# Patient Record
Sex: FEMALE | Race: WHITE | NOT HISPANIC OR LATINO
[De-identification: names, ages, dates, MRNs, and addresses within clinical notes are randomized per-mention and may not be internally consistent; named-entity substitution may affect disease eponyms.]

---

## 2017-02-08 ENCOUNTER — MEDICATION RENEWAL (OUTPATIENT)
Age: 82
End: 2017-02-08

## 2017-03-06 ENCOUNTER — RX RENEWAL (OUTPATIENT)
Age: 82
End: 2017-03-06

## 2017-06-07 ENCOUNTER — RX RENEWAL (OUTPATIENT)
Age: 82
End: 2017-06-07

## 2017-06-12 ENCOUNTER — RX RENEWAL (OUTPATIENT)
Age: 82
End: 2017-06-12

## 2017-06-19 ENCOUNTER — MEDICATION RENEWAL (OUTPATIENT)
Age: 82
End: 2017-06-19

## 2017-06-22 ENCOUNTER — MEDICATION RENEWAL (OUTPATIENT)
Age: 82
End: 2017-06-22

## 2017-07-06 ENCOUNTER — APPOINTMENT (OUTPATIENT)
Dept: CARDIOLOGY | Facility: CLINIC | Age: 82
End: 2017-07-06

## 2017-07-06 ENCOUNTER — APPOINTMENT (OUTPATIENT)
Dept: PULMONOLOGY | Facility: CLINIC | Age: 82
End: 2017-07-06

## 2017-07-06 ENCOUNTER — NON-APPOINTMENT (OUTPATIENT)
Age: 82
End: 2017-07-06

## 2017-07-06 VITALS
BODY MASS INDEX: 25.49 KG/M2 | HEART RATE: 82 BPM | DIASTOLIC BLOOD PRESSURE: 91 MMHG | RESPIRATION RATE: 12 BRPM | OXYGEN SATURATION: 99 % | SYSTOLIC BLOOD PRESSURE: 168 MMHG | HEIGHT: 65 IN | WEIGHT: 153 LBS | TEMPERATURE: 98.6 F

## 2017-07-06 VITALS
OXYGEN SATURATION: 99 % | DIASTOLIC BLOOD PRESSURE: 70 MMHG | SYSTOLIC BLOOD PRESSURE: 130 MMHG | RESPIRATION RATE: 12 BRPM | HEART RATE: 66 BPM

## 2017-07-06 VITALS — DIASTOLIC BLOOD PRESSURE: 78 MMHG | HEART RATE: 66 BPM | SYSTOLIC BLOOD PRESSURE: 132 MMHG

## 2017-07-06 DIAGNOSIS — I34.0 NONRHEUMATIC MITRAL (VALVE) INSUFFICIENCY: ICD-10-CM

## 2017-07-06 DIAGNOSIS — Z82.3 FAMILY HISTORY OF STROKE: ICD-10-CM

## 2017-07-06 DIAGNOSIS — Z87.891 PERSONAL HISTORY OF NICOTINE DEPENDENCE: ICD-10-CM

## 2017-07-07 LAB
ALBUMIN SERPL ELPH-MCNC: 4.2 G/DL
ALP BLD-CCNC: 48 U/L
ALT SERPL-CCNC: 7 U/L
ANION GAP SERPL CALC-SCNC: 16 MMOL/L
AST SERPL-CCNC: 17 U/L
BASOPHILS # BLD AUTO: 0.08 K/UL
BASOPHILS NFR BLD AUTO: 1 %
BILIRUB SERPL-MCNC: 0.4 MG/DL
BUN SERPL-MCNC: 18 MG/DL
CALCIUM SERPL-MCNC: 9.2 MG/DL
CHLORIDE SERPL-SCNC: 108 MMOL/L
CHOLEST SERPL-MCNC: 224 MG/DL
CHOLEST/HDLC SERPL: 4.6 RATIO
CO2 SERPL-SCNC: 23 MMOL/L
CREAT SERPL-MCNC: 0.88 MG/DL
EOSINOPHIL # BLD AUTO: 0.4 K/UL
EOSINOPHIL NFR BLD AUTO: 5 %
GLUCOSE SERPL-MCNC: 80 MG/DL
HBA1C MFR BLD HPLC: 5.4 %
HCT VFR BLD CALC: 38.2 %
HDLC SERPL-MCNC: 49 MG/DL
HGB BLD-MCNC: 12.2 G/DL
IMM GRANULOCYTES NFR BLD AUTO: 0.1 %
LDLC SERPL CALC-MCNC: 152 MG/DL
LYMPHOCYTES # BLD AUTO: 3.37 K/UL
LYMPHOCYTES NFR BLD AUTO: 41.9 %
MAN DIFF?: NORMAL
MCHC RBC-ENTMCNC: 29.2 PG
MCHC RBC-ENTMCNC: 31.9 GM/DL
MCV RBC AUTO: 91.4 FL
MONOCYTES # BLD AUTO: 0.56 K/UL
MONOCYTES NFR BLD AUTO: 7 %
NEUTROPHILS # BLD AUTO: 3.62 K/UL
NEUTROPHILS NFR BLD AUTO: 45 %
PLATELET # BLD AUTO: 291 K/UL
POTASSIUM SERPL-SCNC: 4.4 MMOL/L
PROT SERPL-MCNC: 7.3 G/DL
RBC # BLD: 4.18 M/UL
RBC # FLD: 15.9 %
SODIUM SERPL-SCNC: 147 MMOL/L
TRIGL SERPL-MCNC: 116 MG/DL
TSH SERPL-ACNC: 1.36 UIU/ML
WBC # FLD AUTO: 8.04 K/UL

## 2017-10-23 ENCOUNTER — APPOINTMENT (OUTPATIENT)
Dept: INTERNAL MEDICINE | Facility: CLINIC | Age: 82
End: 2017-10-23
Payer: MEDICARE

## 2017-10-23 VITALS
OXYGEN SATURATION: 98 % | DIASTOLIC BLOOD PRESSURE: 78 MMHG | WEIGHT: 145 LBS | RESPIRATION RATE: 12 BRPM | BODY MASS INDEX: 24.16 KG/M2 | TEMPERATURE: 98.2 F | HEIGHT: 65 IN | HEART RATE: 68 BPM | SYSTOLIC BLOOD PRESSURE: 150 MMHG

## 2017-10-23 PROCEDURE — 99214 OFFICE O/P EST MOD 30 MIN: CPT

## 2018-01-10 ENCOUNTER — APPOINTMENT (OUTPATIENT)
Dept: INTERNAL MEDICINE | Facility: CLINIC | Age: 83
End: 2018-01-10
Payer: MEDICARE

## 2018-01-10 VITALS
DIASTOLIC BLOOD PRESSURE: 81 MMHG | BODY MASS INDEX: 23.82 KG/M2 | RESPIRATION RATE: 12 BRPM | HEART RATE: 74 BPM | TEMPERATURE: 98.1 F | HEIGHT: 65 IN | OXYGEN SATURATION: 98 % | WEIGHT: 143 LBS | SYSTOLIC BLOOD PRESSURE: 137 MMHG

## 2018-01-10 DIAGNOSIS — K59.00 CONSTIPATION, UNSPECIFIED: ICD-10-CM

## 2018-01-10 PROCEDURE — 99214 OFFICE O/P EST MOD 30 MIN: CPT

## 2018-01-11 ENCOUNTER — RX RENEWAL (OUTPATIENT)
Age: 83
End: 2018-01-11

## 2018-01-18 ENCOUNTER — APPOINTMENT (OUTPATIENT)
Dept: CARDIOLOGY | Facility: CLINIC | Age: 83
End: 2018-01-18
Payer: MEDICARE

## 2018-01-18 ENCOUNTER — NON-APPOINTMENT (OUTPATIENT)
Age: 83
End: 2018-01-18

## 2018-01-18 VITALS
SYSTOLIC BLOOD PRESSURE: 143 MMHG | RESPIRATION RATE: 12 BRPM | TEMPERATURE: 97.9 F | HEART RATE: 64 BPM | DIASTOLIC BLOOD PRESSURE: 80 MMHG | OXYGEN SATURATION: 97 % | HEIGHT: 65 IN | WEIGHT: 145 LBS | BODY MASS INDEX: 24.16 KG/M2

## 2018-01-18 VITALS — DIASTOLIC BLOOD PRESSURE: 78 MMHG | SYSTOLIC BLOOD PRESSURE: 150 MMHG

## 2018-01-18 VITALS — SYSTOLIC BLOOD PRESSURE: 148 MMHG | DIASTOLIC BLOOD PRESSURE: 78 MMHG | HEART RATE: 56 BPM

## 2018-01-18 PROCEDURE — 93000 ELECTROCARDIOGRAM COMPLETE: CPT

## 2018-01-18 PROCEDURE — 99214 OFFICE O/P EST MOD 30 MIN: CPT | Mod: 25

## 2018-01-19 LAB
ALBUMIN SERPL ELPH-MCNC: 4 G/DL
ALP BLD-CCNC: 49 U/L
ALT SERPL-CCNC: 5 U/L
ANION GAP SERPL CALC-SCNC: 14 MMOL/L
AST SERPL-CCNC: 20 U/L
BASOPHILS # BLD AUTO: 0.06 K/UL
BASOPHILS NFR BLD AUTO: 0.7 %
BILIRUB SERPL-MCNC: 0.2 MG/DL
BUN SERPL-MCNC: 16 MG/DL
CALCIUM SERPL-MCNC: 9.8 MG/DL
CHLORIDE SERPL-SCNC: 106 MMOL/L
CHOLEST SERPL-MCNC: 185 MG/DL
CHOLEST/HDLC SERPL: 4 RATIO
CO2 SERPL-SCNC: 24 MMOL/L
CREAT SERPL-MCNC: 0.86 MG/DL
EOSINOPHIL # BLD AUTO: 0.53 K/UL
EOSINOPHIL NFR BLD AUTO: 5.9 %
GLUCOSE SERPL-MCNC: 67 MG/DL
HCT VFR BLD CALC: 38.9 %
HDLC SERPL-MCNC: 46 MG/DL
HGB BLD-MCNC: 11.7 G/DL
IMM GRANULOCYTES NFR BLD AUTO: 0.1 %
LDLC SERPL CALC-MCNC: 104 MG/DL
LYMPHOCYTES # BLD AUTO: 3.96 K/UL
LYMPHOCYTES NFR BLD AUTO: 44.4 %
MAN DIFF?: NORMAL
MCHC RBC-ENTMCNC: 28 PG
MCHC RBC-ENTMCNC: 30.1 GM/DL
MCV RBC AUTO: 93.1 FL
MONOCYTES # BLD AUTO: 0.8 K/UL
MONOCYTES NFR BLD AUTO: 9 %
NEUTROPHILS # BLD AUTO: 3.56 K/UL
NEUTROPHILS NFR BLD AUTO: 39.9 %
PLATELET # BLD AUTO: 281 K/UL
POTASSIUM SERPL-SCNC: 4.2 MMOL/L
PROT SERPL-MCNC: 7 G/DL
RBC # BLD: 4.18 M/UL
RBC # FLD: 16.5 %
SODIUM SERPL-SCNC: 144 MMOL/L
TRIGL SERPL-MCNC: 173 MG/DL
TSH SERPL-ACNC: 1.88 UIU/ML
WBC # FLD AUTO: 8.92 K/UL

## 2018-02-05 ENCOUNTER — NON-APPOINTMENT (OUTPATIENT)
Age: 83
End: 2018-02-05

## 2018-02-05 ENCOUNTER — APPOINTMENT (OUTPATIENT)
Dept: CARDIOLOGY | Facility: CLINIC | Age: 83
End: 2018-02-05
Payer: MEDICARE

## 2018-02-05 VITALS
BODY MASS INDEX: 23.99 KG/M2 | HEART RATE: 69 BPM | OXYGEN SATURATION: 100 % | DIASTOLIC BLOOD PRESSURE: 62 MMHG | SYSTOLIC BLOOD PRESSURE: 148 MMHG | HEIGHT: 65 IN | RESPIRATION RATE: 12 BRPM | WEIGHT: 144 LBS

## 2018-02-05 VITALS — HEART RATE: 66 BPM | SYSTOLIC BLOOD PRESSURE: 130 MMHG | DIASTOLIC BLOOD PRESSURE: 68 MMHG

## 2018-02-05 DIAGNOSIS — I10 ESSENTIAL (PRIMARY) HYPERTENSION: ICD-10-CM

## 2018-02-05 LAB
ANION GAP SERPL CALC-SCNC: 17 MMOL/L
BASOPHILS # BLD AUTO: 0.09 K/UL
BASOPHILS NFR BLD AUTO: 1.1 %
BUN SERPL-MCNC: 16 MG/DL
CALCIUM SERPL-MCNC: 9.7 MG/DL
CHLORIDE SERPL-SCNC: 105 MMOL/L
CO2 SERPL-SCNC: 23 MMOL/L
CREAT SERPL-MCNC: 0.83 MG/DL
EOSINOPHIL # BLD AUTO: 0.55 K/UL
EOSINOPHIL NFR BLD AUTO: 6.9 %
GLUCOSE SERPL-MCNC: 70 MG/DL
HCT VFR BLD CALC: 38.6 %
HGB BLD-MCNC: 11.8 G/DL
IMM GRANULOCYTES NFR BLD AUTO: 0.1 %
LYMPHOCYTES # BLD AUTO: 3.05 K/UL
LYMPHOCYTES NFR BLD AUTO: 38.1 %
MAN DIFF?: NORMAL
MCHC RBC-ENTMCNC: 28.6 PG
MCHC RBC-ENTMCNC: 30.6 GM/DL
MCV RBC AUTO: 93.7 FL
MONOCYTES # BLD AUTO: 0.64 K/UL
MONOCYTES NFR BLD AUTO: 8 %
NEUTROPHILS # BLD AUTO: 3.66 K/UL
NEUTROPHILS NFR BLD AUTO: 45.8 %
PLATELET # BLD AUTO: 312 K/UL
POTASSIUM SERPL-SCNC: 4.7 MMOL/L
RBC # BLD: 4.12 M/UL
RBC # FLD: 16.1 %
SODIUM SERPL-SCNC: 145 MMOL/L
WBC # FLD AUTO: 8 K/UL

## 2018-02-05 PROCEDURE — 93306 TTE W/DOPPLER COMPLETE: CPT

## 2018-02-05 PROCEDURE — 93000 ELECTROCARDIOGRAM COMPLETE: CPT

## 2018-02-05 PROCEDURE — 99214 OFFICE O/P EST MOD 30 MIN: CPT | Mod: 25

## 2018-03-02 ENCOUNTER — APPOINTMENT (OUTPATIENT)
Dept: GASTROENTEROLOGY | Facility: CLINIC | Age: 83
End: 2018-03-02

## 2018-03-10 ENCOUNTER — RX RENEWAL (OUTPATIENT)
Age: 83
End: 2018-03-10

## 2018-05-15 ENCOUNTER — APPOINTMENT (OUTPATIENT)
Dept: CARDIOLOGY | Facility: CLINIC | Age: 83
End: 2018-05-15
Payer: MEDICARE

## 2018-05-15 ENCOUNTER — NON-APPOINTMENT (OUTPATIENT)
Age: 83
End: 2018-05-15

## 2018-05-15 VITALS
HEIGHT: 65 IN | HEART RATE: 62 BPM | BODY MASS INDEX: 24.16 KG/M2 | SYSTOLIC BLOOD PRESSURE: 130 MMHG | RESPIRATION RATE: 12 BRPM | OXYGEN SATURATION: 99 % | DIASTOLIC BLOOD PRESSURE: 74 MMHG | WEIGHT: 145 LBS

## 2018-05-15 PROCEDURE — 99214 OFFICE O/P EST MOD 30 MIN: CPT | Mod: 25

## 2018-05-15 PROCEDURE — 93000 ELECTROCARDIOGRAM COMPLETE: CPT

## 2018-05-20 ENCOUNTER — NON-APPOINTMENT (OUTPATIENT)
Age: 83
End: 2018-05-20

## 2018-05-31 ENCOUNTER — APPOINTMENT (OUTPATIENT)
Dept: INTERNAL MEDICINE | Facility: CLINIC | Age: 83
End: 2018-05-31
Payer: MEDICARE

## 2018-05-31 VITALS
WEIGHT: 141 LBS | HEIGHT: 65 IN | SYSTOLIC BLOOD PRESSURE: 116 MMHG | HEART RATE: 69 BPM | TEMPERATURE: 98.3 F | OXYGEN SATURATION: 96 % | BODY MASS INDEX: 23.49 KG/M2 | DIASTOLIC BLOOD PRESSURE: 71 MMHG | RESPIRATION RATE: 12 BRPM

## 2018-05-31 DIAGNOSIS — M25.511 PAIN IN RIGHT SHOULDER: ICD-10-CM

## 2018-05-31 PROCEDURE — 99214 OFFICE O/P EST MOD 30 MIN: CPT

## 2018-06-03 NOTE — HISTORY OF PRESENT ILLNESS
[de-identified] : 88 year old female with h/o Hypertension/ Hyperlipidemia/ / CAD/ Osteoporosis presents today c/o worsening of chronic RT shoulder pain. Pt reports RT shoulder fracture many years ago. Pain moderate , worse with arm lifting, overhead activities. She takes Tylenol prn without much improvement. She is otherwise denies CP/SOB, dizziness, numbness in b/l extremities

## 2018-06-03 NOTE — PHYSICAL EXAM
[No Acute Distress] : no acute distress [Well Nourished] : well nourished [Well Developed] : well developed [Well-Appearing] : well-appearing [Normal Sclera/Conjunctiva] : normal sclera/conjunctiva [PERRL] : pupils equal round and reactive to light [EOMI] : extraocular movements intact [Normal Outer Ear/Nose] : the outer ears and nose were normal in appearance [Normal Oropharynx] : the oropharynx was normal [No JVD] : no jugular venous distention [Supple] : supple [No Lymphadenopathy] : no lymphadenopathy [No Respiratory Distress] : no respiratory distress  [Clear to Auscultation] : lungs were clear to auscultation bilaterally [No Accessory Muscle Use] : no accessory muscle use [Normal Rate] : normal rate  [Regular Rhythm] : with a regular rhythm [Normal S1, S2] : normal S1 and S2 [No Murmur] : no murmur heard [No Edema] : there was no peripheral edema [Soft] : abdomen soft [Non Tender] : non-tender [Non-distended] : non-distended [No HSM] : no HSM [Normal Bowel Sounds] : normal bowel sounds [Normal Posterior Cervical Nodes] : no posterior cervical lymphadenopathy [Normal Anterior Cervical Nodes] : no anterior cervical lymphadenopathy [No CVA Tenderness] : no CVA  tenderness [No Spinal Tenderness] : no spinal tenderness [No Joint Swelling] : no joint swelling [Grossly Normal Strength/Tone] : grossly normal strength/tone [No Rash] : no rash [Normal Gait] : normal gait [Coordination Grossly Intact] : coordination grossly intact [No Focal Deficits] : no focal deficits [Deep Tendon Reflexes (DTR)] : deep tendon reflexes were 2+ and symmetric [Normal Affect] : the affect was normal [Normal Insight/Judgement] : insight and judgment were intact [de-identified] : + RT shoulder tenderness with ROM

## 2018-11-05 ENCOUNTER — RX RENEWAL (OUTPATIENT)
Age: 83
End: 2018-11-05

## 2018-11-09 ENCOUNTER — RX RENEWAL (OUTPATIENT)
Age: 83
End: 2018-11-09

## 2018-11-12 ENCOUNTER — INPATIENT (INPATIENT)
Facility: HOSPITAL | Age: 83
LOS: 3 days | Discharge: NURSING, REHAB OR LONG-TERM CARE | DRG: 86 | End: 2018-11-16
Attending: INTERNAL MEDICINE | Admitting: HOSPITALIST
Payer: COMMERCIAL

## 2018-11-12 ENCOUNTER — NON-APPOINTMENT (OUTPATIENT)
Age: 83
End: 2018-11-12

## 2018-11-12 ENCOUNTER — APPOINTMENT (OUTPATIENT)
Dept: CARDIOLOGY | Facility: CLINIC | Age: 83
End: 2018-11-12
Payer: MEDICARE

## 2018-11-12 VITALS
OXYGEN SATURATION: 95 % | RESPIRATION RATE: 12 BRPM | SYSTOLIC BLOOD PRESSURE: 128 MMHG | HEART RATE: 101 BPM | DIASTOLIC BLOOD PRESSURE: 78 MMHG | HEIGHT: 65 IN

## 2018-11-12 VITALS
RESPIRATION RATE: 18 BRPM | TEMPERATURE: 97 F | OXYGEN SATURATION: 98 % | DIASTOLIC BLOOD PRESSURE: 77 MMHG | HEART RATE: 106 BPM | HEIGHT: 63 IN | SYSTOLIC BLOOD PRESSURE: 143 MMHG | WEIGHT: 125 LBS

## 2018-11-12 DIAGNOSIS — N39.0 URINARY TRACT INFECTION, SITE NOT SPECIFIED: ICD-10-CM

## 2018-11-12 DIAGNOSIS — I62.03 NONTRAUMATIC CHRONIC SUBDURAL HEMORRHAGE: ICD-10-CM

## 2018-11-12 LAB
ALBUMIN SERPL ELPH-MCNC: 3.3 G/DL — SIGNIFICANT CHANGE UP (ref 3.3–5)
ALP SERPL-CCNC: 39 U/L — LOW (ref 40–120)
ALT FLD-CCNC: 21 U/L — SIGNIFICANT CHANGE UP (ref 10–45)
ANION GAP SERPL CALC-SCNC: 11 MMOL/L — SIGNIFICANT CHANGE UP (ref 5–17)
ANION GAP SERPL CALC-SCNC: 12 MMOL/L — SIGNIFICANT CHANGE UP (ref 5–17)
APPEARANCE UR: CLEAR — SIGNIFICANT CHANGE UP
APTT BLD: 34.1 SEC — SIGNIFICANT CHANGE UP (ref 27.5–36.3)
AST SERPL-CCNC: 79 U/L — HIGH (ref 10–40)
BACTERIA # UR AUTO: ABNORMAL
BASOPHILS # BLD AUTO: 0 K/UL — SIGNIFICANT CHANGE UP (ref 0–0.2)
BASOPHILS NFR BLD AUTO: 0.4 % — SIGNIFICANT CHANGE UP (ref 0–2)
BILIRUB SERPL-MCNC: 0.4 MG/DL — SIGNIFICANT CHANGE UP (ref 0.2–1.2)
BILIRUB UR-MCNC: ABNORMAL
BUN SERPL-MCNC: 11 MG/DL — SIGNIFICANT CHANGE UP (ref 7–23)
BUN SERPL-MCNC: 11 MG/DL — SIGNIFICANT CHANGE UP (ref 7–23)
CALCIUM SERPL-MCNC: 8.9 MG/DL — SIGNIFICANT CHANGE UP (ref 8.4–10.5)
CALCIUM SERPL-MCNC: 8.9 MG/DL — SIGNIFICANT CHANGE UP (ref 8.4–10.5)
CHLORIDE SERPL-SCNC: 102 MMOL/L — SIGNIFICANT CHANGE UP (ref 96–108)
CHLORIDE SERPL-SCNC: 105 MMOL/L — SIGNIFICANT CHANGE UP (ref 96–108)
CO2 SERPL-SCNC: 22 MMOL/L — SIGNIFICANT CHANGE UP (ref 22–31)
CO2 SERPL-SCNC: 24 MMOL/L — SIGNIFICANT CHANGE UP (ref 22–31)
COLOR SPEC: YELLOW — SIGNIFICANT CHANGE UP
CREAT SERPL-MCNC: 0.32 MG/DL — LOW (ref 0.5–1.3)
CREAT SERPL-MCNC: 0.37 MG/DL — LOW (ref 0.5–1.3)
DIFF PNL FLD: ABNORMAL
EOSINOPHIL # BLD AUTO: 0.2 K/UL — SIGNIFICANT CHANGE UP (ref 0–0.5)
EOSINOPHIL NFR BLD AUTO: 2.5 % — SIGNIFICANT CHANGE UP (ref 0–6)
EPI CELLS # UR: 0 /HPF — SIGNIFICANT CHANGE UP
GLUCOSE SERPL-MCNC: 94 MG/DL — SIGNIFICANT CHANGE UP (ref 70–99)
GLUCOSE SERPL-MCNC: 96 MG/DL — SIGNIFICANT CHANGE UP (ref 70–99)
GLUCOSE UR QL: NEGATIVE — SIGNIFICANT CHANGE UP
HCT VFR BLD CALC: 37.1 % — SIGNIFICANT CHANGE UP (ref 34.5–45)
HGB BLD-MCNC: 11.8 G/DL — SIGNIFICANT CHANGE UP (ref 11.5–15.5)
HYALINE CASTS # UR AUTO: 4 /LPF — HIGH (ref 0–2)
INR BLD: 1.12 RATIO — SIGNIFICANT CHANGE UP (ref 0.88–1.16)
KETONES UR-MCNC: ABNORMAL
LEUKOCYTE ESTERASE UR-ACNC: ABNORMAL
LYMPHOCYTES # BLD AUTO: 1.5 K/UL — SIGNIFICANT CHANGE UP (ref 1–3.3)
LYMPHOCYTES # BLD AUTO: 22.1 % — SIGNIFICANT CHANGE UP (ref 13–44)
MCHC RBC-ENTMCNC: 28.9 PG — SIGNIFICANT CHANGE UP (ref 27–34)
MCHC RBC-ENTMCNC: 31.8 GM/DL — LOW (ref 32–36)
MCV RBC AUTO: 91 FL — SIGNIFICANT CHANGE UP (ref 80–100)
MONOCYTES # BLD AUTO: 0.8 K/UL — SIGNIFICANT CHANGE UP (ref 0–0.9)
MONOCYTES NFR BLD AUTO: 11.8 % — SIGNIFICANT CHANGE UP (ref 2–14)
NEUTROPHILS # BLD AUTO: 4.3 K/UL — SIGNIFICANT CHANGE UP (ref 1.8–7.4)
NEUTROPHILS NFR BLD AUTO: 63.2 % — SIGNIFICANT CHANGE UP (ref 43–77)
NITRITE UR-MCNC: NEGATIVE — SIGNIFICANT CHANGE UP
PH UR: 6.5 — SIGNIFICANT CHANGE UP (ref 5–8)
PLATELET # BLD AUTO: 274 K/UL — SIGNIFICANT CHANGE UP (ref 150–400)
POTASSIUM SERPL-MCNC: 3.4 MMOL/L — LOW (ref 3.5–5.3)
POTASSIUM SERPL-MCNC: >9 MMOL/L — CRITICAL HIGH (ref 3.5–5.3)
POTASSIUM SERPL-SCNC: 3.4 MMOL/L — LOW (ref 3.5–5.3)
POTASSIUM SERPL-SCNC: >9 MMOL/L — CRITICAL HIGH (ref 3.5–5.3)
PROT SERPL-MCNC: 6.9 G/DL — SIGNIFICANT CHANGE UP (ref 6–8.3)
PROT UR-MCNC: ABNORMAL
PROTHROM AB SERPL-ACNC: 12.9 SEC — SIGNIFICANT CHANGE UP (ref 10–12.9)
RBC # BLD: 4.08 M/UL — SIGNIFICANT CHANGE UP (ref 3.8–5.2)
RBC # FLD: 17.6 % — HIGH (ref 10.3–14.5)
RBC CASTS # UR COMP ASSIST: 15 /HPF — HIGH (ref 0–4)
SODIUM SERPL-SCNC: 135 MMOL/L — SIGNIFICANT CHANGE UP (ref 135–145)
SODIUM SERPL-SCNC: 141 MMOL/L — SIGNIFICANT CHANGE UP (ref 135–145)
SP GR SPEC: 1.02 — SIGNIFICANT CHANGE UP (ref 1.01–1.02)
UROBILINOGEN FLD QL: ABNORMAL
WBC # BLD: 6.8 K/UL — SIGNIFICANT CHANGE UP (ref 3.8–10.5)
WBC # FLD AUTO: 6.8 K/UL — SIGNIFICANT CHANGE UP (ref 3.8–10.5)
WBC UR QL: 40 /HPF — HIGH (ref 0–5)

## 2018-11-12 PROCEDURE — 93010 ELECTROCARDIOGRAM REPORT: CPT

## 2018-11-12 PROCEDURE — 99285 EMERGENCY DEPT VISIT HI MDM: CPT | Mod: 25

## 2018-11-12 PROCEDURE — 99215 OFFICE O/P EST HI 40 MIN: CPT | Mod: 25

## 2018-11-12 PROCEDURE — 71045 X-RAY EXAM CHEST 1 VIEW: CPT | Mod: 26

## 2018-11-12 PROCEDURE — 93000 ELECTROCARDIOGRAM COMPLETE: CPT

## 2018-11-12 PROCEDURE — 70450 CT HEAD/BRAIN W/O DYE: CPT | Mod: 26

## 2018-11-12 RX ORDER — LEVETIRACETAM 250 MG/1
500 TABLET, FILM COATED ORAL ONCE
Qty: 0 | Refills: 0 | Status: COMPLETED | OUTPATIENT
Start: 2018-11-12 | End: 2018-11-12

## 2018-11-12 RX ORDER — CEFTRIAXONE 500 MG/1
1 INJECTION, POWDER, FOR SOLUTION INTRAMUSCULAR; INTRAVENOUS ONCE
Qty: 0 | Refills: 0 | Status: COMPLETED | OUTPATIENT
Start: 2018-11-12 | End: 2018-11-12

## 2018-11-12 RX ADMIN — LEVETIRACETAM 400 MILLIGRAM(S): 250 TABLET, FILM COATED ORAL at 23:13

## 2018-11-12 RX ADMIN — CEFTRIAXONE 100 GRAM(S): 500 INJECTION, POWDER, FOR SOLUTION INTRAMUSCULAR; INTRAVENOUS at 22:25

## 2018-11-12 NOTE — ED PROVIDER NOTE - PMH
Asthma    Generalized Osteoarthritis    History of Osteoporosis    Hyperlipemia    Hypertension    PVD (Peripheral Vascular Disease)

## 2018-11-12 NOTE — ED ADULT NURSE NOTE - NSIMPLEMENTINTERV_GEN_ALL_ED
Implemented All Fall with Harm Risk Interventions:  [place] to call system. Call bell, personal items and telephone within reach. Instruct patient to call for assistance. Room bathroom lighting operational. Non-slip footwear when patient is off stretcher. Physically safe environment: no spills, clutter or unnecessary equipment. Stretcher in lowest position, wheels locked, appropriate side rails in place. Provide visual cue, wrist band, yellow gown, etc. Monitor gait and stability. Monitor for mental status changes and reorient to person, place, and time. Review medications for side effects contributing to fall risk. Reinforce activity limits and safety measures with patient and family. Provide visual clues: red socks.

## 2018-11-12 NOTE — ED PROVIDER NOTE - PROGRESS NOTE DETAILS
spoke with neurosurgery regarding findings. resident Dr. Maza states no intervention necessary given size and chronicity. will admit patient to medicine secondary to significant deconditioning pending results of remaining labs. -Jaqui Noble PA-C

## 2018-11-12 NOTE — ED PROVIDER NOTE - ATTENDING CONTRIBUTION TO CARE
attending Radha: 88yF h/o HTN, HLD, brought in by family for gradual decline in mental status and independence x months after fall in months ago in [place]. Reports SDH 2/2 fall that required surgical intervention x 2. Now with worsening ability to care for self, ambulate or perform any ADLs. Patient returned from [place] yesterday. Sent in for eval by PMD. Will obtain labs, CTH, UA and admit

## 2018-11-12 NOTE — ED ADULT NURSE NOTE - CHIEF COMPLAINT QUOTE
fell in July in [place].  Was told recently has ICH.  MAy have had some surgery for ICH.  Family is unsure as information is in [place].  Pt has worsening AMS

## 2018-11-12 NOTE — CONSULT NOTE ADULT - ATTENDING COMMENTS
As per resident note above.  Seen and evaluated with resident.  Agree with evaluation and assessment.  PT is alert, conversant, MCKINNEY to command.  CT with left residual subdural collection, minimal mass effect. Repeat CT unchanged. Per , pt had 2 operations, had to return to OR for reaccumulation of subdural.  Pt was not moving right side prior to the surgery.  Pt appears on the mend from subdural drainage in [place].  She should avoid antiplatelets or full anticoagulation at this time and can follow up in my office for further radiographic and clinical evaluation.

## 2018-11-12 NOTE — ED ADULT NURSE NOTE - OBJECTIVE STATEMENT
87 y/o with PMH HTN HDL female presenting to ED for headache. As per pt family: "She was in [place] and had a fall in july, they thought she just had a small concussion. In september, her  noticed slurred speech and when she was walking her right side would give out. They diagnosed her with a chronic subdural hematoma. She got drains placed twice. She just flew back from [place] less than 24 hours ago. Her cardiologist saw her and thought that she needed to come to the ED to be evaluated." Upon exam, pt AOx1, bilateral  strength equal 3/5 strength, right leg drop noted, no facial droop noted, no arm drift noted, lungs cta bilaterally, no difficulty speaking in complete sentences, s1s2 heart sounds heard, pulses x 4, camilo x4, abdomen soft nontender nondistended, skin intact. 89 y/o with PMH HTN HDL female presenting to ED for headache. As per pt family: "She was in [place] and had a fall in july, they thought she just had a small concussion. In september, her  noticed slurred speech and when she was walking her right side would give out. They diagnosed her with a chronic subdural hematoma. She got drains placed twice. She just flew back from [place] less than 24 hours ago. Her cardiologist saw her and thought that she needed to come to the ED to be evaluated." Upon exam, pt AOx1, bilateral  strength equal 3/5 strength, right eye 3mm PERRLA, left eye 3 mm fixed- pt family states :"her left eye has damage from cataract surgery years ago but we don't know what the damage was." right leg drop noted, no facial droop noted, no arm drift noted, no slurred speech noted, lungs cta bilaterally, no difficulty speaking in complete sentences, s1s2 heart sounds heard. Pt denies chest pain, sob, ha, n/v/d, abdominal pain, f/c, urinary symptoms, hematuria

## 2018-11-12 NOTE — ED PROVIDER NOTE - OBJECTIVE STATEMENT
89 y/o F pmhx htn, hld, presenting with concern for gradual decline in mental status and independence over months s/p fall in July in [place]. Patient was independent with level of mild dementia prior to fall. In July suffered fall and subdural that required later evacuation in September, secondary to weakness of R lower extremity that was noted as well as slurring of speech. Pt returned to baseline then needed a second evacuation in October secondary to return of symptoms. Patient has been 'off her medication since July' per her daughters after recommendation by doctors in [place], and has been having worsening ability to care for self, ambulate or perform any ADLs. Patient returned from [facility] yesterday, daughters took her to her cardiologist Dr. Jauregui, and was advised to come to the ER. Family concerned patient has been losing weight as she has been having difficulty with swallowing and caring for self. Denies any complaints of pain or any recent falls.

## 2018-11-12 NOTE — ED PROVIDER NOTE - PHYSICAL EXAMINATION
GEN: thin, frail, NAD  HEENT: NC/AT, Symm Facies. PERRL, EOMI, MMM, posterior pharynx clear  CV: No JVD/Bruits or stridor;  +S1S2, RRR w/o m/g/r  RESP: CTAB w/o w/r/r  ABD: Soft, nt/nd, +BS. No guarding/rebound. No RUQ tender, no CVAT  EXT/MSK: No lower extremity edema or calf tenderness. WWP, palpable pulses. FROMx4  SKIN: No erythema, lesions or rash  Neuro: Grossly intact, A&O only to person, with normal speech, CN II-XII intact; Sensation intact, motor 5/5 bilateral UE, 2/5 strength bilateral lower extremities.

## 2018-11-12 NOTE — CONSULT NOTE ADULT - ASSESSMENT
Leonarda Lundberg 89 y/o F pmhx htn, hld, mild dementia, sp L ronny hole x2 for evacuation of cSDH in [place] in early october.  Patient returned from [place] yesterday and family states they are concerned about her having been deconditioned. Per family, patient has decline in strength/energy but MS is at baseline.  CTH: small residual mixed density L sdh, no shift.     PLAN: repeat CTH, keppra 500 x7 days, medicine consult, may fu outpatient in 1-2 weeks after discharge

## 2018-11-12 NOTE — ED ADULT TRIAGE NOTE - CHIEF COMPLAINT QUOTE
fell in July in [place].  Was told recently has ICH.  MAy have had some surgery for ICH.  Family is unsure as information is in Haitian.  Pt has worsening AMS

## 2018-11-12 NOTE — ED ADULT NURSE REASSESSMENT NOTE - NS ED NURSE REASSESS COMMENT FT1
Pt admitted to medicine for UTI, SDH. Pt remains stable in ED , VSS, NAD, sleeping comfortably in stretcher. Side rails up, bed in lowest position, door open, visible at nurse's station. RTM. Awaits bed.

## 2018-11-12 NOTE — ED ADULT NURSE REASSESSMENT NOTE - NS ED NURSE REASSESS COMMENT FT1
straight catheterization performed.  Pt tolerated well.  2 RNs present at bedside.  Sterile technique maintained.  pt had 150cc output of clear whitney urine.  Pt repositioned and provided with clean diaper.  Family present at bedside.  Will continue to monitor.

## 2018-11-13 DIAGNOSIS — S06.5X9A TRAUMATIC SUBDURAL HEMORRHAGE WITH LOSS OF CONSCIOUSNESS OF UNSPECIFIED DURATION, INITIAL ENCOUNTER: ICD-10-CM

## 2018-11-13 DIAGNOSIS — N39.0 URINARY TRACT INFECTION, SITE NOT SPECIFIED: ICD-10-CM

## 2018-11-13 DIAGNOSIS — Z29.9 ENCOUNTER FOR PROPHYLACTIC MEASURES, UNSPECIFIED: ICD-10-CM

## 2018-11-13 DIAGNOSIS — Z79.899 OTHER LONG TERM (CURRENT) DRUG THERAPY: ICD-10-CM

## 2018-11-13 DIAGNOSIS — I10 ESSENTIAL (PRIMARY) HYPERTENSION: ICD-10-CM

## 2018-11-13 LAB
ANION GAP SERPL CALC-SCNC: 14 MMOL/L — SIGNIFICANT CHANGE UP (ref 5–17)
ANISOCYTOSIS BLD QL: SIGNIFICANT CHANGE UP
BASOPHILS # BLD AUTO: 0 K/UL — SIGNIFICANT CHANGE UP (ref 0–0.2)
BASOPHILS # BLD AUTO: 0.16 K/UL — SIGNIFICANT CHANGE UP (ref 0–0.2)
BASOPHILS NFR BLD AUTO: 0.5 % — SIGNIFICANT CHANGE UP (ref 0–2)
BASOPHILS NFR BLD AUTO: 2.6 % — HIGH (ref 0–2)
BUN SERPL-MCNC: 10 MG/DL — SIGNIFICANT CHANGE UP (ref 7–23)
BURR CELLS BLD QL SMEAR: SIGNIFICANT CHANGE UP
CALCIUM SERPL-MCNC: 8.7 MG/DL — SIGNIFICANT CHANGE UP (ref 8.4–10.5)
CHLORIDE SERPL-SCNC: 106 MMOL/L — SIGNIFICANT CHANGE UP (ref 96–108)
CO2 SERPL-SCNC: 23 MMOL/L — SIGNIFICANT CHANGE UP (ref 22–31)
CREAT SERPL-MCNC: 0.36 MG/DL — LOW (ref 0.5–1.3)
EOSINOPHIL # BLD AUTO: 0.11 K/UL — SIGNIFICANT CHANGE UP (ref 0–0.5)
EOSINOPHIL # BLD AUTO: 0.2 K/UL — SIGNIFICANT CHANGE UP (ref 0–0.5)
EOSINOPHIL NFR BLD AUTO: 1.7 % — SIGNIFICANT CHANGE UP (ref 0–6)
EOSINOPHIL NFR BLD AUTO: 2.8 % — SIGNIFICANT CHANGE UP (ref 0–6)
GLUCOSE SERPL-MCNC: 82 MG/DL — SIGNIFICANT CHANGE UP (ref 70–99)
HCT VFR BLD CALC: 31.4 % — LOW (ref 34.5–45)
HCT VFR BLD CALC: 33.1 % — LOW (ref 34.5–45)
HGB BLD-MCNC: 10.1 G/DL — LOW (ref 11.5–15.5)
HGB BLD-MCNC: 10.7 G/DL — LOW (ref 11.5–15.5)
LACTATE SERPL-SCNC: 1.4 MMOL/L — SIGNIFICANT CHANGE UP (ref 0.7–2)
LYMPHOCYTES # BLD AUTO: 1.1 K/UL — SIGNIFICANT CHANGE UP (ref 1–3.3)
LYMPHOCYTES # BLD AUTO: 16.9 % — SIGNIFICANT CHANGE UP (ref 13–44)
LYMPHOCYTES # BLD AUTO: 2 K/UL — SIGNIFICANT CHANGE UP (ref 1–3.3)
LYMPHOCYTES # BLD AUTO: 31.6 % — SIGNIFICANT CHANGE UP (ref 13–44)
MACROCYTES BLD QL: SLIGHT — SIGNIFICANT CHANGE UP
MAGNESIUM SERPL-MCNC: 2 MG/DL — SIGNIFICANT CHANGE UP (ref 1.6–2.6)
MANUAL SMEAR VERIFICATION: SIGNIFICANT CHANGE UP
MCHC RBC-ENTMCNC: 28.1 PG — SIGNIFICANT CHANGE UP (ref 27–34)
MCHC RBC-ENTMCNC: 29.1 PG — SIGNIFICANT CHANGE UP (ref 27–34)
MCHC RBC-ENTMCNC: 32.2 GM/DL — SIGNIFICANT CHANGE UP (ref 32–36)
MCHC RBC-ENTMCNC: 32.3 GM/DL — SIGNIFICANT CHANGE UP (ref 32–36)
MCV RBC AUTO: 87.5 FL — SIGNIFICANT CHANGE UP (ref 80–100)
MCV RBC AUTO: 90 FL — SIGNIFICANT CHANGE UP (ref 80–100)
MICROCYTES BLD QL: SLIGHT — SIGNIFICANT CHANGE UP
MONOCYTES # BLD AUTO: 0.54 K/UL — SIGNIFICANT CHANGE UP (ref 0–0.9)
MONOCYTES # BLD AUTO: 0.8 K/UL — SIGNIFICANT CHANGE UP (ref 0–0.9)
MONOCYTES NFR BLD AUTO: 12.3 % — SIGNIFICANT CHANGE UP (ref 2–14)
MONOCYTES NFR BLD AUTO: 8.5 % — SIGNIFICANT CHANGE UP (ref 2–14)
NEUTROPHILS # BLD AUTO: 3.52 K/UL — SIGNIFICANT CHANGE UP (ref 1.8–7.4)
NEUTROPHILS # BLD AUTO: 4.4 K/UL — SIGNIFICANT CHANGE UP (ref 1.8–7.4)
NEUTROPHILS NFR BLD AUTO: 55.6 % — SIGNIFICANT CHANGE UP (ref 43–77)
NEUTROPHILS NFR BLD AUTO: 67.4 % — SIGNIFICANT CHANGE UP (ref 43–77)
PHOSPHATE SERPL-MCNC: 3.5 MG/DL — SIGNIFICANT CHANGE UP (ref 2.5–4.5)
PLAT MORPH BLD: NORMAL — SIGNIFICANT CHANGE UP
PLATELET # BLD AUTO: 249 K/UL — SIGNIFICANT CHANGE UP (ref 150–400)
PLATELET # BLD AUTO: 260 K/UL — SIGNIFICANT CHANGE UP (ref 150–400)
POIKILOCYTOSIS BLD QL AUTO: SIGNIFICANT CHANGE UP
POTASSIUM SERPL-MCNC: 3.7 MMOL/L — SIGNIFICANT CHANGE UP (ref 3.5–5.3)
POTASSIUM SERPL-SCNC: 3.7 MMOL/L — SIGNIFICANT CHANGE UP (ref 3.5–5.3)
RBC # BLD: 3.59 M/UL — LOW (ref 3.8–5.2)
RBC # BLD: 3.68 M/UL — LOW (ref 3.8–5.2)
RBC # FLD: 17.8 % — HIGH (ref 10.3–14.5)
RBC # FLD: 20.8 % — HIGH (ref 10.3–14.5)
RBC BLD AUTO: ABNORMAL
SODIUM SERPL-SCNC: 143 MMOL/L — SIGNIFICANT CHANGE UP (ref 135–145)
WBC # BLD: 6.33 K/UL — SIGNIFICANT CHANGE UP (ref 3.8–10.5)
WBC # BLD: 6.6 K/UL — SIGNIFICANT CHANGE UP (ref 3.8–10.5)
WBC # FLD AUTO: 6.33 K/UL — SIGNIFICANT CHANGE UP (ref 3.8–10.5)
WBC # FLD AUTO: 6.6 K/UL — SIGNIFICANT CHANGE UP (ref 3.8–10.5)

## 2018-11-13 PROCEDURE — 99223 1ST HOSP IP/OBS HIGH 75: CPT

## 2018-11-13 PROCEDURE — 99222 1ST HOSP IP/OBS MODERATE 55: CPT

## 2018-11-13 PROCEDURE — 70450 CT HEAD/BRAIN W/O DYE: CPT | Mod: 26

## 2018-11-13 PROCEDURE — 12345: CPT | Mod: NC

## 2018-11-13 RX ORDER — INFLUENZA VIRUS VACCINE 15; 15; 15; 15 UG/.5ML; UG/.5ML; UG/.5ML; UG/.5ML
0.5 SUSPENSION INTRAMUSCULAR ONCE
Qty: 0 | Refills: 0 | Status: COMPLETED | OUTPATIENT
Start: 2018-11-13 | End: 2018-11-16

## 2018-11-13 RX ORDER — SIMVASTATIN 20 MG/1
20 TABLET, FILM COATED ORAL AT BEDTIME
Qty: 0 | Refills: 0 | Status: DISCONTINUED | OUTPATIENT
Start: 2018-11-13 | End: 2018-11-16

## 2018-11-13 RX ORDER — CEFTRIAXONE 500 MG/1
1 INJECTION, POWDER, FOR SOLUTION INTRAMUSCULAR; INTRAVENOUS EVERY 24 HOURS
Qty: 0 | Refills: 0 | Status: DISCONTINUED | OUTPATIENT
Start: 2018-11-13 | End: 2018-11-15

## 2018-11-13 RX ORDER — ACETAMINOPHEN 500 MG
650 TABLET ORAL EVERY 6 HOURS
Qty: 0 | Refills: 0 | Status: DISCONTINUED | OUTPATIENT
Start: 2018-11-13 | End: 2018-11-16

## 2018-11-13 RX ORDER — METOPROLOL TARTRATE 50 MG
25 TABLET ORAL DAILY
Qty: 0 | Refills: 0 | Status: DISCONTINUED | OUTPATIENT
Start: 2018-11-13 | End: 2018-11-16

## 2018-11-13 RX ORDER — LEVETIRACETAM 250 MG/1
500 TABLET, FILM COATED ORAL EVERY 12 HOURS
Qty: 0 | Refills: 0 | Status: DISCONTINUED | OUTPATIENT
Start: 2018-11-13 | End: 2018-11-16

## 2018-11-13 RX ORDER — MEMANTINE HYDROCHLORIDE 10 MG/1
5 TABLET ORAL DAILY
Qty: 0 | Refills: 0 | Status: DISCONTINUED | OUTPATIENT
Start: 2018-11-13 | End: 2018-11-16

## 2018-11-13 RX ORDER — DEXTROSE MONOHYDRATE, SODIUM CHLORIDE, AND POTASSIUM CHLORIDE 50; .745; 4.5 G/1000ML; G/1000ML; G/1000ML
1000 INJECTION, SOLUTION INTRAVENOUS
Qty: 0 | Refills: 0 | Status: DISCONTINUED | OUTPATIENT
Start: 2018-11-13 | End: 2018-11-13

## 2018-11-13 RX ADMIN — Medication 25 MILLIGRAM(S): at 17:55

## 2018-11-13 RX ADMIN — CEFTRIAXONE 100 GRAM(S): 500 INJECTION, POWDER, FOR SOLUTION INTRAMUSCULAR; INTRAVENOUS at 21:31

## 2018-11-13 RX ADMIN — Medication 650 MILLIGRAM(S): at 18:00

## 2018-11-13 RX ADMIN — DEXTROSE MONOHYDRATE, SODIUM CHLORIDE, AND POTASSIUM CHLORIDE 75 MILLILITER(S): 50; .745; 4.5 INJECTION, SOLUTION INTRAVENOUS at 03:24

## 2018-11-13 RX ADMIN — MEMANTINE HYDROCHLORIDE 5 MILLIGRAM(S): 10 TABLET ORAL at 21:31

## 2018-11-13 RX ADMIN — SIMVASTATIN 20 MILLIGRAM(S): 20 TABLET, FILM COATED ORAL at 21:31

## 2018-11-13 RX ADMIN — DEXTROSE MONOHYDRATE, SODIUM CHLORIDE, AND POTASSIUM CHLORIDE 50 MILLILITER(S): 50; .745; 4.5 INJECTION, SOLUTION INTRAVENOUS at 15:41

## 2018-11-13 RX ADMIN — LEVETIRACETAM 400 MILLIGRAM(S): 250 TABLET, FILM COATED ORAL at 17:55

## 2018-11-13 RX ADMIN — LEVETIRACETAM 400 MILLIGRAM(S): 250 TABLET, FILM COATED ORAL at 05:37

## 2018-11-13 RX ADMIN — Medication 650 MILLIGRAM(S): at 17:43

## 2018-11-13 NOTE — PROGRESS NOTE ADULT - ASSESSMENT
89 yo woman with Dementia, HTN, HLD recent SDH s/p evac in [place] and brought in by family/sent in by PMD for decreased functional status  Drainage of SDH done initially on 10/31/18 and then needed reoperation a few days later with reaccumulation

## 2018-11-13 NOTE — H&P ADULT - ASSESSMENT
89 yo woman with reported history of Dementia, HTN, HLD brought in by family in setting decline in mental status and functional status over the course of months in setting of SDH.

## 2018-11-13 NOTE — CHART NOTE - NSCHARTNOTEFT_GEN_A_CORE
Fever 100.5 reported by RN. pt seen and examined at bedside,  and grand children at bedside. NAD, aler and confused, follows commands, Denies head ache, cough, SOB, chest pain, N/V/D, abdominal pain, dysuria.       Vital Signs Last 24 Hrs  T(C): 36.7 (2018 18:00), Max: 38.1 (2018 15:55)  T(F): 98.1 (2018 18:00), Max: 100.5 (2018 15:55)  HR: 108 (2018 16:40) (77 - 108)  BP: 118/69 (2018 16:40) (95/59 - 119/80)  BP(mean): --  RR: 18 (2018 15:15) (17 - 20)  SpO2: 91% (2018 15:15) (91% - 99%)      Labs:                          10.7   6.6   )-----------( 249      ( 2018 18:45 )             33.1         143  |  106  |  10  ----------------------------<  82  3.7   |  23  |  0.36<L>    Ca    8.7      2018 06:19  Phos  3.5       Mg     2.0         TPro  6.9  /  Alb  3.3  /  TBili  0.4  /  DBili  x   /  AST  79<H>  /  ALT  21  /  AlkPhos  39<L>      Urinalysis Basic - ( 2018 21:12 )    Color: Yellow / Appearance: Clear / S.025 / pH: x  Gluc: x / Ketone: Small  / Bili: Small / Urobili: 8 mg/dL   Blood: x / Protein: 30 mg/dL / Nitrite: Negative   Leuk Esterase: Large / RBC: 15 /hpf / WBC 40 /hpf   Sq Epi: x / Non Sq Epi: 0 /hpf / Bacteria: Moderate        Radiology:    < from: CT Head No Cont (18 @ 01:56) >    IMPRESSION:    Stable left frontal parietal mixed density subdural hematoma.      < end of copied text >    < from: Xray Chest 1 View- PORTABLE-Urgent (11.12.18 @ 19:50) >    MPRESSION:   No focal consolidation.    < end of copied text >      Physical Exam:  General: NAD  Neurology: Alert and confused,  nonfocal, MCKINNEY x 4  Head:  s/p [place]  Respiratory: CTA B/L  CV: RRR, S1S2, no murmur  Abdominal: Soft, NT, ND   MSK: No edema, + peripheral pulses, FROM all 4 extremity    Assessment & Plan:  89 yo woman with reported history of Dementia, HTN, HLD brought in by family in setting decline in mental status and functional status over the course of months.   UA positive, on Ceftriaxone, noted no blood cultures were sent.    - Blood culture X2, CBC with diff  - Follow up with Urine culture result  - Tylenol 650mg po X1, and cooling measures  - C/W Ceftriaxone    Jana Maza NP-C  #[phone]

## 2018-11-13 NOTE — H&P ADULT - HISTORY OF PRESENT ILLNESS
87 yo woman with reported history of Dementia, HTN, HLD brought in by family in setting decline in mental status and functional status over the course of months. History obtained by charting. Attempted to contact family at provided numbers and left a message. Per ED note, patient had a fall in July and incurred a subdural hematoma which was evacuated in September, secondary to weakness of Right lower extremity that was noted as well as slurring of speech. Patient returned to baseline then needed a second evacuation in October in setting of return of symptoms.  Per ED charting, patient has been off medications since July. Patient returned from [facility] yesterday 89 yo woman with reported history of Dementia, HTN, HLD brought in by family in setting decline in mental status and functional status over the course of months. History obtained by charting. Per family, patient had a fall in July seen by visited by MD.  On September 30th ambulance took her to [facility] secondary to weakness of right side, imbalance, that was noted as well as slurring of speech had 1st surgery on October 1st. Patient returned to baseline then needed a second evacuation within a week in the setting of return of symptoms.  Per ED charting, patient has been off medications since July. Patient returned from [facility] yesterday. Took to Dr. Jauregui suggested for her to go to the ED. Patient has poor appetite. Family unclear if patient with difficult swallowing. 87 yo woman with reported history of Dementia, HTN, HLD brought in by family in setting decline in mental status and functional status over the course of months. History obtained by charting and family. Per family, patient had a fall in July and at that time was seen by visiting by MD.   noticed that patient had  weakness of right side, imbalance, and slurring of speech: at that time she was taken to the hospital on September 30th in [facility]. She was found to have a SDH and had 1st surgery on October 1st. Patient returned to baseline then needed a second evacuation within a week in the setting of returned symptoms. Family has noted that patient had poor appetite and is unclear if patient with difficult swallowing.  Per ED charting and family patient has been off medications since July. Patient had andrade catheter at hospital which was discontinue day of discharge. Per family, patient has been able to urinate on her own. Patient returned from [facility] on 11/11 when she was once deemed stable for transport.  Patient was taken to Dr. Jauregui suggested for her to go to the ED. Per family, patient occasionally complains of headache. No reported complaints

## 2018-11-13 NOTE — H&P ADULT - PROBLEM SELECTOR PLAN 2
Patient with reported andrade at outside hospital  UA suggestive of UTI  S/P Ceftriaxone  C/W Cefrtiaxone pending UCx

## 2018-11-13 NOTE — H&P ADULT - ATTENDING COMMENTS
Patient was previously unknown to me. Patient was assigned to me by hospitalist in charge. My involvement in this case consisted of initial history, physical and management plan.  Primary day team to assume care in AM. Case discussed in detail with overnight medicine NP/PA June [phone]

## 2018-11-13 NOTE — H&P ADULT - PROBLEM SELECTOR PLAN 1
CT Head notable for "Left frontal mixed density subdural hematoma measuring up to 1.1 cm without associated midline shift or herniation, likely subacute to chronic."  Repeat CT head prelim read per radiology: stable SDH  Neurosurgery recs appreciated: Continue with [place]  Consider Dysphagia screening   PT eval in setting of prolonged hospitalization   Fall risk

## 2018-11-13 NOTE — H&P ADULT - NSHPLABSRESULTS_GEN_ALL_CORE
Personally reviewed labs and noted in detail below: Personally reviewed labs and noted in detail below:    Reviewed CT head:< from: CT Head No Cont (11.12.18 @ 20:08) >    PROCEDURE DATE:  11/12/2018      INTERPRETATION:  CLINICAL INFORMATION: History of subdural hemorrhage   with worsening weakness and mental status.    TECHNIQUE: Noncontrast axial CT images were acquired through the head.   Two-dimensional sagittal and coronal reformats were generated.    COMPARISON STUDY: CT head 4/17/2011.    FINDINGS:   There is a left frontal mixed density subdural collection measuring up to   1.1 cm in greatest depth exerting mild mass effect on the left cerebral   hemisphere without associated midline shift. There are 2 associated left   frontal ronny holes with a small amount of presumably postprocedural   pneumocephalus within the collection.    There is no intraparenchymal hemorrhage, mass effect, midline shift,   central herniation, or hydrocephalus.    Age-related involution changes of the brain evidenced by prominence of   the ventricles and sulci. There is mild patchy white matter   hypoattenuation which is nonspecific in nature and likely related to   chronic microvascular ischemic disease.    The visualized paranasal sinuses are clear. Extensive bilateral mastoid   air cell effusions    No scalp hematoma or displaced calvarial fracture.    IMPRESSION:   Left frontal mixed density subdural hematoma measuring up to 1.1 cm   without associated midline shift or herniation, likely subacute to   chronic.    < end of copied text >      Personally reviewed CXR: no focal consolidations    Personally reviewed EKG

## 2018-11-13 NOTE — H&P ADULT - FAMILY HISTORY
Sibling  Still living? Unknown  Family history of breast cancer in sister, Age at diagnosis: Age Unknown

## 2018-11-13 NOTE — PROGRESS NOTE ADULT - SUBJECTIVE AND OBJECTIVE BOX
Authored by Dr Ashkan Boyer [phone]  After hours or if no response please page Hospitalist service [phone]    Patient is a 88y old Female who presents with a chief complaint of SDH, decline in functional status (2018 02:07)    SUBJECTIVE / OVERNIGHT EVENTS: family at bedside -  and son. pt is confused but has no acute complaints    MEDICATIONS  (STANDING):  cefTRIAXone   IVPB 1 Gram(s) IV Intermittent every 24 hours  dextrose 5% + sodium chloride 0.9% with potassium chloride 20 mEq/L 1000 milliLiter(s) (75 mL/Hr) IV Continuous <Continuous>  levETIRAcetam  IVPB 500 milliGRAM(s) IV Intermittent every 12 hours    MEDICATIONS  (PRN):      Vital Signs Last 24 Hrs  T(C): 36.7 (2018 07:55), Max: 36.7 (2018 23:15)  T(F): 98 (2018 07:55), Max: 98.1 (2018 23:15)  HR: 83 (2018 07:55) (77 - 106)  BP: 114/73 (2018 07:55) (102/79 - 143/77)  BP(mean): --  RR: 19 (2018 07:55) (17 - 20)  SpO2: 98% (2018 07:55) (94% - 99%)  CAPILLARY BLOOD GLUCOSE        I&O's Summary      PHYSICAL EXAM  GENERAL: NAD, well-developed  EYES: conjunctiva and sclera clear  HEAD: L crani ronny hole incision, healing  NECK: Supple, No JVD  ENT: MMM  CHEST/LUNG: Clear to auscultation bilaterally; No wheeze  HEART: Regular rate and rhythm; No murmurs  ABDOMEN: Soft, Nontender, Nondistended; Bowel sounds present  EXTREMITIES:  2+ Peripheral Pulses, No clubbing, cyanosis, or edema  NEURO: AOx1 R sided hemiparesis UE, 2-3/5 LE b/l   PSYCH: calm conversational  SKIN: No rashes or lesions    LABS:                        10.1   6.33  )-----------( 260      ( 2018 07:59 )             31.4     11-13    143  |  106  |  10  ----------------------------<  82  3.7   |  23  |  0.36<L>    Ca    8.7      2018 06:19  Phos  3.5     11-  Mg     2.0     11-    TPro  6.9  /  Alb  3.3  /  TBili  0.4  /  DBili  x   /  AST  79<H>  /  ALT  21  /  AlkPhos  39<L>  11-12    PT/INR - ( 2018 20:28 )   PT: 12.9 sec;   INR: 1.12 ratio         PTT - ( 2018 20:28 )  PTT:34.1 sec      Urinalysis Basic - ( 2018 21:12 )    Color: Yellow / Appearance: Clear / S.025 / pH: x  Gluc: x / Ketone: Small  / Bili: Small / Urobili: 8 mg/dL   Blood: x / Protein: 30 mg/dL / Nitrite: Negative   Leuk Esterase: Large / RBC: 15 /hpf / WBC 40 /hpf   Sq Epi: x / Non Sq Epi: 0 /hpf / Bacteria: Moderate          RADIOLOGY & ADDITIONAL TESTS:    Imaging Personally Reviewed: < from: CT Head No Cont (18 @ 01:56) >  Stable left frontal parietal mixed density subdural hematoma.    < end of copied text >    Consultant(s) Notes Reviewed:  NSGY  Care Discussed with Consultants/Other Providers:

## 2018-11-13 NOTE — PROGRESS NOTE ADULT - PROBLEM SELECTOR PLAN 1
CT Head notable for "Left frontal mixed density subdural hematoma measuring up to 1.1 cm without associated midline shift or herniation, likely subacute to chronic."  Repeat CT head stable SDH  would like to obtain Malag[place] CT to compare - purportedly PMD has it, will try to obtain records  Neurosurgery recs appreciated: Continue with Keppra  Consider Dysphagia screening   PT eval   Fall risk

## 2018-11-14 LAB
-  AMPICILLIN: SIGNIFICANT CHANGE UP
-  CIPROFLOXACIN: SIGNIFICANT CHANGE UP
-  LEVOFLOXACIN: SIGNIFICANT CHANGE UP
-  NITROFURANTOIN: SIGNIFICANT CHANGE UP
-  TETRACYCLINE: SIGNIFICANT CHANGE UP
-  VANCOMYCIN: SIGNIFICANT CHANGE UP
ANION GAP SERPL CALC-SCNC: 14 MMOL/L — SIGNIFICANT CHANGE UP (ref 5–17)
BUN SERPL-MCNC: 8 MG/DL — SIGNIFICANT CHANGE UP (ref 7–23)
CALCIUM SERPL-MCNC: 8.5 MG/DL — SIGNIFICANT CHANGE UP (ref 8.4–10.5)
CHLORIDE SERPL-SCNC: 104 MMOL/L — SIGNIFICANT CHANGE UP (ref 96–108)
CO2 SERPL-SCNC: 21 MMOL/L — LOW (ref 22–31)
CREAT SERPL-MCNC: 0.34 MG/DL — LOW (ref 0.5–1.3)
CULTURE RESULTS: SIGNIFICANT CHANGE UP
GLUCOSE SERPL-MCNC: 79 MG/DL — SIGNIFICANT CHANGE UP (ref 70–99)
HCT VFR BLD CALC: 33.3 % — LOW (ref 34.5–45)
HGB BLD-MCNC: 10.9 G/DL — LOW (ref 11.5–15.5)
MCHC RBC-ENTMCNC: 29.5 PG — SIGNIFICANT CHANGE UP (ref 27–34)
MCHC RBC-ENTMCNC: 32.7 GM/DL — SIGNIFICANT CHANGE UP (ref 32–36)
MCV RBC AUTO: 90 FL — SIGNIFICANT CHANGE UP (ref 80–100)
METHOD TYPE: SIGNIFICANT CHANGE UP
ORGANISM # SPEC MICROSCOPIC CNT: SIGNIFICANT CHANGE UP
ORGANISM # SPEC MICROSCOPIC CNT: SIGNIFICANT CHANGE UP
PLATELET # BLD AUTO: 218 K/UL — SIGNIFICANT CHANGE UP (ref 150–400)
POTASSIUM SERPL-MCNC: 3.5 MMOL/L — SIGNIFICANT CHANGE UP (ref 3.5–5.3)
POTASSIUM SERPL-SCNC: 3.5 MMOL/L — SIGNIFICANT CHANGE UP (ref 3.5–5.3)
RBC # BLD: 3.7 M/UL — LOW (ref 3.8–5.2)
RBC # FLD: 20.9 % — HIGH (ref 10.3–14.5)
SODIUM SERPL-SCNC: 139 MMOL/L — SIGNIFICANT CHANGE UP (ref 135–145)
SPECIMEN SOURCE: SIGNIFICANT CHANGE UP
WBC # BLD: 5.98 K/UL — SIGNIFICANT CHANGE UP (ref 3.8–10.5)
WBC # FLD AUTO: 5.98 K/UL — SIGNIFICANT CHANGE UP (ref 3.8–10.5)

## 2018-11-14 PROCEDURE — 99233 SBSQ HOSP IP/OBS HIGH 50: CPT

## 2018-11-14 RX ADMIN — MEMANTINE HYDROCHLORIDE 5 MILLIGRAM(S): 10 TABLET ORAL at 12:12

## 2018-11-14 RX ADMIN — CEFTRIAXONE 100 GRAM(S): 500 INJECTION, POWDER, FOR SOLUTION INTRAMUSCULAR; INTRAVENOUS at 21:59

## 2018-11-14 RX ADMIN — LEVETIRACETAM 400 MILLIGRAM(S): 250 TABLET, FILM COATED ORAL at 17:44

## 2018-11-14 RX ADMIN — Medication 10 MILLIGRAM(S): at 11:11

## 2018-11-14 RX ADMIN — SIMVASTATIN 20 MILLIGRAM(S): 20 TABLET, FILM COATED ORAL at 22:00

## 2018-11-14 RX ADMIN — LEVETIRACETAM 400 MILLIGRAM(S): 250 TABLET, FILM COATED ORAL at 05:47

## 2018-11-14 RX ADMIN — Medication 25 MILLIGRAM(S): at 06:03

## 2018-11-14 NOTE — OCCUPATIONAL THERAPY INITIAL EVALUATION ADULT - IMPAIRED TRANSFERS: SIT/STAND, REHAB EVAL
impaired balance/impaired postural control/decreased strength/impaired coordination/impaired motor control/cognition/decreased flexibility/decreased ROM

## 2018-11-14 NOTE — PROGRESS NOTE ADULT - PROBLEM SELECTOR PLAN 2
Patient with reported andrade at outside hospital  UA suggestive of UTI  S/P Ceftriaxone, urine cultures positive for Enterococcus.   C/W Ceftriaxone pending sensitivities.

## 2018-11-14 NOTE — PROGRESS NOTE ADULT - SUBJECTIVE AND OBJECTIVE BOX
Patient is a 88y old Female who presents with a chief complaint of SDH, decline in functional status (13 Nov 2018 02:07)    SUBJECTIVE / OVERNIGHT EVENTS: pt is confused but has no acute complaints, not in distress.   no events over night.   ROS other wise negative.     T(C): 37 (11-14-18 @ 11:04), Max: 37 (11-14-18 @ 11:04)  HR: 79 (11-14-18 @ 11:04) (79 - 91)  BP: 117/75 (11-14-18 @ 11:04) (105/66 - 117/75)  RR: 18 (11-14-18 @ 11:04) (18 - 18)  SpO2: 97% (11-14-18 @ 11:04) (97% - 98%)    MEDICATIONS  (STANDING):  cefTRIAXone   IVPB 1 Gram(s) IV Intermittent every 24 hours  enalapril 10 milliGRAM(s) Oral daily  influenza   Vaccine 0.5 milliLiter(s) IntraMuscular once  levETIRAcetam  IVPB 500 milliGRAM(s) IV Intermittent every 12 hours  memantine 5 milliGRAM(s) Oral daily  metoprolol succinate ER 25 milliGRAM(s) Oral daily  simvastatin 20 milliGRAM(s) Oral at bedtime    MEDICATIONS  (PRN):  acetaminophen   Tablet .. 650 milliGRAM(s) Oral every 6 hours PRN Temp greater or equal to 38C (100.4F)                PHYSICAL EXAM  GENERAL: NAD, well-developed  EYES: conjunctiva and sclera clear  HEAD: L crani ronny hole incision, healing  NECK: Supple, No JVD  CHEST/LUNG: Clear to auscultation bilaterally; No wheeze  HEART: Regular rate and rhythm; No murmurs  ABDOMEN: Soft, Nontender, Nondistended; Bowel sounds present  EXTREMITIES:  2+ Peripheral Pulses, No clubbing, cyanosis, or edema  NEURO: AOx1 R sided hemiparesis UE, 2-3/5 LE b/l   PSYCH: calm conversational  SKIN: No rashes or lesions                          10.9   5.98  )-----------( 218      ( 14 Nov 2018 07:40 )             33.3           LIVER FUNCTIONS - ( 12 Nov 2018 20:28 )  Alb: 3.3 g/dL / Pro: 6.9 g/dL / ALK PHOS: 39 U/L / ALT: 21 U/L / AST: 79 U/L / GGT: x           PT/INR - ( 12 Nov 2018 20:28 )   PT: 12.9 sec;   INR: 1.12 ratio         PTT - ( 12 Nov 2018 20:28 )  PTT:34.1 sec  139|104|8<79  3.5|21|0.34  8.5,--,--  11-14 @ 06:36        RADIOLOGY & ADDITIONAL TESTS:    Imaging Personally Reviewed: < from: CT Head No Cont (11.13.18 @ 01:56) >  Stable left frontal parietal mixed density subdural hematoma.    < end of copied text >    Consultant(s) Notes Reviewed:  NSGY  Care Discussed with Consultants/Other Providers:

## 2018-11-14 NOTE — OCCUPATIONAL THERAPY INITIAL EVALUATION ADULT - PERTINENT HX OF CURRENT PROBLEM, REHAB EVAL
88F with Dementia, HTN, HLD, setting decline in mental status and functional status over the course of months. Per family, patient had a fall in July.   noticed that patient had  weakness of right side, imbalance, and slurring of speech: at that time she was taken to the hospital on September 30th in [facility]. She was found to have a SDH and had 1st surgery on October 1st. Patient returned from [facility] on 11/11 when she was once deemed stable for transport.

## 2018-11-14 NOTE — PHYSICAL THERAPY INITIAL EVALUATION ADULT - GENERAL OBSERVATIONS, REHAB EVAL
Pt oliver 40 min eval fair. Pt rec'd in bed, peripheral IV lock, and NAD. Pt A&Ox1, pt confused, following simple commands approximately 25% of the time. Pt agreeable to work with PT.

## 2018-11-14 NOTE — OCCUPATIONAL THERAPY INITIAL EVALUATION ADULT - IMPAIRMENTS CONTRIBUTING IMPAIRED BED MOBILITY, REHAB EVAL
impaired postural control/decreased strength/impaired balance/cognition/impaired coordination/decreased flexibility/decreased ROM

## 2018-11-14 NOTE — OCCUPATIONAL THERAPY INITIAL EVALUATION ADULT - ADDITIONAL COMMENTS
CT Head (11/12): Left frontal mixed density subdural hematoma measuring up to 1.1 cm without associated midline shift or herniation, likely subacute to chronic.

## 2018-11-14 NOTE — OCCUPATIONAL THERAPY INITIAL EVALUATION ADULT - DIAGNOSIS, OT EVAL
Pt p/w decreased ADLs and functional mobility due to decreased ROM, strength, cognition, and coordination

## 2018-11-14 NOTE — PROGRESS NOTE ADULT - PROBLEM SELECTOR PLAN 1
CT Head notable for "Left frontal mixed density subdural hematoma measuring up to 1.1 cm without associated midline shift or herniation, likely subacute to chronic."  Repeat CT head stable SDH  Neurosurgery recs appreciated: Continue with Keppra  PT eval   Fall risk

## 2018-11-14 NOTE — OCCUPATIONAL THERAPY INITIAL EVALUATION ADULT - PHYSICAL ASSIST/NONPHYSICAL ASSIST, REHAB EVAL
2 person assist/nonverbal cues (demo/gestures)/verbal cues verbal cues/1 person assist/nonverbal cues (demo/gestures)

## 2018-11-14 NOTE — PHYSICAL THERAPY INITIAL EVALUATION ADULT - PERTINENT HX OF CURRENT PROBLEM, REHAB EVAL
89 y/o female with h/o dementia and recent SDH s/p evac in [facility]. Per family, pt had a fall in July.  noticed that patient had  weakness of right side, imbalance, and slurring of speech: at that time she was taken to the hospital on September 30th in [facility].

## 2018-11-14 NOTE — PROGRESS NOTE ADULT - ASSESSMENT
87 yo woman with Dementia, HTN, HLD recent SDH s/p evac in [place] and brought in by family/sent in by PMD for decreased functional status  Drainage of SDH done initially on 10/31/18 and then needed reoperation a few days later with reaccumulation

## 2018-11-14 NOTE — OCCUPATIONAL THERAPY INITIAL EVALUATION ADULT - TRANSFER SAFETY CONCERNS NOTED: SIT/STAND, REHAB EVAL
decreased safety awareness/decreased sequencing ability/decreased weight-shifting ability/decreased balance during turns/losing balance

## 2018-11-14 NOTE — PHYSICAL THERAPY INITIAL EVALUATION ADULT - PLANNED THERAPY INTERVENTIONS, PT EVAL
gait training/strengthening/GOAL: Pt will negotiate 6 steps with unilateral handrail in a step-to pattern and CGA in 4 weeks/transfer training/bed mobility training

## 2018-11-14 NOTE — PHYSICAL THERAPY INITIAL EVALUATION ADULT - PRECAUTIONS/LIMITATIONS, REHAB EVAL
fall precautions/HISTORY CONTINUED: She was found to have a SDH and had drainage of SDH done initially 10/31/18 and then needed reoperation a few days later with reaccumulation. Patient returned from [facility] on 11/11 when she was deemed stable for transport. Pt sent in by PMD for decreased functional status.

## 2018-11-14 NOTE — OCCUPATIONAL THERAPY INITIAL EVALUATION ADULT - STRENGTHENING, PT EVAL
GOAL: Pt will increase BUE strength to 3+/5 to increase participation in functional tasks in 4 weeks

## 2018-11-14 NOTE — OCCUPATIONAL THERAPY INITIAL EVALUATION ADULT - PLANNED THERAPY INTERVENTIONS, OT EVAL
ADL retraining/balance training/transfer training/strengthening/cognitive, visual perceptual/ROM/bed mobility training

## 2018-11-15 LAB
ANION GAP SERPL CALC-SCNC: 12 MMOL/L — SIGNIFICANT CHANGE UP (ref 5–17)
BUN SERPL-MCNC: 10 MG/DL — SIGNIFICANT CHANGE UP (ref 7–23)
CALCIUM SERPL-MCNC: 8.4 MG/DL — SIGNIFICANT CHANGE UP (ref 8.4–10.5)
CHLORIDE SERPL-SCNC: 105 MMOL/L — SIGNIFICANT CHANGE UP (ref 96–108)
CO2 SERPL-SCNC: 24 MMOL/L — SIGNIFICANT CHANGE UP (ref 22–31)
CREAT SERPL-MCNC: 0.4 MG/DL — LOW (ref 0.5–1.3)
GLUCOSE SERPL-MCNC: 85 MG/DL — SIGNIFICANT CHANGE UP (ref 70–99)
HCT VFR BLD CALC: 29.8 % — LOW (ref 34.5–45)
HGB BLD-MCNC: 9.9 G/DL — LOW (ref 11.5–15.5)
MCHC RBC-ENTMCNC: 29.6 PG — SIGNIFICANT CHANGE UP (ref 27–34)
MCHC RBC-ENTMCNC: 33.2 GM/DL — SIGNIFICANT CHANGE UP (ref 32–36)
MCV RBC AUTO: 89 FL — SIGNIFICANT CHANGE UP (ref 80–100)
PLATELET # BLD AUTO: 238 K/UL — SIGNIFICANT CHANGE UP (ref 150–400)
POTASSIUM SERPL-MCNC: 3.4 MMOL/L — LOW (ref 3.5–5.3)
POTASSIUM SERPL-SCNC: 3.4 MMOL/L — LOW (ref 3.5–5.3)
RBC # BLD: 3.35 M/UL — LOW (ref 3.8–5.2)
RBC # FLD: 20.8 % — HIGH (ref 10.3–14.5)
SODIUM SERPL-SCNC: 141 MMOL/L — SIGNIFICANT CHANGE UP (ref 135–145)
WBC # BLD: 6.21 K/UL — SIGNIFICANT CHANGE UP (ref 3.8–10.5)
WBC # FLD AUTO: 6.21 K/UL — SIGNIFICANT CHANGE UP (ref 3.8–10.5)

## 2018-11-15 PROCEDURE — 99233 SBSQ HOSP IP/OBS HIGH 50: CPT

## 2018-11-15 RX ORDER — POTASSIUM CHLORIDE 20 MEQ
20 PACKET (EA) ORAL ONCE
Qty: 0 | Refills: 0 | Status: COMPLETED | OUTPATIENT
Start: 2018-11-15 | End: 2018-11-15

## 2018-11-15 RX ORDER — CIPROFLOXACIN LACTATE 400MG/40ML
500 VIAL (ML) INTRAVENOUS EVERY 12 HOURS
Qty: 0 | Refills: 0 | Status: DISCONTINUED | OUTPATIENT
Start: 2018-11-15 | End: 2018-11-16

## 2018-11-15 RX ADMIN — LEVETIRACETAM 400 MILLIGRAM(S): 250 TABLET, FILM COATED ORAL at 05:53

## 2018-11-15 RX ADMIN — Medication 10 MILLIGRAM(S): at 12:33

## 2018-11-15 RX ADMIN — LEVETIRACETAM 400 MILLIGRAM(S): 250 TABLET, FILM COATED ORAL at 17:16

## 2018-11-15 RX ADMIN — MEMANTINE HYDROCHLORIDE 5 MILLIGRAM(S): 10 TABLET ORAL at 12:33

## 2018-11-15 RX ADMIN — SIMVASTATIN 20 MILLIGRAM(S): 20 TABLET, FILM COATED ORAL at 22:35

## 2018-11-15 RX ADMIN — Medication 20 MILLIEQUIVALENT(S): at 13:44

## 2018-11-15 RX ADMIN — Medication 500 MILLIGRAM(S): at 22:32

## 2018-11-15 NOTE — PROGRESS NOTE ADULT - SUBJECTIVE AND OBJECTIVE BOX
Patient is a 88y old Female who presents with a chief complaint of SDH, decline in functional status (13 Nov 2018 02:07)    SUBJECTIVE / OVERNIGHT EVENTS: pt is confused but has no acute complaints, not in distress.   no events over night.   ROS other wise negative.     T(C): 36.9 (11-15-18 @ 12:28), Max: 37.2 (11-15-18 @ 06:27)  HR: 85 (11-15-18 @ 12:28) (75 - 85)  BP: 114/72 (11-15-18 @ 12:28) (97/54 - 114/72)  RR: 18 (11-15-18 @ 12:28) (16 - 18)  SpO2: 95% (11-15-18 @ 12:28) (95% - 97%)    MEDICATIONS  (STANDING):  ciprofloxacin     Tablet 500 milliGRAM(s) Oral every 12 hours  enalapril 10 milliGRAM(s) Oral daily  influenza   Vaccine 0.5 milliLiter(s) IntraMuscular once  levETIRAcetam  IVPB 500 milliGRAM(s) IV Intermittent every 12 hours  memantine 5 milliGRAM(s) Oral daily  metoprolol succinate ER 25 milliGRAM(s) Oral daily  potassium chloride   Solution 20 milliEquivalent(s) Oral once  simvastatin 20 milliGRAM(s) Oral at bedtime    MEDICATIONS  (PRN):  acetaminophen   Tablet .. 650 milliGRAM(s) Oral every 6 hours PRN Temp greater or equal to 38C (100.4F)    PHYSICAL EXAM  GENERAL: NAD, well-developed  EYES: conjunctiva and sclera clear  HEAD: L crani ronny hole incision, healing  NECK: Supple, No JVD  CHEST/LUNG: Clear to auscultation bilaterally; No wheeze  HEART: Regular rate and rhythm; No murmurs  ABDOMEN: Soft, Nontender, Nondistended; Bowel sounds present  EXTREMITIES:  2+ Peripheral Pulses, No clubbing, cyanosis, or edema  NEURO: AOx1 R sided hemiparesis UE, 2-3/5 LE b/l   PSYCH: calm conversational  SKIN: No rashes or lesions                                         9.9    6.21  )-----------( 238      ( 15 Nov 2018 07:52 )             29.8               141|105|10<85  3.4|24|0.40  8.4,--,--  11-15 @ 06:56      RADIOLOGY & ADDITIONAL TESTS:    Imaging Personally Reviewed: < from: CT Head No Cont (11.13.18 @ 01:56) >  Stable left frontal parietal mixed density subdural hematoma.    < end of copied text >    Consultant(s) Notes Reviewed:  NSGY  Care Discussed with Consultants/Other Providers:

## 2018-11-15 NOTE — PROGRESS NOTE ADULT - PROBLEM SELECTOR PLAN 2
Patient with reported andrade at outside hospital  UA suggestive of UTI  S/P Ceftriaxone, urine cultures positive for Enterococcus, pan sensitive.  Change Ceftriaxone to Cipro.

## 2018-11-15 NOTE — PROGRESS NOTE ADULT - PROBLEM SELECTOR PLAN 1
CT Head notable for "Left frontal mixed density subdural hematoma measuring up to 1.1 cm without associated midline shift or herniation, likely subacute to chronic."  Repeat CT head stable SDH  Neurosurgery recs appreciated: Continue with Keppra  PT recommend LEXA placement.   Fall risk

## 2018-11-16 ENCOUNTER — TRANSCRIPTION ENCOUNTER (OUTPATIENT)
Age: 83
End: 2018-11-16

## 2018-11-16 VITALS
HEART RATE: 77 BPM | DIASTOLIC BLOOD PRESSURE: 73 MMHG | OXYGEN SATURATION: 96 % | RESPIRATION RATE: 15 BRPM | SYSTOLIC BLOOD PRESSURE: 117 MMHG | TEMPERATURE: 98 F

## 2018-11-16 LAB
ANION GAP SERPL CALC-SCNC: 12 MMOL/L — SIGNIFICANT CHANGE UP (ref 5–17)
BUN SERPL-MCNC: 13 MG/DL — SIGNIFICANT CHANGE UP (ref 7–23)
CALCIUM SERPL-MCNC: 8 MG/DL — LOW (ref 8.4–10.5)
CHLORIDE SERPL-SCNC: 104 MMOL/L — SIGNIFICANT CHANGE UP (ref 96–108)
CO2 SERPL-SCNC: 25 MMOL/L — SIGNIFICANT CHANGE UP (ref 22–31)
CREAT SERPL-MCNC: 0.4 MG/DL — LOW (ref 0.5–1.3)
GLUCOSE SERPL-MCNC: 89 MG/DL — SIGNIFICANT CHANGE UP (ref 70–99)
HCT VFR BLD CALC: 28.2 % — LOW (ref 34.5–45)
HGB BLD-MCNC: 9 G/DL — LOW (ref 11.5–15.5)
MAGNESIUM SERPL-MCNC: 1.9 MG/DL — SIGNIFICANT CHANGE UP (ref 1.6–2.6)
MCHC RBC-ENTMCNC: 29.2 PG — SIGNIFICANT CHANGE UP (ref 27–34)
MCHC RBC-ENTMCNC: 31.9 GM/DL — LOW (ref 32–36)
MCV RBC AUTO: 91.6 FL — SIGNIFICANT CHANGE UP (ref 80–100)
PLATELET # BLD AUTO: 251 K/UL — SIGNIFICANT CHANGE UP (ref 150–400)
POTASSIUM SERPL-MCNC: 3.4 MMOL/L — LOW (ref 3.5–5.3)
POTASSIUM SERPL-SCNC: 3.4 MMOL/L — LOW (ref 3.5–5.3)
RBC # BLD: 3.08 M/UL — LOW (ref 3.8–5.2)
RBC # FLD: 21 % — HIGH (ref 10.3–14.5)
SODIUM SERPL-SCNC: 141 MMOL/L — SIGNIFICANT CHANGE UP (ref 135–145)
WBC # BLD: 6.4 K/UL — SIGNIFICANT CHANGE UP (ref 3.8–10.5)
WBC # FLD AUTO: 6.4 K/UL — SIGNIFICANT CHANGE UP (ref 3.8–10.5)

## 2018-11-16 PROCEDURE — 93005 ELECTROCARDIOGRAM TRACING: CPT | Mod: XU

## 2018-11-16 PROCEDURE — 81001 URINALYSIS AUTO W/SCOPE: CPT

## 2018-11-16 PROCEDURE — 97161 PT EVAL LOW COMPLEX 20 MIN: CPT

## 2018-11-16 PROCEDURE — 70450 CT HEAD/BRAIN W/O DYE: CPT

## 2018-11-16 PROCEDURE — 51701 INSERT BLADDER CATHETER: CPT

## 2018-11-16 PROCEDURE — 87186 SC STD MICRODIL/AGAR DIL: CPT

## 2018-11-16 PROCEDURE — 71045 X-RAY EXAM CHEST 1 VIEW: CPT

## 2018-11-16 PROCEDURE — 85730 THROMBOPLASTIN TIME PARTIAL: CPT

## 2018-11-16 PROCEDURE — 80053 COMPREHEN METABOLIC PANEL: CPT

## 2018-11-16 PROCEDURE — 85027 COMPLETE CBC AUTOMATED: CPT

## 2018-11-16 PROCEDURE — 83605 ASSAY OF LACTIC ACID: CPT

## 2018-11-16 PROCEDURE — 97166 OT EVAL MOD COMPLEX 45 MIN: CPT

## 2018-11-16 PROCEDURE — 87040 BLOOD CULTURE FOR BACTERIA: CPT

## 2018-11-16 PROCEDURE — 87086 URINE CULTURE/COLONY COUNT: CPT

## 2018-11-16 PROCEDURE — 90686 IIV4 VACC NO PRSV 0.5 ML IM: CPT

## 2018-11-16 PROCEDURE — 99285 EMERGENCY DEPT VISIT HI MDM: CPT | Mod: 25

## 2018-11-16 PROCEDURE — 85610 PROTHROMBIN TIME: CPT

## 2018-11-16 PROCEDURE — 83735 ASSAY OF MAGNESIUM: CPT

## 2018-11-16 PROCEDURE — 96374 THER/PROPH/DIAG INJ IV PUSH: CPT | Mod: XU

## 2018-11-16 PROCEDURE — 80048 BASIC METABOLIC PNL TOTAL CA: CPT

## 2018-11-16 PROCEDURE — 84100 ASSAY OF PHOSPHORUS: CPT

## 2018-11-16 RX ORDER — POTASSIUM CHLORIDE 20 MEQ
20 PACKET (EA) ORAL ONCE
Qty: 0 | Refills: 0 | Status: COMPLETED | OUTPATIENT
Start: 2018-11-16 | End: 2018-11-16

## 2018-11-16 RX ORDER — LEVETIRACETAM 250 MG/1
1 TABLET, FILM COATED ORAL
Qty: 0 | Refills: 0 | COMMUNITY
Start: 2018-11-16

## 2018-11-16 RX ORDER — CIPROFLOXACIN LACTATE 400MG/40ML
1 VIAL (ML) INTRAVENOUS
Qty: 0 | Refills: 0 | COMMUNITY
Start: 2018-11-16

## 2018-11-16 RX ORDER — LEVETIRACETAM 250 MG/1
500 TABLET, FILM COATED ORAL
Qty: 0 | Refills: 0 | Status: DISCONTINUED | OUTPATIENT
Start: 2018-11-16 | End: 2018-11-16

## 2018-11-16 RX ORDER — DIVALPROEX SODIUM 500 MG/1
1 TABLET, DELAYED RELEASE ORAL
Qty: 0 | Refills: 0 | COMMUNITY

## 2018-11-16 RX ADMIN — INFLUENZA VIRUS VACCINE 0.5 MILLILITER(S): 15; 15; 15; 15 SUSPENSION INTRAMUSCULAR at 11:41

## 2018-11-16 RX ADMIN — Medication 500 MILLIGRAM(S): at 05:08

## 2018-11-16 RX ADMIN — LEVETIRACETAM 400 MILLIGRAM(S): 250 TABLET, FILM COATED ORAL at 05:09

## 2018-11-16 RX ADMIN — MEMANTINE HYDROCHLORIDE 5 MILLIGRAM(S): 10 TABLET ORAL at 11:43

## 2018-11-16 RX ADMIN — Medication 20 MILLIEQUIVALENT(S): at 11:41

## 2018-11-16 NOTE — PROGRESS NOTE ADULT - PROBLEM SELECTOR PLAN 2
Patient with reported andrade at outside hospital  UA suggestive of UTI  S/P Ceftriaxone, urine cultures positive for Enterococcus, pan sensitive.  Change Ceftriaxone to Cipro- complete total 7 day course.

## 2018-11-16 NOTE — DISCHARGE NOTE ADULT - CARE PLAN
Principal Discharge DX:	UTI (urinary tract infection)  Goal:	Improving with antibiotics  Assessment and plan of treatment:	HOME CARE INSTRUCTIONS  f you were prescribed antibiotics, take them exactly as your caregiver instructs you. Finish the medication even if you feel better after you have only taken some of the medication.  Drink enough water and fluids to keep your urine clear or pale yellow.  Avoid caffeine, tea, and carbonated beverages. They tend to irritate your bladder.  Empty your bladder often. Avoid holding urine for long periods of time.  Empty your bladder before and after sexual intercourse.  After a bowel movement, women should cleanse from front to back. Use each tissue only once.  SEEK MEDICAL CARE IF:  You have back pain.  You develop a fever.  Your symptoms do not begin to resolve within 3 days.  SEEK IMMEDIATE MEDICAL CARE IF:  You have severe back pain or lower abdominal pain.  You develop chills.  You have nausea or vomiting.  You have continued burning or discomfort with urination.  Secondary Diagnosis:	SDH (subdural hematoma)  Assessment and plan of treatment:	You are being discharged on Keppra to complete a 7 day course.  After that, you no longer require seizure medication.  You will need to follow up with neurosurgery, Dr. Cates, [phone], within one week of discharge - please call to make an appointment.  Secondary Diagnosis:	Hypertension  Assessment and plan of treatment:	Low salt diet  Activity as tolerated.  Take all medication as prescribed.  Follow up with your medical doctor for routine blood pressure monitoring at your next visit.  Notify your doctor if you have any of the following symptoms:   Dizziness, Lightheadedness, Blurry vision, Headache, Chest pain, Shortness of breath Principal Discharge DX:	UTI (urinary tract infection)  Goal:	Improving with antibiotics  Assessment and plan of treatment:	You will need to follow up with your primary medical doctor within one week of discharge - please call to make an appointment.  HOME CARE INSTRUCTIONS  You will take your antibiotics through 11/18/18 and then discontinue.  Finish the medication even if you feel better after you have only taken some of the medication.  Drink enough water and fluids to keep your urine clear or pale yellow.  Avoid caffeine, tea, and carbonated beverages. They tend to irritate your bladder.  Empty your bladder often. Avoid holding urine for long periods of time.  Empty your bladder before and after sexual intercourse.  After a bowel movement, women should cleanse from front to back. Use each tissue only once.  SEEK MEDICAL CARE IF:  You have back pain.  You develop a fever.  Your symptoms do not begin to resolve within 3 days.  SEEK IMMEDIATE MEDICAL CARE IF:  You have severe back pain or lower abdominal pain.  You develop chills.  You have nausea or vomiting.  You have continued burning or discomfort with urination.  Secondary Diagnosis:	SDH (subdural hematoma)  Assessment and plan of treatment:	You are being discharged on Keppra to complete a 7 day course.  After that, you no longer require seizure medication.  You will need to follow up with neurosurgery, Dr. aCtes, [phone], within one week of discharge - please call to make an appointment.  Secondary Diagnosis:	Hypertension  Assessment and plan of treatment:	Low salt diet  Activity as tolerated.  Take all medication as prescribed.  Follow up with your medical doctor for routine blood pressure monitoring at your next visit.  Notify your doctor if you have any of the following symptoms:   Dizziness, Lightheadedness, Blurry vision, Headache, Chest pain, Shortness of breath Principal Discharge DX:	UTI (urinary tract infection)  Goal:	Improving with antibiotics  Assessment and plan of treatment:	You will need to follow up with your primary medical doctor within one week of discharge - please call to make an appointment.  HOME CARE INSTRUCTIONS  You will take your antibiotics through 11/18/18 and then discontinue.  Finish the medication even if you feel better after you have only taken some of the medication.  Drink enough water and fluids to keep your urine clear or pale yellow.  Avoid caffeine, tea, and carbonated beverages. They tend to irritate your bladder.  Empty your bladder often. Avoid holding urine for long periods of time.  Empty your bladder before and after sexual intercourse.  After a bowel movement, women should cleanse from front to back. Use each tissue only once.  SEEK MEDICAL CARE IF:  You have back pain.  You develop a fever.  Your symptoms do not begin to resolve within 3 days.  SEEK IMMEDIATE MEDICAL CARE IF:  You have severe back pain or lower abdominal pain.  You develop chills.  You have nausea or vomiting.  You have continued burning or discomfort with urination.  Secondary Diagnosis:	SDH (subdural hematoma)  Assessment and plan of treatment:	You are being discharged on Keppra to complete a 7 day course.  After that, you no longer require seizure medication.  You will need to follow up with neurosurgery, Dr. Cates, [phone], within one week of discharge - please call to make an appointment.  Secondary Diagnosis:	Hypertension  Assessment and plan of treatment:	Low salt diet  Activity as tolerated.  Take all medication as prescribed.  Follow up with your medical doctor for routine blood pressure monitoring at your next visit.  Notify your doctor if you have any of the following symptoms:   Dizziness, Lightheadedness, Blurry vision, Headache, Chest pain, Shortness of breath  Secondary Diagnosis:	Hypokalemia  Assessment and plan of treatment:	You were repleted for potassium of 3.4.  You will need a BMP drawn tomorrow 11/17/18 to make sure the potassium is within normal limitis.

## 2018-11-16 NOTE — PROGRESS NOTE ADULT - PROBLEM SELECTOR PLAN 1
CT Head notable for "Left frontal mixed density subdural hematoma measuring up to 1.1 cm without associated midline shift or herniation, likely subacute to chronic."  Repeat CT head stable SDH  Neurosurgery recs appreciated: Continue with Keppra x total 7 days.   PT recommend LEXA placement.   Fall risk  D/C placement, time spent coordinating discharge plan 40 minutes.  Speech swallow eval at Rehab.

## 2018-11-16 NOTE — PROGRESS NOTE ADULT - REASON FOR ADMISSION
SDH, decline in functional status

## 2018-11-16 NOTE — DISCHARGE NOTE ADULT - CARE PROVIDER_API CALL
Hemal Cates), Neurological Surgery  [address] [place] [address]  Phone: [phone]  Fax: [phone]

## 2018-11-16 NOTE — DISCHARGE NOTE ADULT - PLAN OF CARE
Improving with antibiotics HOME CARE INSTRUCTIONS  f you were prescribed antibiotics, take them exactly as your caregiver instructs you. Finish the medication even if you feel better after you have only taken some of the medication.  Drink enough water and fluids to keep your urine clear or pale yellow.  Avoid caffeine, tea, and carbonated beverages. They tend to irritate your bladder.  Empty your bladder often. Avoid holding urine for long periods of time.  Empty your bladder before and after sexual intercourse.  After a bowel movement, women should cleanse from front to back. Use each tissue only once.  SEEK MEDICAL CARE IF:  You have back pain.  You develop a fever.  Your symptoms do not begin to resolve within 3 days.  SEEK IMMEDIATE MEDICAL CARE IF:  You have severe back pain or lower abdominal pain.  You develop chills.  You have nausea or vomiting.  You have continued burning or discomfort with urination. You are being discharged on Keppra to complete a 7 day course.  After that, you no longer require seizure medication.  You will need to follow up with neurosurgery, Dr. Cates, [phone], within one week of discharge - please call to make an appointment. Low salt diet  Activity as tolerated.  Take all medication as prescribed.  Follow up with your medical doctor for routine blood pressure monitoring at your next visit.  Notify your doctor if you have any of the following symptoms:   Dizziness, Lightheadedness, Blurry vision, Headache, Chest pain, Shortness of breath You will need to follow up with your primary medical doctor within one week of discharge - please call to make an appointment.  HOME CARE INSTRUCTIONS  You will take your antibiotics through 11/18/18 and then discontinue.  Finish the medication even if you feel better after you have only taken some of the medication.  Drink enough water and fluids to keep your urine clear or pale yellow.  Avoid caffeine, tea, and carbonated beverages. They tend to irritate your bladder.  Empty your bladder often. Avoid holding urine for long periods of time.  Empty your bladder before and after sexual intercourse.  After a bowel movement, women should cleanse from front to back. Use each tissue only once.  SEEK MEDICAL CARE IF:  You have back pain.  You develop a fever.  Your symptoms do not begin to resolve within 3 days.  SEEK IMMEDIATE MEDICAL CARE IF:  You have severe back pain or lower abdominal pain.  You develop chills.  You have nausea or vomiting.  You have continued burning or discomfort with urination. You were repleted for potassium of 3.4.  You will need a BMP drawn tomorrow 11/17/18 to make sure the potassium is within normal limitis.

## 2018-11-16 NOTE — DISCHARGE NOTE ADULT - MEDICATION SUMMARY - MEDICATIONS TO STOP TAKING
I will STOP taking the medications listed below when I get home from the hospital:    Depakote 500 mg oral delayed release tablet  -- 1 tab(s) by mouth 2 times a day    aspirin 81 mg oral delayed release tablet  -- 1 tab(s) by mouth once a day

## 2018-11-16 NOTE — DISCHARGE NOTE ADULT - MEDICATION SUMMARY - MEDICATIONS TO TAKE
I will START or STAY ON the medications listed below when I get home from the hospital:    enalapril 10 mg oral tablet  -- 1 tab(s) by mouth once a day  -- Indication: For Hypertension    levETIRAcetam 500 mg oral tablet  -- 1 tab(s) by mouth every 12 hours  Take through 11/19/18 and then discontinue  -- Indication: For Prophylactic measure    simvastatin 20 mg oral tablet  -- 1 tab(s) by mouth once a day (at bedtime)  -- Indication: For Hyperlipidemia    Toprol-XL 25 mg oral tablet, extended release  -- 1 tab(s) by mouth once a day  -- Indication: For Hypertension    alendronate 70 mg oral tablet  -- 1 tab(s) by mouth once a week  -- Indication: For Osteoporosis    memantine 5 mg oral tablet  -- 1 tab(s) by mouth once a day  -- Indication: For CNS agent    ciprofloxacin 500 mg oral tablet  -- 1 tab(s) by mouth every 12 hours  Take through 11/18/18 and then discontinue  -- Indication: For UTI (urinary tract infection) I will START or STAY ON the medications listed below when I get home from the hospital:    enalapril 2.5 mg oral tablet  -- 1 tab(s) by mouth once a day  -- Indication: For Hypertension    levETIRAcetam 500 mg oral tablet  -- 1 tab(s) by mouth every 12 hours  Take through 11/19/18 and then discontinue  -- Indication: For Prophylactic measure    simvastatin 20 mg oral tablet  -- 1 tab(s) by mouth once a day (at bedtime)  -- Indication: For Hyperlipidemia    Toprol-XL 25 mg oral tablet, extended release  -- 1 tab(s) by mouth once a day  -- Indication: For Hypertension    alendronate 70 mg oral tablet  -- 1 tab(s) by mouth once a week  -- Indication: For Osteoporosis    memantine 5 mg oral tablet  -- 1 tab(s) by mouth once a day  -- Indication: For CNS agent    ciprofloxacin 500 mg oral tablet  -- 1 tab(s) by mouth every 12 hours  Take through 11/18/18 and then discontinue  -- Indication: For UTI (urinary tract infection)

## 2018-11-16 NOTE — PROGRESS NOTE ADULT - SUBJECTIVE AND OBJECTIVE BOX
Patient is a 88y old Female who presents with a chief complaint of SDH, decline in functional status (13 Nov 2018 02:07)    SUBJECTIVE / OVERNIGHT EVENTS: pt is confused but has no acute complaints, not in distress.   no events over night.   ROS other wise negative.     T(C): 36.6 (11-16-18 @ 09:05), Max: 36.8 (11-16-18 @ 05:02)  HR: 77 (11-16-18 @ 09:05) (77 - 82)  BP: 117/73 (11-16-18 @ 09:05) (98/58 - 117/73)  RR: 15 (11-16-18 @ 09:05) (15 - 18)  SpO2: 96% (11-16-18 @ 09:05) (95% - 96%)    MEDICATIONS  (STANDING):  ciprofloxacin     Tablet 500 milliGRAM(s) Oral every 12 hours  enalapril 10 milliGRAM(s) Oral daily  levETIRAcetam 500 milliGRAM(s) Oral two times a day  memantine 5 milliGRAM(s) Oral daily  metoprolol succinate ER 25 milliGRAM(s) Oral daily  simvastatin 20 milliGRAM(s) Oral at bedtime    MEDICATIONS  (PRN):  acetaminophen   Tablet .. 650 milliGRAM(s) Oral every 6 hours PRN Temp greater or equal to 38C (100.4F)        PHYSICAL EXAM  GENERAL: NAD, well-developed  EYES: conjunctiva and sclera clear  HEAD: L crani ronny hole incision, healing  NECK: Supple, No JVD  CHEST/LUNG: Clear to auscultation bilaterally; No wheeze  HEART: Regular rate and rhythm; No murmurs  ABDOMEN: Soft, Nontender, Nondistended; Bowel sounds present  EXTREMITIES:  2+ Peripheral Pulses, No clubbing, cyanosis, or edema  NEURO: AOx1 R sided hemiparesis UE, 2-3/5 LE b/l   PSYCH: calm conversational  SKIN: No rashes or lesions                                               9.9    6.21  )-----------( 238      ( 15 Nov 2018 07:52 )             29.8               141|104|13<89  3.4|25|0.40  8.0,1.9,--  11-16 @ 09:15      RADIOLOGY & ADDITIONAL TESTS:    Imaging Personally Reviewed: < from: CT Head No Cont (11.13.18 @ 01:56) >  Stable left frontal parietal mixed density subdural hematoma.    < end of copied text >    Consultant(s) Notes Reviewed:  NSGY  Care Discussed with Consultants/Other Providers:

## 2018-11-16 NOTE — DISCHARGE NOTE ADULT - HOSPITAL COURSE
87 yo woman with reported history of Dementia, HTN, HLD brought in by family in setting decline in mental status and functional status over the course of months. History obtained by charting and family. Per family, patient had a fall in July and at that time was seen by visiting by MD.   noticed that patient had  weakness of right side, imbalance, and slurring of speech: at that time she was taken to the hospital on September 30th in [facility]. She was found to have a SDH and had 1st surgery on October 1st. Patient returned to baseline then needed a second evacuation within a week in the setting of returned symptoms. Family has noted that patient had poor appetite and is unclear if patient with difficult swallowing.  Per ED charting and family patient has been off medications since July. Patient had andrade catheter at hospital which was discontinue day of discharge. Per family, patient has been able to urinate on her own. Patient returned from [facility] on 11/11 when she was once deemed stable for transport.  Patient was taken to Dr. Jauregui suggested for her to go to the ED. Per family, patient occasionally complains of headache.    CXray - no focal consolidation  Head CT - Stable left frontal parietal mixed density subdural hematoma.    Urinalysis positive - treated with IV antibiotics.  Neurosx following - given Keppra prohylactically secondary to SDH    Stable for discharge to rehab, on po antibiotics, with Neurosurgery and PMD followup. 87 yo woman with reported history of Dementia, HTN, HLD brought in by family in setting decline in mental status and functional status over the course of months. History obtained by charting and family. Per family, patient had a fall in July and at that time was seen by visiting by MD.   noticed that patient had  weakness of right side, imbalance, and slurring of speech: at that time she was taken to the hospital on September 30th in [facility]. She was found to have a SDH and had 1st surgery on October 1st. Patient returned to baseline then needed a second evacuation within a week in the setting of returned symptoms. Family has noted that patient had poor appetite and is unclear if patient with difficult swallowing.  Per ED charting and family patient has been off medications since July. Patient had andrade catheter at hospital which was discontinue day of discharge. Per family, patient has been able to urinate on her own. Patient returned from [facility] on 11/11 when she was once deemed stable for transport.  Patient was taken to Dr. Jauregui suggested for her to go to the ED. Per family, patient occasionally complains of headache.    CXray - no focal consolidation  Head CT - Stable left frontal parietal mixed density subdural hematoma.    Urinalysis positive - treated with IV antibiotics.  Neurosx following - given Keppra prohylactically secondary to SDH    Stable for discharge to rehab, on po antibiotics, with Neurosurgery and PMD follow up. 89 yo woman with reported history of Dementia, HTN, HLD brought in by family in setting decline in mental status and functional status over the course of months. History obtained by charting and family. Per family, patient had a fall in July and at that time was seen by visiting by MD.   noticed that patient had  weakness of right side, imbalance, and slurring of speech: at that time she was taken to the hospital on September 30th in [facility]. She was found to have a SDH and had 1st surgery on October 1st. Patient returned to baseline then needed a second evacuation within a week in the setting of returned symptoms. Family has noted that patient had poor appetite and is unclear if patient with difficult swallowing.  Per ED charting and family patient has been off medications since July. Patient had andrade catheter at hospital which was discontinue day of discharge. Per family, patient has been able to urinate on her own. Patient returned from [facility] on 11/11 when she was once deemed stable for transport.  Patient was taken to Dr. Jauregui suggested for her to go to the ED. Per family, patient occasionally complains of headache.    CXray - no focal consolidation  Head CT - Stable left frontal parietal mixed density subdural hematoma.    Urinalysis positive, cultures positive for Enterococcus, - treated with IV Ceftriaxone, discharged on Cipro to complete course.   Neurosx following - given Keppra prohylactically secondary to SDH    Stable for discharge to rehab, on po antibiotics, with Neurosurgery and PMD follow up.

## 2018-11-16 NOTE — SWALLOW BEDSIDE ASSESSMENT ADULT - SWALLOW EVAL: DIAGNOSIS
Consult for bedside swallow evaluation received and appreciated. Case d/w NP Laure and attending MD Warren. As per MD pt's spouse recommending assessment. Pt reportedly tolerating current diet and scheduled for d/c today. Per MD defer evaluation as pt can receive assessment in rehabilitation setting.

## 2018-11-16 NOTE — DISCHARGE NOTE ADULT - PATIENT PORTAL LINK FT
You can access the [facility] Patient Portal, offered by [facility], by registering with the following website: [email or web address]

## 2018-11-16 NOTE — DISCHARGE NOTE ADULT - ADDITIONAL INSTRUCTIONS
You will need to follow up with your primary medical doctor within one week of discharge - please call to make an appointment.    You will need to follow up with neurosurgery, Dr. Cates, [phone], within one week of discharge - please call to make an appointment. You will need a BMP drawn tomorrow 11/17/18 to make sure the potassium is within normal limitis.    You will need to follow up with your primary medical doctor within one week of discharge - please call to make an appointment.    You will need to follow up with neurosurgery, Dr. Cates, [phone], within one week of discharge - please call to make an appointment.

## 2018-11-18 ENCOUNTER — NON-APPOINTMENT (OUTPATIENT)
Age: 83
End: 2018-11-18

## 2018-11-18 PROBLEM — I62.03 SUBDURAL HEMATOMA, CHRONIC: Status: ACTIVE | Noted: 2018-11-18

## 2018-11-18 LAB
CULTURE RESULTS: SIGNIFICANT CHANGE UP
SPECIMEN SOURCE: SIGNIFICANT CHANGE UP

## 2018-11-18 NOTE — DISCUSSION/SUMMARY
[FreeTextEntry1] : IMPRESSION: Mrs. Lundberg is an 88 year old woman with a history of HTN, hyperlipidemia, nonobstructive CAD, mitral regurgitation, LV dysfunction, newly diagnosed anemia, and osteoporosis who presents today for follow up of HTN following recent hospitalization in [place] for chronic subdural hematoma status post [place] holes.\par \par PLAN:\par 1. Her blood pressure is well controlled, however, she is tachycardic with ectopy on exam and her ECG. She will need to be restarted on Toprol XL 25mg daily. \par 2. She is currently off of ASA given her subdural hematoma.\par 3. She should eventually have a repeat echocardiogram given her mitral regurgitation and also to follow up her LV function.\par 4.  She had a nuclear stress test performed about 2 years ago that revealed scar with a low normal EF and no ischemia. \par 5. Although her family feels that she looks better than when she was in [place], I am concerned as she initially did not recognize me and also though that she was still in [place]. Given her failure to thrive with memory disturbances, I have referred her to the ER where she should have blood work as well as a CT of the head for further evaluation of her subdural hematoma. She should also be seen by Neurosurgery.\par 6. She will follow up with me after she is discharged from the hospital, although she will likely benefit from going to rehab.

## 2018-11-18 NOTE — PHYSICAL EXAM
[General Appearance - Well Developed] : well developed [Well Groomed] : well groomed [No Deformities] : no deformities [Normal Oral Mucosa] : normal oral mucosa [No Oral Pallor] : no oral pallor [No Oral Cyanosis] : no oral cyanosis [Normal Jugular Venous A Waves Present] : normal jugular venous A waves present [Normal Jugular Venous V Waves Present] : normal jugular venous V waves present [No Jugular Venous Montesinos A Waves] : no jugular venous montesinos A waves [Respiration, Rhythm And Depth] : normal respiratory rhythm and effort [Exaggerated Use Of Accessory Muscles For Inspiration] : no accessory muscle use [Auscultation Breath Sounds / Voice Sounds] : lungs were clear to auscultation bilaterally [Tachycardia] : tachycardic [Premature Beats] : regular with premature beats [Normal S1] : normal S1 [Normal S2] : normal S2 [I] : a grade 1 [No Pitting Edema] : no pitting edema present [Bowel Sounds] : normal bowel sounds [Abdomen Soft] : soft [Abdomen Tenderness] : non-tender [Nail Clubbing] : no clubbing of the fingernails [Cyanosis, Localized] : no localized cyanosis [Petechial Hemorrhages (___cm)] : no petechial hemorrhages [Skin Color & Pigmentation] : normal skin color and pigmentation [] : no rash [Skin Lesions] : no skin lesions [No Skin Ulcers] : no skin ulcer [S3] : no S3 [Right Carotid Bruit] : no bruit heard over the right carotid [Left Carotid Bruit] : no bruit heard over the left carotid [Bruit] : no bruit heard [FreeTextEntry1] : She was oriented only to person and she had a flat affect.

## 2018-11-18 NOTE — HISTORY OF PRESENT ILLNESS
[FreeTextEntry1] : Patient is an 88 year old woman with a history of HTN, hyperlipidemia, nonobstructive coronary artery disease, mitral regurgitation, LV dysfunction, and osteoporosis who presents for evaluation of HTN following subdural hematoma while in [place]. She experienced a fall while she was in [place] and was initially doing well, however, she subsequently began to appear weak to her  along with altered mental status. She was subsequently found to have a chronic subdural hematoma status post 2 [place] holes placed 10/10/2018. She was subsequently found to be anemic. She was hospitalized up until a couple of days ago and she just returned from [place]. her family states that she appears better than when she was in [place], however, she is lethargic, possibly secondary to the long flight. She denies any chest pain, dyspnea at rest, and palpitations. Her  states that she has not been eating much. Her daughter states that she is off of all of her medications, except for Keppra which she was given for seizure prophylaxis.

## 2018-11-19 LAB
CULTURE RESULTS: SIGNIFICANT CHANGE UP
SPECIMEN SOURCE: SIGNIFICANT CHANGE UP

## 2018-12-07 ENCOUNTER — INPATIENT (INPATIENT)
Facility: HOSPITAL | Age: 83
LOS: 6 days | Discharge: NURSING, REHAB OR LONG-TERM CARE | DRG: 871 | End: 2018-12-14
Attending: HOSPITALIST | Admitting: STUDENT IN AN ORGANIZED HEALTH CARE EDUCATION/TRAINING PROGRAM
Payer: COMMERCIAL

## 2018-12-07 VITALS
OXYGEN SATURATION: 90 % | SYSTOLIC BLOOD PRESSURE: 118 MMHG | WEIGHT: 100.09 LBS | HEIGHT: 64 IN | HEART RATE: 118 BPM | DIASTOLIC BLOOD PRESSURE: 72 MMHG | RESPIRATION RATE: 20 BRPM

## 2018-12-07 DIAGNOSIS — S06.5X9A TRAUMATIC SUBDURAL HEMORRHAGE WITH LOSS OF CONSCIOUSNESS OF UNSPECIFIED DURATION, INITIAL ENCOUNTER: Chronic | ICD-10-CM

## 2018-12-07 LAB
ALBUMIN SERPL ELPH-MCNC: 2.9 G/DL — LOW (ref 3.3–5)
ALBUMIN SERPL ELPH-MCNC: 3.6 G/DL — SIGNIFICANT CHANGE UP (ref 3.3–5)
ALP SERPL-CCNC: 79 U/L — SIGNIFICANT CHANGE UP (ref 40–120)
ALP SERPL-CCNC: 92 U/L — SIGNIFICANT CHANGE UP (ref 40–120)
ALT FLD-CCNC: 25 U/L — SIGNIFICANT CHANGE UP (ref 10–45)
ALT FLD-CCNC: 27 U/L — SIGNIFICANT CHANGE UP (ref 10–45)
ANION GAP SERPL CALC-SCNC: 13 MMOL/L — SIGNIFICANT CHANGE UP (ref 5–17)
ANION GAP SERPL CALC-SCNC: 15 MMOL/L — SIGNIFICANT CHANGE UP (ref 5–17)
APPEARANCE UR: CLEAR — SIGNIFICANT CHANGE UP
APTT BLD: 27.4 SEC — LOW (ref 27.5–36.3)
AST SERPL-CCNC: 28 U/L — SIGNIFICANT CHANGE UP (ref 10–40)
AST SERPL-CCNC: 29 U/L — SIGNIFICANT CHANGE UP (ref 10–40)
BACTERIA # UR AUTO: NEGATIVE — SIGNIFICANT CHANGE UP
BASOPHILS # BLD AUTO: 0.1 K/UL — SIGNIFICANT CHANGE UP (ref 0–0.2)
BASOPHILS NFR BLD AUTO: 0.9 % — SIGNIFICANT CHANGE UP (ref 0–2)
BILIRUB SERPL-MCNC: 0.5 MG/DL — SIGNIFICANT CHANGE UP (ref 0.2–1.2)
BILIRUB SERPL-MCNC: 0.6 MG/DL — SIGNIFICANT CHANGE UP (ref 0.2–1.2)
BILIRUB UR-MCNC: ABNORMAL
BUN SERPL-MCNC: 15 MG/DL — SIGNIFICANT CHANGE UP (ref 7–23)
BUN SERPL-MCNC: 17 MG/DL — SIGNIFICANT CHANGE UP (ref 7–23)
CALCIUM SERPL-MCNC: 7.7 MG/DL — LOW (ref 8.4–10.5)
CALCIUM SERPL-MCNC: 8.8 MG/DL — SIGNIFICANT CHANGE UP (ref 8.4–10.5)
CHLORIDE SERPL-SCNC: 102 MMOL/L — SIGNIFICANT CHANGE UP (ref 96–108)
CHLORIDE SERPL-SCNC: 105 MMOL/L — SIGNIFICANT CHANGE UP (ref 96–108)
CO2 SERPL-SCNC: 20 MMOL/L — LOW (ref 22–31)
CO2 SERPL-SCNC: 22 MMOL/L — SIGNIFICANT CHANGE UP (ref 22–31)
COLOR SPEC: YELLOW — SIGNIFICANT CHANGE UP
CREAT SERPL-MCNC: 0.46 MG/DL — LOW (ref 0.5–1.3)
CREAT SERPL-MCNC: 0.5 MG/DL — SIGNIFICANT CHANGE UP (ref 0.5–1.3)
DIFF PNL FLD: NEGATIVE — SIGNIFICANT CHANGE UP
EOSINOPHIL # BLD AUTO: 0.1 K/UL — SIGNIFICANT CHANGE UP (ref 0–0.5)
EOSINOPHIL NFR BLD AUTO: 1.2 % — SIGNIFICANT CHANGE UP (ref 0–6)
EPI CELLS # UR: 1 /HPF — SIGNIFICANT CHANGE UP
GLUCOSE SERPL-MCNC: 107 MG/DL — HIGH (ref 70–99)
GLUCOSE SERPL-MCNC: 118 MG/DL — HIGH (ref 70–99)
GLUCOSE UR QL: NEGATIVE — SIGNIFICANT CHANGE UP
HCT VFR BLD CALC: 33.9 % — LOW (ref 34.5–45)
HGB BLD-MCNC: 10.9 G/DL — LOW (ref 11.5–15.5)
HYALINE CASTS # UR AUTO: 0 /LPF — SIGNIFICANT CHANGE UP (ref 0–2)
INR BLD: 1.09 RATIO — SIGNIFICANT CHANGE UP (ref 0.88–1.16)
KETONES UR-MCNC: NEGATIVE — SIGNIFICANT CHANGE UP
LEUKOCYTE ESTERASE UR-ACNC: NEGATIVE — SIGNIFICANT CHANGE UP
LIDOCAIN IGE QN: 36 U/L — SIGNIFICANT CHANGE UP (ref 7–60)
LYMPHOCYTES # BLD AUTO: 1.2 K/UL — SIGNIFICANT CHANGE UP (ref 1–3.3)
LYMPHOCYTES # BLD AUTO: 16.3 % — SIGNIFICANT CHANGE UP (ref 13–44)
MCHC RBC-ENTMCNC: 29.6 PG — SIGNIFICANT CHANGE UP (ref 27–34)
MCHC RBC-ENTMCNC: 32.3 GM/DL — SIGNIFICANT CHANGE UP (ref 32–36)
MCV RBC AUTO: 91.7 FL — SIGNIFICANT CHANGE UP (ref 80–100)
MONOCYTES # BLD AUTO: 0.7 K/UL — SIGNIFICANT CHANGE UP (ref 0–0.9)
MONOCYTES NFR BLD AUTO: 8.5 % — SIGNIFICANT CHANGE UP (ref 2–14)
NEUTROPHILS # BLD AUTO: 5.6 K/UL — SIGNIFICANT CHANGE UP (ref 1.8–7.4)
NEUTROPHILS NFR BLD AUTO: 73.2 % — SIGNIFICANT CHANGE UP (ref 43–77)
NITRITE UR-MCNC: NEGATIVE — SIGNIFICANT CHANGE UP
PH UR: 6 — SIGNIFICANT CHANGE UP (ref 5–8)
PLATELET # BLD AUTO: 343 K/UL — SIGNIFICANT CHANGE UP (ref 150–400)
POTASSIUM SERPL-MCNC: 3.9 MMOL/L — SIGNIFICANT CHANGE UP (ref 3.5–5.3)
POTASSIUM SERPL-MCNC: 4.2 MMOL/L — SIGNIFICANT CHANGE UP (ref 3.5–5.3)
POTASSIUM SERPL-SCNC: 3.9 MMOL/L — SIGNIFICANT CHANGE UP (ref 3.5–5.3)
POTASSIUM SERPL-SCNC: 4.2 MMOL/L — SIGNIFICANT CHANGE UP (ref 3.5–5.3)
PROT SERPL-MCNC: 5.6 G/DL — LOW (ref 6–8.3)
PROT SERPL-MCNC: 6.9 G/DL — SIGNIFICANT CHANGE UP (ref 6–8.3)
PROT UR-MCNC: ABNORMAL
PROTHROM AB SERPL-ACNC: 12.5 SEC — SIGNIFICANT CHANGE UP (ref 10–12.9)
RBC # BLD: 3.69 M/UL — LOW (ref 3.8–5.2)
RBC # FLD: 18.3 % — HIGH (ref 10.3–14.5)
RBC CASTS # UR COMP ASSIST: 6 /HPF — HIGH (ref 0–4)
SODIUM SERPL-SCNC: 138 MMOL/L — SIGNIFICANT CHANGE UP (ref 135–145)
SODIUM SERPL-SCNC: 139 MMOL/L — SIGNIFICANT CHANGE UP (ref 135–145)
SP GR SPEC: 1.02 — SIGNIFICANT CHANGE UP (ref 1.01–1.02)
UROBILINOGEN FLD QL: ABNORMAL
WBC # BLD: 7.7 K/UL — SIGNIFICANT CHANGE UP (ref 3.8–10.5)
WBC # FLD AUTO: 7.7 K/UL — SIGNIFICANT CHANGE UP (ref 3.8–10.5)
WBC UR QL: 2 /HPF — SIGNIFICANT CHANGE UP (ref 0–5)

## 2018-12-07 PROCEDURE — 74177 CT ABD & PELVIS W/CONTRAST: CPT | Mod: 26

## 2018-12-07 PROCEDURE — 99291 CRITICAL CARE FIRST HOUR: CPT | Mod: GC

## 2018-12-07 PROCEDURE — 71045 X-RAY EXAM CHEST 1 VIEW: CPT | Mod: 26

## 2018-12-07 PROCEDURE — 99292 CRITICAL CARE ADDL 30 MIN: CPT

## 2018-12-07 PROCEDURE — 70450 CT HEAD/BRAIN W/O DYE: CPT | Mod: 26

## 2018-12-07 PROCEDURE — 93306 TTE W/DOPPLER COMPLETE: CPT | Mod: 26

## 2018-12-07 PROCEDURE — 71260 CT THORAX DX C+: CPT | Mod: 26

## 2018-12-07 RX ORDER — HEPARIN SODIUM 5000 [USP'U]/ML
1500 INJECTION INTRAVENOUS; SUBCUTANEOUS EVERY 6 HOURS
Qty: 0 | Refills: 0 | Status: DISCONTINUED | OUTPATIENT
Start: 2018-12-07 | End: 2018-12-08

## 2018-12-07 RX ORDER — SODIUM CHLORIDE 9 MG/ML
1000 INJECTION INTRAMUSCULAR; INTRAVENOUS; SUBCUTANEOUS
Qty: 0 | Refills: 0 | Status: DISCONTINUED | OUTPATIENT
Start: 2018-12-07 | End: 2018-12-09

## 2018-12-07 RX ORDER — HEPARIN SODIUM 5000 [USP'U]/ML
3500 INJECTION INTRAVENOUS; SUBCUTANEOUS EVERY 6 HOURS
Qty: 0 | Refills: 0 | Status: DISCONTINUED | OUTPATIENT
Start: 2018-12-07 | End: 2018-12-08

## 2018-12-07 RX ORDER — HEPARIN SODIUM 5000 [USP'U]/ML
INJECTION INTRAVENOUS; SUBCUTANEOUS
Qty: 25000 | Refills: 0 | Status: DISCONTINUED | OUTPATIENT
Start: 2018-12-07 | End: 2018-12-08

## 2018-12-07 RX ORDER — HEPARIN SODIUM 5000 [USP'U]/ML
4500 INJECTION INTRAVENOUS; SUBCUTANEOUS ONCE
Qty: 0 | Refills: 0 | Status: COMPLETED | OUTPATIENT
Start: 2018-12-07 | End: 2018-12-07

## 2018-12-07 RX ORDER — HEPARIN SODIUM 5000 [USP'U]/ML
INJECTION INTRAVENOUS; SUBCUTANEOUS
Qty: 25000 | Refills: 0 | Status: DISCONTINUED | OUTPATIENT
Start: 2018-12-07 | End: 2018-12-07

## 2018-12-07 RX ORDER — SODIUM CHLORIDE 9 MG/ML
3 INJECTION INTRAMUSCULAR; INTRAVENOUS; SUBCUTANEOUS ONCE
Qty: 0 | Refills: 0 | Status: COMPLETED | OUTPATIENT
Start: 2018-12-07 | End: 2018-12-07

## 2018-12-07 RX ORDER — SODIUM CHLORIDE 9 MG/ML
1000 INJECTION INTRAMUSCULAR; INTRAVENOUS; SUBCUTANEOUS ONCE
Qty: 0 | Refills: 0 | Status: COMPLETED | OUTPATIENT
Start: 2018-12-07 | End: 2018-12-07

## 2018-12-07 RX ORDER — HEPARIN SODIUM 5000 [USP'U]/ML
3500 INJECTION INTRAVENOUS; SUBCUTANEOUS ONCE
Qty: 0 | Refills: 0 | Status: DISCONTINUED | OUTPATIENT
Start: 2018-12-07 | End: 2018-12-07

## 2018-12-07 RX ORDER — SODIUM CHLORIDE 9 MG/ML
500 INJECTION INTRAMUSCULAR; INTRAVENOUS; SUBCUTANEOUS ONCE
Qty: 0 | Refills: 0 | Status: COMPLETED | OUTPATIENT
Start: 2018-12-07 | End: 2018-12-07

## 2018-12-07 RX ADMIN — SODIUM CHLORIDE 1000 MILLILITER(S): 9 INJECTION INTRAMUSCULAR; INTRAVENOUS; SUBCUTANEOUS at 22:55

## 2018-12-07 RX ADMIN — HEPARIN SODIUM 1100 UNIT(S)/HR: 5000 INJECTION INTRAVENOUS; SUBCUTANEOUS at 18:38

## 2018-12-07 RX ADMIN — HEPARIN SODIUM 4500 UNIT(S): 5000 INJECTION INTRAVENOUS; SUBCUTANEOUS at 18:37

## 2018-12-07 RX ADMIN — SODIUM CHLORIDE 125 MILLILITER(S): 9 INJECTION INTRAMUSCULAR; INTRAVENOUS; SUBCUTANEOUS at 17:24

## 2018-12-07 RX ADMIN — SODIUM CHLORIDE 1000 MILLILITER(S): 9 INJECTION INTRAMUSCULAR; INTRAVENOUS; SUBCUTANEOUS at 16:14

## 2018-12-07 RX ADMIN — SODIUM CHLORIDE 3 MILLILITER(S): 9 INJECTION INTRAMUSCULAR; INTRAVENOUS; SUBCUTANEOUS at 16:06

## 2018-12-07 NOTE — ED PROVIDER NOTE - OBJECTIVE STATEMENT
88 yo woman PMH ashtma, OA, HLD, HTN, SDH is coming from [facility] rehab after recent hospitalization for SDH and UTI with abdominal pain x 5 days. Pt has had decreased PO intake x 2-3 days. Pain worsens with intake of food. Pt had been constipated and had several episodes of vomiting 3 days ago which resolved. She was given an enema yesterday and was 88 yo woman PMH ashtma, OA, HLD, HTN, SDH is coming from [facility] rehab after recent hospitalization for SDH and UTI with abdominal pain x 5 days. Pt has had decreased PO intake x 2-3 days. Pain worsens with intake of food. Pt had been constipated and had several episodes of vomiting 3 days ago which resolved. She was given an enema yesterday and had multiple bowel movements. 88 yo woman PMH ashtma, OA, HLD, HTN, SDH is coming from [facility] rehab after recent hospitalization for SDH and UTI with abdominal pain x 5 days. Pt has had decreased PO intake x 2-3 days. Pain worsens with intake of food. Pt had been constipated and had several episodes of vomiting 3 days ago which resolved. She was given an enema yesterday and had multiple bowel movements. She continued to have worsening abdominal pain and family was concerned with decreased PO and brought pt to ED. Pt denies fevers or chills, denies URI symptoms, denies chest pain or SOB.

## 2018-12-07 NOTE — ED PROVIDER NOTE - PHYSICAL EXAMINATION
General: cachetic appearing elderly woman in no acute distress  Head: normocephalic, atraumatic  Eyes: left oval pupil, reactive to light, right pupil round and reactive  Mouth: dry mucous membranes, no oropharyngeal erythema, tongue and uvula midline  Neck: supple neck, no lymphadenopathy  CV: tachycardic, normal S1 and S2  Respiratory: clear to auscultation bilaterally  Abdomen: mildly generalized tenderness to palpation, soft, nondistended, bowel sounds present x 4 quadrants  Back: no midline tenderness to palpation, no CVAT  Neuro: no motor or sensory deficits  Skin: no rashes or lesions  Extremities: no edema, peripheral pulses 2+ bilaterally

## 2018-12-07 NOTE — CONSULT NOTE ADULT - ATTENDING COMMENTS
Large clot burden on CT with hypoxia and borderline BP  Would prefer a/c over IVC filter if possible.  Neurosurgery input appreciated she remains on heparin gtt  Continue with heparin gtt for now with tight PTT control  Head CT in 1 day  Dose coumadin tonight 2.5mg  Trial of nasal cannula during the daytime    No role for catheter lytics given age and high bleeding risk    Yan [id number]

## 2018-12-07 NOTE — ED PROVIDER NOTE - NS ED ROS FT
General: no fevers or chills  Head: no headaches  Eyes: no vision changes  ENT: no neck pain  CV: no chest pain, no palpitations  Resp: no SOB  GI: +N, +V, +abdominal pain, no blood in stool  : no dysuria  MSK: no joint pain, no muscle aches  Skin: no rashes or bruising  Neuro: no weakness, no change in sensation

## 2018-12-07 NOTE — CHART NOTE - NSCHARTNOTEFT_GEN_A_CORE
While there is no absolute neurosurgical contraindication to systemic anti coagulation, there does exist an increased risk of intracranial hemorrhage which can result in significant morbidity including but not limited to headache, seizure, stroke, paralysis, and in severe cases even death.    The risks and benefits of starting systemic anticoagulation must be considered carefully in the setting of the patients medical history and current clinical condition.    - Recommend NO bolus with low ptt goal of ~60-65  - Recommend obtaining follow up CTH after patient is therapeutic and to notify neurosurgery immediately with any changes in the patients neurologic exam

## 2018-12-07 NOTE — CONSULT NOTE ADULT - ASSESSMENT
-recommend heparin gtt for anticoagulation however would touch base with neurosurgery given recent subdural hematoma  -check TTE  -please check troponin and proBNP  -please check LE dopplers  -if patient is DNR, then favor admitting to tele under hospitalist If patient is full code, then would admit to CCU for closer monitoring 89F hx of dementia, HTN, HLD, osteoporosis, sent from [facility] rehab for worsening abdominal pain of 5 day duration. Found to have bilateral PE, elevated proBNP, elevated hsTrop, and evidence of RV strain on TTE. Concerning for submassive PE, high risk.     -recommend heparin gtt for anticoagulation however would touch base with neurosurgery given recent subdural hematoma  -please check LE dopplers  -patient is DNR, would admit to tele under hospitalist. Will continue to follow    Joseph Luna MD  Cardiology Fellow PGY-5  [id number]

## 2018-12-07 NOTE — ED PROVIDER NOTE - ATTENDING CONTRIBUTION TO CARE
Private Physician Greta Martinez  89y female PMH Asthma, Osteoarthritis/Osteoporosis, HLD,HTN, SDH,PVD, recent admit for SDH and uti dc approx 2w ago, Now comes to ed complains of abd pain past 5 ago. Seen at rehab had xry showng "blockage" treated with Enema twice without improvement in symptoms. Abd distended,firm,anorexia, NV 3d ago. Pt referred to ed for eval/ct. PE Elderly female awake alert looking mildly uncomfortable. Ncat chest clear anterior & posterior abd mild gen tenderness with mild rebound, no guarding/masses. decreased bs. Neuro gcs 15 speech fluent power 5/5 all extr  Chano Sarkar MD, Facep

## 2018-12-07 NOTE — ED PROVIDER NOTE - CRITICAL CARE PROVIDED
documentation/conducted a detailed discussion of DNR status/consultation with other physicians/consult w/ pt's family directly relating to pts condition/direct patient care (not related to procedure)

## 2018-12-07 NOTE — ED PROVIDER NOTE - PROGRESS NOTE DETAILS
Dina Everett, resident MD: abdominal xray reveals dilated bowels, will order CT abdomen. pt desat to 80's will order CT chest as well. Dina Everett, resident MD: contacted by radiology for bilateral PE. will anticoagulate and consult PERT. Attending MD Sharif.  Pt signed out to me in guarded condition with stable vital signs while on O2.  Pt pending PERT team consult and TBA after she presented from [facility] for SDH over summer, 2 wks ago UTI, in rehab, sent for abdominal pain, bilateral PE's, heparin being administered. Attending MD Sharif.  Cards (covering PERT team) has called and asked for echocardiogram to be ordered.  Their staff will come to ED to perform.  Will not transport given desat to 70's earlier in stay.  Sating >90% on NC currently.  Reassessed and rousable without sig resp distress while on NC.  Heparin running. Dina Everett, resident MD: neurosurgery recommends CTH at this point due to recent SDH and a repeat CTH once pt is therapeutic. prefers a lower therapeutic range of 60-65. Attending MD Sharif.  PERT team recommending a goals of care discussion with pt and family prior to dispo.  Will discuss and decide best location for pt.  NSG to see.  Heparin bolus already administered prior to signout.  Will discuss risk/benefit 2/2 recent SDH.  Pt with bilateral PEs with desats.  Heparin likely necessary despite risks. Attending MD Sharif.  Goals of care discussion with pt//daughter.  All 3 in agreement with DNR/DNI.  Not yet prepared to make pt comfort measures only.  Paperwork completed.  DNR order placed.  Family updated re: planned CT's and nsg consult.  Pt to be admitted to medicine in guarded condition without resp distress. Dina Everett, resident MD: discussed pt with hospitalist. will admit to medicine with tele if CTH is wnl with no changes. pt became hypoxic to 90 with NC, will administer high flow NC. will give 500ml of NS. Attending MD Sharif.  Pt's CT head non-actionable.  Hospitalist accepting.  Limited fluids administered 2/2 soft pressures, high flow O2 begun for reduced O2 again.  Pt remains DNR/DNI, responsive to fluid and high flow.  Admitted to medicine in guarded condition.

## 2018-12-07 NOTE — ED ADULT NURSE NOTE - OBJECTIVE STATEMENT
88 yo female brought to ED by EMS from [facility] rehab for abdominal pain. Patient is s/p admission for SDH sustained after fall and UTI. DC to rehab 2 wks ago. Now reporting abdominal pain x5 days. Family states xray at rehab showed abdominal blockage, patient received 2 enemas without relief. Upon arrival to ED, patient A&Ox3. Speech clear. R pupil 3mmR, L pupil 5mm, misshapen and fixed. Abdomen is soft, diffuse mild ttp. Skin intact. Famnily at bedside. Tachycardic, hypoxic on room air Dr Sarkar aware 89 yo female brought to ED by EMS from [facility] rehab for abdominal pain. Patient is s/p admission for SDH sustained after fall and UTI. DC to rehab 2 wks ago. Now reporting abdominal pain x5 days. Family states xray at rehab showed abdominal blockage, patient received 2 enemas without relief. Upon arrival to ED, patient A&Ox3. Speech clear. R pupil 3mmR, L pupil 5mm, misshapen and fixed. Abdomen is soft, diffuse mild ttp. Patient points to umbulicus to describe where pain is. Skin intact. Family at bedside. Tachycardic, hypoxic on room air Dr Sarkar aware.

## 2018-12-07 NOTE — CONSULT NOTE ADULT - SUBJECTIVE AND OBJECTIVE BOX
Patient seen and evaluated at bedside    Chief Complaint:    HPI:      PMH:   Generalized Osteoarthritis  PVD (Peripheral Vascular Disease)  History of Osteoporosis  Asthma  Hyperlipemia  Hypertension      PSH:   SDH (subdural hematoma)  S/P Cataract Surgery      Medications:   heparin  Infusion.  Unit(s)/Hr IV Continuous <Continuous>  heparin  Injectable 3500 Unit(s) IV Push every 6 hours PRN  heparin  Injectable 1500 Unit(s) IV Push every 6 hours PRN  sodium chloride 0.9%. 1000 milliLiter(s) IV Continuous <Continuous>      Allergies:  No Known Allergies      FAMILY HISTORY:  Family history of breast cancer in sister (Sibling)      Social History:  Smoking History:  Alcohol Use:  Drug Use:    Review of Systems:  REVIEW OF SYSTEMS:  CONSTITUTIONAL: No weakness, fevers or chills  EYES/ENT: No visual changes;  No dysphagia  NECK: No pain or stiffness  RESPIRATORY: No cough, wheezing, hemoptysis; No shortness of breath  CARDIOVASCULAR: No chest pain or palpitations; No lower extremity edema  GASTROINTESTINAL: No abdominal or epigastric pain. No nausea, vomiting, or hematemesis; No diarrhea or constipation. No melena or hematochezia.  BACK: No back pain  GENITOURINARY: No dysuria, frequency or hematuria  NEUROLOGICAL: No numbness or weakness  SKIN: No itching, burning, rashes, or lesions   All other review of systems is negative unless indicated above.    Physical Exam:  T(F): 99.4 (12-07), Max: 99.4 (12-07)  HR: 105 (12-07) (103 - 118)  BP: 109/93 (12-07) (109/93 - 118/72)  RR: 20 (12-07)  SpO2: 95% (12-07)  GENERAL: No acute distress, well-developed  HEAD:  Atraumatic, Normocephalic  ENT: EOMI, PERRLA, conjunctiva and sclera clear, Neck supple, No JVD, moist mucosa  CHEST/LUNG: Clear to auscultation bilaterally; No wheeze, equal breath sounds bilaterally   BACK: No spinal tenderness  HEART: Regular rate and rhythm; No murmurs, rubs, or gallops  ABDOMEN: Soft, Nontender, Nondistended; Bowel sounds present  EXTREMITIES:  No clubbing, cyanosis, or edema  PSYCH: Nl behavior, nl affect  NEUROLOGY: AAOx3, non-focal, cranial nerves intact  SKIN: Normal color, No rashes or lesions  LINES:    Cardiovascular Diagnostic Testing:    CT C/A/P:  IMPRESSION:  Bilateral pulmonary emboli within the right and left main pulmonary   arteries extending into the lobar branches and several segmental branches   bilaterally.  Likely sigmoid colitis.    Labs: Personally reviewed                        10.9   7.7   )-----------( 343      ( 07 Dec 2018 16:24 )             33.9     12-07    139  |  102  |  17  ----------------------------<  118<H>  4.2   |  22  |  0.50    Ca    8.8      07 Dec 2018 16:24    TPro  6.9  /  Alb  3.6  /  TBili  0.6  /  DBili  x   /  AST  28  /  ALT  27  /  AlkPhos  92  12-07    PT/INR - ( 07 Dec 2018 16:39 )   PT: 12.5 sec;   INR: 1.09 ratio         PTT - ( 07 Dec 2018 16:39 )  PTT:27.4 sec Chief Complaint:  abd pain    HPI:  89F hx of dementia, HTN, HLD, osteoporosis, sent from [facility] rehab for worsening abdominal pain of 5 day duration. Over the summer pt. had a fall in [place] c/b SDH requiring 2 evacuations. Pt. returned from [facility] on 11/1 and was sent to ED by PMD for decreased functional status, admitted to [facility] 11/12 - 11/16 when she was treated for a UTI. Repeat imaging showed stable SDH, and patient was then discharged to [facility] rehab where she has been for the past two weeks. At [facility], she has been ambulating with a walker and slowly regaining her strength until the past few days when she began feeling unwell. Over the past few days pt. has been complaining of diffuse abdominal pain and has had decreased PO intake. She has also been constipated, so she was given an enema after which she had multiple bowel movements. However, pain persistent so family decided to bring her to the ED. Upon arrival to the ED, she was found to be hypoxic to 80, imaging revealed b/l PEs, colitis, SDH stable. After discussion with family, perc team and neurosurgery, pt. was started on a heparin drip. She denies fevers, chills, cp, sob, n/v/d, dysuria, rashes, sick contacts.     ED - VS T99.4,  --> 97, /72 --> 95/51, RR 20, O2 Saturation 90% on HFNC 80% FIO2  Labs notable for anemia (Hb 10.9, Troponin 81, pro BNP 3131)  Imaging with b/l PEs, colitis, stable SDH  TTE EF 40-45% with new RV dysfunction   Received, total 1500cc NS bolus, now on NS @125, started on heparin drip (08 Dec 2018 01:13)      PMH:   Generalized Osteoarthritis  PVD (Peripheral Vascular Disease)  History of Osteoporosis  Asthma  Hyperlipemia  Hypertension      PSH:   SDH (subdural hematoma)  S/P Cataract Surgery      Medications:   ciprofloxacin   IVPB      ciprofloxacin   IVPB 400 milliGRAM(s) IV Intermittent every 12 hours  heparin  Infusion 1100 Unit(s)/Hr IV Continuous <Continuous>  memantine 5 milliGRAM(s) Oral daily  metroNIDAZOLE  IVPB      metroNIDAZOLE  IVPB 500 milliGRAM(s) IV Intermittent every 8 hours  simvastatin 20 milliGRAM(s) Oral at bedtime  sodium chloride 0.9%. 1000 milliLiter(s) IV Continuous <Continuous>      Allergies:  No Known Allergies      FAMILY HISTORY:  Family history of breast cancer in sister (Sibling)      Social History:  Smoking History:  Alcohol Use:  Drug Use:    Review of Systems:  REVIEW OF SYSTEMS:  CONSTITUTIONAL: No weakness, fevers or chills  EYES/ENT: No visual changes;  No dysphagia  NECK: No pain or stiffness  RESPIRATORY: No cough, wheezing, hemoptysis; No shortness of breath  CARDIOVASCULAR: No chest pain or palpitations; No lower extremity edema  GASTROINTESTINAL: No abdominal or epigastric pain. No nausea, vomiting, or hematemesis; No diarrhea or constipation. No melena or hematochezia.  BACK: No back pain  GENITOURINARY: No dysuria, frequency or hematuria  NEUROLOGICAL: No numbness or weakness  SKIN: No itching, burning, rashes, or lesions   All other review of systems is negative unless indicated above.    Physical Exam:  T(F): 98 (12-08), Max: 99.4 (12-07)  HR: 83 (12-08) (83 - 118)  BP: 89/53 (12-08) (81/47 - 118/72)  RR: 18 (12-08)  SpO2: 96% (12-08)  GENERAL: No acute distress, well-developed  HEAD:  Atraumatic, Normocephalic  ENT: EOMI, PERRLA, conjunctiva and sclera clear, Neck supple, No JVD, moist mucosa  CHEST/LUNG: Clear to auscultation bilaterally; No wheeze, equal breath sounds bilaterally   BACK: No spinal tenderness  HEART: Regular rate and rhythm; No murmurs, rubs, or gallops  ABDOMEN: Soft, Nontender, Nondistended; Bowel sounds present  EXTREMITIES:  No clubbing, cyanosis, or edema  PSYCH: Nl behavior, nl affect  NEUROLOGY: AAOx3, non-focal, cranial nerves intact  SKIN: Normal color, No rashes or lesions  LINES:    Cardiovascular Diagnostic Testing:    CT C/A/P:  IMPRESSION:  Bilateral pulmonary emboli within the right and left main pulmonary   arteries extending into the lobar branches and several segmental branches   bilaterally.  Likely sigmoid colitis.    TTE:  Conclusions:  1. Mitral annular calcification, otherwise normal mitral  valve. Mild mitral regurgitation.  2. Aortic valve not well visualized; appears calcified.  Peak transaortic valve gradient equals 6 mm Hg.  Mild-moderate aortic regurgitation.  3. Severely dilated left atrium.  LA volume index = 49  cc/m2. Atrial Septal Aneurysm is present.  4. Eccentric left ventricular hypertrophy (dilated left  ventricle with normal relative wall thickness).  5. Moderate segmental left ventricular systolic  dysfunction. The basal inferoseptum, mid to distal lateral,  mid to distal inferior, and mid inferolateral wall are  hypokinetic.  6. Right ventricular enlargement with decreased right  ventricular systolic function. TV s'= 8.5 cm/sec.  7. Estimated right ventricular systolic pressure equals 58  mm Hg, assuming right atrial pressure equals 8 mm Hg,  consistent with moderate pulmonary hypertension.  8. Normal tricuspid valve. Moderate tricuspid  regurgitation.  9. Color Doppler suggestive of patent foramen ovale (as  noted on prior echo 02/15/2018). Consider repeat limited  study with agitated saline.  *** Compared with echocardiogram of 2/5/2018, pulmonary  hypertension and right ventricular dysfunction are now  seen. Mitral regurgitation has decreased. Atrial Septal  aneurysm  is again seen as noted on previous echo.    Labs: Personally reviewed                        8.7    6.8   )-----------( 292      ( 08 Dec 2018 01:30 )             27.0     12-07    138  |  105  |  15  ----------------------------<  107<H>  3.9   |  20<L>  |  0.46<L>    Ca    7.7<L>      07 Dec 2018 20:54    TPro  5.6<L>  /  Alb  2.9<L>  /  TBili  0.5  /  DBili  x   /  AST  29  /  ALT  25  /  AlkPhos  79  12-07    PT/INR - ( 07 Dec 2018 16:39 )   PT: 12.5 sec;   INR: 1.09 ratio         PTT - ( 08 Dec 2018 00:43 )  PTT:70.8 sec      Serum Pro-Brain Natriuretic Peptide: 3131 pg/mL (12-07 @ 20:54)

## 2018-12-07 NOTE — ED ADULT NURSE NOTE - NSIMPLEMENTINTERV_GEN_ALL_ED
Implemented All Fall with Harm Risk Interventions:  [place] to call system. Call bell, personal items and telephone within reach. Instruct patient to call for assistance. Room bathroom lighting operational. Non-slip footwear when patient is off stretcher. Physically safe environment: no spills, clutter or unnecessary equipment. Stretcher in lowest position, wheels locked, appropriate side rails in place. Provide visual cue, wrist band, yellow gown, etc. Monitor gait and stability. Monitor for mental status changes and reorient to person, place, and time. Review medications for side effects contributing to fall risk. Reinforce activity limits and safety measures with patient and family. Provide visual clues: red socks.

## 2018-12-07 NOTE — ED PROVIDER NOTE - MEDICAL DECISION MAKING DETAILS
Eldlery female with abd pain. ro bowel obstruciton, diverticula dz, uti ct labs, ivf re-assess  Chano Sarkar MD, Facep

## 2018-12-08 DIAGNOSIS — J96.01 ACUTE RESPIRATORY FAILURE WITH HYPOXIA: ICD-10-CM

## 2018-12-08 DIAGNOSIS — I26.99 OTHER PULMONARY EMBOLISM WITHOUT ACUTE COR PULMONALE: ICD-10-CM

## 2018-12-08 DIAGNOSIS — I50.20 UNSPECIFIED SYSTOLIC (CONGESTIVE) HEART FAILURE: ICD-10-CM

## 2018-12-08 DIAGNOSIS — I10 ESSENTIAL (PRIMARY) HYPERTENSION: ICD-10-CM

## 2018-12-08 DIAGNOSIS — E78.5 HYPERLIPIDEMIA, UNSPECIFIED: ICD-10-CM

## 2018-12-08 DIAGNOSIS — Z29.9 ENCOUNTER FOR PROPHYLACTIC MEASURES, UNSPECIFIED: ICD-10-CM

## 2018-12-08 DIAGNOSIS — S06.5X9A TRAUMATIC SUBDURAL HEMORRHAGE WITH LOSS OF CONSCIOUSNESS OF UNSPECIFIED DURATION, INITIAL ENCOUNTER: ICD-10-CM

## 2018-12-08 DIAGNOSIS — R65.10 SYSTEMIC INFLAMMATORY RESPONSE SYNDROME (SIRS) OF NON-INFECTIOUS ORIGIN WITHOUT ACUTE ORGAN DYSFUNCTION: ICD-10-CM

## 2018-12-08 DIAGNOSIS — K52.9 NONINFECTIVE GASTROENTERITIS AND COLITIS, UNSPECIFIED: ICD-10-CM

## 2018-12-08 DIAGNOSIS — D64.9 ANEMIA, UNSPECIFIED: ICD-10-CM

## 2018-12-08 DIAGNOSIS — A41.9 SEPSIS, UNSPECIFIED ORGANISM: ICD-10-CM

## 2018-12-08 LAB
ALBUMIN SERPL ELPH-MCNC: 2.8 G/DL — LOW (ref 3.3–5)
ALP SERPL-CCNC: 76 U/L — SIGNIFICANT CHANGE UP (ref 40–120)
ALT FLD-CCNC: 20 U/L — SIGNIFICANT CHANGE UP (ref 10–45)
ANION GAP SERPL CALC-SCNC: 10 MMOL/L — SIGNIFICANT CHANGE UP (ref 5–17)
APTT BLD: 41.9 SEC — HIGH (ref 27.5–36.3)
APTT BLD: 49.4 SEC — HIGH (ref 27.5–36.3)
APTT BLD: 50.6 SEC — HIGH (ref 27.5–36.3)
APTT BLD: 70.8 SEC — HIGH (ref 27.5–36.3)
AST SERPL-CCNC: 20 U/L — SIGNIFICANT CHANGE UP (ref 10–40)
BASOPHILS # BLD AUTO: 0.1 K/UL — SIGNIFICANT CHANGE UP (ref 0–0.2)
BASOPHILS NFR BLD AUTO: 0.8 % — SIGNIFICANT CHANGE UP (ref 0–2)
BILIRUB SERPL-MCNC: 0.5 MG/DL — SIGNIFICANT CHANGE UP (ref 0.2–1.2)
BLD GP AB SCN SERPL QL: NEGATIVE — SIGNIFICANT CHANGE UP
BUN SERPL-MCNC: 12 MG/DL — SIGNIFICANT CHANGE UP (ref 7–23)
CALCIUM SERPL-MCNC: 7.8 MG/DL — LOW (ref 8.4–10.5)
CHLORIDE SERPL-SCNC: 105 MMOL/L — SIGNIFICANT CHANGE UP (ref 96–108)
CO2 SERPL-SCNC: 21 MMOL/L — LOW (ref 22–31)
CREAT SERPL-MCNC: 0.49 MG/DL — LOW (ref 0.5–1.3)
CULTURE RESULTS: NO GROWTH — SIGNIFICANT CHANGE UP
EOSINOPHIL # BLD AUTO: 0.1 K/UL — SIGNIFICANT CHANGE UP (ref 0–0.5)
EOSINOPHIL NFR BLD AUTO: 1.4 % — SIGNIFICANT CHANGE UP (ref 0–6)
FERRITIN SERPL-MCNC: 248 NG/ML — HIGH (ref 15–150)
GLUCOSE SERPL-MCNC: 85 MG/DL — SIGNIFICANT CHANGE UP (ref 70–99)
HCT VFR BLD CALC: 15.9 % — CRITICAL LOW (ref 34.5–45)
HCT VFR BLD CALC: 27 % — LOW (ref 34.5–45)
HCT VFR BLD CALC: 27 % — LOW (ref 34.5–45)
HCT VFR BLD CALC: 27.5 % — LOW (ref 34.5–45)
HGB BLD-MCNC: 5 G/DL — CRITICAL LOW (ref 11.5–15.5)
HGB BLD-MCNC: 8.7 G/DL — LOW (ref 11.5–15.5)
HGB BLD-MCNC: 8.8 G/DL — LOW (ref 11.5–15.5)
HGB BLD-MCNC: 9.1 G/DL — LOW (ref 11.5–15.5)
IRON SATN MFR SERPL: 11 % — LOW (ref 14–50)
IRON SATN MFR SERPL: 22 UG/DL — LOW (ref 30–160)
LACTATE SERPL-SCNC: <0.5 MMOL/L — LOW (ref 0.7–2)
LYMPHOCYTES # BLD AUTO: 2 K/UL — SIGNIFICANT CHANGE UP (ref 1–3.3)
LYMPHOCYTES # BLD AUTO: 29.4 % — SIGNIFICANT CHANGE UP (ref 13–44)
MAGNESIUM SERPL-MCNC: 2 MG/DL — SIGNIFICANT CHANGE UP (ref 1.6–2.6)
MCHC RBC-ENTMCNC: 29.6 PG — SIGNIFICANT CHANGE UP (ref 27–34)
MCHC RBC-ENTMCNC: 29.8 PG — SIGNIFICANT CHANGE UP (ref 27–34)
MCHC RBC-ENTMCNC: 30.4 PG — SIGNIFICANT CHANGE UP (ref 27–34)
MCHC RBC-ENTMCNC: 30.5 PG — SIGNIFICANT CHANGE UP (ref 27–34)
MCHC RBC-ENTMCNC: 31.5 GM/DL — LOW (ref 32–36)
MCHC RBC-ENTMCNC: 32.4 GM/DL — SIGNIFICANT CHANGE UP (ref 32–36)
MCHC RBC-ENTMCNC: 32.8 GM/DL — SIGNIFICANT CHANGE UP (ref 32–36)
MCHC RBC-ENTMCNC: 33.1 GM/DL — SIGNIFICANT CHANGE UP (ref 32–36)
MCV RBC AUTO: 91.8 FL — SIGNIFICANT CHANGE UP (ref 80–100)
MCV RBC AUTO: 92.1 FL — SIGNIFICANT CHANGE UP (ref 80–100)
MCV RBC AUTO: 92.8 FL — SIGNIFICANT CHANGE UP (ref 80–100)
MCV RBC AUTO: 93.8 FL — SIGNIFICANT CHANGE UP (ref 80–100)
MONOCYTES # BLD AUTO: 0.6 K/UL — SIGNIFICANT CHANGE UP (ref 0–0.9)
MONOCYTES NFR BLD AUTO: 8.6 % — SIGNIFICANT CHANGE UP (ref 2–14)
NEUTROPHILS # BLD AUTO: 4.1 K/UL — SIGNIFICANT CHANGE UP (ref 1.8–7.4)
NEUTROPHILS NFR BLD AUTO: 59.8 % — SIGNIFICANT CHANGE UP (ref 43–77)
PHOSPHATE SERPL-MCNC: 2.7 MG/DL — SIGNIFICANT CHANGE UP (ref 2.5–4.5)
PLATELET # BLD AUTO: 171 K/UL — SIGNIFICANT CHANGE UP (ref 150–400)
PLATELET # BLD AUTO: 292 K/UL — SIGNIFICANT CHANGE UP (ref 150–400)
PLATELET # BLD AUTO: 292 K/UL — SIGNIFICANT CHANGE UP (ref 150–400)
PLATELET # BLD AUTO: 302 K/UL — SIGNIFICANT CHANGE UP (ref 150–400)
POTASSIUM SERPL-MCNC: 3.7 MMOL/L — SIGNIFICANT CHANGE UP (ref 3.5–5.3)
POTASSIUM SERPL-SCNC: 3.7 MMOL/L — SIGNIFICANT CHANGE UP (ref 3.5–5.3)
PROT SERPL-MCNC: 5.5 G/DL — LOW (ref 6–8.3)
RBC # BLD: 1.7 M/UL — LOW (ref 3.8–5.2)
RBC # BLD: 2.91 M/UL — LOW (ref 3.8–5.2)
RBC # BLD: 2.94 M/UL — LOW (ref 3.8–5.2)
RBC # BLD: 2.99 M/UL — LOW (ref 3.8–5.2)
RBC # FLD: 17.6 % — HIGH (ref 10.3–14.5)
RBC # FLD: 17.8 % — HIGH (ref 10.3–14.5)
RBC # FLD: 17.8 % — HIGH (ref 10.3–14.5)
RBC # FLD: 18.1 % — HIGH (ref 10.3–14.5)
RH IG SCN BLD-IMP: POSITIVE — SIGNIFICANT CHANGE UP
SODIUM SERPL-SCNC: 136 MMOL/L — SIGNIFICANT CHANGE UP (ref 135–145)
SPECIMEN SOURCE: SIGNIFICANT CHANGE UP
TIBC SERPL-MCNC: 209 UG/DL — LOW (ref 220–430)
TROPONIN T, HIGH SENSITIVITY RESULT: 68 NG/L — HIGH (ref 0–51)
UIBC SERPL-MCNC: 187 UG/DL — SIGNIFICANT CHANGE UP (ref 110–370)
WBC # BLD: 4 K/UL — SIGNIFICANT CHANGE UP (ref 3.8–10.5)
WBC # BLD: 6.4 K/UL — SIGNIFICANT CHANGE UP (ref 3.8–10.5)
WBC # BLD: 6.4 K/UL — SIGNIFICANT CHANGE UP (ref 3.8–10.5)
WBC # BLD: 6.8 K/UL — SIGNIFICANT CHANGE UP (ref 3.8–10.5)
WBC # FLD AUTO: 4 K/UL — SIGNIFICANT CHANGE UP (ref 3.8–10.5)
WBC # FLD AUTO: 6.4 K/UL — SIGNIFICANT CHANGE UP (ref 3.8–10.5)
WBC # FLD AUTO: 6.4 K/UL — SIGNIFICANT CHANGE UP (ref 3.8–10.5)
WBC # FLD AUTO: 6.8 K/UL — SIGNIFICANT CHANGE UP (ref 3.8–10.5)

## 2018-12-08 PROCEDURE — 70450 CT HEAD/BRAIN W/O DYE: CPT | Mod: 26

## 2018-12-08 PROCEDURE — 12345: CPT | Mod: NC,GC

## 2018-12-08 PROCEDURE — 99223 1ST HOSP IP/OBS HIGH 75: CPT | Mod: GC

## 2018-12-08 PROCEDURE — 99223 1ST HOSP IP/OBS HIGH 75: CPT

## 2018-12-08 RX ORDER — HEPARIN SODIUM 5000 [USP'U]/ML
1100 INJECTION INTRAVENOUS; SUBCUTANEOUS
Qty: 25000 | Refills: 0 | Status: DISCONTINUED | OUTPATIENT
Start: 2018-12-08 | End: 2018-12-08

## 2018-12-08 RX ORDER — CIPROFLOXACIN LACTATE 400MG/40ML
400 VIAL (ML) INTRAVENOUS EVERY 12 HOURS
Qty: 0 | Refills: 0 | Status: COMPLETED | OUTPATIENT
Start: 2018-12-08 | End: 2018-12-12

## 2018-12-08 RX ORDER — CIPROFLOXACIN LACTATE 400MG/40ML
400 VIAL (ML) INTRAVENOUS ONCE
Qty: 0 | Refills: 0 | Status: COMPLETED | OUTPATIENT
Start: 2018-12-08 | End: 2018-12-08

## 2018-12-08 RX ORDER — METRONIDAZOLE 500 MG
TABLET ORAL
Qty: 0 | Refills: 0 | Status: COMPLETED | OUTPATIENT
Start: 2018-12-08 | End: 2018-12-12

## 2018-12-08 RX ORDER — POLYETHYLENE GLYCOL 3350 17 G/17G
17 POWDER, FOR SOLUTION ORAL DAILY
Qty: 0 | Refills: 0 | Status: DISCONTINUED | OUTPATIENT
Start: 2018-12-08 | End: 2018-12-08

## 2018-12-08 RX ORDER — CIPROFLOXACIN LACTATE 400MG/40ML
VIAL (ML) INTRAVENOUS
Qty: 0 | Refills: 0 | Status: COMPLETED | OUTPATIENT
Start: 2018-12-08 | End: 2018-12-12

## 2018-12-08 RX ORDER — METRONIDAZOLE 500 MG
500 TABLET ORAL EVERY 8 HOURS
Qty: 0 | Refills: 0 | Status: COMPLETED | OUTPATIENT
Start: 2018-12-08 | End: 2018-12-12

## 2018-12-08 RX ORDER — HEPARIN SODIUM 5000 [USP'U]/ML
1100 INJECTION INTRAVENOUS; SUBCUTANEOUS
Qty: 25000 | Refills: 0 | Status: DISCONTINUED | OUTPATIENT
Start: 2018-12-08 | End: 2018-12-10

## 2018-12-08 RX ORDER — MEMANTINE HYDROCHLORIDE 10 MG/1
5 TABLET ORAL DAILY
Qty: 0 | Refills: 0 | Status: DISCONTINUED | OUTPATIENT
Start: 2018-12-08 | End: 2018-12-14

## 2018-12-08 RX ORDER — ASPIRIN/CALCIUM CARB/MAGNESIUM 324 MG
1 TABLET ORAL
Qty: 0 | Refills: 0 | COMMUNITY

## 2018-12-08 RX ORDER — METRONIDAZOLE 500 MG
500 TABLET ORAL ONCE
Qty: 0 | Refills: 0 | Status: COMPLETED | OUTPATIENT
Start: 2018-12-08 | End: 2018-12-08

## 2018-12-08 RX ORDER — SIMVASTATIN 20 MG/1
20 TABLET, FILM COATED ORAL AT BEDTIME
Qty: 0 | Refills: 0 | Status: DISCONTINUED | OUTPATIENT
Start: 2018-12-08 | End: 2018-12-14

## 2018-12-08 RX ADMIN — Medication 200 MILLIGRAM(S): at 17:15

## 2018-12-08 RX ADMIN — MEMANTINE HYDROCHLORIDE 5 MILLIGRAM(S): 10 TABLET ORAL at 22:49

## 2018-12-08 RX ADMIN — SODIUM CHLORIDE 500 MILLILITER(S): 9 INJECTION INTRAMUSCULAR; INTRAVENOUS; SUBCUTANEOUS at 00:21

## 2018-12-08 RX ADMIN — Medication 100 MILLIGRAM(S): at 22:08

## 2018-12-08 RX ADMIN — Medication 200 MILLIGRAM(S): at 03:15

## 2018-12-08 RX ADMIN — Medication 100 MILLIGRAM(S): at 04:43

## 2018-12-08 RX ADMIN — SODIUM CHLORIDE 125 MILLILITER(S): 9 INJECTION INTRAMUSCULAR; INTRAVENOUS; SUBCUTANEOUS at 11:40

## 2018-12-08 RX ADMIN — HEPARIN SODIUM 11 UNIT(S)/HR: 5000 INJECTION INTRAVENOUS; SUBCUTANEOUS at 11:41

## 2018-12-08 RX ADMIN — Medication 100 MILLIGRAM(S): at 13:20

## 2018-12-08 RX ADMIN — SIMVASTATIN 20 MILLIGRAM(S): 20 TABLET, FILM COATED ORAL at 22:08

## 2018-12-08 RX ADMIN — HEPARIN SODIUM 10 UNIT(S)/HR: 5000 INJECTION INTRAVENOUS; SUBCUTANEOUS at 01:48

## 2018-12-08 RX ADMIN — HEPARIN SODIUM 12 UNIT(S)/HR: 5000 INJECTION INTRAVENOUS; SUBCUTANEOUS at 18:19

## 2018-12-08 NOTE — ED CLERICAL - NS ED CLERK UNITS
After Visit Summary   8/18/2017    Shirin Muller    MRN: [id number]           Patient Information     Date Of Birth          1958        Visit Information        Provider Department      8/18/2017 8:00 AM  18 ATC;  ONCOLOGY INFUSION [facility]        Today's Diagnoses     Chemotherapy-induced neutropenia (H)    -  1    Malignant neoplasm of head of pancreas (H)          Care Instructions    Contact Numbers  [facility] Main Line/After Hours: [phone]  [facility] Triage line: [phone]      Please call the triage or after hours line if you experience a temperature greater than or equal to 100.5, shaking chills, have uncontrolled nausea, vomiting and/or diarrhea, dizziness, shortness of breath, chest pain, bleeding, unexplained bruising, or if you have any other new/concerning symptoms, questions or concerns.      If you are having any concerning symptoms or wish to speak to a provider before your next infusion visit, please call to notify us so we can adequately serve you.     If you need a refill on a narcotic prescription or other medication, please call before your infusion appointment.           [facility] will be at your house Sunday, 8/20, at 11:30 am to disconnect your pump.          August 2017 Sunday Monday Tuesday Wednesday Thursday Friday Saturday             1     2     3     [facility] LAB DRAW    8:45 AM   (15 min.)    [facility] DRAW   [facility] Lab Draw     [facility] RETURN    9:15 AM   (50 min.)   Ester Echavarria, APRN CNP   [facility] ONC INFUSION 360   10:30 AM   (360 min.)   UC ONCOLOGY INFUSION   [facility]     Admission   12:33 PM   Barb Tatum MD   [address] [facility]   (Discharge: 8/5/2017)     ENDOSCOPIC RETROGRADE CHOLANGIOPANCREATOGRAM    1:55 PM   Guru Jamia Mcintosh MD   [facility] OR     XR SURG SAUL >5 MIN FL W STILL    4:00 PM   (15 min.)   [id number]    [facility] [place],  Radiology 4     XR CHEST PORT 1 VIEW    2:35 PM   (20 min.)   [id number]   [facility] [place],  Radiology 5       6     7     8     9     10     11     [facility] DRAW    9:15 AM   (15 min.)   [facility] DRAW   [facility] Draw     [facility] ONC INFUSION 360   10:00 AM   (360 min.)   UC ONCOLOGY INFUSION   [facility]     [facility] RETURN   10:05 AM   (50 min.)   Ester Echavarria APRN CNP   [facility] 12       13     14     15     16     17     18     [facility] DRAW    6:30 AM   (15 min.)   [facility] DRAW   [facility] Draw     [facility] RETURN    6:45 AM   (50 min.)   Ester Echavarria APRN CNP   [facility]    8:00 AM   (360 min.)   UC ONCOLOGY INFUSION   [facility] 19       20     21     [facility] ONC INFUSION 120    1:30 PM   (120 min.)   UC ONCOLOGY INFUSION   [facility] 22     23     24     25     26       27     28     29     30     31 September 2017 Sunday Monday Tuesday Wednesday Thursday Friday Saturday                            1     [facility] DRAW    8:30 AM   (15 min.)    [facility] DRAW   [facility] Draw     [facility] ONC INFUSION 360    9:00 AM   (360 min.)   UC ONCOLOGY INFUSION   [facility] 2       3     4     5     6     7     8     9       10     11     12     13     14     15     [facility] DRAW    9:15 AM   (15 min.)   [facility] DRAW   [facility] Draw     CT CHEST ABDOMEN PELVIS WWO    9:25 AM   (20 min.)   UCCT2   [facility] CT 16       17     18     [facility] RETURN    9:30 AM   (30 min.)   Demond Gonsales MD   [facility]     [facility] ONC INFUSION 360   12:00 PM   (360 min.)   UC ONCOLOGY INFUSION   [facility] 19     20     21     22     23       24     25     26     27     28     29     30                  Lab  APER Results:  Recent Results (from the past 12 hour(s))   Comprehensive metabolic panel    Collection Time: 08/18/17  6:51 AM   Result Value Ref Range    Sodium 140 133 - 144 mmol/L    Potassium 3.9 3.4 - 5.3 mmol/L    Chloride 102 94 - 109 mmol/L    Carbon Dioxide 29 20 - 32 mmol/L    Anion Gap 9 3 - 14 mmol/L    Glucose 200 (H) 70 - 99 mg/dL    Urea Nitrogen 12 7 - 30 mg/dL    Creatinine 0.59 0.52 - 1.04 mg/dL    GFR Estimate >90 >60 mL/min/1.7m2    GFR Estimate If Black >90 >60 mL/min/1.7m2    Calcium 9.0 8.5 - 10.1 mg/dL    Bilirubin Total 0.7 0.2 - 1.3 mg/dL    Albumin 2.6 (L) 3.4 - 5.0 g/dL    Protein Total 7.9 6.8 - 8.8 g/dL    Alkaline Phosphatase 550 (H) 40 - 150 U/L    ALT 50 0 - 50 U/L     (H) 0 - 45 U/L   CBC with platelets differential    Collection Time: 08/18/17  6:51 AM   Result Value Ref Range    WBC 6.5 4.0 - 11.0 10e9/L    RBC Count 3.78 (L) 3.8 - 5.2 10e12/L    Hemoglobin 9.4 (L) 11.7 - 15.7 g/dL    Hematocrit 31.7 (L) 35.0 - 47.0 %    MCV 84 78 - 100 fl    MCH 24.9 (L) 26.5 - 33.0 pg    MCHC 29.7 (L) 31.5 - 36.5 g/dL    RDW 25.2 (H) 10.0 - 15.0 %    Platelet Count 110 (L) 150 - 450 10e9/L    Diff Method Automated Method     % Neutrophils 71.9 %    % Lymphocytes 16.7 %    % Monocytes 9.4 %    % Eosinophils 1.1 %    % Basophils 0.6 %    % Immature Granulocytes 0.3 %    Nucleated RBCs 0 0 /100    Absolute Neutrophil 4.7 1.6 - 8.3 10e9/L    Absolute Lymphocytes 1.1 0.8 - 5.3 10e9/L    Absolute Monocytes 0.6 0.0 - 1.3 10e9/L    Absolute Eosinophils 0.1 0.0 - 0.7 10e9/L    Absolute Basophils 0.0 0.0 - 0.2 10e9/L    Abs Immature Granulocytes 0.0 0 - 0.4 10e9/L    Absolute Nucleated RBC 0.0                  Follow-ups after your visit        Your next 10 appointments already scheduled     Aug 21, 2017  1:30 PM CDT   Infusion 120 with UC ONCOLOGY INFUSION, UC 13 ATC   [facility] ([facility])    [address]  2[address]  [place] [address]    [phone]            Sep 01, 2017  8:30 AM CDT   [facility] Draw with  [facility] DRAW   [facility] ([facility])    [address]  [address] [facility] [address]   [phone]            Sep 01, 2017  9:00 AM CDT   Infusion 360 with UC ONCOLOGY INFUSION, UC 12 ATC   [facility] ([facility])    [address]  [address] [facility] [address]   [phone]            Sep 15, 2017  9:15 AM CDT   [facility] Lab Draw with  [facility] DRAW   [facility] ([facility])    [address] [address]   [phone]            Sep 15, 2017  9:40 AM CDT   (Arrive by 9:25 AM)   CT CHEST ABDOMEN PELVIS W/O & W CONTRAST with UCCT2   [facility] ([facility])    [address] [place] [address]  [address] [facility] [address]   [phone]           Please bring any scans or X-rays taken at other hospitals, if similar tests were done. Also bring a list of your medicines, including vitamins, minerals and over-the-counter drugs. It is safest to leave personal items at home.  Be sure to tell your doctor:   If you have any allergies.   If there s any chance you are pregnant.   If you are breastfeeding.   If you have any special needs.  You may have contrast for this exam. To prepare:   Do not eat or drink for 2 hours before your exam. If you need to take medicine, you may take it with small sips of water. (We may ask you to take liquid medicine as well.)   The day before your exam, drink extra fluids at least six 8-ounce glasses (unless your doctor tells you to restrict your fluids).  Patients over 70 or patients with diabetes or kidney problems:   If you haven t had a blood test (creatinine test) within the last 30 days, go to your clinic or Diagnostic Imaging Department for this test.  If you have diabetes:   If  your kidney function is normal, continue taking your metformin (Avandamet, Glucophage, Glucovance, Metaglip) on the day of your exam.   If your kidney function is abnormal, wait 48 hours before restarting this medicine.  You will have oral contrast for this exam:   You will drink the contrast at home. Get this from your clinic or Diagnostic Imaging Department. Please follow the directions given.  Please wear loose clothing, such as a sweat suit or jogging clothes. Avoid snaps, zippers and other metal. We may ask you to undress and put on a hospital gown.  If you have any questions, please call the Imaging Department where you will have your exam.            Sep 18, 2017  9:45 AM CDT   (Arrive by 9:30 AM)   Return Visit with Demond Gonsales MD   [facility] ([facility])    [address] [place] [address] [phone]            Sep 18, 2017 12:00 PM CDT   Infusion 360 with  ONCOLOGY INFUSION,  24 ATC   [facility] ([facility])    [address] [place] [address] [phone]              Who to contact     If you have questions or need follow up information about today's clinic visit or your schedule please contact [facility] directly at [phone].  Normal or non-critical lab and imaging results will be communicated to you by MyChart, letter or phone within 4 business days after the clinic has received the results. If you do not hear from us within 7 days, please contact the clinic through MyChart or phone. If you have a critical or abnormal lab result, we will notify you by phone as soon as possible.  Submit refill requests through "[employer] or call your pharmacy and they will forward the refill request to us. Please allow 3 business days for your refill to be completed.          Additional Information About Your Visit        "[employer]  Information     [employer] gives you secure access to your electronic health record. If you see a primary care provider, you can also send messages to your care team and make appointments. If you have questions, please call your primary care clinic.  If you do not have a primary care provider, please call [phone] and they will assist you.        Care EveryWhere ID     This is your Care EveryWhere ID. This could be used by other organizations to access your [facility] medical records  [id number]         Blood Pressure from Last 3 Encounters:   08/18/17 103/64   08/18/17 114/75   08/11/17 108/73    Weight from Last 3 Encounters:   08/18/17 77.4 kg (170 lb 11.2 oz)   08/11/17 75.2 kg (165 lb 12.8 oz)   08/04/17 73.3 kg (161 lb 11.2 oz)              We Performed the Following     Cancer antigen 19-9     CBC with platelets differential     Comprehensive metabolic panel          Today's Medication Changes          These changes are accurate as of: 8/18/17  1:05 PM.  If you have any questions, ask your nurse or doctor.               These medicines have changed or have updated prescriptions.        Dose/Directions    * dexamethasone 4 MG tablet   Commonly known as:  DECADRON   This may have changed:  Another medication with the same name was added. Make sure you understand how and when to take each.   Used for:  Need for prophylactic measure, Malignant neoplasm of head of pancreas (H)        Dose:  4 mg   Take 1 tablet (4 mg) by mouth daily (with breakfast)   Quantity:  3 tablet   Refills:  0       * dexamethasone 4 MG tablet   Commonly known as:  DECADRON   This may have changed:  You were already taking a medication with the same name, and this prescription was added. Make sure you understand how and when to take each.   Used for:  Chemotherapy-induced neutropenia (H), Malignant neoplasm of head of pancreas (H)        Dose:  4 mg   Take 1 tablet (4 mg) by mouth daily (with breakfast) for 3 days   Quantity:  3 tablet    Refills:  0       ENSURE CLEAR Liqd   This may have changed:  how much to take   Used for:  Malignant neoplasm of head of pancreas (H)        Dose:  1 Bottle   Take 1 Bottle by mouth 3 times daily   Quantity:  90 Bottle   Refills:  6       * Notice:  This list has 2 medication(s) that are the same as other medications prescribed for you. Read the directions carefully, and ask your doctor or other care provider to review them with you.         Where to get your medicines      These medications were sent to [place], [place] - [address]  [address], [facility] [address]    Hours:  TRANSPLANT PHONE NUMBER [phone] Phone:  [phone]     dexamethasone 4 MG tablet                Primary Care Provider    [facility]       No address on file        Equal Access to Services     JAI CORDERO : Maxx harding Sojim, waaxda luqadaha, qaybta kaalmada adeegyada, luis e lora. So [place] -[phone].    ATENCIÓN: Si habla español, tiene a bernal disposición servicios gratuitos de asistencia lingüística. Llame al [phone].    We comply with applicable federal civil rights laws and [place] laws. We do not discriminate on the basis of race, color, national origin, age, disability sex, sexual orientation or gender identity.            Thank you!     Thank you for choosing [facility]  for your care. Our goal is always to provide you with excellent care. Hearing back from our patients is one way we can continue to improve our services. Please take a few minutes to complete the written survey that you may receive in the mail after your visit with us. Thank you!             Your Updated Medication List - Protect others around you: Learn how to safely use, store and throw away your medicines at www.disposemymeds.org.          This list is accurate as of: 8/18/17  1:05 PM.  Always use your  most recent med list.                   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)       amylase-lipase-protease 57384 UNITS Cpep    CREON    180 capsule    Take 2 capsules (72,000 Units) by mouth 3 times daily (with meals)    Malignant neoplasm of head of pancreas (H)       * dexamethasone 4 MG tablet    DECADRON    3 tablet    Take 1 tablet (4 mg) by mouth daily (with breakfast)    Need for prophylactic measure, Malignant neoplasm of head of pancreas (H)       * dexamethasone 4 MG tablet    DECADRON    3 tablet    Take 1 tablet (4 mg) by mouth daily (with breakfast) for 3 days    Chemotherapy-induced neutropenia (H), Malignant neoplasm of head of pancreas (H)       diltiazem 2% in PLO cream (FV COMPOUNDED) 2% Gel     30 g    Apply topically daily and as needed to external hemorrhoids    External hemorrhoids       docusate sodium 100 MG capsule    COLACE    100 capsule    Take 1 capsule (100 mg) by mouth daily    External hemorrhoids       dronabinol 5 MG capsule    MARINOL    90 capsule    Take 1 capsule (5 mg) by mouth 3 times daily (before meals)    Anorexia       ENSURE CLEAR Liqd     90 Bottle    Take 1 Bottle by mouth 3 times daily    Malignant neoplasm of head of pancreas (H)       levofloxacin 500 MG tablet    LEVAQUIN    14 tablet    Take 1 tablet (500 mg) by mouth daily    Cholangitis, 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)       lidocaine-prilocaine cream    EMLA    30 g    Apply topically as needed for other (Use 30-60 minutes prior to port access)    Malignant neoplasm of head of pancreas (H)       loratadine 10 MG tablet    CLARITIN    30 tablet    Take 1 tablet (10 mg) by mouth daily Reported on 5/5/2017    Chronic seasonal allergic rhinitis, unspecified trigger       LORazepam 0.5 MG tablet    ATIVAN     30 tablet    Take 1 tablet (0.5 mg) by mouth every 4 hours as needed (Anxiety, Nausea/Vomiting or Sleep)    Malignant neoplasm of head of pancreas (H)       morphine 15 MG 12 hr tablet    MS CONTIN    60 tablet    Take 1 tablet (15 mg) by mouth every 12 hours    Malignant neoplasm of head of pancreas (H)       ondansetron 8 MG ODT tab    ZOFRAN-ODT    60 tablet    Take 1 tablet (8 mg) by mouth every 8 hours as needed for nausea    Malignant neoplasm of head of pancreas (H)       oxyCODONE 5 MG IR tablet    ROXICODONE    100 tablet    Take 1 tablet (5 mg) by mouth every 4 hours as needed for moderate to severe pain    Malignant neoplasm of head of pancreas (H)       pantoprazole 20 MG EC tablet    PROTONIX    60 tablet    Take 1 tablet (20 mg) by mouth 2 times daily    Malignant neoplasm of head of pancreas (H)       polyethylene glycol powder    MIRALAX/GLYCOLAX    119 g    Take 17 g (1 capful) by mouth daily    Malignant neoplasm of head of pancreas (H)       potassium chloride SA 20 MEQ CR tablet    potassium chloride    60 tablet    Take 1 tablet (20 mEq) by mouth daily    Malignant neoplasm of head of pancreas (H)       prochlorperazine 10 MG tablet    COMPAZINE    30 tablet    Take 1 tablet (10 mg) by mouth every 6 hours as needed (Nausea/Vomiting)    Malignant neoplasm of head of pancreas (H)       * Notice:  This list has 2 medication(s) that are the same as other medications prescribed for you. Read the directions carefully, and ask your doctor or other care provider to review them with you.

## 2018-12-08 NOTE — PROGRESS NOTE ADULT - PROBLEM SELECTOR PLAN 4
- tachycardia and tachypnea   - although pt. with PE, colitis possibly source of infection   - UCx pending, will send BCx and lactate  - abx coverage with cipro and flagyl   - VS [id number]

## 2018-12-08 NOTE — H&P ADULT - PROBLEM SELECTOR PLAN 6
- s/p fall in June in [place] c/b SDH requiring 2 evacuations with residual right sided weakness   - previously on keppra, tapered off as of 11/19  - CTH stable, repeat imaging after therapeutic PTT achieved on heparin drip  - neurosurgery recs appreciated

## 2018-12-08 NOTE — H&P ADULT - NSHPPHYSICALEXAM_GEN_ALL_CORE
Vital Signs Last 24 Hrs  T(C): 36.4 (07 Dec 2018 22:08), Max: 37.4 (07 Dec 2018 15:31)  T(F): 97.6 (07 Dec 2018 22:08), Max: 99.4 (07 Dec 2018 15:31)  HR: 91 (08 Dec 2018 01:30) (91 - 118)  BP: 96/64 (08 Dec 2018 01:30) (81/47 - 118/72)  RR: 18 (08 Dec 2018 01:30) (18 - 22)  SpO2: 94% (08 Dec 2018 01:30) (79% - 100%)    General: WN/WD NAD  Neurology: A&Ox2, right sided weakness compared to left   Eyes: PERRLA/ EOMI, Gross vision intact  ENT: Gross hearing intact  Neck: Neck supple, trachea midline, No JVD,   Respiratory: CTA B/L, No wheezing, rales, rhonchi  CV: RRR, S1S2, no murmurs, rubs or gallops  Abdominal: Soft, NT, ND +BS  Extremities: No edema, + peripheral pulses  Skin: No Rashes, Hematoma, Ecchymosis Vital Signs Last 24 Hrs  T(C): 36.4 (07 Dec 2018 22:08), Max: 37.4 (07 Dec 2018 15:31)  T(F): 97.6 (07 Dec 2018 22:08), Max: 99.4 (07 Dec 2018 15:31)  HR: 91 (08 Dec 2018 01:30) (91 - 118)  BP: 96/64 (08 Dec 2018 01:30) (81/47 - 118/72)  RR: 18 (08 Dec 2018 01:30) (18 - 22)  SpO2: 94% (08 Dec 2018 01:30) (79% - 100%)    General: WN/WD NAD  Neurology: A&Ox2, right sided weakness compared to left   Eyes: PERRLA/ EOMI, Gross vision intact  ENT: Gross hearing intact  Neck: Neck supple, trachea midline, No JVD,   Respiratory: CTA B/L, No wheezing, rales, rhonchi  CV: RRR, S1S2, no murmurs, rubs or gallops  Abdominal: Soft, diffusely tender to deep palpation, no guarding, no rebound, non-distended, +BS  Extremities: No edema, + peripheral pulses  Skin: No Rashes, Hematoma, Ecchymosis

## 2018-12-08 NOTE — PROGRESS NOTE ADULT - PROBLEM SELECTOR PLAN 10
- DVT ppx: on full anticoagulation heparin   - DIET: regular  - CODE: GOC with patient's  Diego Lundberg, no documented HCP, pt. has 4 children, family makes decisions together. Per conversation with , patient and family in agreement that she would not want "artifical, life-sustaining" measures and that she would want to "go naturally" should her clinical status decompensate. Family is amenable to blood thinners in current setting, IVF, however no pressors. DNR/DNI paperwork signed with ED resident/attending.

## 2018-12-08 NOTE — H&P ADULT - PROBLEM SELECTOR PLAN 1
- hypoxic to 80s on admission, now saturating 94% on HFNC (80% FIO2)  - 2/2 bilateral PE, management as below  - monitor VS [id number] - hypoxic to 80s on admission, now saturating 94% on HFNC (80% FIO2); titrate off as tolerated  - 2/2 bilateral PE, management as below  - monitor VS [id number]

## 2018-12-08 NOTE — PROGRESS NOTE ADULT - ASSESSMENT
88F hx of dementia, HTN, HLD, osteoporosis, sent from [facility] for worsening abdominal pain of 5 day duration, likely 2/2 sigmoid colitis seen on CTAP in the setting of constipation. Also admitted with acute hypoxic respiratory failure 2/2 bilateral pulmonary embolisms with RV strain. 88F hx of dementia, HTN, HLD, osteoporosis, sent from [facility] for worsening abdominal pain of 5 day duration, likely 2/2 sigmoid colitis seen on CTAP in the setting of constipation. Also admitted with acute hypoxic respiratory failure 2/2 massive PE (bilateral pulmonary embolisms with RV strain and hypotension) on heparin gtt.

## 2018-12-08 NOTE — PROGRESS NOTE ADULT - SUBJECTIVE AND OBJECTIVE BOX
CHIEF COMPLAINT: Patient is a 89y old  Female who presents with a chief complaint of bilateral PE, colitis (08 Dec 2018 01:13)      SUBJECTIVE / OVERNIGHT EVENTS: Pt seen and examined at bedside.      MEDICATIONS:  MEDICATIONS  (STANDING):  ciprofloxacin   IVPB      ciprofloxacin   IVPB 400 milliGRAM(s) IV Intermittent every 12 hours  heparin  Infusion 1100 Unit(s)/Hr (10 mL/Hr) IV Continuous <Continuous>  memantine 5 milliGRAM(s) Oral daily  metroNIDAZOLE  IVPB      metroNIDAZOLE  IVPB 500 milliGRAM(s) IV Intermittent every 8 hours  simvastatin 20 milliGRAM(s) Oral at bedtime  sodium chloride 0.9%. 1000 milliLiter(s) (125 mL/Hr) IV Continuous <Continuous>    MEDICATIONS  (PRN):        OBJECTIVE:  Vital Signs Last 24 Hrs  T(C): 36.7 (08 Dec 2018 04:41), Max: 37.4 (07 Dec 2018 15:31)  T(F): 98 (08 Dec 2018 04:41), Max: 99.4 (07 Dec 2018 15:31)  HR: 83 (08 Dec 2018 04:41) (83 - 118)  BP: 89/53 (08 Dec 2018 04:41) (81/47 - 118/72)  BP(mean): --  RR: 18 (08 Dec 2018 04:41) (18 - 22)  SpO2: 96% (08 Dec 2018 04:41) (79% - 100%)  CAPILLARY BLOOD GLUCOSE        I&O's Summary        PHYSICAL EXAM:  GENERAL: NAD, well-developed  HEENT: EOMI, PERRLA, conjunctiva and sclera clear  NECK: Supple, No JVD  CHEST/LUNG: CTABL  HEART: RRR; S1 S2, No murmurs, rubs, or gallops  ABDOMEN: Soft, Nontender, Nondistended; BS x4   EXTREMITIES:  2+ Pulses, No edema  NEUROLOGY: AAOx3, non-focal  SKIN: No rashes or lesions    LABS:                        8.7    6.8   )-----------( 292      ( 08 Dec 2018 01:30 )             27.0     12-    138  |  105  |  15  ----------------------------<  107<H>  3.9   |  20<L>  |  0.46<L>    Ca    7.7<L>      07 Dec 2018 20:54    TPro  5.6<L>  /  Alb  2.9<L>  /  TBili  0.5  /  DBili  x   /  AST  29  /  ALT  25  /  AlkPhos  79  12-07    PT/INR - ( 07 Dec 2018 16:39 )   PT: 12.5 sec;   INR: 1.09 ratio         PTT - ( 08 Dec 2018 00:43 )  PTT:70.8 sec      Urinalysis Basic - ( 07 Dec 2018 17:32 )    Color: Yellow / Appearance: Clear / S.022 / pH: x  Gluc: x / Ketone: Negative  / Bili: Small / Urobili: 6 mg/dL   Blood: x / Protein: Trace / Nitrite: Negative   Leuk Esterase: Negative / RBC: 6 /hpf / WBC 2 /hpf   Sq Epi: x / Non Sq Epi: 1 /hpf / Bacteria: Negative            RADIOLOGY & ADDITIONAL TESTS: CHIEF COMPLAINT: Patient is a 89y old  Female who presents with a chief complaint of bilateral PE, colitis (08 Dec 2018 01:13)      SUBJECTIVE / OVERNIGHT EVENTS: Overnight pt admitted to medicine and transferred to [facility]. Remains on heparin gtt. Pt seen and examined at bedside. Pt is awake and alert, pleasant and confused. Pt has no complaints. Denies SOB, chest pain, palpitations.       MEDICATIONS:  MEDICATIONS  (STANDING):  ciprofloxacin   IVPB      ciprofloxacin   IVPB 400 milliGRAM(s) IV Intermittent every 12 hours  heparin  Infusion 1100 Unit(s)/Hr (10 mL/Hr) IV Continuous <Continuous>  memantine 5 milliGRAM(s) Oral daily  metroNIDAZOLE  IVPB      metroNIDAZOLE  IVPB 500 milliGRAM(s) IV Intermittent every 8 hours  simvastatin 20 milliGRAM(s) Oral at bedtime  sodium chloride 0.9%. 1000 milliLiter(s) (125 mL/Hr) IV Continuous <Continuous>    MEDICATIONS  (PRN):        OBJECTIVE:  Vital Signs Last 24 Hrs  T(C): 36.7 (08 Dec 2018 04:41), Max: 37.4 (07 Dec 2018 15:31)  T(F): 98 (08 Dec 2018 04:41), Max: 99.4 (07 Dec 2018 15:31)  HR: 83 (08 Dec 2018 04:41) (83 - 118)  BP: 89/53 (08 Dec 2018 04:41) (81/47 - 118/72)  BP(mean): --  RR: 18 (08 Dec 2018 04:41) (18 - 22)  SpO2: 96% (08 Dec 2018 04:41) (79% - 100%)  CAPILLARY BLOOD GLUCOSE        I&O's Summary        PHYSICAL EXAM:  General: WN/WD NAD  	Neurology: A&Ox2, right sided weakness compared to left   	Eyes: PERRLA/ EOMI, Gross vision intact  	ENT: Gross hearing intact  	Neck: Neck supple, trachea midline, No JVD,   	Respiratory: CTA B/L, No wheezing, rales, rhonchi  	CV: RRR, S1S2, no murmurs, rubs or gallops  	Abdominal: Soft, diffusely tender to deep palpation, no guarding, no rebound, non-distended, +BS  	Extremities: No edema, + peripheral pulses  Skin: No Rashes, Hematoma, Ecchymosis      LABS:                        8.7    6.8   )-----------( 292      ( 08 Dec 2018 01:30 )             27.0         138  |  105  |  15  ----------------------------<  107<H>  3.9   |  20<L>  |  0.46<L>    Ca    7.7<L>      07 Dec 2018 20:54    TPro  5.6<L>  /  Alb  2.9<L>  /  TBili  0.5  /  DBili  x   /  AST  29  /  ALT  25  /  AlkPhos  79      PT/INR - ( 07 Dec 2018 16:39 )   PT: 12.5 sec;   INR: 1.09 ratio         PTT - ( 08 Dec 2018 00:43 )  PTT:70.8 sec      Urinalysis Basic - ( 07 Dec 2018 17:32 )    Color: Yellow / Appearance: Clear / S.022 / pH: x  Gluc: x / Ketone: Negative  / Bili: Small / Urobili: 6 mg/dL   Blood: x / Protein: Trace / Nitrite: Negative   Leuk Esterase: Negative / RBC: 6 /hpf / WBC 2 /hpf   Sq Epi: x / Non Sq Epi: 1 /hpf / Bacteria: Negative            RADIOLOGY & ADDITIONAL TESTS:

## 2018-12-08 NOTE — H&P ADULT - PROBLEM SELECTOR PLAN 9
- s/p fall in June 2018, c/b SDH requiring two evacuations in [facility]  - repeat imaging stable   - was on Keppra previously, tapered off 11/19/18  - pt. is currently on heparin for PE, monitor neurological status, repeat CTH after therapeutic - c/w simvastatin 20 mg daily

## 2018-12-08 NOTE — H&P ADULT - PROBLEM SELECTOR PLAN 2
- CT chest with b/l PE, TTE with RV strain, saturating in the 90s on HFNC (80% FIO2)  - hypotensive to 90s (MAP >65), mentating, per discussion with family no pressors, c/w NS @75 with caution  - after discussion with neurosurgery and vascular cardiology, pt. started on patient specific heparin drip with goal PTT 60-65 given SDH  - vascular cardiology recs appreciated, monitor on tele, VS [id number] - CT chest with b/l PE, TTE with RV strain, saturating in the 90s on HFNC (80% FIO2)  - hypotensive to 90s (MAP >65), mentating, per discussion with family no pressors, c/w NS @75 with caution  - after discussion with neurosurgery and vascular cardiology, pt. started on patient specific heparin drip with goal PTT 60-65 given SDH  - f/u LE dopplers  - vascular cardiology recs appreciated, monitor on tele, VS [id number] - CT chest with b/l PE, TTE with RV strain, saturating in the 90s on HFNC (80% FIO2)  - hypotensive to 90s (MAP >65), +evidence of right heart strain on echo;  - mentating, per discussion with family no pressors, c/w NS @75 with caution  - after discussion with neurosurgery and vascular cardiology, pt. started on patient specific heparin drip with goal PTT 60-65 given SDH  - f/u LE dopplers  - vascular cardiology recs appreciated, monitor on tele, VS [id number]

## 2018-12-08 NOTE — H&P ADULT - PROBLEM SELECTOR PLAN 4
- tachycardic, tachypneic likely in the setting of PE  - pt. with colitis as well, however no leukocytosis / afebrile will observe off abs  - monitor VS [id number] - tachycardia and tachypnea   - although pt. with PE, colitis possibly source of infection   - UCx pending, will send BCx and lactate  - abx coverage with cipro and flagyl   - VS [id number]

## 2018-12-08 NOTE — H&P ADULT - PROBLEM SELECTOR PLAN 10
- DVT ppx: on full dose heparin for PE  - DIET: Regular  - GOC/CODE - GOC with patient's  Diego Lundberg over the phone. Patient has 4 children, with no documented HCP, family makes decisions together. Per , pt. would not want "artificial life sustaining" measures. Amenable to blood thinners for PE in current situation, IV fluids, however no pressors. Code status DNR/DNI - documentation signed with ED resident/attending. - DVT ppx: on full anticoagulation heparin   - DIET: regular  - CODE: GOC with patient's  Diego Lundberg, no documented HCP, pt. has 4 children, family makes decisions together. Per conversation with , patient and family in agreement that she would not want "artifical, life-sustaining" measures and that she would want to "go naturally" should her clinical status decompensate. Family is amenable to blood thinners in current setting, IVF, however no pressors. DNR/DNI paperwork signed with ED resident/attending.

## 2018-12-08 NOTE — H&P ADULT - NSHPREVIEWOFSYSTEMS_GEN_ALL_CORE
REVIEW OF SYSTEMS:  CONSTITUTIONAL: +weakness, fevers or chills  EYES: No visual changes  ENT: No vertigo or throat pain   NECK: No pain or stiffness  RESPIRATORY: No cough, wheezing, hemoptysis; no shortness of breath  CARDIOVASCULAR: No chest pain or palpitations  GASTROINTESTINAL: +abdominal pain, +constipation, No nausea, vomiting, or hematemesis; No diarrhea No melena or hematochezia.  GENITOURINARY: No dysuria, frequency or hematuria  NEUROLOGICAL: +right sided weakness weakness  SKIN: No itching, rashes REVIEW OF SYSTEMS:  CONSTITUTIONAL: +weakness, fevers or chills  EYES: No visual changes  ENT: No vertigo or throat pain   NECK: No pain or stiffness  RESPIRATORY: No cough, wheezing, hemoptysis; no shortness of breath  CARDIOVASCULAR: No chest pain or palpitations  GASTROINTESTINAL: +abdominal pain, +constipation, No nausea, vomiting, or hematemesis; No diarrhea No melena or hematochezia.  GENITOURINARY: No dysuria, frequency or hematuria  NEUROLOGICAL: +right sided weakness weakness  MUSCULOSKELETAL: no joint pain  SKIN: No itching, rashes

## 2018-12-08 NOTE — PROGRESS NOTE ADULT - ATTENDING COMMENTS
Pt responding well with fluids, and on high flow, tolerating well.  No chest pain or SOB.  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  Wants to go back.  Has abdominal discomfort 2/2 Sigmoid colitis, treating with antibiotics.  Monitor on tele.  Consider switching to lovenox or noac when cleared by neurosurg after CT head.

## 2018-12-08 NOTE — H&P ADULT - ASSESSMENT
88F hx of dementia, HTN, HLD, osteoporosis, sent from [facility] for worsening abdominal pain of 5 day duration, likely 2/2 sigmoid colitis seen on CTAP in the setting of constipation. Also admitted with acute hypoxic respiratory failure 2/2 bilateral pulmonary embolisms with RV strain.

## 2018-12-08 NOTE — PROGRESS NOTE ADULT - PROBLEM SELECTOR PLAN 2
- CT chest with b/l PE, TTE with RV strain, saturating in the 90s on HFNC (80% FIO2)  - hypotensive to 90s (MAP >65), +evidence of right heart strain on echo;  - mentating, per discussion with family no pressors, c/w NS @75 with caution  - after discussion with neurosurgery and vascular cardiology, pt. started on patient specific heparin drip with goal PTT 60-65 given SDH  - f/u LE dopplers  - vascular cardiology recs appreciated, monitor on tele, VS [id number] Massive PE- CT chest with b/l PE, TTE with RV strain, saturating in the 90s on HFNC (80% FIO2), hypotensive to 90s (MAP >65), +evidence of right heart strain on echo;  - mentating, per discussion with family no pressors, c/w NS @125 with caution  - No thrombolytics given recent SDH and high risk of bleeding; after discussion with neurosurgery and vascular cardiology, pt. started on patient specific heparin drip with goal PTT 60-70 given SDH. Once therapeutic, if CT head is stable will switch to therapeutic Lovenox  - f/u LE dopplers  - vascular cardiology recs appreciated, monitor on tele, VS [id number]

## 2018-12-08 NOTE — PROGRESS NOTE ADULT - PROBLEM SELECTOR PLAN 6
- s/p fall in June in [place] c/b SDH requiring 2 evacuations with residual right sided weakness   - previously on keppra, tapered off as of 11/19  - CTH stable, repeat imaging after therapeutic PTT achieved on heparin drip  - neurosurgery recs appreciated - s/p fall in June in [place] c/b SDH requiring 2 evacuations with residual right sided weakness  - previously on keppra, tapered off as of 11/19  - CTH stable, repeat imaging after therapeutic PTT achieved on heparin drip  - neurosurgery recs appreciated

## 2018-12-08 NOTE — H&P ADULT - PROBLEM SELECTOR PLAN 5
- pt. with abdominal pain, with sigmoid colitis on CTAP  - likely 2/2 constipation, start miralax   - pt. afebrile without white count, observe off antibiotics - pt. with abdominal pain, with sigmoid colitis/moderate amount of stool on CTAP  - likely 2/2 constipation, s/p enema at [facility] with subsequent bowel movements   - will check lactate, blood cultures, and start ciprofloxacin and flagyl - pt. with abdominal pain, with sigmoid colitis/moderate amount of stool on CTAP  - likely 2/2 constipation, s/p enema at [facility] with subsequent bowel movements   - discussed with radiology, sigmoid colitis and stool burden likely from diverticulitis and less concerned for stercoral colitis  - enema, keep NPO, will check lactate, blood cultures, and start ciprofloxacin and flagyl

## 2018-12-08 NOTE — H&P ADULT - PROBLEM SELECTOR PLAN 3
- EF 40-45%  - left systolic dysfunction, new RV dysfunction with elevated proBNP  - holding home enalapril and toprol XL given hypotension   - s/p 1.5L NS bolus now on IVF for BP support, caution with further fluid boluses

## 2018-12-08 NOTE — ED ADULT NURSE REASSESSMENT NOTE - NS ED NURSE REASSESS COMMENT FT1
Called Doctor Rabago because of decreased O2 sats on high flow NC. To raise to 80%, pending RT. Pending orders regarding hypotension. See VS.

## 2018-12-08 NOTE — H&P ADULT - NSHPLABSRESULTS_GEN_ALL_CORE
10.9   7.7   )-----------( 343      ( 07 Dec 2018 16:24 )             33.9       12-07    138  |  105  |  15  ----------------------------<  107<H>  3.9   |  20<L>  |  0.46<L>    Ca    7.7<L>      07 Dec 2018 20:54    TPro  5.6<L>  /  Alb  2.9<L>  /  TBili  0.5      < end of copied text >    /  DBili  x   /  AST  29  /  ALT  25  /  AlkPhos  79  12-07    EKG - sinus rhythm, QTc 469ms    CTH - No acute intracranial hemorrhage. Slight interval decrease in size of   left mixed density subdural.    CT CHEST  Bilateral pulmonary emboli within the right and left main pulmonary   arteries extending into the lobarbranches and several segmental branches   bilaterally.    CTAP  Likely sigmoid colitis.    < end of copied text > 10.9   7.7   )-----------( 343      ( 07 Dec 2018 16:24 )             33.9       12-07    138  |  105  |  15  ----------------------------<  107<H>  3.9   |  20<L>  |  0.46<L>    Ca    7.7<L>      07 Dec 2018 20:54    TPro  5.6<L>  /  Alb  2.9<L>  /  TBili  0.5      < end of copied text >    /  DBili  x   /  AST  29  /  ALT  25  /  AlkPhos  79  12-07    EKG - sinus rhythm, QTc 469ms    CTH - No acute intracranial hemorrhage. Slight interval decrease in size of   left mixed density subdural.    CT CHEST  Bilateral pulmonary emboli within the right and left main pulmonary   arteries extending into the lobar branches and several segmental branches   bilaterally.    CTAP  Likely sigmoid colitis.    < end of copied text > Labs reviewed    10.9   7.7   )-----------( 343      ( 07 Dec 2018 16:24 )             33.9       12-07    138  |  105  |  15  ----------------------------<  107<H>  3.9   |  20<L>  |  0.46<L>    Ca    7.7<L>      07 Dec 2018 20:54    TPro  5.6<L>  /  Alb  2.9<L>  /  TBili  0.5      < end of copied text >    /  DBili  x   /  AST  29  /  ALT  25  /  AlkPhos  79  12-07    EKG reviewed - sinus rhythm, QTc 469ms    Imaging reviewed  CTH - No acute intracranial hemorrhage. Slight interval decrease in size of   left mixed density subdural.    CT CHEST  Bilateral pulmonary emboli within the right and left main pulmonary   arteries extending into the lobar branches and several segmental branches   bilaterally.    CTAP  Likely sigmoid colitis.

## 2018-12-08 NOTE — H&P ADULT - HISTORY OF PRESENT ILLNESS
88F hx of dementia, HTN, HLD, osteoporosis, sent from [facility] rehab for worsening abdominal pain of 5 day duration. Over the summer pt. had a fall in [place] c/b SDH requiring 2 evacuations. Pt. returned from [facility] on 11/1 and was sent to ED by PMD for decreased functional status, admitted to [facility] 11/12 - 11/16 when she was treated for a UTI. Repeat imaging showed stable SDH, and patient was then discharged to [facility] rehab where she has been for the past two weeks. At [facility], she has been ambulating with a walker and slowly regaining her strength until the past few days when she began feeling unwell. Over the past few days pt. has been complaining of diffuse abdominal pain and has had decreased PO intake. She has also been constipated, so she was given an enema after which she had multiple bowel movements. However, pain persistent so family decided to bring her to the ED. Upon arrival to the ED, she was found to be hypoxic to 80, imaging revealed b/l PEs, colitis, SDH stable. After discussion with family and neurosurgery, pt. was started on a heparin drip.     ED - VS T99.4,  --> 97, /72 --> 95/51, RR 20, O2 Saturation 90% on HFNC 80% FIO2  Labs notable for anemiea (Hb 10.9, Troponin 81, pro BNP 3131)  Imaging with b/l PEs, colitis, stable SDH  TTE EF 40-45% with new RV dysfunction   Received, total 1500cc NS bolus, now on NS @125, started on heparin drip 88F hx of dementia, HTN, HLD, osteoporosis, sent from [facility] rehab for worsening abdominal pain of 5 day duration. Over the summer pt. had a fall in [place] c/b SDH requiring 2 evacuations. Pt. returned from [facility] on 11/1 and was sent to ED by PMD for decreased functional status, admitted to [facility] 11/12 - 11/16 when she was treated for a UTI. Repeat imaging showed stable SDH, and patient was then discharged to [facility] rehab where she has been for the past two weeks. At [facility], she has been ambulating with a walker and slowly regaining her strength until the past few days when she began feeling unwell. Over the past few days pt. has been complaining of diffuse abdominal pain and has had decreased PO intake. She has also been constipated, so she was given an enema after which she had multiple bowel movements. However, pain persistent so family decided to bring her to the ED. Upon arrival to the ED, she was found to be hypoxic to 80, imaging revealed b/l PEs, colitis, SDH stable. After discussion with family, perc team and neurosurgery, pt. was started on a heparin drip. She denies fevers, chills, cp, sob, n/v/d, dysuria, rashes, sick contacts.     ED - VS T99.4,  --> 97, /72 --> 95/51, RR 20, O2 Saturation 90% on HFNC 80% FIO2  Labs notable for anemia (Hb 10.9, Troponin 81, pro BNP 3131)  Imaging with b/l PEs, colitis, stable SDH  TTE EF 40-45% with new RV dysfunction   Received, total 1500cc NS bolus, now on NS @125, started on heparin drip

## 2018-12-08 NOTE — PROGRESS NOTE ADULT - PROBLEM SELECTOR PLAN 1
- hypoxic to 80s on admission, now saturating 94% on HFNC (80% FIO2); titrate off as tolerated  - 2/2 bilateral PE, management as below  - monitor VS [id number]

## 2018-12-08 NOTE — H&P ADULT - PROBLEM SELECTOR PLAN 7
- Hb 10.9 (baseline ~10)  - trend daily CBC - Hb 10.9 (baseline ~10)  - iron studies, B12, folate, repeat CTH given SDH and on heparin gtt for PE  - trend CBC [id number]

## 2018-12-08 NOTE — PROGRESS NOTE ADULT - PROBLEM SELECTOR PLAN 7
- Hb 10.9 (baseline ~10)  - iron studies, B12, folate, repeat CTH given SDH and on heparin gtt for PE  - trend CBC [id number]

## 2018-12-08 NOTE — PROGRESS NOTE ADULT - PROBLEM SELECTOR PLAN 5
- pt. with abdominal pain, with sigmoid colitis/moderate amount of stool on CTAP  - likely 2/2 constipation, s/p enema at [facility] with subsequent bowel movements   - discussed with radiology, sigmoid colitis and stool burden likely from diverticulitis and less concerned for stercoral colitis  - enema, keep NPO, will check lactate, blood cultures, and start ciprofloxacin and flagyl - pt. with abdominal pain, with sigmoid colitis/moderate amount of stool on CTAP  - likely 2/2 constipation, s/p enema at [facility] with subsequent bowel movements   - discussed with radiology, sigmoid colitis and stool burden likely from diverticulitis and less concerned for stercoral colitis  - blood cultures  - c/w ciprofloxacin and flagyl

## 2018-12-09 LAB
ANION GAP SERPL CALC-SCNC: 11 MMOL/L — SIGNIFICANT CHANGE UP (ref 5–17)
APTT BLD: 48.9 SEC — HIGH (ref 27.5–36.3)
APTT BLD: 66 SEC — HIGH (ref 27.5–36.3)
APTT BLD: 75.1 SEC — HIGH (ref 27.5–36.3)
BUN SERPL-MCNC: 8 MG/DL — SIGNIFICANT CHANGE UP (ref 7–23)
CALCIUM SERPL-MCNC: 7.4 MG/DL — LOW (ref 8.4–10.5)
CHLORIDE SERPL-SCNC: 110 MMOL/L — HIGH (ref 96–108)
CO2 SERPL-SCNC: 19 MMOL/L — LOW (ref 22–31)
CREAT SERPL-MCNC: 0.44 MG/DL — LOW (ref 0.5–1.3)
GLUCOSE SERPL-MCNC: 95 MG/DL — SIGNIFICANT CHANGE UP (ref 70–99)
HCT VFR BLD CALC: 24.4 % — LOW (ref 34.5–45)
HCT VFR BLD CALC: 25.1 % — LOW (ref 34.5–45)
HGB BLD-MCNC: 8.1 G/DL — LOW (ref 11.5–15.5)
HGB BLD-MCNC: 8.1 G/DL — LOW (ref 11.5–15.5)
MAGNESIUM SERPL-MCNC: 1.9 MG/DL — SIGNIFICANT CHANGE UP (ref 1.6–2.6)
MCHC RBC-ENTMCNC: 29.8 PG — SIGNIFICANT CHANGE UP (ref 27–34)
MCHC RBC-ENTMCNC: 30.5 PG — SIGNIFICANT CHANGE UP (ref 27–34)
MCHC RBC-ENTMCNC: 32.4 GM/DL — SIGNIFICANT CHANGE UP (ref 32–36)
MCHC RBC-ENTMCNC: 33 GM/DL — SIGNIFICANT CHANGE UP (ref 32–36)
MCV RBC AUTO: 92.2 FL — SIGNIFICANT CHANGE UP (ref 80–100)
MCV RBC AUTO: 92.3 FL — SIGNIFICANT CHANGE UP (ref 80–100)
PHOSPHATE SERPL-MCNC: 2.4 MG/DL — LOW (ref 2.5–4.5)
PLATELET # BLD AUTO: 287 K/UL — SIGNIFICANT CHANGE UP (ref 150–400)
PLATELET # BLD AUTO: 289 K/UL — SIGNIFICANT CHANGE UP (ref 150–400)
POTASSIUM SERPL-MCNC: 3.5 MMOL/L — SIGNIFICANT CHANGE UP (ref 3.5–5.3)
POTASSIUM SERPL-SCNC: 3.5 MMOL/L — SIGNIFICANT CHANGE UP (ref 3.5–5.3)
RBC # BLD: 2.64 M/UL — LOW (ref 3.8–5.2)
RBC # BLD: 2.72 M/UL — LOW (ref 3.8–5.2)
RBC # FLD: 17.6 % — HIGH (ref 10.3–14.5)
RBC # FLD: 17.9 % — HIGH (ref 10.3–14.5)
SODIUM SERPL-SCNC: 140 MMOL/L — SIGNIFICANT CHANGE UP (ref 135–145)
WBC # BLD: 5.6 K/UL — SIGNIFICANT CHANGE UP (ref 3.8–10.5)
WBC # BLD: 6.3 K/UL — SIGNIFICANT CHANGE UP (ref 3.8–10.5)
WBC # FLD AUTO: 5.6 K/UL — SIGNIFICANT CHANGE UP (ref 3.8–10.5)
WBC # FLD AUTO: 6.3 K/UL — SIGNIFICANT CHANGE UP (ref 3.8–10.5)

## 2018-12-09 PROCEDURE — 99233 SBSQ HOSP IP/OBS HIGH 50: CPT

## 2018-12-09 PROCEDURE — 99233 SBSQ HOSP IP/OBS HIGH 50: CPT | Mod: GC

## 2018-12-09 RX ORDER — DRONABINOL 2.5 MG
2.5 CAPSULE ORAL DAILY
Qty: 0 | Refills: 0 | Status: DISCONTINUED | OUTPATIENT
Start: 2018-12-09 | End: 2018-12-14

## 2018-12-09 RX ORDER — SODIUM CHLORIDE 9 MG/ML
1000 INJECTION INTRAMUSCULAR; INTRAVENOUS; SUBCUTANEOUS
Qty: 0 | Refills: 0 | Status: DISCONTINUED | OUTPATIENT
Start: 2018-12-09 | End: 2018-12-10

## 2018-12-09 RX ADMIN — Medication 200 MILLIGRAM(S): at 18:26

## 2018-12-09 RX ADMIN — MEMANTINE HYDROCHLORIDE 5 MILLIGRAM(S): 10 TABLET ORAL at 21:16

## 2018-12-09 RX ADMIN — HEPARIN SODIUM 11 UNIT(S)/HR: 5000 INJECTION INTRAVENOUS; SUBCUTANEOUS at 11:14

## 2018-12-09 RX ADMIN — Medication 2.5 MILLIGRAM(S): at 13:16

## 2018-12-09 RX ADMIN — HEPARIN SODIUM 12 UNIT(S)/HR: 5000 INJECTION INTRAVENOUS; SUBCUTANEOUS at 02:21

## 2018-12-09 RX ADMIN — Medication 100 MILLIGRAM(S): at 15:51

## 2018-12-09 RX ADMIN — Medication 100 MILLIGRAM(S): at 05:38

## 2018-12-09 RX ADMIN — SODIUM CHLORIDE 125 MILLILITER(S): 9 INJECTION INTRAMUSCULAR; INTRAVENOUS; SUBCUTANEOUS at 15:42

## 2018-12-09 RX ADMIN — SIMVASTATIN 20 MILLIGRAM(S): 20 TABLET, FILM COATED ORAL at 21:16

## 2018-12-09 RX ADMIN — Medication 200 MILLIGRAM(S): at 05:39

## 2018-12-09 RX ADMIN — HEPARIN SODIUM 12 UNIT(S)/HR: 5000 INJECTION INTRAVENOUS; SUBCUTANEOUS at 21:17

## 2018-12-09 RX ADMIN — SODIUM CHLORIDE 125 MILLILITER(S): 9 INJECTION INTRAMUSCULAR; INTRAVENOUS; SUBCUTANEOUS at 21:17

## 2018-12-09 RX ADMIN — Medication 100 MILLIGRAM(S): at 21:16

## 2018-12-09 NOTE — PROGRESS NOTE ADULT - PROBLEM SELECTOR PLAN 1
- hypoxic to 80s on admission, now saturating 94% on HFNC (80% FIO2); titrate off as tolerated  - 2/2 bilateral PE, management as below  - monitor VS [id number] - hypoxic to 80s on admission, now saturating 96% on NC 5L  - 2/2 bilateral PE, management as below  - monitor VS [id number] - hypoxic to 80s on admission, now saturating 96% on NC 5L; maintain SpO2 > 95$  - 2/2 bilateral PE, management as below  - monitor VS [id number]

## 2018-12-09 NOTE — PROGRESS NOTE ADULT - PROBLEM SELECTOR PLAN 5
- pt. with abdominal pain, with sigmoid colitis/moderate amount of stool on CTAP  - likely 2/2 constipation, s/p enema at [facility] with subsequent bowel movements   - discussed with radiology, sigmoid colitis and stool burden likely from diverticulitis and less concerned for stercoral colitis  - blood cultures  - c/w ciprofloxacin and flagyl

## 2018-12-09 NOTE — PROGRESS NOTE ADULT - SUBJECTIVE AND OBJECTIVE BOX
Chief Complaint:  abd pain    HPI:  Doing ok.  On Nasal cannula.   at bedside.  No chest pain.  Breathing is better.  no neuro changes.    Short episode of PAT      PMH:   Generalized Osteoarthritis  PVD (Peripheral Vascular Disease)  History of Osteoporosis  Asthma  Hyperlipemia  Hypertension      PSH:   SDH (subdural hematoma)  S/P Cataract Surgery     MEDICATIONS  (STANDING):  ciprofloxacin   IVPB      ciprofloxacin   IVPB 400 milliGRAM(s) IV Intermittent every 12 hours  dronabinol 2.5 milliGRAM(s) Oral daily  heparin  Infusion 1100 Unit(s)/Hr (11 mL/Hr) IV Continuous <Continuous>  memantine 5 milliGRAM(s) Oral daily  metroNIDAZOLE  IVPB      metroNIDAZOLE  IVPB 500 milliGRAM(s) IV Intermittent every 8 hours  simvastatin 20 milliGRAM(s) Oral at bedtime  sodium chloride 0.9%. 1000 milliLiter(s) (125 mL/Hr) IV Continuous <Continuous>    MEDICATIONS  (PRN):    Allergies:  No Known Allergies      FAMILY HISTORY:  Family history of breast cancer in sister (Sibling)      Social History:  Smoking History:  Alcohol Use:  Drug Use:    Review of Systems:  REVIEW OF SYSTEMS:  CONSTITUTIONAL: No weakness, fevers or chills  EYES/ENT: No visual changes;  No dysphagia  NECK: No pain or stiffness  RESPIRATORY: No cough, wheezing, hemoptysis; No shortness of breath  CARDIOVASCULAR: No chest pain or palpitations; No lower extremity edema  GASTROINTESTINAL: No abdominal or epigastric pain. No nausea, vomiting, or hematemesis; No diarrhea or constipation. No melena or hematochezia.  BACK: No back pain  GENITOURINARY: No dysuria, frequency or hematuria  NEUROLOGICAL: No numbness or weakness  SKIN: No itching, burning, rashes, or lesions   All other review of systems is negative unless indicated above.    ICU Vital Signs Last 24 Hrs  T(C): 36.8 (09 Dec 2018 04:21), Max: 36.8 (09 Dec 2018 04:21)  T(F): 98.2 (09 Dec 2018 04:21), Max: 98.2 (09 Dec 2018 04:21)  HR: 72 (09 Dec 2018 11:36) (72 - 90)  BP: 94/60 (09 Dec 2018 05:25) (94/60 - 102/60)  BP(mean): --  ABP: --  ABP(mean): --  RR: 19 (09 Dec 2018 09:17) (18 - 20)  SpO2: 93% (09 Dec 2018 09:17) (93% - 100%)        GENERAL: No acute distress, well-developed  HEAD:  Atraumatic, Normocephalic  ENT: EOMI, PERRLA, conjunctiva and sclera clear, Neck supple, No JVD, moist mucosa  CHEST/LUNG: Clear to auscultation bilaterally; No wheeze, equal breath sounds bilaterally   BACK: No spinal tenderness  HEART: Regular rate and rhythm; No murmurs, rubs, or gallops  ABDOMEN: Soft, Nontender, Nondistended; Bowel sounds present  EXTREMITIES:  No clubbing, cyanosis, or edema  PSYCH: Nl behavior, nl affect  NEUROLOGY: AAOx3, non-focal, cranial nerves intact  SKIN: Normal color, No rashes or lesions  LINES:    Cardiovascular Diagnostic Testing:    CT C/A/P:  IMPRESSION:  Bilateral pulmonary emboli within the right and left main pulmonary   arteries extending into the lobar branches and several segmental branches   bilaterally.  Likely sigmoid colitis.    TTE:  Conclusions:  1. Mitral annular calcification, otherwise normal mitral  valve. Mild mitral regurgitation.  2. Aortic valve not well visualized; appears calcified.  Peak transaortic valve gradient equals 6 mm Hg.  Mild-moderate aortic regurgitation.  3. Severely dilated left atrium.  LA volume index = 49  cc/m2. Atrial Septal Aneurysm is present.  4. Eccentric left ventricular hypertrophy (dilated left  ventricle with normal relative wall thickness).  5. Moderate segmental left ventricular systolic  dysfunction. The basal inferoseptum, mid to distal lateral,  mid to distal inferior, and mid inferolateral wall are  hypokinetic.  6. Right ventricular enlargement with decreased right  ventricular systolic function. TV s'= 8.5 cm/sec.  7. Estimated right ventricular systolic pressure equals 58  mm Hg, assuming right atrial pressure equals 8 mm Hg,  consistent with moderate pulmonary hypertension.  8. Normal tricuspid valve. Moderate tricuspid  regurgitation.  9. Color Doppler suggestive of patent foramen ovale (as  noted on prior echo 02/15/2018). Consider repeat limited  study with agitated saline.  *** Compared with echocardiogram of 2/5/2018, pulmonary  hypertension and right ventricular dysfunction are now  seen. Mitral regurgitation has decreased. Atrial Septal  aneurysm  is again seen as noted on previous echo.                           8.1    5.6   )-----------( 287      ( 09 Dec 2018 09:36 )             25.1   12-09    140  |  110<H>  |  8   ----------------------------<  95  3.5   |  19<L>  |  0.44<L>    Ca    7.4<L>      09 Dec 2018 09:36  Phos  2.4     12-09  Mg     1.9     12-09    TPro  5.5<L>  /  Alb  2.8<L>  /  TBili  0.5  /  DBili  x   /  AST  20  /  ALT  20  /  AlkPhos  76  12-08

## 2018-12-09 NOTE — PROGRESS NOTE ADULT - PROBLEM SELECTOR PLAN 2
Massive PE- CT chest with b/l PE, TTE with RV strain, saturating in the 90s on HFNC (80% FIO2), hypotensive to 90s (MAP >65), +evidence of right heart strain on echo;  - mentating, per discussion with family no pressors, c/w NS @125 with caution  - No thrombolytics given recent SDH and high risk of bleeding; after discussion with neurosurgery and vascular cardiology, pt. started on patient specific heparin drip with goal PTT 60-70 given SDH. Once therapeutic, if CT head is stable will switch to therapeutic Lovenox  - f/u LE dopplers  - vascular cardiology recs appreciated, monitor on tele, VS [id number]

## 2018-12-09 NOTE — PROGRESS NOTE ADULT - PROBLEM SELECTOR PLAN 7
- Hb 10.9 (baseline ~10)  - iron studies, B12, folate, repeat CTH given SDH and on heparin gtt for PE  - trend CBC [id number]

## 2018-12-09 NOTE — PROGRESS NOTE ADULT - ASSESSMENT
88F hx of dementia, HTN, HLD, osteoporosis, sent from [facility] for worsening abdominal pain of 5 day duration, likely 2/2 sigmoid colitis seen on CTAP in the setting of constipation. Also admitted with acute hypoxic respiratory failure 2/2 massive PE (bilateral pulmonary embolisms with RV strain and hypotension) on heparin gtt. 88F hx of dementia, HTN, HLD, osteoporosis, SDH s/p 2 evacuations (6/2018)  with residual right sided weakness, sent from [facility] for worsening abdominal pain of 5 day duration, likely 2/2 sigmoid colitis seen on CTAP in the setting of constipation. Also admitted with acute hypoxic respiratory failure 2/2 massive PE (bilateral pulmonary embolisms with RV strain and hypotension) on heparin gtt. 88yoF w/ PMHx significant for dementia, HTN, HLD, osteoporosis, SDH s/p 2 evacuations (6/2018)  with residual right sided weakness, sent from [facility] for worsening abdominal pain of 5 day duration, likely 2/2 sigmoid colitis seen on CTAP in the setting of constipation. Also admitted with acute hypoxic respiratory failure 2/2 massive PE (bilateral pulmonary embolisms with RV strain and hypotension) on Heparin gtt.

## 2018-12-09 NOTE — PROGRESS NOTE ADULT - ASSESSMENT
89F hx of dementia, HTN, HLD, osteoporosis, sent from [facility] for worsening abdominal pain of 5 day duration. Found to have bilateral PE, elevated proBNP, elevated hsTrop, and evidence of RV strain on TTE. Concerning for submassive PE, high risk.     Large clot burden on CT with hypoxia and borderline BP  Would prefer a/c over IVC filter if possible.    Neurosurgery input appreciated she remains on heparin gtt  Continue with heparin gtt for now with tight PTT control  Surveillance head CT.       No role for catheter lytics given age and high bleeding risk    Yan [id number]

## 2018-12-09 NOTE — PROGRESS NOTE ADULT - PROBLEM SELECTOR PLAN 10
- DVT ppx: on full anticoagulation heparin   - DIET: regular  - CODE: GOC with patient's  Digeo Lundberg, no documented HCP, pt. has 4 children, family makes decisions together. Per conversation with , patient and family in agreement that she would not want "artifical, life-sustaining" measures and that she would want to "go naturally" should her clinical status decompensate. Family is amenable to blood thinners in current setting, IVF, however no pressors. DNR/DNI paperwork signed with ED resident/attending.

## 2018-12-09 NOTE — PROGRESS NOTE ADULT - SUBJECTIVE AND OBJECTIVE BOX
Remigio Remy  PGY2 Internal Medicine  Pager [phone] or [phone]    Patient is a 89y old  Female who presents with a chief complaint of bilateral PE, colitis (08 Dec 2018 08:49)      SUBJECTIVE / OVERNIGHT EVENTS:    MEDICATIONS  (STANDING):  ciprofloxacin   IVPB      ciprofloxacin   IVPB 400 milliGRAM(s) IV Intermittent every 12 hours  heparin  Infusion 1100 Unit(s)/Hr (12 mL/Hr) IV Continuous <Continuous>  memantine 5 milliGRAM(s) Oral daily  metroNIDAZOLE  IVPB      metroNIDAZOLE  IVPB 500 milliGRAM(s) IV Intermittent every 8 hours  simvastatin 20 milliGRAM(s) Oral at bedtime  sodium chloride 0.9%. 1000 milliLiter(s) (125 mL/Hr) IV Continuous <Continuous>    MEDICATIONS  (PRN):      T(C): 36.8 (18 @ 04:21), Max: 36.8 (18 @ 04:21)  HR: 80 (18 @ 05:25) (78 - 90)  BP: 94/60 (18 @ 05:25) (94/60 - 102/60)  RR: 18 (18 @ 05:25) (18 - 20)  SpO2: 100% (18 @ 05:25) (97% - 100%)  CAPILLARY BLOOD GLUCOSE        I&O's Summary    08 Dec 2018 07:01  -  09 Dec 2018 05:52  --------------------------------------------------------  IN: 512 mL / OUT: 0 mL / NET: 512 mL    PHYSICAL EXAM:  General: WN/WD NAD  	Neurology: A&Ox2, right sided weakness compared to left   	Eyes: PERRLA/ EOMI, Gross vision intact  	ENT: Gross hearing intact  	Neck: Neck supple, trachea midline, No JVD,   	Respiratory: CTA B/L, No wheezing, rales, rhonchi  	CV: RRR, S1S2, no murmurs, rubs or gallops  	Abdominal: Soft, diffusely tender to deep palpation, no guarding, no rebound, non-distended, +BS  	Extremities: No edema, + peripheral pulses  Skin: No Rashes, Hematoma, Ecchymosis    LABS:                        8.1    6.3   )-----------( 289      ( 09 Dec 2018 01:11 )             24.4     12-    136  |  105  |  12  ----------------------------<  85  3.7   |  21<L>  |  0.49<L>    Ca    7.8<L>      08 Dec 2018 09:15  Phos  2.7     12-  Mg     2.0         TPro  5.5<L>  /  Alb  2.8<L>  /  TBili  0.5  /  DBili  x   /  AST  20  /  ALT  20  /  AlkPhos  76  12-    PT/INR - ( 07 Dec 2018 16:39 )   PT: 12.5 sec;   INR: 1.09 ratio         PTT - ( 09 Dec 2018 01:11 )  PTT:66.0 sec      Urinalysis Basic - ( 07 Dec 2018 17:32 )    Color: Yellow / Appearance: Clear / S.022 / pH: x  Gluc: x / Ketone: Negative  / Bili: Small / Urobili: 6 mg/dL   Blood: x / Protein: Trace / Nitrite: Negative   Leuk Esterase: Negative / RBC: 6 /hpf / WBC 2 /hpf   Sq Epi: x / Non Sq Epi: 1 /hpf / Bacteria: Negative        Micro:      RADIOLOGY & ADDITIONAL TESTS:    Imaging Personally Reviewed:    Consultant(s) Notes Reviewed:      Care Discussed with Consultants/Other Providers: Remigio Remy  PGY2 Internal Medicine  Pager [phone] or [phone]    Patient is a 89y old  Female who presents with a chief complaint of bilateral PE, colitis (08 Dec 2018 08:49)      SUBJECTIVE / OVERNIGHT EVENTS: No overnight events, transitioned from HFNC to NC now, tolerating it well, some SOB but no CP, no f/n/v/d    MEDICATIONS  (STANDING):  ciprofloxacin   IVPB      ciprofloxacin   IVPB 400 milliGRAM(s) IV Intermittent every 12 hours  heparin  Infusion 1100 Unit(s)/Hr (12 mL/Hr) IV Continuous <Continuous>  memantine 5 milliGRAM(s) Oral daily  metroNIDAZOLE  IVPB      metroNIDAZOLE  IVPB 500 milliGRAM(s) IV Intermittent every 8 hours  simvastatin 20 milliGRAM(s) Oral at bedtime  sodium chloride 0.9%. 1000 milliLiter(s) (125 mL/Hr) IV Continuous <Continuous>    MEDICATIONS  (PRN):      T(C): 36.8 (18 @ 04:21), Max: 36.8 (18 @ 04:21)  HR: 80 (18 @ 05:25) (78 - 90)  BP: 94/60 (18 @ 05:25) (94/60 - 102/60)  RR: 18 (18 @ 05:25) (18 - 20)  SpO2: 100% (18 @ 05:25) (97% - 100%)  CAPILLARY BLOOD GLUCOSE        I&O's Summary    08 Dec 2018 07:01  -  09 Dec 2018 05:52  --------------------------------------------------------  IN: 512 mL / OUT: 0 mL / NET: 512 mL    PHYSICAL EXAM:  General: WN/WD NAD  	Neurology: A&Ox2, right sided weakness compared to left   	Eyes: PERRLA/ EOMI, Gross vision intact  	ENT: Gross hearing intact  	Neck: Neck supple, trachea midline, No JVD,   	Respiratory: CTA B/L, No wheezing, rales, rhonchi  	CV: RRR, S1S2, no murmurs, rubs or gallops  	Abdominal: Soft, diffusely tender to deep palpation, no guarding, no rebound, non-distended, +BS  	Extremities: No edema, + peripheral pulses  Skin: No Rashes, Hematoma, Ecchymosis    LABS:                        8.1    6.3   )-----------( 289      ( 09 Dec 2018 01:11 )             24.4     12    136  |  105  |  12  ----------------------------<  85  3.7   |  21<L>  |  0.49<L>    Ca    7.8<L>      08 Dec 2018 09:15  Phos  2.7       Mg     2.0         TPro  5.5<L>  /  Alb  2.8<L>  /  TBili  0.5  /  DBili  x   /  AST  20  /  ALT  20  /  AlkPhos  76  12-08    PT/INR - ( 07 Dec 2018 16:39 )   PT: 12.5 sec;   INR: 1.09 ratio         PTT - ( 09 Dec 2018 01:11 )  PTT:66.0 sec      Urinalysis Basic - ( 07 Dec 2018 17:32 )    Color: Yellow / Appearance: Clear / S.022 / pH: x  Gluc: x / Ketone: Negative  / Bili: Small / Urobili: 6 mg/dL   Blood: x / Protein: Trace / Nitrite: Negative   Leuk Esterase: Negative / RBC: 6 /hpf / WBC 2 /hpf   Sq Epi: x / Non Sq Epi: 1 /hpf / Bacteria: Negative        Micro:      RADIOLOGY & ADDITIONAL TESTS:    Imaging Personally Reviewed:    Consultant(s) Notes Reviewed:      Care Discussed with Consultants/Other Providers: Remigio Lee  PGY2 Internal Medicine  Pager [phone] or [phone]    Patient is a 89y old  Female who presents with a chief complaint of bilateral PE, colitis (08 Dec 2018 08:49)    SUBJECTIVE / OVERNIGHT EVENTS: No overnight events, transitioned from HFNC to NC now, tolerating it well, some SOB but no CP, no f/n/v/d    T(C): 36.8 (12-09-18 @ 04:21), Max: 36.8 (12-09-18 @ 04:21)  HR: 80 (12-09-18 @ 05:25) (78 - 90)  BP: 94/60 (12-09-18 @ 05:25) (94/60 - 102/60)  RR: 18 (12-09-18 @ 05:25) (18 - 20)  SpO2: 100% (12-09-18 @ 05:25) (97% - 100%)    I&O's Summary  08 Dec 2018 07:01  -  09 Dec 2018 05:52  --------------------------------------------------------  IN: 512 mL / OUT: 0 mL / NET: 512 mL    PHYSICAL EXAM:  General: WN/WD NAD  	Neurology: A&Ox2, right sided weakness compared to left   	Eyes: PERRLA/ EOMI, Gross vision intact  	ENT: Gross hearing intact  	Neck: Neck supple, trachea midline, No JVD,   	Respiratory: CTA B/L, No wheezing, rales, rhonchi  	CV: RRR, S1S2, no murmurs, rubs or gallops  	Abdominal: Soft, diffusely tender to deep palpation, no guarding, no rebound, non-distended, +BS  	Extremities: No edema, + peripheral pulses  Skin: No Rashes, Hematoma, Ecchymosis    LABS:                       8.1    6.3   )-----------( 289      ( 09 Dec 2018 01:11 )             24.4     12-09    140  |  110<H>  |  8   ----------------------------<  95  3.5   |  19<L>  |  0.44<L>    Ca    7.4<L>      09 Dec 2018 09:36  Phos  2.4     12-09  Mg     1.9     12-09    TPro  5.5<L>  /  Alb  2.8<L>  /  TBili  0.5  /  DBili  x   /  AST  20  /  ALT  20  /  AlkPhos  76  12-08     PTT - ( 09 Dec 2018 01:11 )  PTT:66.0 sec    Consultant(s) Notes Reviewed:  Vascular Medicine    MEDICATIONS  (STANDING):  ciprofloxacin   IVPB      ciprofloxacin   IVPB 400 milliGRAM(s) IV Intermittent every 12 hours  dronabinol 2.5 milliGRAM(s) Oral daily  heparin  Infusion 1100 Unit(s)/Hr (11 mL/Hr) IV Continuous <Continuous>  memantine 5 milliGRAM(s) Oral daily  metroNIDAZOLE  IVPB      metroNIDAZOLE  IVPB 500 milliGRAM(s) IV Intermittent every 8 hours  simvastatin 20 milliGRAM(s) Oral at bedtime  sodium chloride 0.9%. 1000 milliLiter(s) (125 mL/Hr) IV Continuous <Continuous>

## 2018-12-09 NOTE — PROGRESS NOTE ADULT - PROBLEM SELECTOR PLAN 4
- tachycardia and tachypnea   - although pt. with PE, colitis possibly source of infection   - UCx pending, will send BCx and lactate  - abx coverage with cipro and flagyl   - VS [id number]

## 2018-12-10 DIAGNOSIS — I50.22 CHRONIC SYSTOLIC (CONGESTIVE) HEART FAILURE: ICD-10-CM

## 2018-12-10 LAB
ANION GAP SERPL CALC-SCNC: 11 MMOL/L — SIGNIFICANT CHANGE UP (ref 5–17)
APTT BLD: 66.1 SEC — HIGH (ref 27.5–36.3)
APTT BLD: 72.3 SEC — HIGH (ref 27.5–36.3)
BUN SERPL-MCNC: 6 MG/DL — LOW (ref 7–23)
CALCIUM SERPL-MCNC: 7.3 MG/DL — LOW (ref 8.4–10.5)
CHLORIDE SERPL-SCNC: 114 MMOL/L — HIGH (ref 96–108)
CO2 SERPL-SCNC: 19 MMOL/L — LOW (ref 22–31)
CREAT SERPL-MCNC: 0.45 MG/DL — LOW (ref 0.5–1.3)
GLUCOSE SERPL-MCNC: 92 MG/DL — SIGNIFICANT CHANGE UP (ref 70–99)
HCT VFR BLD CALC: 25.7 % — LOW (ref 34.5–45)
HGB BLD-MCNC: 8.2 G/DL — LOW (ref 11.5–15.5)
MAGNESIUM SERPL-MCNC: 2 MG/DL — SIGNIFICANT CHANGE UP (ref 1.6–2.6)
MCHC RBC-ENTMCNC: 29.6 PG — SIGNIFICANT CHANGE UP (ref 27–34)
MCHC RBC-ENTMCNC: 32 GM/DL — SIGNIFICANT CHANGE UP (ref 32–36)
MCV RBC AUTO: 92.7 FL — SIGNIFICANT CHANGE UP (ref 80–100)
PHOSPHATE SERPL-MCNC: 2.3 MG/DL — LOW (ref 2.5–4.5)
PLATELET # BLD AUTO: 291 K/UL — SIGNIFICANT CHANGE UP (ref 150–400)
POTASSIUM SERPL-MCNC: 3.4 MMOL/L — LOW (ref 3.5–5.3)
POTASSIUM SERPL-SCNC: 3.4 MMOL/L — LOW (ref 3.5–5.3)
RBC # BLD: 2.77 M/UL — LOW (ref 3.8–5.2)
RBC # FLD: 18.1 % — HIGH (ref 10.3–14.5)
SODIUM SERPL-SCNC: 144 MMOL/L — SIGNIFICANT CHANGE UP (ref 135–145)
WBC # BLD: 5.6 K/UL — SIGNIFICANT CHANGE UP (ref 3.8–10.5)
WBC # FLD AUTO: 5.6 K/UL — SIGNIFICANT CHANGE UP (ref 3.8–10.5)

## 2018-12-10 PROCEDURE — 93970 EXTREMITY STUDY: CPT | Mod: 26

## 2018-12-10 PROCEDURE — 99233 SBSQ HOSP IP/OBS HIGH 50: CPT

## 2018-12-10 PROCEDURE — 99233 SBSQ HOSP IP/OBS HIGH 50: CPT | Mod: GC

## 2018-12-10 RX ORDER — POTASSIUM PHOSPHATE, MONOBASIC POTASSIUM PHOSPHATE, DIBASIC 236; 224 MG/ML; MG/ML
15 INJECTION, SOLUTION INTRAVENOUS ONCE
Qty: 0 | Refills: 0 | Status: COMPLETED | OUTPATIENT
Start: 2018-12-10 | End: 2018-12-10

## 2018-12-10 RX ORDER — APIXABAN 2.5 MG/1
5 TABLET, FILM COATED ORAL
Qty: 0 | Refills: 0 | Status: DISCONTINUED | OUTPATIENT
Start: 2018-12-10 | End: 2018-12-14

## 2018-12-10 RX ADMIN — Medication 2.5 MILLIGRAM(S): at 11:23

## 2018-12-10 RX ADMIN — APIXABAN 5 MILLIGRAM(S): 2.5 TABLET, FILM COATED ORAL at 18:00

## 2018-12-10 RX ADMIN — Medication 100 MILLIGRAM(S): at 05:25

## 2018-12-10 RX ADMIN — HEPARIN SODIUM 11.5 UNIT(S)/HR: 5000 INJECTION INTRAVENOUS; SUBCUTANEOUS at 14:00

## 2018-12-10 RX ADMIN — Medication 100 MILLIGRAM(S): at 21:01

## 2018-12-10 RX ADMIN — MEMANTINE HYDROCHLORIDE 5 MILLIGRAM(S): 10 TABLET ORAL at 21:01

## 2018-12-10 RX ADMIN — SIMVASTATIN 20 MILLIGRAM(S): 20 TABLET, FILM COATED ORAL at 21:01

## 2018-12-10 RX ADMIN — POTASSIUM PHOSPHATE, MONOBASIC POTASSIUM PHOSPHATE, DIBASIC 62.5 MILLIMOLE(S): 236; 224 INJECTION, SOLUTION INTRAVENOUS at 11:23

## 2018-12-10 RX ADMIN — Medication 200 MILLIGRAM(S): at 06:23

## 2018-12-10 RX ADMIN — Medication 100 MILLIGRAM(S): at 15:37

## 2018-12-10 RX ADMIN — HEPARIN SODIUM 11.5 UNIT(S)/HR: 5000 INJECTION INTRAVENOUS; SUBCUTANEOUS at 05:59

## 2018-12-10 RX ADMIN — Medication 200 MILLIGRAM(S): at 17:37

## 2018-12-10 NOTE — PROGRESS NOTE ADULT - PROBLEM SELECTOR PLAN 7
- Hb 10.9 (baseline ~10)  - iron studies, B12, folate, repeat CTH given SDH and on heparin gtt for PE  - trend CBC [id number] - Hb 10.9 (baseline ~10)- stable  - daily CBC

## 2018-12-10 NOTE — PROGRESS NOTE ADULT - PROBLEM SELECTOR PLAN 2
Massive PE- CT chest with b/l PE, TTE with RV strain, saturating in the 90s on HFNC (80% FIO2), hypotensive to 90s (MAP >65), +evidence of right heart strain on echo;  - mentating, per discussion with family no pressors, s/p NS @125 and BP improved- will d/c today and monitor, consider restarting IVF if hypotensive  - No thrombolytics given recent SDH and high risk of bleeding  - after discussion with neurosurgery and vascular cardiology, pt. started on patient specific heparin drip with goal PTT 60-70 given SDH. Once therapeutic, if CT head is stable will switch to therapeutic Lovenox/DOAC  - f/u LE dopplers  - vascular cardiology recs appreciated, monitor on tele, VS [id number] Massive PE- CT chest with b/l PE, TTE with RV strain, saturating in the 90s on HFNC (80% FIO2), hypotensive to 90s (MAP >65), +evidence of right heart strain on echo;  - mentating, per discussion with family no pressors, s/p NS @125 and BP improved- will d/c today and monitor, consider restarting IVF if hypotensive. LE dopplers showing extensive bilateral DVTs  - No thrombolytics given recent SDH and high risk of bleeding  - after discussion with neurosurgery and vascular cardiology, pt. started on patient specific heparin drip with goal PTT 60-70 given SDH. per vascular cardiology, will switch to Eliquis 5mg BID (will not give initial 10mg bid dosing given recent SDH) and rpt CT head on Tuesday  - vascular cardiology recs appreciated, monitor on tele, VS [id number] Massive PE- CT chest with b/l PE, TTE with RV strain, saturating in the 90s on HFNC (80% FIO2), hypotensive to 90s (MAP >65), +evidence of right heart strain on echo;  - mentating, per discussion with family no pressors, s/p NS @125 and BP improved- will d/c today and monitor, consider restarting IVF if hypotensive. LE dopplers showing extensive bilateral DVTs  - No thrombolytics given recent SDH and high risk of bleeding  - after discussion with neurosurgery and vascular cardiology, pt. started on patient specific heparin drip with goal PTT 60-70 given SDH. per vascular cardiology, will switch to Eliquis 5mg BID (will not give initial 10mg bid dosing given recent SDH) and repeat CT head on Tuesday  - vascular cardiology recs appreciated, monitor on tele, VS [id number]

## 2018-12-10 NOTE — PROGRESS NOTE ADULT - PROBLEM SELECTOR PLAN 10
- DVT ppx: on full anticoagulation heparin- pt specific goal 60-70  - DIET: regular  - CODE: GOC with patient's  Diego Lundberg, no documented HCP, pt. has 4 children, family makes decisions together. Per conversation with , patient and family in agreement that she would not want "artifical, life-sustaining" measures and that she would want to "go naturally" should her clinical status decompensate. Family is amenable to blood thinners in current setting, IVF, however no pressors. DNR/DNI paperwork signed with ED resident/attending. - DVT ppx: on full anticoagulation heparin- pt specific goal 60-70, switch to Eliquis tonight  - DIET: regular  - CODE: GOC with patient's  Diego Lundberg, no documented HCP, pt. has 4 children, family makes decisions together. Per conversation with , patient and family in agreement that she would not want "artifical, life-sustaining" measures and that she would want to "go naturally" should her clinical status decompensate. Family is amenable to blood thinners in current setting, IVF, however no pressors. DNR/DNI paperwork signed with ED resident/attending.

## 2018-12-10 NOTE — PROGRESS NOTE ADULT - ATTENDING COMMENTS
Showing stability on heparin gtt  This evening, give Eliquis 5mg BID and stop heparin gtt  Favor Eliquis due to convenience (family prefers to avoid frequent INR checks) and hope to avoid INR overshoots in this small, frail patient    Yan [id number]

## 2018-12-10 NOTE — PROGRESS NOTE ADULT - ASSESSMENT
88yoF w/ PMHx significant for dementia, HTN, HLD, osteoporosis, SDH s/p 2 evacuations (6/2018)  with residual right sided weakness, sent from [facility] for worsening abdominal pain of 5 day duration, likely 2/2 sigmoid colitis seen on CTAP in the setting of constipation. Also admitted with acute hypoxic respiratory failure 2/2 massive PE (bilateral pulmonary embolisms with RV strain and hypotension) on Heparin gtt. 88yoF w/ PMHx significant for dementia, HTN, HLD, osteoporosis, SDH s/p 2 evacuations (6/2018)  with residual right sided weakness, sent from [facility] for worsening abdominal pain of 5 day duration, likely 2/2 sigmoid colitis seen on CTAP in the setting of constipation. Also admitted with acute hypoxic respiratory failure 2/2 massive PE (bilateral pulmonary embolisms with RV strain and hypotension).

## 2018-12-10 NOTE — PROGRESS NOTE ADULT - SUBJECTIVE AND OBJECTIVE BOX
Vascular Cardiology  Progress note     [facility] [id number]              EMAIL [email or web address]   OFFICE [phone]      INTERVAL HISTORY:  No complaints, no acute overnight events. Still on NC however being weaned.       Allergies    No Known Allergies    Intolerances    	    MEDICATIONS:  heparin  Infusion 1100 Unit(s)/Hr IV Continuous <Continuous>    ciprofloxacin   IVPB      ciprofloxacin   IVPB 400 milliGRAM(s) IV Intermittent every 12 hours  metroNIDAZOLE  IVPB      metroNIDAZOLE  IVPB 500 milliGRAM(s) IV Intermittent every 8 hours      dronabinol 2.5 milliGRAM(s) Oral daily  memantine 5 milliGRAM(s) Oral daily      simvastatin 20 milliGRAM(s) Oral at bedtime    potassium phosphate IVPB 15 milliMole(s) IV Intermittent once      PAST MEDICAL & SURGICAL HISTORY:  Generalized Osteoarthritis  PVD (Peripheral Vascular Disease)  History of Osteoporosis  Asthma  Hyperlipemia  Hypertension  SDH (subdural hematoma): 7/2018 With evacuatinon in [place]  S/P Cataract Surgery: left eye      FAMILY HISTORY:  Family history of breast cancer in sister (Sibling)      SOCIAL HISTORY:  unchanged    REVIEW OF SYSTEMS:  CONSTITUTIONAL: No fever, weight loss, or fatigue  EYES: No eye pain, visual disturbances, or discharge  ENMT:  No difficulty hearing, tinnitus, vertigo; No sinus or throat pain  NECK: No pain or stiffness  RESPIRATORY: No cough, wheezing, chills or hemoptysis; No Shortness of Breath  CARDIOVASCULAR: No chest pain, palpitations, passing out, dizziness, or leg swelling  GASTROINTESTINAL: No abdominal or epigastric pain. No nausea, vomiting, or hematemesis; No diarrhea or constipation. No melena or hematochezia.  GENITOURINARY: No dysuria, frequency, hematuria, or incontinence  NEUROLOGICAL: No headaches, memory loss, loss of strength, numbness, or tremors  SKIN: No itching, burning, rashes, or lesions   LYMPH Nodes: No enlarged glands  ENDOCRINE: No heat or cold intolerance; No hair loss  MUSCULOSKELETAL: No joint pain or swelling; No muscle, back, or extremity pain  PSYCHIATRIC: No depression, anxiety, mood swings, or difficulty sleeping  HEME/LYMPH: No easy bruising, or bleeding gums  ALLERY AND IMMUNOLOGIC: No hives or eczema	    [ x] All others negative	  [ ] Unable to obtain    PHYSICAL EXAM:  T(C): 36.6 (12-10-18 @ 08:00), Max: 36.9 (12-10-18 @ 00:00)  HR: 79 (12-10-18 @ 08:00) (65 - 94)  BP: 125/75 (12-10-18 @ 08:00) (95/63 - 125/75)  RR: 18 (12-10-18 @ 08:00) (18 - 19)  SpO2: 92% (12-10-18 @ 08:00) (90% - 98%)  Wt(kg): --  I&O's Summary    09 Dec 2018 07:01  -  10 Dec 2018 07:00  --------------------------------------------------------  IN: 1100 mL / OUT: 1320 mL / NET: -220 mL    10 Dec 2018 07:01  -  10 Dec 2018 11:04  --------------------------------------------------------  IN: 240 mL / OUT: 0 mL / NET: 240 mL        GENERAL: No acute distress, NC in place  HEAD:  Atraumatic, Normocephalic  ENT: EOMI, PERRLA, conjunctiva and sclera clear, Neck supple, No JVD, moist mucosa  CHEST/LUNG: Clear to auscultation bilaterally  BACK: No spinal tenderness  HEART: Regular rate and rhythm; No murmurs  ABDOMEN: Soft, Nontender  EXTREMITIES:  No edema. 2+ peripheral pulses (DP and PT bilaterally)  NEUROLOGY: AAOx2  SKIN: Normal color, No rashes or lesions      LABS:	 	    CBC Full  -  ( 10 Dec 2018 05:33 )  WBC Count : 5.6 K/uL  Hemoglobin : 8.2 g/dL  Hematocrit : 25.7 %  Platelet Count - Automated : 291 K/uL  Mean Cell Volume : 92.7 fl  Mean Cell Hemoglobin : 29.6 pg  Mean Cell Hemoglobin Concentration : 32.0 gm/dL  Auto Neutrophil # : x  Auto Lymphocyte # : x  Auto Monocyte # : x  Auto Eosinophil # : x  Auto Basophil # : x  Auto Neutrophil % : x  Auto Lymphocyte % : x  Auto Monocyte % : x  Auto Eosinophil % : x  Auto Basophil % : x    12-10    144  |  114<H>  |  6<L>  ----------------------------<  92  3.4<L>   |  19<L>  |  0.45<L>  12-09    140  |  110<H>  |  8   ----------------------------<  95  3.5   |  19<L>  |  0.44<L>    Ca    7.3<L>      10 Dec 2018 05:33  Ca    7.4<L>      09 Dec 2018 09:36  Phos  2.3     12-10  Phos  2.4     12-09  Mg     2.0     12-10  Mg     1.9     12-09      CT C/A/P:  IMPRESSION:  Bilateral pulmonary emboli within the right and left main pulmonary   arteries extending into the lobar branches and several segmental branches   bilaterally.  Likely sigmoid colitis.    TTE:  Conclusions:  1. Mitral annular calcification, otherwise normal mitral  valve. Mild mitral regurgitation.  2. Aortic valve not well visualized; appears calcified.  Peak transaortic valve gradient equals 6 mm Hg.  Mild-moderate aortic regurgitation.  3. Severely dilated left atrium.  LA volume index = 49  cc/m2. Atrial Septal Aneurysm is present.  4. Eccentric left ventricular hypertrophy (dilated left  ventricle with normal relative wall thickness).  5. Moderate segmental left ventricular systolic  dysfunction. The basal inferoseptum, mid to distal lateral,  mid to distal inferior, and mid inferolateral wall are  hypokinetic.  6. Right ventricular enlargement with decreased right  ventricular systolic function. TV s'= 8.5 cm/sec.  7. Estimated right ventricular systolic pressure equals 58  mm Hg, assuming right atrial pressure equals 8 mm Hg,  consistent with moderate pulmonary hypertension.  8. Normal tricuspid valve. Moderate tricuspid  regurgitation.  9. Color Doppler suggestive of patent foramen ovale (as  noted on prior echo 02/15/2018). Consider repeat limited  study with agitated saline.  *** Compared with echocardiogram of 2/5/2018, pulmonary  hypertension and right ventricular dysfunction are now  seen. Mitral regurgitation has decreased. Atrial Septal  aneurysm  is again seen as noted on previous echo.          Assessment:  1. Bilateral PE- large clot burden on CT, submassive, high risk. Patient continues to be hypoxic requiring supplemental O2. Tachycardia resolved. Currently on hep gtt.   2. Recent subdural hematoma  3. HTN  4. HLD  5. Dementia       Plan:  1. Given that patient is frail and would be difficult for family to monitor INR would favor a DOAC such as eliquis instead of coumadin. Patient's CrCl calculated to be ~75. If subdural hemotoma stable on repeat surveillance CT head, then would recommend eliquis 5mg BID (would skip 10mg BID dosing given recent subdural hematoma)  2. F/u LE obinnas       Joseph Luna MD  Cardiology Fellow PGY-5  [id number] after 5pm, otherwise see below    Thank you      Vascular Cardiology Service   [facility] - [id number]  Office [phone]  email:   [email or web address]

## 2018-12-10 NOTE — PROGRESS NOTE ADULT - PROBLEM SELECTOR PLAN 1
- hypoxic to 80s on admission, now saturating 93% on NC 3L; maintain SpO2 > 95%  - 2/2 bilateral PE, management as below  - monitor VS [id number] Hypoxic to 80s on admission, now saturating 93% on NC 3L; maintain SpO2 > 95%  - 2/2 bilateral PE, management as below  - monitor VS [id number]

## 2018-12-10 NOTE — PROGRESS NOTE ADULT - PROBLEM SELECTOR PLAN 6
- s/p fall in June in [place] c/b SDH requiring 2 evacuations with residual right sided weakness  - previously on keppra, tapered off as of 11/19  - CTH stable, repeat imaging after therapeutic PTT achieved on heparin drip also stable  - neurosurgery recs appreciated - s/p fall in June in [facility] c/b SDH requiring 2 evacuations with residual right sided weakness  - previously on keppra, tapered off as of 11/19  - CTH stable, repeat on Tuesday after switching to Eliquis  - neurosurgery recs appreciated

## 2018-12-10 NOTE — PROGRESS NOTE ADULT - PROBLEM SELECTOR PLAN 3
- EF 40-45%  - left systolic dysfunction, new RV dysfunction with elevated proBNP  - holding home enalapril and toprol XL given hypotension   - s/p 1.5L NS bolus now on IVF for BP support, will d/c as BP better - tachycardia and tachypnea   - although pt. with PE, colitis possibly source of infection- abdominal pain improved  - F/u UCx, BCx  - abx coverage with cipro and flagyl x5 days (started 12/8)  - VS [id number]

## 2018-12-10 NOTE — PROGRESS NOTE ADULT - PROBLEM SELECTOR PLAN 4
- tachycardia and tachypnea   - although pt. with PE, colitis possibly source of infection- abdominal pain improved  - F/u UCx, BCx  - abx coverage with cipro and flagyl   - VS [id number] - tachycardia and tachypnea   - although pt. with PE, colitis possibly source of infection- abdominal pain improved  - F/u UCx, BCx  - abx coverage with cipro and flagyl x5 days (started 12/8)  - VS [id number] - EF 40-45%  - left systolic dysfunction, new RV dysfunction with elevated proBNP  - holding home enalapril and toprol XL given hypotension   - s/p 1.5L NS bolus now on IVF for BP support, will d/c as BP better

## 2018-12-10 NOTE — PROGRESS NOTE ADULT - SUBJECTIVE AND OBJECTIVE BOX
Pt is alert, awake, MCKINNEY with no drift.  S/p evac of cSDH x 2 in [place] with substantial decrease in residual since last seen in November.  There is small amount of residual hematoma in left parietal region.  Pt on Heparin for bilateral PE.  Discussed with  that we will need to monitor the subdural residual with serial CT.  PT PTT now 60's.

## 2018-12-10 NOTE — PROGRESS NOTE ADULT - SUBJECTIVE AND OBJECTIVE BOX
CHIEF COMPLAINT: Patient is a 89y old  Female who presents with a chief complaint of bilateral PE, colitis (09 Dec 2018 12:00)      SUBJECTIVE / OVERNIGHT EVENTS: No acute events overnight. Satting ~95% on 5L NC, weaned to 3L satting 93%. Pt seen and examined at bedside. Pt is confused but in no distress. Has no complaints. Pt aware that she is in the hospital but doesn't know why. Abdominal pain is better.       MEDICATIONS:  MEDICATIONS  (STANDING):  ciprofloxacin   IVPB      ciprofloxacin   IVPB 400 milliGRAM(s) IV Intermittent every 12 hours  dronabinol 2.5 milliGRAM(s) Oral daily  heparin  Infusion 1100 Unit(s)/Hr (11.5 mL/Hr) IV Continuous <Continuous>  memantine 5 milliGRAM(s) Oral daily  metroNIDAZOLE  IVPB      metroNIDAZOLE  IVPB 500 milliGRAM(s) IV Intermittent every 8 hours  potassium phosphate IVPB 15 milliMole(s) IV Intermittent once  simvastatin 20 milliGRAM(s) Oral at bedtime  sodium chloride 0.9%. 1000 milliLiter(s) (125 mL/Hr) IV Continuous <Continuous>    MEDICATIONS  (PRN):        OBJECTIVE:  Vital Signs Last 24 Hrs  T(C): 36.6 (10 Dec 2018 08:00), Max: 36.9 (10 Dec 2018 00:00)  T(F): 97.9 (10 Dec 2018 08:00), Max: 98.4 (10 Dec 2018 00:00)  HR: 79 (10 Dec 2018 08:00) (65 - 94)  BP: 125/75 (10 Dec 2018 08:00) (95/63 - 125/75)  BP(mean): --  RR: 18 (10 Dec 2018 08:00) (18 - 19)  SpO2: 92% (10 Dec 2018 08:00) (90% - 98%)  CAPILLARY BLOOD GLUCOSE        I&O's Summary    09 Dec 2018 07:01  -  10 Dec 2018 07:00  --------------------------------------------------------  IN: 1100 mL / OUT: 1320 mL / NET: -220 mL          PHYSICAL EXAM:  General: WN/WD NAD  Neurology: A&Ox2, right sided weakness compared to left   Eyes: PERRLA/ EOMI, Gross vision intact  ENT: Gross hearing intact  Neck: Neck supple, trachea midline, No JVD,   Respiratory: CTA B/L, No wheezing, rales, rhonchi  CV: RRR, S1S2, no murmurs, rubs or gallops  Abdominal: Soft, diffusely tender to deep palpation, no guarding, no rebound, non-distended, +BS  Extremities: No edema, + peripheral pulses  Skin: No Rashes, Hematoma, Ecchymosis    LABS:                        8.2    5.6   )-----------( 291      ( 10 Dec 2018 05:33 )             25.7     12-10    144  |  114<H>  |  6<L>  ----------------------------<  92  3.4<L>   |  19<L>  |  0.45<L>    Ca    7.3<L>      10 Dec 2018 05:33  Phos  2.3     12-10  Mg     2.0     12-10    TPro  5.5<L>  /  Alb  2.8<L>  /  TBili  0.5  /  DBili  x   /  AST  20  /  ALT  20  /  AlkPhos  76  12-08    PTT - ( 10 Dec 2018 05:33 )  PTT:72.3 sec          Culture - Blood (collected 08 Dec 2018 06:54)  Source: .Blood Blood-Peripheral  Preliminary Report (09 Dec 2018 07:01):    No growth to date.    Culture - Blood (collected 08 Dec 2018 06:54)  Source: .Blood Blood-Venous  Preliminary Report (09 Dec 2018 07:01):    No growth to date.    Culture - Urine (collected 07 Dec 2018 20:13)  Source: .Urine Catheterized  Final Report (08 Dec 2018 23:09):    No growth          RADIOLOGY & ADDITIONAL TESTS: CHIEF COMPLAINT: Patient is a 89y old  Female who presents with a chief complaint of bilateral PE, colitis (09 Dec 2018 12:00)      SUBJECTIVE / OVERNIGHT EVENTS: No acute events overnight. Satting ~95% on 5L NC, weaned to 3L satting 93%. Pt seen and examined at bedside. Pt is confused but in no distress. Has no complaints. Pt aware that she is in the hospital but doesn't know why. Abdominal pain is better.       MEDICATIONS:  MEDICATIONS  (STANDING):  ciprofloxacin   IVPB      ciprofloxacin   IVPB 400 milliGRAM(s) IV Intermittent every 12 hours  dronabinol 2.5 milliGRAM(s) Oral daily  heparin  Infusion 1100 Unit(s)/Hr (11.5 mL/Hr) IV Continuous <Continuous>  memantine 5 milliGRAM(s) Oral daily  metroNIDAZOLE  IVPB      metroNIDAZOLE  IVPB 500 milliGRAM(s) IV Intermittent every 8 hours  potassium phosphate IVPB 15 milliMole(s) IV Intermittent once  simvastatin 20 milliGRAM(s) Oral at bedtime  sodium chloride 0.9%. 1000 milliLiter(s) (125 mL/Hr) IV Continuous <Continuous>    MEDICATIONS  (PRN):        OBJECTIVE:  Vital Signs Last 24 Hrs  T(C): 36.6 (10 Dec 2018 08:00), Max: 36.9 (10 Dec 2018 00:00)  T(F): 97.9 (10 Dec 2018 08:00), Max: 98.4 (10 Dec 2018 00:00)  HR: 79 (10 Dec 2018 08:00) (65 - 94)  BP: 125/75 (10 Dec 2018 08:00) (95/63 - 125/75)  BP(mean): --  RR: 18 (10 Dec 2018 08:00) (18 - 19)  SpO2: 92% (10 Dec 2018 08:00) (90% - 98%)  CAPILLARY BLOOD GLUCOSE        I&O's Summary    09 Dec 2018 07:01  -  10 Dec 2018 07:00  --------------------------------------------------------  IN: 1100 mL / OUT: 1320 mL / NET: -220 mL          PHYSICAL EXAM:  General: WN/WD NAD  Neurology: A&Ox1, right sided weakness compared to left   Eyes: PERRLA/ EOMI, Gross vision intact  ENT: Gross hearing intact  Neck: Neck supple, trachea midline, No JVD,   Respiratory: CTA B/L, No wheezing, rales, rhonchi  CV: RRR, S1S2, no murmurs, rubs or gallops  Abdominal: Soft, diffusely tender to deep palpation, no guarding, no rebound, non-distended, +BS  Extremities: No edema, + peripheral pulses  Skin: No Rashes, Hematoma, Ecchymosis    LABS:                        8.2    5.6   )-----------( 291      ( 10 Dec 2018 05:33 )             25.7     12-10    144  |  114<H>  |  6<L>  ----------------------------<  92  3.4<L>   |  19<L>  |  0.45<L>    Ca    7.3<L>      10 Dec 2018 05:33  Phos  2.3     12-10  Mg     2.0     12-10    TPro  5.5<L>  /  Alb  2.8<L>  /  TBili  0.5  /  DBili  x   /  AST  20  /  ALT  20  /  AlkPhos  76  12-08    PTT - ( 10 Dec 2018 05:33 )  PTT:72.3 sec          Culture - Blood (collected 08 Dec 2018 06:54)  Source: .Blood Blood-Peripheral  Preliminary Report (09 Dec 2018 07:01):    No growth to date.    Culture - Blood (collected 08 Dec 2018 06:54)  Source: .Blood Blood-Venous  Preliminary Report (09 Dec 2018 07:01):    No growth to date.    Culture - Urine (collected 07 Dec 2018 20:13)  Source: .Urine Catheterized  Final Report (08 Dec 2018 23:09):    No growth          RADIOLOGY & ADDITIONAL TESTS: CHIEF COMPLAINT: Patient is a 89y old  Female who presents with a chief complaint of bilateral PE, colitis (09 Dec 2018 12:00)    SUBJECTIVE / OVERNIGHT EVENTS: No acute events overnight. Satting ~95% on 5L NC, weaned to 3L satting 93%. Pt seen and examined at bedside. Pt is confused but in no distress. Has no complaints. Pt aware that she is in the hospital but doesn't know why. Abdominal pain is better.     OBJECTIVE:  Vital Signs Last 24 Hrs  T(F): 97.9 (10 Dec 2018 08:00), Max: 98.4 (10 Dec 2018 00:00)  HR: 79 (10 Dec 2018 08:00) (65 - 94)  BP: 125/75 (10 Dec 2018 08:00) (95/63 - 125/75)  RR: 18 (10 Dec 2018 08:00) (18 - 19)  SpO2: 92% (10 Dec 2018 08:00) (90% - 98%)    I&O's Summary  09 Dec 2018 07:01  -  10 Dec 2018 07:00  --------------------------------------------------------  IN: 1100 mL / OUT: 1320 mL / NET: -220 mL    PHYSICAL EXAM:  General: WN/WD NAD  Neurology: A&Ox1, right sided weakness compared to left   Eyes: PERRLA/ EOMI, Gross vision intact  ENT: Gross hearing intact  Neck: Neck supple, trachea midline, No JVD,   Respiratory: CTA B/L, No wheezing, rales, rhonchi  CV: RRR, S1S2, no murmurs, rubs or gallops  Abdominal: Soft, diffusely tender to deep palpation, no guarding, no rebound, non-distended, +BS  Extremities: No edema, + peripheral pulses  Skin: No Rashes, Hematoma, Ecchymosis    LABS:                     8.2    5.6   )-----------( 291      ( 10 Dec 2018 05:33 )             25.7     12-10  144  |  114<H>  |  6<L>  ----------------------------<  92  3.4<L>   |  19<L>  |  0.45<L>    Ca    7.3<L>      10 Dec 2018 05:33  Phos  2.3     12-10  Mg     2.0     12-10    PTT - ( 10 Dec 2018 05:33 )  PTT:72.3 sec    Culture - Blood (collected 08 Dec 2018 06:54)  Source: .Blood Blood-Peripheral  Preliminary Report (09 Dec 2018 07:01):    No growth to date.    Culture - Blood (collected 08 Dec 2018 06:54)  Source: .Blood Blood-Venous  Preliminary Report (09 Dec 2018 07:01):    No growth to date.    Culture - Urine (collected 07 Dec 2018 20:13)  Source: .Urine Catheterized  Final Report (08 Dec 2018 23:09):    No growth    MEDICATIONS  (STANDING):  ciprofloxacin   IVPB      ciprofloxacin   IVPB 400 milliGRAM(s) IV Intermittent every 12 hours  dronabinol 2.5 milliGRAM(s) Oral daily  heparin  Infusion 1100 Unit(s)/Hr (11.5 mL/Hr) IV Continuous <Continuous>  memantine 5 milliGRAM(s) Oral daily  metroNIDAZOLE  IVPB      metroNIDAZOLE  IVPB 500 milliGRAM(s) IV Intermittent every 8 hours  potassium phosphate IVPB 15 milliMole(s) IV Intermittent once  simvastatin 20 milliGRAM(s) Oral at bedtime  sodium chloride 0.9%. 1000 milliLiter(s) (125 mL/Hr) IV Continuous <Continuous>

## 2018-12-10 NOTE — PROGRESS NOTE ADULT - PROBLEM SELECTOR PLAN 5
- pt. with abdominal pain, with sigmoid colitis/moderate amount of stool on CTAP  - likely 2/2 constipation, s/p enema at [facility] with subsequent bowel movements   - discussed with radiology, sigmoid colitis and stool burden likely from diverticulitis and less concerned for stercoral colitis  - blood cultures  - c/w ciprofloxacin and flagyl - pt. with abdominal pain, with sigmoid colitis/moderate amount of stool on CTAP  - likely 2/2 constipation, s/p enema at [facility] with subsequent bowel movements   - discussed with radiology, sigmoid colitis and stool burden likely from diverticulitis and less concerned for stercoral colitis  - blood cultures  - c/w ciprofloxacin and flagyl (Started 12/8)

## 2018-12-11 ENCOUNTER — TRANSCRIPTION ENCOUNTER (OUTPATIENT)
Age: 83
End: 2018-12-11

## 2018-12-11 LAB
ANION GAP SERPL CALC-SCNC: 10 MMOL/L — SIGNIFICANT CHANGE UP (ref 5–17)
BLD GP AB SCN SERPL QL: NEGATIVE — SIGNIFICANT CHANGE UP
BUN SERPL-MCNC: 4 MG/DL — LOW (ref 7–23)
CALCIUM SERPL-MCNC: 7.7 MG/DL — LOW (ref 8.4–10.5)
CHLORIDE SERPL-SCNC: 111 MMOL/L — HIGH (ref 96–108)
CO2 SERPL-SCNC: 21 MMOL/L — LOW (ref 22–31)
CREAT SERPL-MCNC: 0.41 MG/DL — LOW (ref 0.5–1.3)
GLUCOSE SERPL-MCNC: 112 MG/DL — HIGH (ref 70–99)
HCT VFR BLD CALC: 27.2 % — LOW (ref 34.5–45)
HGB BLD-MCNC: 8.7 G/DL — LOW (ref 11.5–15.5)
MAGNESIUM SERPL-MCNC: 1.9 MG/DL — SIGNIFICANT CHANGE UP (ref 1.6–2.6)
MCHC RBC-ENTMCNC: 29.8 PG — SIGNIFICANT CHANGE UP (ref 27–34)
MCHC RBC-ENTMCNC: 32 GM/DL — SIGNIFICANT CHANGE UP (ref 32–36)
MCV RBC AUTO: 93.2 FL — SIGNIFICANT CHANGE UP (ref 80–100)
PHOSPHATE SERPL-MCNC: 2.3 MG/DL — LOW (ref 2.5–4.5)
PLATELET # BLD AUTO: 315 K/UL — SIGNIFICANT CHANGE UP (ref 150–400)
POTASSIUM SERPL-MCNC: 3.4 MMOL/L — LOW (ref 3.5–5.3)
POTASSIUM SERPL-SCNC: 3.4 MMOL/L — LOW (ref 3.5–5.3)
RBC # BLD: 2.92 M/UL — LOW (ref 3.8–5.2)
RBC # FLD: 18.3 % — HIGH (ref 10.3–14.5)
RH IG SCN BLD-IMP: POSITIVE — SIGNIFICANT CHANGE UP
SODIUM SERPL-SCNC: 142 MMOL/L — SIGNIFICANT CHANGE UP (ref 135–145)
WBC # BLD: 5.6 K/UL — SIGNIFICANT CHANGE UP (ref 3.8–10.5)
WBC # FLD AUTO: 5.6 K/UL — SIGNIFICANT CHANGE UP (ref 3.8–10.5)

## 2018-12-11 PROCEDURE — 99233 SBSQ HOSP IP/OBS HIGH 50: CPT

## 2018-12-11 PROCEDURE — 70450 CT HEAD/BRAIN W/O DYE: CPT | Mod: 26

## 2018-12-11 PROCEDURE — 99233 SBSQ HOSP IP/OBS HIGH 50: CPT | Mod: GC

## 2018-12-11 RX ORDER — POTASSIUM PHOSPHATE, MONOBASIC POTASSIUM PHOSPHATE, DIBASIC 236; 224 MG/ML; MG/ML
15 INJECTION, SOLUTION INTRAVENOUS ONCE
Qty: 0 | Refills: 0 | Status: COMPLETED | OUTPATIENT
Start: 2018-12-11 | End: 2018-12-11

## 2018-12-11 RX ADMIN — Medication 200 MILLIGRAM(S): at 17:33

## 2018-12-11 RX ADMIN — Medication 100 MILLIGRAM(S): at 05:05

## 2018-12-11 RX ADMIN — Medication 200 MILLIGRAM(S): at 06:10

## 2018-12-11 RX ADMIN — SIMVASTATIN 20 MILLIGRAM(S): 20 TABLET, FILM COATED ORAL at 21:27

## 2018-12-11 RX ADMIN — APIXABAN 5 MILLIGRAM(S): 2.5 TABLET, FILM COATED ORAL at 17:33

## 2018-12-11 RX ADMIN — Medication 100 MILLIGRAM(S): at 21:27

## 2018-12-11 RX ADMIN — MEMANTINE HYDROCHLORIDE 5 MILLIGRAM(S): 10 TABLET ORAL at 21:27

## 2018-12-11 RX ADMIN — Medication 2.5 MILLIGRAM(S): at 11:36

## 2018-12-11 RX ADMIN — POTASSIUM PHOSPHATE, MONOBASIC POTASSIUM PHOSPHATE, DIBASIC 62.5 MILLIMOLE(S): 236; 224 INJECTION, SOLUTION INTRAVENOUS at 11:26

## 2018-12-11 RX ADMIN — APIXABAN 5 MILLIGRAM(S): 2.5 TABLET, FILM COATED ORAL at 05:05

## 2018-12-11 RX ADMIN — Medication 100 MILLIGRAM(S): at 14:58

## 2018-12-11 NOTE — PROGRESS NOTE ADULT - PROBLEM SELECTOR PLAN 5
- pt. with abdominal pain, with sigmoid colitis/moderate amount of stool on CTAP  - likely 2/2 constipation, s/p enema at [facility] with subsequent bowel movements   - discussed with radiology, sigmoid colitis and stool burden likely from diverticulitis and less concerned for stercoral colitis  - blood cultures NGTD  - c/w ciprofloxacin and flagyl (Started 12/8)

## 2018-12-11 NOTE — DISCHARGE NOTE ADULT - CARE PROVIDERS DIRECT ADDRESSES
,[email or web address]

## 2018-12-11 NOTE — PROGRESS NOTE ADULT - SUBJECTIVE AND OBJECTIVE BOX
CHIEF COMPLAINT: Patient is a 89y old  Female who presents with a chief complaint of bilateral PE, colitis (11 Dec 2018 08:40)      SUBJECTIVE / OVERNIGHT EVENTS: No acute events overnight. Pt switched to Eliquis 5mg BID last night. Oxygen requirements decreasing- now SpO2 % on 4L NC. Pt seen and examined at bedside. Pt is confused but calm, has no complaints. Generally feels okay.       MEDICATIONS:  MEDICATIONS  (STANDING):  apixaban 5 milliGRAM(s) Oral <User Schedule>  ciprofloxacin   IVPB 400 milliGRAM(s) IV Intermittent every 12 hours  ciprofloxacin   IVPB      dronabinol 2.5 milliGRAM(s) Oral daily  memantine 5 milliGRAM(s) Oral daily  metroNIDAZOLE  IVPB      metroNIDAZOLE  IVPB 500 milliGRAM(s) IV Intermittent every 8 hours  simvastatin 20 milliGRAM(s) Oral at bedtime    MEDICATIONS  (PRN):        OBJECTIVE:  Vital Signs Last 24 Hrs  T(C): 36.6 (11 Dec 2018 08:54), Max: 36.7 (10 Dec 2018 20:41)  T(F): 97.9 (11 Dec 2018 08:54), Max: 98.1 (10 Dec 2018 20:41)  HR: 75 (11 Dec 2018 09:44) (72 - 87)  BP: 120/77 (11 Dec 2018 08:54) (114/70 - 123/80)  BP(mean): --  RR: 18 (11 Dec 2018 09:44) (16 - 18)  SpO2: 99% (11 Dec 2018 09:44) (96% - 100%)  CAPILLARY BLOOD GLUCOSE        I&O's Summary    10 Dec 2018 07:01  -  11 Dec 2018 07:00  --------------------------------------------------------  IN: 2456.5 mL / OUT: 1450 mL / NET: 1006.5 mL          PHYSICAL EXAM:  General: WN/WD NAD  Neurology: A&Ox1, right sided weakness compared to left   Eyes: PERRLA/ EOMI, Gross vision intact  ENT: Gross hearing intact  Neck: Neck supple, trachea midline, No JVD,   Respiratory: CTA B/L, No wheezing, rales, rhonchi  CV: RRR, S1S2, no murmurs, rubs or gallops  Abdominal: Soft, diffusely tender to deep palpation, no guarding, no rebound, non-distended, +BS  Extremities: No edema, + peripheral pulses  Skin: No Rashes, Hematoma, Ecchymosis      LABS:                        8.7    5.6   )-----------( 315      ( 11 Dec 2018 07:15 )             27.2     12-11    142  |  111<H>  |  4<L>  ----------------------------<  112<H>  3.4<L>   |  21<L>  |  0.41<L>    Ca    7.7<L>      11 Dec 2018 07:15  Phos  2.3     12-11  Mg     1.9     12-11      PTT - ( 10 Dec 2018 13:25 )  PTT:66.1 sec              RADIOLOGY & ADDITIONAL TESTS: CHIEF COMPLAINT: Patient is a 89y old  Female who presents with a chief complaint of bilateral PE, colitis (11 Dec 2018 08:40)    SUBJECTIVE / OVERNIGHT EVENTS: No acute events overnight. Pt switched to Eliquis 5mg BID last night. Oxygen requirements decreasing- now SpO2 % on 4L NC. Pt seen and examined at bedside. Pt is confused but calm, has no complaints. Generally feels okay.     OBJECTIVE:  Vital Signs Last 24 Hrs  T(F): 97.9 (11 Dec 2018 08:54), Max: 98.1 (10 Dec 2018 20:41)  HR: 75 (11 Dec 2018 09:44) (72 - 87)  BP: 120/77 (11 Dec 2018 08:54) (114/70 - 123/80)  RR: 18 (11 Dec 2018 09:44) (16 - 18)  SpO2: 99% (11 Dec 2018 09:44) (96% - 100%)    I&O's Summary  10 Dec 2018 07:01  -  11 Dec 2018 07:00  --------------------------------------------------------  IN: 2456.5 mL / OUT: 1450 mL / NET: 1006.5 mL    PHYSICAL EXAM:  General: WN/WD NAD  Neurology: A&Ox1, right sided weakness compared to left   Eyes: PERRLA/ EOMI, Gross vision intact  ENT: Gross hearing intact  Neck: Neck supple, trachea midline, No JVD,   Respiratory: CTA B/L, No wheezing, rales, rhonchi  CV: RRR, S1S2, no murmurs, rubs or gallops  Abdominal: Soft, diffusely tender to deep palpation, no guarding, no rebound, non-distended, +BS  Extremities: No edema, + peripheral pulses  Skin: No Rashes, Hematoma, Ecchymosis    LABS:                      8.7    5.6   )-----------( 315      ( 11 Dec 2018 07:15 )             27.2     12-11  142  |  111<H>  |  4<L>  ----------------------------<  112<H>  3.4<L>   |  21<L>  |  0.41<L>    Ca    7.7<L>      11 Dec 2018 07:15  Phos  2.3     12-11  Mg     1.9     12-11    MEDICATIONS  (STANDING):  apixaban 5 milliGRAM(s) Oral <User Schedule>  ciprofloxacin   IVPB 400 milliGRAM(s) IV Intermittent every 12 hours  ciprofloxacin   IVPB      dronabinol 2.5 milliGRAM(s) Oral daily  memantine 5 milliGRAM(s) Oral daily  metroNIDAZOLE  IVPB      metroNIDAZOLE  IVPB 500 milliGRAM(s) IV Intermittent every 8 hours  simvastatin 20 milliGRAM(s) Oral at bedtime

## 2018-12-11 NOTE — DISCHARGE NOTE ADULT - CARE PLAN
Principal Discharge DX:	Pulmonary embolism, bilateral  Goal:	Management and resolution  Assessment and plan of treatment:	You were diagnosed with blood clots in your lungs and legs, and treated with blood thinners. Please continue taking your medications as prescribed. Follow up with vascular cardiology within 1-2 weeks after discharge. Please return to the hospital if you experience shortness of breath, chest pain, palpitations, lightheadedness/dizziness, or other new symptoms that are concerning to you. Principal Discharge DX:	Pulmonary embolism, bilateral  Goal:	Management and resolution  Assessment and plan of treatment:	You were diagnosed with blood clots in your lungs and legs, and treated with blood thinners. Please continue taking your medications as prescribed. Follow up with vascular cardiology within 1-2 weeks after discharge. Please return to the hospital if you experience shortness of breath, chest pain, palpitations, lightheadedness/dizziness, or other new symptoms that are concerning to you.  AT [facility], PT SHOULD CONTINUE TO BE WEANED OFF OXYGEN, TO ROOM AIR AS TOLERATED Principal Discharge DX:	Pulmonary embolism, bilateral  Goal:	Management and resolution  Assessment and plan of treatment:	You were diagnosed with blood clots in your lungs and legs, and treated with blood thinners. Please continue taking your medications as prescribed. Follow up with vascular cardiology within 1-2 weeks after discharge. Please return to the hospital if you experience shortness of breath, chest pain, palpitations, lightheadedness/dizziness, or other new symptoms that are concerning to you. We also stopped your heart failure medication metoprolol (Toprol) because your blood pressure was low. Please follow up with your PCP and cardiologist regarding when to resume that medication.  AT [facility], PT SHOULD CONTINUE TO BE WEANED OFF OXYGEN, TO ROOM AIR AS TOLERATED

## 2018-12-11 NOTE — PROVIDER CONTACT NOTE (OTHER) - ASSESSMENT
pt alert, follows commands. VSS, denies chest pain or sob. Resting comfortably, asymptomatic at this time. NSR on tele. Pt has hx of PAT 170s for 4 seconds on 12/10

## 2018-12-11 NOTE — PROGRESS NOTE ADULT - ASSESSMENT
88yoF w/ PMHx significant for dementia, HTN, HLD, osteoporosis, SDH s/p 2 evacuations (6/2018)  with residual right sided weakness, sent from [facility] for worsening abdominal pain of 5 day duration, likely 2/2 sigmoid colitis seen on CTAP in the setting of constipation. Also admitted with acute hypoxic respiratory failure 2/2 massive PE (bilateral pulmonary embolisms with RV strain and hypotension), now on Eliquis. 88yoF w/ PMHx significant for dementia, HTN, HLD, osteoporosis, SDH s/p 2 evacuations (6/2018)  with residual right sided weakness, sent from [facility] for worsening abdominal pain of 5 day duration, likely 2/2 sigmoid colitis seen on CTAP in the setting of constipation. Also admitted with acute hypoxic respiratory failure 2/2 massive PE (bilateral pulmonary embolisms with RV strain and hypotension), now on Eliquis for treatment.

## 2018-12-11 NOTE — DISCHARGE NOTE ADULT - CARE PROVIDER_API CALL
Karsten Heredia (DO), Internal Medicine; Nuclear Cardiology  [address] [place] [address]  Phone: [phone]  Fax: [phone]    Kathy Wall (DO), [facility]  [address] [place] [address]  Phone: [phone]  Fax: [phone]

## 2018-12-11 NOTE — PROGRESS NOTE ADULT - ATTENDING COMMENTS
Eliquis as above.  Repeat head CT tomorrow  She may need to be discharged with supplemental O2  Start process of discharge planning.      Yan [id number]

## 2018-12-11 NOTE — DISCHARGE NOTE ADULT - PLAN OF CARE
Management and resolution You were diagnosed with blood clots in your lungs and legs, and treated with blood thinners. Please continue taking your medications as prescribed. Follow up with vascular cardiology within 1-2 weeks after discharge. Please return to the hospital if you experience shortness of breath, chest pain, palpitations, lightheadedness/dizziness, or other new symptoms that are concerning to you. You were diagnosed with blood clots in your lungs and legs, and treated with blood thinners. Please continue taking your medications as prescribed. Follow up with vascular cardiology within 1-2 weeks after discharge. Please return to the hospital if you experience shortness of breath, chest pain, palpitations, lightheadedness/dizziness, or other new symptoms that are concerning to you.  AT [facility], PT SHOULD CONTINUE TO BE WEANED OFF OXYGEN, TO ROOM AIR AS TOLERATED You were diagnosed with blood clots in your lungs and legs, and treated with blood thinners. Please continue taking your medications as prescribed. Follow up with vascular cardiology within 1-2 weeks after discharge. Please return to the hospital if you experience shortness of breath, chest pain, palpitations, lightheadedness/dizziness, or other new symptoms that are concerning to you. We also stopped your heart failure medication metoprolol (Toprol) because your blood pressure was low. Please follow up with your PCP and cardiologist regarding when to resume that medication.  AT [facility], PT SHOULD CONTINUE TO BE WEANED OFF OXYGEN, TO ROOM AIR AS TOLERATED

## 2018-12-11 NOTE — PROGRESS NOTE ADULT - PROBLEM SELECTOR PLAN 10
- DVT ppx: Eliquis 5mg BID for PE  - DIET: regular  - CODE: GOC with patient's  Diego Lundberg, no documented HCP, pt. has 4 children, family makes decisions together. Per conversation with , patient and family in agreement that she would not want "artifical, life-sustaining" measures and that she would want to "go naturally" should her clinical status decompensate. Family is amenable to blood thinners in current setting, IVF, however no pressors. DNR/DNI paperwork signed with ED resident/attending.

## 2018-12-11 NOTE — DISCHARGE NOTE ADULT - ADDITIONAL INSTRUCTIONS
AT [facility], PT SHOULD CONTINUE TO BE WEANED OFF OXYGEN, TO ROOM AIR AS TOLERATED

## 2018-12-11 NOTE — PROGRESS NOTE ADULT - PROBLEM SELECTOR PLAN 6
- s/p fall in June in [place] c/b SDH requiring 2 evacuations with residual right sided weakness  - previously on keppra, tapered off as of 11/19  - CTH stable, repeat today after switching to Eliquis  - neurosurgery recs appreciated

## 2018-12-11 NOTE — PROGRESS NOTE ADULT - PROBLEM SELECTOR PLAN 4
- EF 40-45%  - left systolic dysfunction, new RV dysfunction with elevated proBNP  - holding home enalapril and toprol XL given hypotension initially - EF 40-45%  - left systolic dysfunction, new RV dysfunction with elevated proBNP  - holding home toprol XL given hypotension initially  - 2+ pitting edema LEs today- respiratory status improving. s/p IVF at 125cc/hr for 2 days. Will restart enalapril as tolerated. hold off diuresis for now as pt is preload dependent 2/2 massive PE.

## 2018-12-11 NOTE — PROGRESS NOTE ADULT - PROBLEM SELECTOR PLAN 1
Hypoxic to 80s on admission, now saturating % on NC 4L- overall O2 requirements decreased; maintain SpO2 > 95%  - 2/2 bilateral PE, management as below  - monitor VS [id number] Hypoxic to 80s on admission, now saturating % on NC 4L- overall O2 requirements decreasing; maintain SpO2 > 95%  - 2/2 bilateral PE, management as below  - monitor VS [id number]

## 2018-12-11 NOTE — PROGRESS NOTE ADULT - PROBLEM SELECTOR PLAN 2
Massive PE- CT chest with b/l PE, TTE with RV strain, saturating in the 90s on HFNC (80% FIO2), hypotensive to 90s (MAP >65), +evidence of right heart strain on echo; no thrombolytics given recent SDH and risk of bleeding. LE dopplers showing extensive bilateral DVTs  - s/p IVF initially; BP currently improved   - vascular cardiology recs appreciated- pt switched from heparin gtt to Eliquis 5mg BID (will not give initial 10mg BID 2/2 risk of bleeding), repeat CT head today  - monitor on tele, VS [id number]

## 2018-12-11 NOTE — PROGRESS NOTE ADULT - SUBJECTIVE AND OBJECTIVE BOX
Vascular Cardiology  Progress note     SPECTRA [id number]              EMAIL [email or web address]   OFFICE [phone]      INTERVAL HISTORY:  Denies any SOB or CP. Still requiring supplemental O2.      Allergies    No Known Allergies    Intolerances    	    MEDICATIONS:  apixaban 5 milliGRAM(s) Oral <User Schedule>    ciprofloxacin   IVPB 400 milliGRAM(s) IV Intermittent every 12 hours  ciprofloxacin   IVPB      metroNIDAZOLE  IVPB      metroNIDAZOLE  IVPB 500 milliGRAM(s) IV Intermittent every 8 hours      dronabinol 2.5 milliGRAM(s) Oral daily  memantine 5 milliGRAM(s) Oral daily      simvastatin 20 milliGRAM(s) Oral at bedtime        PAST MEDICAL & SURGICAL HISTORY:  Generalized Osteoarthritis  PVD (Peripheral Vascular Disease)  History of Osteoporosis  Asthma  Hyperlipemia  Hypertension  SDH (subdural hematoma): 7/2018 With evacuatinon in [place]  S/P Cataract Surgery: left eye      FAMILY HISTORY:  Family history of breast cancer in sister (Sibling)      SOCIAL HISTORY:  unchanged    REVIEW OF SYSTEMS:  CONSTITUTIONAL: No fever, weight loss, or fatigue  EYES: No eye pain, visual disturbances, or discharge  ENMT:  No difficulty hearing, tinnitus, vertigo; No sinus or throat pain  NECK: No pain or stiffness  RESPIRATORY: No cough, wheezing, chills or hemoptysis; No Shortness of Breath  CARDIOVASCULAR: No chest pain, palpitations, passing out, dizziness, or leg swelling  GASTROINTESTINAL: No abdominal or epigastric pain. No nausea, vomiting, or hematemesis; No diarrhea or constipation. No melena or hematochezia.  GENITOURINARY: No dysuria, frequency, hematuria, or incontinence  NEUROLOGICAL: No headaches, memory loss, loss of strength, numbness, or tremors  SKIN: No itching, burning, rashes, or lesions   LYMPH Nodes: No enlarged glands  ENDOCRINE: No heat or cold intolerance; No hair loss  MUSCULOSKELETAL: No joint pain or swelling; No muscle, back, or extremity pain  PSYCHIATRIC: No depression, anxiety, mood swings, or difficulty sleeping  HEME/LYMPH: No easy bruising, or bleeding gums  ALLERY AND IMMUNOLOGIC: No hives or eczema	    [ x] All others negative	  [ ] Unable to obtain    PHYSICAL EXAM:  T(C): 36.3 (12-11-18 @ 04:35), Max: 36.7 (12-10-18 @ 20:41)  HR: 72 (12-11-18 @ 05:35) (72 - 87)  BP: 123/80 (12-11-18 @ 04:35) (114/70 - 123/80)  RR: 16 (12-11-18 @ 05:35) (16 - 18)  SpO2: 97% (12-11-18 @ 05:35) (96% - 99%)  Wt(kg): --  I&O's Summary    10 Dec 2018 07:01  -  11 Dec 2018 07:00  --------------------------------------------------------  IN: 2456.5 mL / OUT: 1450 mL / NET: 1006.5 mL        GENERAL: No acute distress, NC in place  HEAD:  Atraumatic, Normocephalic  ENT: EOMI, PERRLA, conjunctiva and sclera clear, Neck supple, No JVD, moist mucosa  CHEST/LUNG: Clear to auscultation bilaterally  BACK: No spinal tenderness  HEART: Regular rate and rhythm; No murmurs  ABDOMEN: Soft, Nontender  EXTREMITIES:  No edema. 2+ peripheral pulses (DP and PT bilaterally)  NEUROLOGY: AAOx2  SKIN: Normal color, No rashes or lesions    LABS:	 	    CBC Full  -  ( 11 Dec 2018 07:15 )  WBC Count : 5.6 K/uL  Hemoglobin : 8.7 g/dL  Hematocrit : 27.2 %  Platelet Count - Automated : 315 K/uL  Mean Cell Volume : 93.2 fl  Mean Cell Hemoglobin : 29.8 pg  Mean Cell Hemoglobin Concentration : 32.0 gm/dL  Auto Neutrophil # : x  Auto Lymphocyte # : x  Auto Monocyte # : x  Auto Eosinophil # : x  Auto Basophil # : x  Auto Neutrophil % : x  Auto Lymphocyte % : x  Auto Monocyte % : x  Auto Eosinophil % : x  Auto Basophil % : x    12-11    142  |  111<H>  |  4<L>  ----------------------------<  112<H>  3.4<L>   |  21<L>  |  0.41<L>  12-10    144  |  114<H>  |  6<L>  ----------------------------<  92  3.4<L>   |  19<L>  |  0.45<L>    Ca    7.7<L>      11 Dec 2018 07:15  Ca    7.3<L>      10 Dec 2018 05:33  Phos  2.3     12-11  Phos  2.3     12-10  Mg     1.9     12-11  Mg     2.0     12-10          CT C/A/P:  IMPRESSION:  Bilateral pulmonary emboli within the right and left main pulmonary   arteries extending into the lobar branches and several segmental branches   bilaterally.  Likely sigmoid colitis.    TTE:  Conclusions:  1. Mitral annular calcification, otherwise normal mitral  valve. Mild mitral regurgitation.  2. Aortic valve not well visualized; appears calcified.  Peak transaortic valve gradient equals 6 mm Hg.  Mild-moderate aortic regurgitation.  3. Severely dilated left atrium.  LA volume index = 49  cc/m2. Atrial Septal Aneurysm is present.  4. Eccentric left ventricular hypertrophy (dilated left  ventricle with normal relative wall thickness).  5. Moderate segmental left ventricular systolic  dysfunction. The basal inferoseptum, mid to distal lateral,  mid to distal inferior, and mid inferolateral wall are  hypokinetic.  6. Right ventricular enlargement with decreased right  ventricular systolic function. TV s'= 8.5 cm/sec.  7. Estimated right ventricular systolic pressure equals 58  mm Hg, assuming right atrial pressure equals 8 mm Hg,  consistent with moderate pulmonary hypertension.  8. Normal tricuspid valve. Moderate tricuspid  regurgitation.  9. Color Doppler suggestive of patent foramen ovale (as  noted on prior echo 02/15/2018). Consider repeat limited  study with agitated saline.  *** Compared with echocardiogram of 2/5/2018, pulmonary  hypertension and right ventricular dysfunction are now  seen. Mitral regurgitation has decreased. Atrial Septal  aneurysm  is again seen as noted on previous echo.    LE dopplers:  FINDINGS:    There is DVT within the right femoral vein at the lower thigh extending   to the popliteal vein, tibioperoneal trunk, gastrocnemius vein, posterior   tibial vein, peroneal vein, and soleal vein.    There is DVT within the left femoral vein in the upper thigh. The femoral   vein in the mid and lower thigh is patent. There is thrombus in the left   popliteal vein, posterior tibial vein, peroneal trunk, peroneal vein, and   soleal vein.      Assessment:  1. Bilateral PE/LE DVT- large clot burden on CT, submassive, high risk. Patient continues to be hypoxic requiring supplemental O2. Tachycardia resolved. Currently on eliquis. Patient with distal DVT in RLE, proximal and distal DVT in LLE  2. Recent subdural hematoma  3. HTN  4. HLD  5. Dementia       Plan:  1. Given that patient is frail and would be difficult for family to monitor INR would favor a DOAC such as eliquis instead of coumadin. Patient's CrCl calculated to be ~75. Continue eliquis 5mg BID (would skip 10mg BID dosing given recent subdural hematoma)  2. Surveillance head CT per neurosurgery       Joseph Luna MD  Cardiology Fellow PGY-5  [id number] after 5pm, otherwise see below    Thank you      Vascular Cardiology Service   [facility] - [address]  Office [phone]  email:   [email or web address]

## 2018-12-11 NOTE — DISCHARGE NOTE ADULT - MEDICATION SUMMARY - MEDICATIONS TO TAKE
I will START or STAY ON the medications listed below when I get home from the hospital:    enalapril 2.5 mg oral tablet  -- 1 tab(s) by mouth once a day  -- Indication: For Heart failure with reduced ejection fraction    apixaban 5 mg oral tablet  -- 1 tab(s) by mouth   -- Indication: For Pulmonary embolism, bilateral    dronabinol 2.5 mg oral capsule  -- 1 cap(s) by mouth once a day  -- Indication: For Nausea    simvastatin 20 mg oral tablet  -- 1 tab(s) by mouth once a day (at bedtime)  -- Indication: For Hyperlipidemia    alendronate 70 mg oral tablet  -- 1 tab(s) by mouth once a week  -- Indication: For Osteoporosis    memantine 5 mg oral tablet  -- 1 tab(s) by mouth once a day  -- Indication: For Dementia

## 2018-12-11 NOTE — PROGRESS NOTE ADULT - PROBLEM SELECTOR PLAN 3
- tachycardia and tachypnea   - although pt. with PE, colitis possibly source of infection- abdominal pain improved  - F/u UCx, BCx  - abx coverage with cipro and flagyl x5 days (started 12/8)  - VS [id number]

## 2018-12-11 NOTE — DISCHARGE NOTE ADULT - PATIENT PORTAL LINK FT
You can access the [facility] Patient Portal, offered by [facility], by registering with the following website: [email or web address]

## 2018-12-12 LAB
ANION GAP SERPL CALC-SCNC: 10 MMOL/L — SIGNIFICANT CHANGE UP (ref 5–17)
BUN SERPL-MCNC: 5 MG/DL — LOW (ref 7–23)
CALCIUM SERPL-MCNC: 8.5 MG/DL — SIGNIFICANT CHANGE UP (ref 8.4–10.5)
CHLORIDE SERPL-SCNC: 110 MMOL/L — HIGH (ref 96–108)
CO2 SERPL-SCNC: 21 MMOL/L — LOW (ref 22–31)
CREAT SERPL-MCNC: 0.48 MG/DL — LOW (ref 0.5–1.3)
GLUCOSE SERPL-MCNC: 88 MG/DL — SIGNIFICANT CHANGE UP (ref 70–99)
HCT VFR BLD CALC: 32.9 % — LOW (ref 34.5–45)
HGB BLD-MCNC: 10.2 G/DL — LOW (ref 11.5–15.5)
MAGNESIUM SERPL-MCNC: 1.9 MG/DL — SIGNIFICANT CHANGE UP (ref 1.6–2.6)
MCHC RBC-ENTMCNC: 29.1 PG — SIGNIFICANT CHANGE UP (ref 27–34)
MCHC RBC-ENTMCNC: 30.9 GM/DL — LOW (ref 32–36)
MCV RBC AUTO: 93.9 FL — SIGNIFICANT CHANGE UP (ref 80–100)
PHOSPHATE SERPL-MCNC: 2.9 MG/DL — SIGNIFICANT CHANGE UP (ref 2.5–4.5)
PLATELET # BLD AUTO: 358 K/UL — SIGNIFICANT CHANGE UP (ref 150–400)
POTASSIUM SERPL-MCNC: 3.6 MMOL/L — SIGNIFICANT CHANGE UP (ref 3.5–5.3)
POTASSIUM SERPL-SCNC: 3.6 MMOL/L — SIGNIFICANT CHANGE UP (ref 3.5–5.3)
RBC # BLD: 3.5 M/UL — LOW (ref 3.8–5.2)
RBC # FLD: 18.4 % — HIGH (ref 10.3–14.5)
SODIUM SERPL-SCNC: 141 MMOL/L — SIGNIFICANT CHANGE UP (ref 135–145)
WBC # BLD: 6.3 K/UL — SIGNIFICANT CHANGE UP (ref 3.8–10.5)
WBC # FLD AUTO: 6.3 K/UL — SIGNIFICANT CHANGE UP (ref 3.8–10.5)

## 2018-12-12 PROCEDURE — 99233 SBSQ HOSP IP/OBS HIGH 50: CPT | Mod: GC

## 2018-12-12 PROCEDURE — 99232 SBSQ HOSP IP/OBS MODERATE 35: CPT

## 2018-12-12 RX ADMIN — Medication 2.5 MILLIGRAM(S): at 09:46

## 2018-12-12 RX ADMIN — Medication 200 MILLIGRAM(S): at 06:44

## 2018-12-12 RX ADMIN — Medication 200 MILLIGRAM(S): at 17:12

## 2018-12-12 RX ADMIN — SIMVASTATIN 20 MILLIGRAM(S): 20 TABLET, FILM COATED ORAL at 21:11

## 2018-12-12 RX ADMIN — APIXABAN 5 MILLIGRAM(S): 2.5 TABLET, FILM COATED ORAL at 17:11

## 2018-12-12 RX ADMIN — Medication 2.5 MILLIGRAM(S): at 05:31

## 2018-12-12 RX ADMIN — Medication 100 MILLIGRAM(S): at 21:11

## 2018-12-12 RX ADMIN — APIXABAN 5 MILLIGRAM(S): 2.5 TABLET, FILM COATED ORAL at 06:44

## 2018-12-12 RX ADMIN — Medication 100 MILLIGRAM(S): at 05:31

## 2018-12-12 RX ADMIN — MEMANTINE HYDROCHLORIDE 5 MILLIGRAM(S): 10 TABLET ORAL at 21:11

## 2018-12-12 RX ADMIN — Medication 100 MILLIGRAM(S): at 13:54

## 2018-12-12 NOTE — PROGRESS NOTE ADULT - SUBJECTIVE AND OBJECTIVE BOX
Vascular Cardiology  Progress note     SPECTRA [id number]              EMAIL [email or web address]   OFFICE [phone]      INTERVAL HISTORY:  No CP or SOB. CT head yesterday unchanged.         Allergies    No Known Allergies    Intolerances    	    MEDICATIONS:  apixaban 5 milliGRAM(s) Oral <User Schedule>  enalapril 2.5 milliGRAM(s) Oral daily    ciprofloxacin   IVPB 400 milliGRAM(s) IV Intermittent every 12 hours  ciprofloxacin   IVPB      metroNIDAZOLE  IVPB      metroNIDAZOLE  IVPB 500 milliGRAM(s) IV Intermittent every 8 hours      dronabinol 2.5 milliGRAM(s) Oral daily  memantine 5 milliGRAM(s) Oral daily      simvastatin 20 milliGRAM(s) Oral at bedtime        PAST MEDICAL & SURGICAL HISTORY:  Generalized Osteoarthritis  PVD (Peripheral Vascular Disease)  History of Osteoporosis  Asthma  Hyperlipemia  Hypertension  SDH (subdural hematoma): 7/2018 With evacuatinon in [place]  S/P Cataract Surgery: left eye      FAMILY HISTORY:  Family history of breast cancer in sister (Sibling)      SOCIAL HISTORY:  unchanged    REVIEW OF SYSTEMS:  CONSTITUTIONAL: No fever, weight loss, or fatigue  EYES: No eye pain, visual disturbances, or discharge  ENMT:  No difficulty hearing, tinnitus, vertigo; No sinus or throat pain  NECK: No pain or stiffness  RESPIRATORY: No cough, wheezing, chills or hemoptysis; No Shortness of Breath  CARDIOVASCULAR: No chest pain, palpitations, passing out, dizziness, or leg swelling  GASTROINTESTINAL: No abdominal or epigastric pain. No nausea, vomiting, or hematemesis; No diarrhea or constipation. No melena or hematochezia.  GENITOURINARY: No dysuria, frequency, hematuria, or incontinence  NEUROLOGICAL: No headaches, memory loss, loss of strength, numbness, or tremors  SKIN: No itching, burning, rashes, or lesions   LYMPH Nodes: No enlarged glands  ENDOCRINE: No heat or cold intolerance; No hair loss  MUSCULOSKELETAL: No joint pain or swelling; No muscle, back, or extremity pain  PSYCHIATRIC: No depression, anxiety, mood swings, or difficulty sleeping  HEME/LYMPH: No easy bruising, or bleeding gums  ALLERY AND IMMUNOLOGIC: No hives or eczema	    [ x] All others negative	  [ ] Unable to obtain    PHYSICAL EXAM:  T(C): 36.9 (12-12-18 @ 04:29), Max: 36.9 (12-12-18 @ 04:29)  HR: 75 (12-12-18 @ 10:08) (70 - 83)  BP: 121/72 (12-12-18 @ 10:08) (106/69 - 138/90)  RR: 18 (12-12-18 @ 04:29) (18 - 18)  SpO2: 98% (12-12-18 @ 10:08) (98% - 100%)  Wt(kg): --  I&O's Summary    11 Dec 2018 07:01  -  12 Dec 2018 07:00  --------------------------------------------------------  IN: 1620 mL / OUT: 1000 mL / NET: 620 mL      GENERAL: No acute distress, NC in place  HEAD:  Atraumatic, Normocephalic  ENT: EOMI, PERRLA, conjunctiva and sclera clear, Neck supple, No JVD, moist mucosa  CHEST/LUNG: Clear to auscultation bilaterally  BACK: No spinal tenderness  HEART: Regular rate and rhythm; No murmurs  ABDOMEN: Soft, Nontender  EXTREMITIES:  No edema. 2+ peripheral pulses (DP and PT bilaterally)  NEUROLOGY: AAOx2  SKIN: Normal color, No rashes or lesions      LABS:	 	    CBC Full  -  ( 12 Dec 2018 07:11 )  WBC Count : 6.3 K/uL  Hemoglobin : 10.2 g/dL  Hematocrit : 32.9 %  Platelet Count - Automated : 358 K/uL  Mean Cell Volume : 93.9 fl  Mean Cell Hemoglobin : 29.1 pg  Mean Cell Hemoglobin Concentration : 30.9 gm/dL  Auto Neutrophil # : x  Auto Lymphocyte # : x  Auto Monocyte # : x  Auto Eosinophil # : x  Auto Basophil # : x  Auto Neutrophil % : x  Auto Lymphocyte % : x  Auto Monocyte % : x  Auto Eosinophil % : x  Auto Basophil % : x    12-12    141  |  110<H>  |  5<L>  ----------------------------<  88  3.6   |  21<L>  |  0.48<L>  12-11    142  |  111<H>  |  4<L>  ----------------------------<  112<H>  3.4<L>   |  21<L>  |  0.41<L>    Ca    8.5      12 Dec 2018 07:10  Ca    7.7<L>      11 Dec 2018 07:15  Phos  2.9     12-12  Phos  2.3     12-11  Mg     1.9     12-12  Mg     1.9     12-11      CT C/A/P:  IMPRESSION:  Bilateral pulmonary emboli within the right and left main pulmonary   arteries extending into the lobar branches and several segmental branches   bilaterally.  Likely sigmoid colitis.    TTE:  Conclusions:  1. Mitral annular calcification, otherwise normal mitral  valve. Mild mitral regurgitation.  2. Aortic valve not well visualized; appears calcified.  Peak transaortic valve gradient equals 6 mm Hg.  Mild-moderate aortic regurgitation.  3. Severely dilated left atrium.  LA volume index = 49  cc/m2. Atrial Septal Aneurysm is present.  4. Eccentric left ventricular hypertrophy (dilated left  ventricle with normal relative wall thickness).  5. Moderate segmental left ventricular systolic  dysfunction. The basal inferoseptum, mid to distal lateral,  mid to distal inferior, and mid inferolateral wall are  hypokinetic.  6. Right ventricular enlargement with decreased right  ventricular systolic function. TV s'= 8.5 cm/sec.  7. Estimated right ventricular systolic pressure equals 58  mm Hg, assuming right atrial pressure equals 8 mm Hg,  consistent with moderate pulmonary hypertension.  8. Normal tricuspid valve. Moderate tricuspid  regurgitation.  9. Color Doppler suggestive of patent foramen ovale (as  noted on prior echo 02/15/2018). Consider repeat limited  study with agitated saline.  *** Compared with echocardiogram of 2/5/2018, pulmonary  hypertension and right ventricular dysfunction are now  seen. Mitral regurgitation has decreased. Atrial Septal  aneurysm  is again seen as noted on previous echo.    LE dopplers:  FINDINGS:    There is DVT within the right femoral vein at the lower thigh extending   to the popliteal vein, tibioperoneal trunk, gastrocnemius vein, posterior   tibial vein, peroneal vein, and soleal vein.    There is DVT within the left femoral vein in the upper thigh. The femoral   vein in the mid and lower thigh is patent. There is thrombus in the left   popliteal vein, posterior tibial vein, peroneal trunk, peroneal vein, and   soleal vein.      Assessment:  1. Bilateral PE/LE DVT- large clot burden on CT, submassive, high risk. Patient continues to be hypoxic requiring supplemental O2. Tachycardia resolved. Currently on eliquis. Patient with distal DVT in RLE, proximal and distal DVT in LLE  2. Recent subdural hematoma- CT head stable   3. HTN  4. HLD  5. Dementia       Plan:  1. Given that patient is frail and would be difficult for family to monitor INR would favor a DOAC such as eliquis instead of coumadin. Patient's CrCl calculated to be ~75. Continue eliquis 5mg BID (would skip 10mg BID dosing given recent subdural hematoma)  2. Surveillance head CT per neurosurgery       Joseph Luna MD  Cardiology Fellow PGY-5  [id number] after 5pm, otherwise see below    Thank you      Vascular Cardiology Service   [facility] - [address]  Office [phone]  email:   [email or web address]

## 2018-12-12 NOTE — PROVIDER CONTACT NOTE (OTHER) - ACTION/TREATMENT ORDERED:
[employer] was called, tele box changed, all leads was on . battery was working . pt is in stable condition
Continue to monitor.
no new orders at this time

## 2018-12-12 NOTE — PROGRESS NOTE ADULT - PROBLEM SELECTOR PLAN 5
- pt. with abdominal pain, with sigmoid colitis/moderate amount of stool on CTAP  - likely 2/2 constipation, s/p enema at [facility] with subsequent bowel movements   - discussed with radiology, sigmoid colitis and stool burden likely from diverticulitis and less concerned for stercoral colitis  - blood cultures NGTD  - c/w ciprofloxacin and flagyl (Started 12/8) until 12/12

## 2018-12-12 NOTE — PHYSICAL THERAPY INITIAL EVALUATION ADULT - PRECAUTIONS/LIMITATIONS, REHAB EVAL
cardiac precautions/fall precautions/oxygen therapy device and L/min/At [facility], she has been ambulating with a walker and slowly regaining her strength until the past few days when she began feeling unwell. Over the past few days pt. has been complaining of diffuse abdominal pain and has had decreased PO intake. She has also been constipated, so she was given an enema after which she had multiple bowel movements. However, pain persistent so family decided to bring her to the ED. Upon arrival to the ED, she was found to be hypoxic to 80, imaging revealed b/l PEs, colitis, SDH stable. After discussion with family, perc team and neurosurgery, pt. was started on a heparin drip. She denies fevers, chills, cp, sob, n/v/d, dysuria, rashes, sick contacts. VADuplex:Acute deep venous thrombosis: above and below the knee bilaterally as above.

## 2018-12-12 NOTE — PROGRESS NOTE ADULT - PROBLEM SELECTOR PLAN 4
- EF 40-45%  - left systolic dysfunction, new RV dysfunction with elevated proBNP  - holding home toprol XL given hypotension initially and PE, likely preload dependent  - 2+ pitting edema LEs today- respiratory status improving. s/p IVF at 125cc/hr for 2 days. Will restart enalapril as tolerated. hold off diuresis for now as pt is preload dependent 2/2 massive PE. - EF 40-45%  - left systolic dysfunction, new RV dysfunction with elevated proBNP  - c/w Enalapril  - holding home toprol XL given hypotension initially and PE, likely preload dependent  - 2+ pitting edema LEs today- respiratory status improving. s/p IVF at 125cc/hr for 2 days. hold off diuresis for now as pt is preload dependent 2/2 massive PE.

## 2018-12-12 NOTE — PHYSICAL THERAPY INITIAL EVALUATION ADULT - PERTINENT HX OF CURRENT PROBLEM, REHAB EVAL
88F sent from [facility] rehab for worsening abdominal pain of 5 day duration. Over the summer pt. had a fall in [place] c/b SDH requiring 2 evacuations. Pt. returned from [facility] on 11/1 and was sent to ED by PMD for decreased functional status, admitted to [facility] 11/12 - 11/16 when she was treated for a UTI. Repeat imaging showed stable SDH, and pt was then discharged to [facility] rehab where she has been for the past two weeks.

## 2018-12-12 NOTE — PROGRESS NOTE ADULT - SUBJECTIVE AND OBJECTIVE BOX
CHIEF COMPLAINT: Patient is a 89y old  Female who presents with a chief complaint of bilateral PE, colitis (11 Dec 2018 16:37)      SUBJECTIVE / OVERNIGHT EVENTS: No acute events overnight. Pt satting % on 3L NC. CT head done yesterday pm, unchanged from prior. Pt seen and examined at bedside. Pt is confused as on previous days but in no distress, pleasant and responds to questions. Denies SOB, CP, palpitations.       MEDICATIONS:  MEDICATIONS  (STANDING):  apixaban 5 milliGRAM(s) Oral <User Schedule>  ciprofloxacin   IVPB 400 milliGRAM(s) IV Intermittent every 12 hours  ciprofloxacin   IVPB      dronabinol 2.5 milliGRAM(s) Oral daily  enalapril 2.5 milliGRAM(s) Oral daily  memantine 5 milliGRAM(s) Oral daily  metroNIDAZOLE  IVPB      metroNIDAZOLE  IVPB 500 milliGRAM(s) IV Intermittent every 8 hours  simvastatin 20 milliGRAM(s) Oral at bedtime    MEDICATIONS  (PRN):        OBJECTIVE:  Vital Signs Last 24 Hrs  T(C): 36.9 (12 Dec 2018 04:29), Max: 36.9 (12 Dec 2018 04:29)  T(F): 98.4 (12 Dec 2018 04:29), Max: 98.4 (12 Dec 2018 04:29)  HR: 73 (12 Dec 2018 04:29) (70 - 83)  BP: 112/68 (12 Dec 2018 04:29) (106/69 - 138/90)  BP(mean): --  RR: 18 (12 Dec 2018 04:29) (18 - 18)  SpO2: 98% (12 Dec 2018 04:29) (98% - 100%)  CAPILLARY BLOOD GLUCOSE        I&O's Summary    11 Dec 2018 07:01  -  12 Dec 2018 07:00  --------------------------------------------------------  IN: 1620 mL / OUT: 1000 mL / NET: 620 mL          PHYSICAL EXAM:  General: WN/WD NAD  Neurology: A&Ox1, right sided weakness compared to left   Eyes: PERRLA/ EOMI, Gross vision intact  ENT: Gross hearing intact  Neck: Neck supple, trachea midline, No JVD,   Respiratory: CTA B/L, No wheezing, rales, rhonchi  CV: RRR, S1S2, no murmurs, rubs or gallops  Abdominal: Soft, diffusely tender to deep palpation, no guarding, no rebound, non-distended, +BS  Extremities: 1-2+ edema LEs bilaterally, + peripheral pulses  Skin: No Rashes, Hematoma, Ecchymosis      LABS:                        10.2   6.3   )-----------( 358      ( 12 Dec 2018 07:11 )             32.9     12-12    141  |  110<H>  |  5<L>  ----------------------------<  88  3.6   |  21<L>  |  0.48<L>    Ca    8.5      12 Dec 2018 07:10  Phos  2.9     12-12  Mg     1.9     12-12      PTT - ( 10 Dec 2018 13:25 )  PTT:66.1 sec CHIEF COMPLAINT: Patient is a 89y old  Female who presents with a chief complaint of bilateral PE, colitis (11 Dec 2018 16:37)    SUBJECTIVE / OVERNIGHT EVENTS: No acute events overnight. Pt satting % on 3L NC. CT head done yesterday pm, unchanged from prior. Pt seen and examined at bedside. Pt is confused as on previous days but in no distress, pleasant and responds to questions. Denies SOB, CP, palpitations.     OBJECTIVE:  Vital Signs Last 24 Hrs  T(F): 98.4 (12 Dec 2018 04:29), Max: 98.4 (12 Dec 2018 04:29)  HR: 73 (12 Dec 2018 04:29) (70 - 83)  BP: 112/68 (12 Dec 2018 04:29) (106/69 - 138/90)  RR: 18 (12 Dec 2018 04:29) (18 - 18)  SpO2: 98% (12 Dec 2018 04:29) (98% - 100%)    I&O's Summary  11 Dec 2018 07:01  -  12 Dec 2018 07:00  --------------------------------------------------------  IN: 1620 mL / OUT: 1000 mL / NET: 620 mL    PHYSICAL EXAM:  General: WN/WD NAD  Neurology: A&Ox1, right sided weakness compared to left   Eyes: PERRLA/ EOMI, Gross vision intact  ENT: Gross hearing intact  Neck: Neck supple, trachea midline, No JVD,   Respiratory: CTA B/L, No wheezing, rales, rhonchi  CV: RRR, S1S2, no murmurs, rubs or gallops  Abdominal: Soft, diffusely tender to deep palpation, no guarding, no rebound, non-distended, +BS  Extremities: 1-2+ edema LEs bilaterally, + peripheral pulses  Skin: No Rashes, Hematoma, Ecchymosis    LABS:                   10.2   6.3   )-----------( 358      ( 12 Dec 2018 07:11 )             32.9     12-12  141  |  110<H>  |  5<L>  ----------------------------<  88  3.6   |  21<L>  |  0.48<L>    Ca    8.5      12 Dec 2018 07:10  Phos  2.9     12-12  Mg     1.9     12-12    MEDICATIONS  (STANDING):  apixaban 5 milliGRAM(s) Oral <User Schedule>  ciprofloxacin   IVPB 400 milliGRAM(s) IV Intermittent every 12 hours  ciprofloxacin   IVPB      dronabinol 2.5 milliGRAM(s) Oral daily  enalapril 2.5 milliGRAM(s) Oral daily  memantine 5 milliGRAM(s) Oral daily  metroNIDAZOLE  IVPB      metroNIDAZOLE  IVPB 500 milliGRAM(s) IV Intermittent every 8 hours  simvastatin 20 milliGRAM(s) Oral at bedtime

## 2018-12-12 NOTE — PROGRESS NOTE ADULT - ASSESSMENT
88yoF w/ PMHx significant for dementia, HTN, HLD, osteoporosis, SDH s/p 2 evacuations (6/2018)  with residual right sided weakness, sent from [facility] rehab for worsening abdominal pain of 5 day duration, likely 2/2 sigmoid colitis seen on CTAP in the setting of constipation. Also admitted with acute hypoxic respiratory failure 2/2 massive PE (bilateral pulmonary embolisms with RV strain and hypotension), now on Eliquis for treatment.

## 2018-12-12 NOTE — PROGRESS NOTE ADULT - PROBLEM SELECTOR PLAN 3
- tachycardia and tachypnea   - although pt. with PE, colitis possibly source of infection- abdominal pain improved  - F/u UCx, BCx- NGTD  - abx coverage with cipro and flagyl x5 days (started 12/8) until 12/12  - VS [id number]

## 2018-12-12 NOTE — PROGRESS NOTE ADULT - PROBLEM SELECTOR PLAN 6
- s/p fall in June in [place] c/b SDH requiring 2 evacuations with residual right sided weakness  - previously on keppra, tapered off as of 11/19  - CTH stable on Eliquis  - neurosurgery recs appreciated- will need serial CT head to monitor while on AC

## 2018-12-12 NOTE — PROGRESS NOTE ADULT - ATTENDING COMMENTS
Discharge planning ongoing, with plan for [facility] on O2 supplementation w/ instructions to continue weaning off to maintain goal SpO2 > 95%, on continued anticoagulation, and outpatient f/u.

## 2018-12-12 NOTE — PROGRESS NOTE ADULT - PROBLEM SELECTOR PLAN 1
Hypoxic to 80s on admission, now saturating % on NC 3L- overall O2 requirements decreasing; maintain SpO2 > 95%  - 2/2 bilateral PE, management as below  - monitor VS [id number]

## 2018-12-12 NOTE — PROGRESS NOTE ADULT - PROBLEM SELECTOR PLAN 2
Massive PE- CT chest with b/l PE, TTE with RV strain, saturating in the 90s on HFNC (80% FIO2), hypotensive to 90s (MAP >65), +evidence of right heart strain on echo; no thrombolytics given recent SDH and risk of bleeding. LE dopplers showing extensive bilateral DVTs  - s/p IVF initially; BP currently improved   - vascular cardiology recs appreciated- pt switched from heparin gtt to Eliquis 5mg BID (will not give initial 10mg BID 2/2 risk of bleeding), repeat CT head on Eliquis stable  - monitor on tele, VS [id number] Massive PE- CT chest with b/l PE, TTE with RV strain, saturating in the 90s on HFNC (80% FIO2), hypotensive to 90s (MAP >65), +evidence of right heart strain on echo; no thrombolytics given recent SDH and risk of bleeding. LE dopplers showing extensive bilateral DVTs  - s/p IVF initially; BP currently improved   - vascular cardiology recs appreciated- pt switched from heparin gtt to Eliquis 5mg BID (will not give initial 10mg BID 2/2 risk of bleeding), repeat CT head on Eliquis stable  - VS [id number]

## 2018-12-12 NOTE — PHYSICAL THERAPY INITIAL EVALUATION ADULT - ADDITIONAL COMMENTS
Pt lives in private home with , 1 step to enter and 15 step to second floor bedroom. Prior to this July pt was independent with all functional mobility and did not use an AD to ambulate. Pt now admitted from rehab where she required assist with all functional mobility and was using RW and assist to ambulate. Pts  states pt has first floor set-up inside home and he is transitioning bathroom to be handicap accessible.

## 2018-12-13 LAB
ANION GAP SERPL CALC-SCNC: 9 MMOL/L — SIGNIFICANT CHANGE UP (ref 5–17)
BUN SERPL-MCNC: 6 MG/DL — LOW (ref 7–23)
CALCIUM SERPL-MCNC: 8.6 MG/DL — SIGNIFICANT CHANGE UP (ref 8.4–10.5)
CHLORIDE SERPL-SCNC: 109 MMOL/L — HIGH (ref 96–108)
CO2 SERPL-SCNC: 22 MMOL/L — SIGNIFICANT CHANGE UP (ref 22–31)
CREAT SERPL-MCNC: 0.54 MG/DL — SIGNIFICANT CHANGE UP (ref 0.5–1.3)
CULTURE RESULTS: SIGNIFICANT CHANGE UP
CULTURE RESULTS: SIGNIFICANT CHANGE UP
GLUCOSE SERPL-MCNC: 82 MG/DL — SIGNIFICANT CHANGE UP (ref 70–99)
HCT VFR BLD CALC: 29.2 % — LOW (ref 34.5–45)
HGB BLD-MCNC: 9.2 G/DL — LOW (ref 11.5–15.5)
MAGNESIUM SERPL-MCNC: 1.8 MG/DL — SIGNIFICANT CHANGE UP (ref 1.6–2.6)
MCHC RBC-ENTMCNC: 29.3 PG — SIGNIFICANT CHANGE UP (ref 27–34)
MCHC RBC-ENTMCNC: 31.5 GM/DL — LOW (ref 32–36)
MCV RBC AUTO: 93.1 FL — SIGNIFICANT CHANGE UP (ref 80–100)
PHOSPHATE SERPL-MCNC: 3 MG/DL — SIGNIFICANT CHANGE UP (ref 2.5–4.5)
PLATELET # BLD AUTO: 348 K/UL — SIGNIFICANT CHANGE UP (ref 150–400)
POTASSIUM SERPL-MCNC: 3.4 MMOL/L — LOW (ref 3.5–5.3)
POTASSIUM SERPL-SCNC: 3.4 MMOL/L — LOW (ref 3.5–5.3)
RBC # BLD: 3.14 M/UL — LOW (ref 3.8–5.2)
RBC # FLD: 18.3 % — HIGH (ref 10.3–14.5)
SODIUM SERPL-SCNC: 140 MMOL/L — SIGNIFICANT CHANGE UP (ref 135–145)
SPECIMEN SOURCE: SIGNIFICANT CHANGE UP
SPECIMEN SOURCE: SIGNIFICANT CHANGE UP
WBC # BLD: 6 K/UL — SIGNIFICANT CHANGE UP (ref 3.8–10.5)
WBC # FLD AUTO: 6 K/UL — SIGNIFICANT CHANGE UP (ref 3.8–10.5)

## 2018-12-13 PROCEDURE — 99232 SBSQ HOSP IP/OBS MODERATE 35: CPT | Mod: GC

## 2018-12-13 PROCEDURE — 99232 SBSQ HOSP IP/OBS MODERATE 35: CPT

## 2018-12-13 RX ORDER — POTASSIUM CHLORIDE 20 MEQ
40 PACKET (EA) ORAL ONCE
Qty: 0 | Refills: 0 | Status: COMPLETED | OUTPATIENT
Start: 2018-12-13 | End: 2018-12-13

## 2018-12-13 RX ADMIN — APIXABAN 5 MILLIGRAM(S): 2.5 TABLET, FILM COATED ORAL at 05:13

## 2018-12-13 RX ADMIN — Medication 2.5 MILLIGRAM(S): at 06:38

## 2018-12-13 RX ADMIN — APIXABAN 5 MILLIGRAM(S): 2.5 TABLET, FILM COATED ORAL at 17:19

## 2018-12-13 RX ADMIN — Medication 2.5 MILLIGRAM(S): at 08:58

## 2018-12-13 RX ADMIN — Medication 40 MILLIEQUIVALENT(S): at 08:58

## 2018-12-13 RX ADMIN — SIMVASTATIN 20 MILLIGRAM(S): 20 TABLET, FILM COATED ORAL at 21:41

## 2018-12-13 RX ADMIN — MEMANTINE HYDROCHLORIDE 5 MILLIGRAM(S): 10 TABLET ORAL at 21:41

## 2018-12-13 NOTE — PROGRESS NOTE ADULT - PROBLEM SELECTOR PLAN 2
Massive PE- CT chest with b/l PE, TTE with RV strain, saturating in the 90s on HFNC (80% FIO2), hypotensive to 90s (MAP >65), +evidence of right heart strain on echo; no thrombolytics given recent SDH and risk of bleeding. LE dopplers showing extensive bilateral DVTs  - s/p IVF initially; BP currently improved   - vascular cardiology recs appreciated- pt switched from heparin gtt to Eliquis 5mg BID (will not give initial 10mg BID 2/2 risk of bleeding), repeat CT head on Eliquis stable  - VS [id number]

## 2018-12-13 NOTE — PROGRESS NOTE ADULT - PROBLEM SELECTOR PLAN 1
Hypoxic to 80s on admission, now saturating % on NC 3L- overall O2 requirements decreasing; maintain SpO2 > 95%  - 2/2 bilateral PE, management as below  - monitor VS [id number]

## 2018-12-13 NOTE — PROGRESS NOTE ADULT - PROBLEM SELECTOR PLAN 4
- EF 40-45%  - left systolic dysfunction, new RV dysfunction with elevated proBNP  - c/w Enalapril  - holding home toprol XL given hypotension initially and PE, likely preload dependent  - 1+ pitting edema LEs today- respiratory status improving. hold off diuresis for now as pt is preload dependent 2/2 massive PE and borderline hypotension.

## 2018-12-13 NOTE — PROGRESS NOTE ADULT - SUBJECTIVE AND OBJECTIVE BOX
CHIEF COMPLAINT: Patient is a 89y old  Female who presents with a chief complaint of bilateral PE, colitis (13 Dec 2018 08:56)      SUBJECTIVE / OVERNIGHT EVENTS: No acute events overnight. BPs slightly lower, in the 100/60s. Satting % on 3L. Pt seen and examined at bedside. Sleeping comfortably, has no complaints. Pending rehab placement.       MEDICATIONS:  MEDICATIONS  (STANDING):  apixaban 5 milliGRAM(s) Oral <User Schedule>  dronabinol 2.5 milliGRAM(s) Oral daily  enalapril 2.5 milliGRAM(s) Oral daily  memantine 5 milliGRAM(s) Oral daily  simvastatin 20 milliGRAM(s) Oral at bedtime    MEDICATIONS  (PRN):        OBJECTIVE:  Vital Signs Last 24 Hrs  T(C): 36.7 (13 Dec 2018 04:36), Max: 37.3 (12 Dec 2018 20:52)  T(F): 98 (13 Dec 2018 04:36), Max: 99.1 (12 Dec 2018 20:52)  HR: 77 (13 Dec 2018 04:36) (70 - 87)  BP: 102/64 (13 Dec 2018 04:56) (99/60 - 121/72)  BP(mean): --  RR: 18 (13 Dec 2018 04:36) (18 - 18)  SpO2: 97% (13 Dec 2018 04:36) (97% - 100%)  CAPILLARY BLOOD GLUCOSE        I&O's Summary    12 Dec 2018 07:01  -  13 Dec 2018 07:00  --------------------------------------------------------  IN: 1260 mL / OUT: 1100 mL / NET: 160 mL          PHYSICAL EXAM:  General: WN/WD NAD  Neurology: A&Ox1, right sided weakness compared to left   Eyes: PERRLA/ EOMI, Gross vision intact  ENT: Gross hearing intact  Neck: Neck supple, trachea midline, No JVD,   Respiratory: CTA B/L, No wheezing, rales, rhonchi  CV: RRR, S1S2, no murmurs, rubs or gallops  Abdominal: Soft, diffusely tender to deep palpation, no guarding, no rebound, non-distended, +BS  Extremities: 1-2+ edema LEs bilaterally, + peripheral pulses  Skin: No Rashes, Hematoma, Ecchymosis    LABS:                        9.2    6.0   )-----------( 348      ( 13 Dec 2018 06:26 )             29.2     12-13    140  |  109<H>  |  6<L>  ----------------------------<  82  3.4<L>   |  22  |  0.54    Ca    8.6      13 Dec 2018 06:25  Phos  3.0     12-13  Mg     1.8     12-13 CHIEF COMPLAINT: Patient is a 89y old  Female who presents with a chief complaint of bilateral PE, colitis (13 Dec 2018 08:56)      SUBJECTIVE / OVERNIGHT EVENTS: No acute events overnight. BPs slightly lower, in the 100/60s. Satting % on 3L. Pt seen and examined at bedside. Sleeping comfortably, has no complaints. Pending rehab placement- rejected for [facility], peer to peer scheduled for today at 3:30pm.       MEDICATIONS:  MEDICATIONS  (STANDING):  apixaban 5 milliGRAM(s) Oral <User Schedule>  dronabinol 2.5 milliGRAM(s) Oral daily  enalapril 2.5 milliGRAM(s) Oral daily  memantine 5 milliGRAM(s) Oral daily  simvastatin 20 milliGRAM(s) Oral at bedtime    MEDICATIONS  (PRN):        OBJECTIVE:  Vital Signs Last 24 Hrs  T(C): 36.7 (13 Dec 2018 04:36), Max: 37.3 (12 Dec 2018 20:52)  T(F): 98 (13 Dec 2018 04:36), Max: 99.1 (12 Dec 2018 20:52)  HR: 77 (13 Dec 2018 04:36) (70 - 87)  BP: 102/64 (13 Dec 2018 04:56) (99/60 - 121/72)  BP(mean): --  RR: 18 (13 Dec 2018 04:36) (18 - 18)  SpO2: 97% (13 Dec 2018 04:36) (97% - 100%)  CAPILLARY BLOOD GLUCOSE        I&O's Summary    12 Dec 2018 07:01  -  13 Dec 2018 07:00  --------------------------------------------------------  IN: 1260 mL / OUT: 1100 mL / NET: 160 mL          PHYSICAL EXAM:  General: WN/WD NAD  Neurology: A&Ox1, right sided weakness compared to left   Eyes: PERRLA/ EOMI, Gross vision intact  ENT: Gross hearing intact  Neck: Neck supple, trachea midline, No JVD,   Respiratory: CTA B/L, No wheezing, rales, rhonchi  CV: RRR, S1S2, no murmurs, rubs or gallops  Abdominal: Soft, diffusely tender to deep palpation, no guarding, no rebound, non-distended, +BS  Extremities: 1-2+ edema LEs bilaterally, + peripheral pulses  Skin: No Rashes, Hematoma, Ecchymosis    LABS:                        9.2    6.0   )-----------( 348      ( 13 Dec 2018 06:26 )             29.2     12-13    140  |  109<H>  |  6<L>  ----------------------------<  82  3.4<L>   |  22  |  0.54    Ca    8.6      13 Dec 2018 06:25  Phos  3.0     12-13  Mg     1.8     12-13 CHIEF COMPLAINT: Patient is a 89y old  Female who presents with a chief complaint of bilateral PE, colitis (13 Dec 2018 08:56)    SUBJECTIVE / OVERNIGHT EVENTS: No acute events overnight. BPs slightly lower, in the 100/60s. Satting % on 3L. Pt seen and examined at bedside. Sleeping comfortably, has no complaints. Pending rehab placement- rejected for [facility], peer to peer scheduled for today at 3:30pm.     OBJECTIVE:  Vital Signs Last 24 Hrs  T(F): 98 (13 Dec 2018 04:36), Max: 99.1 (12 Dec 2018 20:52)  HR: 77 (13 Dec 2018 04:36) (70 - 87)  BP: 102/64 (13 Dec 2018 04:56) (99/60 - 121/72)  RR: 18 (13 Dec 2018 04:36) (18 - 18)  SpO2: 97% (13 Dec 2018 04:36) (97% - 100%)    I&O's Summary  12 Dec 2018 07:01  -  13 Dec 2018 07:00  --------------------------------------------------------  IN: 1260 mL / OUT: 1100 mL / NET: 160 mL    PHYSICAL EXAM:  General: WN/WD NAD  Neurology: A&Ox1, right sided weakness compared to left   Eyes: PERRLA/ EOMI, Gross vision intact  ENT: Gross hearing intact  Neck: Neck supple, trachea midline, No JVD,   Respiratory: CTA B/L, No wheezing, rales, rhonchi  CV: RRR, S1S2, no murmurs, rubs or gallops  Abdominal: Soft, diffusely tender to deep palpation, no guarding, no rebound, non-distended, +BS  Extremities: 1-2+ edema LEs bilaterally, + peripheral pulses  Skin: No Rashes, Hematoma, Ecchymosis    LABS:                     9.2    6.0   )-----------( 348      ( 13 Dec 2018 06:26 )             29.2     12-13  140  |  109<H>  |  6<L>  ----------------------------<  82  3.4<L>   |  22  |  0.54    Ca    8.6      13 Dec 2018 06:25  Phos  3.0     12-13  Mg     1.8     12-13    MEDICATIONS  (STANDING):  apixaban 5 milliGRAM(s) Oral <User Schedule>  dronabinol 2.5 milliGRAM(s) Oral daily  enalapril 2.5 milliGRAM(s) Oral daily  memantine 5 milliGRAM(s) Oral daily  simvastatin 20 milliGRAM(s) Oral at bedtime

## 2018-12-13 NOTE — PROGRESS NOTE ADULT - PROBLEM SELECTOR PLAN 5
- tachycardia and tachypnea   - although pt. with PE, colitis possibly source of infection- abdominal pain improved  - F/u UCx, BCx- NGTD  - s/p cipro and flagyl x5 days 12/8- 12/12)  - VS [id number]

## 2018-12-13 NOTE — PROGRESS NOTE ADULT - SUBJECTIVE AND OBJECTIVE BOX
Vascular Cardiology  Progress note     SPECTRA [id number]              EMAIL [email or web address]   OFFICE [phone]    INTERVAL HISTORY:  No complaints this AM         Allergies    No Known Allergies    Intolerances    	    MEDICATIONS:  apixaban 5 milliGRAM(s) Oral <User Schedule>  enalapril 2.5 milliGRAM(s) Oral daily        dronabinol 2.5 milliGRAM(s) Oral daily  memantine 5 milliGRAM(s) Oral daily      simvastatin 20 milliGRAM(s) Oral at bedtime    potassium chloride    Tablet ER 40 milliEquivalent(s) Oral once      PAST MEDICAL & SURGICAL HISTORY:  Generalized Osteoarthritis  PVD (Peripheral Vascular Disease)  History of Osteoporosis  Asthma  Hyperlipemia  Hypertension  SDH (subdural hematoma): 7/2018 With evacuatinon in [place]  S/P Cataract Surgery: left eye      FAMILY HISTORY:  Family history of breast cancer in sister (Sibling)      SOCIAL HISTORY:  unchanged    REVIEW OF SYSTEMS:  CONSTITUTIONAL: No fever, weight loss, or fatigue  EYES: No eye pain, visual disturbances, or discharge  ENMT:  No difficulty hearing, tinnitus, vertigo; No sinus or throat pain  NECK: No pain or stiffness  RESPIRATORY: No cough, wheezing, chills or hemoptysis; No Shortness of Breath  CARDIOVASCULAR: No chest pain, palpitations, passing out, dizziness, or leg swelling  GASTROINTESTINAL: No abdominal or epigastric pain. No nausea, vomiting, or hematemesis; No diarrhea or constipation. No melena or hematochezia.  GENITOURINARY: No dysuria, frequency, hematuria, or incontinence  NEUROLOGICAL: No headaches, memory loss, loss of strength, numbness, or tremors  SKIN: No itching, burning, rashes, or lesions   LYMPH Nodes: No enlarged glands  ENDOCRINE: No heat or cold intolerance; No hair loss  MUSCULOSKELETAL: No joint pain or swelling; No muscle, back, or extremity pain  PSYCHIATRIC: No depression, anxiety, mood swings, or difficulty sleeping  HEME/LYMPH: No easy bruising, or bleeding gums  ALLERY AND IMMUNOLOGIC: No hives or eczema	    [ x] All others negative	  [ ] Unable to obtain    PHYSICAL EXAM:  T(C): 36.7 (12-13-18 @ 04:36), Max: 37.3 (12-12-18 @ 20:52)  HR: 77 (12-13-18 @ 04:36) (70 - 87)  BP: 102/64 (12-13-18 @ 04:56) (99/60 - 121/72)  RR: 18 (12-13-18 @ 04:36) (18 - 18)  SpO2: 97% (12-13-18 @ 04:36) (97% - 100%)  Wt(kg): --  I&O's Summary    12 Dec 2018 07:01  -  13 Dec 2018 07:00  --------------------------------------------------------  IN: 1260 mL / OUT: 1100 mL / NET: 160 mL        GENERAL: No acute distress, NC in place  HEAD:  Atraumatic, Normocephalic  ENT: EOMI, PERRLA, conjunctiva and sclera clear, Neck supple, No JVD, moist mucosa  CHEST/LUNG: Clear to auscultation bilaterally  BACK: No spinal tenderness  HEART: Regular rate and rhythm; No murmurs  ABDOMEN: Soft, Nontender  EXTREMITIES:  No edema. 2+ peripheral pulses (DP and PT bilaterally)  NEUROLOGY: AAOx2  SKIN: Normal color, No rashes or lesions        LABS:	 	    CBC Full  -  ( 13 Dec 2018 06:26 )  WBC Count : 6.0 K/uL  Hemoglobin : 9.2 g/dL  Hematocrit : 29.2 %  Platelet Count - Automated : 348 K/uL  Mean Cell Volume : 93.1 fl  Mean Cell Hemoglobin : 29.3 pg  Mean Cell Hemoglobin Concentration : 31.5 gm/dL  Auto Neutrophil # : x  Auto Lymphocyte # : x  Auto Monocyte # : x  Auto Eosinophil # : x  Auto Basophil # : x  Auto Neutrophil % : x  Auto Lymphocyte % : x  Auto Monocyte % : x  Auto Eosinophil % : x  Auto Basophil % : x    12-13    140  |  109<H>  |  6<L>  ----------------------------<  82  3.4<L>   |  22  |  0.54  12-12    141  |  110<H>  |  5<L>  ----------------------------<  88  3.6   |  21<L>  |  0.48<L>    Ca    8.6      13 Dec 2018 06:25  Ca    8.5      12 Dec 2018 07:10  Phos  3.0     12-13  Phos  2.9     12-12  Mg     1.8     12-13  Mg     1.9     12-12        CT C/A/P:  IMPRESSION:  Bilateral pulmonary emboli within the right and left main pulmonary   arteries extending into the lobar branches and several segmental branches   bilaterally.  Likely sigmoid colitis.    TTE:  Conclusions:  1. Mitral annular calcification, otherwise normal mitral  valve. Mild mitral regurgitation.  2. Aortic valve not well visualized; appears calcified.  Peak transaortic valve gradient equals 6 mm Hg.  Mild-moderate aortic regurgitation.  3. Severely dilated left atrium.  LA volume index = 49  cc/m2. Atrial Septal Aneurysm is present.  4. Eccentric left ventricular hypertrophy (dilated left  ventricle with normal relative wall thickness).  5. Moderate segmental left ventricular systolic  dysfunction. The basal inferoseptum, mid to distal lateral,  mid to distal inferior, and mid inferolateral wall are  hypokinetic.  6. Right ventricular enlargement with decreased right  ventricular systolic function. TV s'= 8.5 cm/sec.  7. Estimated right ventricular systolic pressure equals 58  mm Hg, assuming right atrial pressure equals 8 mm Hg,  consistent with moderate pulmonary hypertension.  8. Normal tricuspid valve. Moderate tricuspid  regurgitation.  9. Color Doppler suggestive of patent foramen ovale (as  noted on prior echo 02/15/2018). Consider repeat limited  study with agitated saline.  *** Compared with echocardiogram of 2/5/2018, pulmonary  hypertension and right ventricular dysfunction are now  seen. Mitral regurgitation has decreased. Atrial Septal  aneurysm  is again seen as noted on previous echo.    LE dopplers:  FINDINGS:    There is DVT within the right femoral vein at the lower thigh extending   to the popliteal vein, tibioperoneal trunk, gastrocnemius vein, posterior   tibial vein, peroneal vein, and soleal vein.    There is DVT within the left femoral vein in the upper thigh. The femoral   vein in the mid and lower thigh is patent. There is thrombus in the left   popliteal vein, posterior tibial vein, peroneal trunk, peroneal vein, and   soleal vein.      Assessment:  1. Bilateral PE/LE DVT- large clot burden on CT, submassive, high risk. Patient continues to be hypoxic requiring supplemental O2. Tachycardia resolved. Currently on eliquis. Patient with distal DVT in RLE, proximal and distal DVT in LLE  2. Recent subdural hematoma- CT head stable   3. HTN  4. HLD  5. Dementia       Plan:  1. Given that patient is frail and would be difficult for family to monitor INR would favor a DOAC such as eliquis instead of coumadin. Patient's CrCl calculated to be ~75. Continue eliquis 5mg BID (would skip 10mg BID dosing given recent subdural hematoma)  2. Surveillance head CT per neurosurgery  3. Pt may need to go to rehab with supplemental O2       Joseph Luna MD  Cardiology Fellow PGY-5  [id number] after 5pm, otherwise see below    Thank you      Vascular Cardiology Service   [facility] - [id number]  Office [phone]  email:   [email or web address]

## 2018-12-13 NOTE — PROGRESS NOTE ADULT - ATTENDING COMMENTS
Awaiting insurance approval for discharge to [facility]. Will undergo [id number] discussion. Total discharge planning time spent thus far = 45minutes.

## 2018-12-14 VITALS
TEMPERATURE: 98 F | OXYGEN SATURATION: 94 % | HEART RATE: 78 BPM | SYSTOLIC BLOOD PRESSURE: 99 MMHG | RESPIRATION RATE: 18 BRPM | DIASTOLIC BLOOD PRESSURE: 59 MMHG

## 2018-12-14 LAB
ANION GAP SERPL CALC-SCNC: 8 MMOL/L — SIGNIFICANT CHANGE UP (ref 5–17)
BUN SERPL-MCNC: 7 MG/DL — SIGNIFICANT CHANGE UP (ref 7–23)
CALCIUM SERPL-MCNC: 8.6 MG/DL — SIGNIFICANT CHANGE UP (ref 8.4–10.5)
CHLORIDE SERPL-SCNC: 109 MMOL/L — HIGH (ref 96–108)
CO2 SERPL-SCNC: 25 MMOL/L — SIGNIFICANT CHANGE UP (ref 22–31)
CREAT SERPL-MCNC: 0.65 MG/DL — SIGNIFICANT CHANGE UP (ref 0.5–1.3)
GLUCOSE SERPL-MCNC: 86 MG/DL — SIGNIFICANT CHANGE UP (ref 70–99)
HCT VFR BLD CALC: 29 % — LOW (ref 34.5–45)
HGB BLD-MCNC: 9 G/DL — LOW (ref 11.5–15.5)
MAGNESIUM SERPL-MCNC: 1.8 MG/DL — SIGNIFICANT CHANGE UP (ref 1.6–2.6)
MCHC RBC-ENTMCNC: 28.9 PG — SIGNIFICANT CHANGE UP (ref 27–34)
MCHC RBC-ENTMCNC: 31.1 GM/DL — LOW (ref 32–36)
MCV RBC AUTO: 92.8 FL — SIGNIFICANT CHANGE UP (ref 80–100)
PHOSPHATE SERPL-MCNC: 3.3 MG/DL — SIGNIFICANT CHANGE UP (ref 2.5–4.5)
PLATELET # BLD AUTO: 371 K/UL — SIGNIFICANT CHANGE UP (ref 150–400)
POTASSIUM SERPL-MCNC: 4.3 MMOL/L — SIGNIFICANT CHANGE UP (ref 3.5–5.3)
POTASSIUM SERPL-SCNC: 4.3 MMOL/L — SIGNIFICANT CHANGE UP (ref 3.5–5.3)
RBC # BLD: 3.12 M/UL — LOW (ref 3.8–5.2)
RBC # FLD: 18.4 % — HIGH (ref 10.3–14.5)
SODIUM SERPL-SCNC: 142 MMOL/L — SIGNIFICANT CHANGE UP (ref 135–145)
WBC # BLD: 6.2 K/UL — SIGNIFICANT CHANGE UP (ref 3.8–10.5)
WBC # FLD AUTO: 6.2 K/UL — SIGNIFICANT CHANGE UP (ref 3.8–10.5)

## 2018-12-14 PROCEDURE — 86901 BLOOD TYPING SEROLOGIC RH(D): CPT

## 2018-12-14 PROCEDURE — 99285 EMERGENCY DEPT VISIT HI MDM: CPT | Mod: 25

## 2018-12-14 PROCEDURE — 87086 URINE CULTURE/COLONY COUNT: CPT

## 2018-12-14 PROCEDURE — 99239 HOSP IP/OBS DSCHRG MGMT >30: CPT

## 2018-12-14 PROCEDURE — 83540 ASSAY OF IRON: CPT

## 2018-12-14 PROCEDURE — 87040 BLOOD CULTURE FOR BACTERIA: CPT

## 2018-12-14 PROCEDURE — 83690 ASSAY OF LIPASE: CPT

## 2018-12-14 PROCEDURE — 93306 TTE W/DOPPLER COMPLETE: CPT

## 2018-12-14 PROCEDURE — 71045 X-RAY EXAM CHEST 1 VIEW: CPT

## 2018-12-14 PROCEDURE — 81001 URINALYSIS AUTO W/SCOPE: CPT

## 2018-12-14 PROCEDURE — 86850 RBC ANTIBODY SCREEN: CPT

## 2018-12-14 PROCEDURE — 74177 CT ABD & PELVIS W/CONTRAST: CPT

## 2018-12-14 PROCEDURE — 84100 ASSAY OF PHOSPHORUS: CPT

## 2018-12-14 PROCEDURE — 71260 CT THORAX DX C+: CPT

## 2018-12-14 PROCEDURE — 86900 BLOOD TYPING SEROLOGIC ABO: CPT

## 2018-12-14 PROCEDURE — 93970 EXTREMITY STUDY: CPT

## 2018-12-14 PROCEDURE — 96374 THER/PROPH/DIAG INJ IV PUSH: CPT | Mod: XU

## 2018-12-14 PROCEDURE — 83735 ASSAY OF MAGNESIUM: CPT

## 2018-12-14 PROCEDURE — 80048 BASIC METABOLIC PNL TOTAL CA: CPT

## 2018-12-14 PROCEDURE — 82728 ASSAY OF FERRITIN: CPT

## 2018-12-14 PROCEDURE — 85027 COMPLETE CBC AUTOMATED: CPT

## 2018-12-14 PROCEDURE — 85610 PROTHROMBIN TIME: CPT

## 2018-12-14 PROCEDURE — 84484 ASSAY OF TROPONIN QUANT: CPT

## 2018-12-14 PROCEDURE — 97162 PT EVAL MOD COMPLEX 30 MIN: CPT

## 2018-12-14 PROCEDURE — 83605 ASSAY OF LACTIC ACID: CPT

## 2018-12-14 PROCEDURE — 83550 IRON BINDING TEST: CPT

## 2018-12-14 PROCEDURE — 80053 COMPREHEN METABOLIC PANEL: CPT

## 2018-12-14 PROCEDURE — 99232 SBSQ HOSP IP/OBS MODERATE 35: CPT

## 2018-12-14 PROCEDURE — 85730 THROMBOPLASTIN TIME PARTIAL: CPT

## 2018-12-14 PROCEDURE — 70450 CT HEAD/BRAIN W/O DYE: CPT

## 2018-12-14 PROCEDURE — 83880 ASSAY OF NATRIURETIC PEPTIDE: CPT

## 2018-12-14 RX ORDER — APIXABAN 2.5 MG/1
0.5 TABLET, FILM COATED ORAL
Qty: 0 | Refills: 0 | DISCHARGE
Start: 2018-12-14

## 2018-12-14 RX ORDER — APIXABAN 2.5 MG/1
1 TABLET, FILM COATED ORAL
Qty: 0 | Refills: 0 | DISCHARGE
Start: 2018-12-14

## 2018-12-14 RX ORDER — DRONABINOL 2.5 MG
1 CAPSULE ORAL
Qty: 0 | Refills: 0 | DISCHARGE
Start: 2018-12-14

## 2018-12-14 RX ORDER — METOPROLOL TARTRATE 50 MG
1 TABLET ORAL
Qty: 0 | Refills: 0 | COMMUNITY

## 2018-12-14 RX ADMIN — APIXABAN 5 MILLIGRAM(S): 2.5 TABLET, FILM COATED ORAL at 05:27

## 2018-12-14 RX ADMIN — Medication 2.5 MILLIGRAM(S): at 12:09

## 2018-12-14 NOTE — PROGRESS NOTE ADULT - SUBJECTIVE AND OBJECTIVE BOX
CHIEF COMPLAINT: Patient is a 89y old  Female who presents with a chief complaint of bilateral PE, colitis (13 Dec 2018 09:14)      SUBJECTIVE / OVERNIGHT EVENTS: Peer to peer yesterday for LEX[employer]- pt was approved. Overnight RN noted small amount of streaked red blood with 1 bowel movement. Otherwise pt was asymptomatic and VSS. Pt seen and examined at bedside. Sleeping comfortably. Pt has no complaints but is eager to leave the hospital.       MEDICATIONS:  MEDICATIONS  (STANDING):  apixaban 5 milliGRAM(s) Oral <User Schedule>  dronabinol 2.5 milliGRAM(s) Oral daily  memantine 5 milliGRAM(s) Oral daily  simvastatin 20 milliGRAM(s) Oral at bedtime    MEDICATIONS  (PRN):        OBJECTIVE:  Vital Signs Last 24 Hrs  T(C): 36.7 (14 Dec 2018 04:08), Max: 36.7 (13 Dec 2018 20:32)  T(F): 98.1 (14 Dec 2018 04:08), Max: 98.1 (13 Dec 2018 20:32)  HR: 76 (14 Dec 2018 04:08) (75 - 94)  BP: 106/68 (14 Dec 2018 04:08) (105/65 - 115/72)  BP(mean): --  RR: 18 (14 Dec 2018 04:08) (18 - 18)  SpO2: 99% (14 Dec 2018 04:08) (94% - 100%)  CAPILLARY BLOOD GLUCOSE        I&O's Summary    13 Dec 2018 07:01  -  14 Dec 2018 07:00  --------------------------------------------------------  IN: 720 mL / OUT: 2050 mL / NET: -1330 mL          PHYSICAL EXAM:  General: WN/WD NAD  Neurology: A&Ox1, right sided weakness compared to left   Eyes: PERRLA/ EOMI, Gross vision intact  ENT: Gross hearing intact  Neck: Neck supple, trachea midline, No JVD,   Respiratory: CTA B/L, No wheezing, rales, rhonchi  CV: RRR, S1S2, no murmurs, rubs or gallops  Abdominal: Soft, diffusely tender to deep palpation, no guarding, no rebound, non-distended, +BS  Extremities: 1-2+ edema LEs bilaterally, + peripheral pulses  Skin: No Rashes, Hematoma, Ecchymosis    LABS:                        9.0    6.2   )-----------( 371      ( 14 Dec 2018 05:31 )             29.0     12-14    142  |  109<H>  |  7   ----------------------------<  86  4.3   |  25  |  0.65    Ca    8.6      14 Dec 2018 05:31  Phos  3.3     12-14  Mg     1.8     12-14

## 2018-12-14 NOTE — PROGRESS NOTE ADULT - PROBLEM SELECTOR PLAN 7
- baseline ~10- stable  - 1 BM streaked with small amt of red blood overnight; AM CBC stable, VSS  - daily CBC

## 2018-12-14 NOTE — PROGRESS NOTE ADULT - PROBLEM SELECTOR PROBLEM 6
Colitis
Colitis
Subdural hematoma

## 2018-12-14 NOTE — PROGRESS NOTE ADULT - PROVIDER SPECIALTY LIST ADULT
Internal Medicine
Neurosurgery
Vascular Cardiology
Internal Medicine

## 2018-12-14 NOTE — PROGRESS NOTE ADULT - PROBLEM SELECTOR PLAN 2
Massive PE- CT chest with b/l PE, TTE with RV strain, saturating in the 90s on HFNC (80% FIO2), hypotensive to 90s (MAP >65), +evidence of right heart strain on echo; no thrombolytics given recent SDH and risk of bleeding. LE dopplers showing extensive bilateral DVTs  - s/p IVF initially; BP currently improved   - vascular cardiology recs appreciated- pt switched from heparin gtt to Eliquis 5mg BID (will not give initial 10mg BID 2/2 risk of bleeding), repeat CT head on Eliquis stable  - VS [id number]

## 2018-12-14 NOTE — PROGRESS NOTE ADULT - PROBLEM SELECTOR PROBLEM 1
Acute respiratory failure with hypoxia

## 2018-12-14 NOTE — PROGRESS NOTE ADULT - PROBLEM SELECTOR PLAN 9
- c/w simvastatin 20 mg daily

## 2018-12-14 NOTE — PROGRESS NOTE ADULT - SUBJECTIVE AND OBJECTIVE BOX
Vascular Cardiology  Progress note     SPECTRA [id number]              EMAIL [email or web address]   OFFICE [phone]    INTERVAL HISTORY:  No complaints this AM         Allergies    No Known Allergies    Intolerances    	   MEDICATIONS  (STANDING):  apixaban 5 milliGRAM(s) Oral <User Schedule>  dronabinol 2.5 milliGRAM(s) Oral daily  memantine 5 milliGRAM(s) Oral daily  simvastatin 20 milliGRAM(s) Oral at bedtime    MEDICATIONS  (PRN):        PAST MEDICAL & SURGICAL HISTORY:  Generalized Osteoarthritis  PVD (Peripheral Vascular Disease)  History of Osteoporosis  Asthma  Hyperlipemia  Hypertension  SDH (subdural hematoma): 7/2018 With evacuatinon in [place]  S/P Cataract Surgery: left eye      FAMILY HISTORY:  Family history of breast cancer in sister (Sibling)      SOCIAL HISTORY:  unchanged    REVIEW OF SYSTEMS:  CONSTITUTIONAL: No fever, weight loss, or fatigue  EYES: No eye pain, visual disturbances, or discharge  ENMT:  No difficulty hearing, tinnitus, vertigo; No sinus or throat pain  NECK: No pain or stiffness  RESPIRATORY: No cough, wheezing, chills or hemoptysis; No Shortness of Breath  CARDIOVASCULAR: No chest pain, palpitations, passing out, dizziness, or leg swelling  GASTROINTESTINAL: No abdominal or epigastric pain. No nausea, vomiting, or hematemesis; No diarrhea or constipation. No melena or hematochezia.  GENITOURINARY: No dysuria, frequency, hematuria, or incontinence  NEUROLOGICAL: No headaches, memory loss, loss of strength, numbness, or tremors  SKIN: No itching, burning, rashes, or lesions   LYMPH Nodes: No enlarged glands  ENDOCRINE: No heat or cold intolerance; No hair loss  MUSCULOSKELETAL: No joint pain or swelling; No muscle, back, or extremity pain  PSYCHIATRIC: No depression, anxiety, mood swings, or difficulty sleeping  HEME/LYMPH: No easy bruising, or bleeding gums  ALLERY AND IMMUNOLOGIC: No hives or eczema	    [ x] All others negative	  [ ] Unable to obtain     ICU Vital Signs Last 24 Hrs  T(C): 36.7 (14 Dec 2018 04:08), Max: 36.7 (13 Dec 2018 20:32)  T(F): 98.1 (14 Dec 2018 04:08), Max: 98.1 (13 Dec 2018 20:32)  HR: 76 (14 Dec 2018 04:08) (75 - 94)  BP: 106/68 (14 Dec 2018 04:08) (105/65 - 115/72)  BP(mean): --  ABP: --  ABP(mean): --  RR: 18 (14 Dec 2018 04:08) (18 - 18)  SpO2: 99% (14 Dec 2018 04:08) (94% - 100%)      GENERAL: No acute distress, NC in place  HEAD:  Atraumatic, Normocephalic  ENT: EOMI, PERRLA, conjunctiva and sclera clear, Neck supple, No JVD, moist mucosa  CHEST/LUNG: Clear to auscultation bilaterally  BACK: No spinal tenderness  HEART: Regular rate and rhythm; No murmurs  ABDOMEN: Soft, Nontender  EXTREMITIES:  No edema. 2+ peripheral pulses (DP and PT bilaterally)  NEUROLOGY: AAOx2  SKIN: Normal color, No rashes or lesions                           9.0    6.2   )-----------( 371      ( 14 Dec 2018 05:31 )             29.0   12-14    142  |  109<H>  |  7   ----------------------------<  86  4.3   |  25  |  0.65    Ca    8.6      14 Dec 2018 05:31  Phos  3.3     12-14  Mg     1.8     12-14          CT C/A/P:  IMPRESSION:  Bilateral pulmonary emboli within the right and left main pulmonary   arteries extending into the lobar branches and several segmental branches   bilaterally.  Likely sigmoid colitis.    TTE:  Conclusions:  1. Mitral annular calcification, otherwise normal mitral  valve. Mild mitral regurgitation.  2. Aortic valve not well visualized; appears calcified.  Peak transaortic valve gradient equals 6 mm Hg.  Mild-moderate aortic regurgitation.  3. Severely dilated left atrium.  LA volume index = 49  cc/m2. Atrial Septal Aneurysm is present.  4. Eccentric left ventricular hypertrophy (dilated left  ventricle with normal relative wall thickness).  5. Moderate segmental left ventricular systolic  dysfunction. The basal inferoseptum, mid to distal lateral,  mid to distal inferior, and mid inferolateral wall are  hypokinetic.  6. Right ventricular enlargement with decreased right  ventricular systolic function. TV s'= 8.5 cm/sec.  7. Estimated right ventricular systolic pressure equals 58  mm Hg, assuming right atrial pressure equals 8 mm Hg,  consistent with moderate pulmonary hypertension.  8. Normal tricuspid valve. Moderate tricuspid  regurgitation.  9. Color Doppler suggestive of patent foramen ovale (as  noted on prior echo 02/15/2018). Consider repeat limited  study with agitated saline.  *** Compared with echocardiogram of 2/5/2018, pulmonary  hypertension and right ventricular dysfunction are now  seen. Mitral regurgitation has decreased. Atrial Septal  aneurysm  is again seen as noted on previous echo.    LE dopplers:  FINDINGS:    There is DVT within the right femoral vein at the lower thigh extending   to the popliteal vein, tibioperoneal trunk, gastrocnemius vein, posterior   tibial vein, peroneal vein, and soleal vein.    There is DVT within the left femoral vein in the upper thigh. The femoral   vein in the mid and lower thigh is patent. There is thrombus in the left   popliteal vein, posterior tibial vein, peroneal trunk, peroneal vein, and   soleal vein.      Assessment:  1. Bilateral PE/LE DVT- large clot burden on CT, submassive, high risk. Patient continues to be hypoxic requiring supplemental O2. Tachycardia resolved. Currently on eliquis. Patient with distal DVT in RLE, proximal and distal DVT in LLE  2. Recent subdural hematoma- CT head stable   3. HTN  4. HLD  5. Dementia       Plan:  1. Continue eliquis   2. Surveillance head CT per neurosurgery  3. Pt may need to go to rehab with supplemental O2  4.  d/c planning      Yan  I will be off service until 12/25/2018.  Please call the vascular medicine service [phone] with any questions

## 2018-12-14 NOTE — PROGRESS NOTE ADULT - PROBLEM SELECTOR PLAN 6
- pt. with abdominal pain, with sigmoid colitis/moderate amount of stool on CTAP  - likely 2/2 constipation, s/p enema at [facility] with subsequent bowel movements   - discussed with radiology, sigmoid colitis and stool burden likely from diverticulitis and less concerned for stercoral colitis  - blood cultures NGTD  - c/w ciprofloxacin and flagyl (Started 12/8) until 12/12 - pt. with abdominal pain, with sigmoid colitis/moderate amount of stool on CTAP  - likely 2/2 constipation, s/p enema at [facility] with subsequent bowel movements   - discussed with radiology, sigmoid colitis and stool burden likely from diverticulitis and less concerned for stercoral colitis  - blood cultures NGTD  - s/p ciprofloxacin and flagyl 12/8- 12/12

## 2018-12-14 NOTE — PROGRESS NOTE ADULT - PROBLEM SELECTOR PLAN 8
- hypotensive in setting of PE, MAP >65  - hold home enalapril   - s/p 1.5L bolus, c/w NS @125, caution with additional boluses given RV strain and left systolic dysfunction, per GOC below family would not want pressors  - VS [id number]
- hypotensive in setting of PE, MAP >65  - hold home enalapril   - s/p 1.5L bolus, c/w NS @125, caution with additional boluses given RV strain and left systolic dysfunction, per GOC below family would not want pressors  - VS [id number]
- hypotensive in setting of PE, MAP >65- improved s/p IVF  - hold home enalapril, toprol  - per GOC below family would not want pressors  - VS [id number]
- hypotensive in setting of PE, MAP >65- improved s/p IVF  - hold home toprol  - per GOC below family would not want pressors  - VS [id number]
- hypotensive in setting of PE, MAP >65- improved s/p IVF  - hold home toprol  - per GOC below family would not want pressors  - VS [id number]
- hypotensive in setting of PE, MAP >65- improved s/p IVF  - hold home toprol  - per GOC below family would not want pressors  - VS [id number]
- hypotensive in setting of PE, MAP >65- improved s/p IVF  - hold home enalapril   - per GOC below family would not want pressors  - VS [id number]

## 2018-12-14 NOTE — PROGRESS NOTE ADULT - REASON FOR ADMISSION
bilateral PE, colitis

## 2018-12-14 NOTE — PROGRESS NOTE ADULT - PROBLEM SELECTOR PLAN 5
- tachycardia and tachypnea   - although pt. with PE, colitis possibly source of infection- abdominal pain improved  - F/u UCx, BCx- NGTD  - s/p cipro and flagyl x5 days 12/8- 12/12)  - VS [id number]

## 2018-12-14 NOTE — PROGRESS NOTE ADULT - PROBLEM SELECTOR PROBLEM 2
Pulmonary embolism, bilateral

## 2018-12-14 NOTE — PROGRESS NOTE ADULT - PROBLEM SELECTOR PLAN 1
Hypoxic to 80s on admission, now saturating % on NC 3L- overall O2 requirements decreasing; maintain SpO2 > 95%  - 2/2 bilateral PE, management as below  - monitor VS [id number]

## 2019-01-07 ENCOUNTER — APPOINTMENT (OUTPATIENT)
Dept: INTERNAL MEDICINE | Facility: CLINIC | Age: 84
End: 2019-01-07
Payer: MEDICARE

## 2019-01-07 VITALS
RESPIRATION RATE: 12 BRPM | OXYGEN SATURATION: 98 % | TEMPERATURE: 98 F | WEIGHT: 123 LBS | HEART RATE: 95 BPM | HEIGHT: 65 IN | DIASTOLIC BLOOD PRESSURE: 81 MMHG | BODY MASS INDEX: 20.49 KG/M2 | SYSTOLIC BLOOD PRESSURE: 134 MMHG

## 2019-01-07 PROCEDURE — 99214 OFFICE O/P EST MOD 30 MIN: CPT

## 2019-01-23 ENCOUNTER — MEDICATION RENEWAL (OUTPATIENT)
Age: 84
End: 2019-01-23

## 2019-01-24 ENCOUNTER — MEDICATION RENEWAL (OUTPATIENT)
Age: 84
End: 2019-01-24

## 2019-02-04 ENCOUNTER — APPOINTMENT (OUTPATIENT)
Dept: CARDIOLOGY | Facility: CLINIC | Age: 84
End: 2019-02-04
Payer: MEDICARE

## 2019-02-04 ENCOUNTER — NON-APPOINTMENT (OUTPATIENT)
Age: 84
End: 2019-02-04

## 2019-02-04 VITALS
HEART RATE: 86 BPM | BODY MASS INDEX: 21.16 KG/M2 | OXYGEN SATURATION: 100 % | RESPIRATION RATE: 12 BRPM | HEIGHT: 65 IN | DIASTOLIC BLOOD PRESSURE: 75 MMHG | WEIGHT: 127 LBS | SYSTOLIC BLOOD PRESSURE: 138 MMHG

## 2019-02-04 VITALS — DIASTOLIC BLOOD PRESSURE: 72 MMHG | SYSTOLIC BLOOD PRESSURE: 122 MMHG

## 2019-02-04 PROCEDURE — 99214 OFFICE O/P EST MOD 30 MIN: CPT | Mod: 25

## 2019-02-04 PROCEDURE — 93000 ELECTROCARDIOGRAM COMPLETE: CPT

## 2019-02-05 LAB
ALBUMIN SERPL ELPH-MCNC: 4.1 G/DL
ALP BLD-CCNC: 58 U/L
ALT SERPL-CCNC: 12 U/L
ANION GAP SERPL CALC-SCNC: 12 MMOL/L
AST SERPL-CCNC: 23 U/L
BILIRUB SERPL-MCNC: <0.2 MG/DL
BUN SERPL-MCNC: 18 MG/DL
CALCIUM SERPL-MCNC: 9.1 MG/DL
CHLORIDE SERPL-SCNC: 106 MMOL/L
CHOLEST SERPL-MCNC: 151 MG/DL
CHOLEST/HDLC SERPL: 2.7 RATIO
CO2 SERPL-SCNC: 24 MMOL/L
CREAT SERPL-MCNC: 0.73 MG/DL
GLUCOSE SERPL-MCNC: 107 MG/DL
HBA1C MFR BLD HPLC: 5 %
HDLC SERPL-MCNC: 55 MG/DL
LDLC SERPL CALC-MCNC: 81 MG/DL
POTASSIUM SERPL-SCNC: 4.2 MMOL/L
PROT SERPL-MCNC: 7 G/DL
SODIUM SERPL-SCNC: 142 MMOL/L
TRIGL SERPL-MCNC: 77 MG/DL
TSH SERPL-ACNC: 2.21 UIU/ML

## 2019-02-10 ENCOUNTER — NON-APPOINTMENT (OUTPATIENT)
Age: 84
End: 2019-02-10

## 2019-02-10 NOTE — HISTORY OF PRESENT ILLNESS
[FreeTextEntry1] : Patient is an 89 year old woman with a history of HTN, hyperlipidemia, nonobstructive coronary artery disease, mitral regurgitation, LV dysfunction,subdural hematoma in [place] status post [place] holes, and osteoporosis who presents for follow up of HTN. She was hospitalized at [facility] for an acute UTI, where she was treated with antibiotics and subsequently sent to rehab. While in rehab she was found to have bilateral PEs and transferred back to the hospital. She was started on Eliquis 5mg BID. She returned home from rehab on December 26th. Currently she reports feeling well denying any chest pain, dyspnea at rest, palpitations, or dizziness.

## 2019-02-10 NOTE — PHYSICAL EXAM
[General Appearance - Well Developed] : well developed [Well Groomed] : well groomed [No Deformities] : no deformities [Normal Conjunctiva] : the conjunctiva exhibited no abnormalities [Normal Oral Mucosa] : normal oral mucosa [No Oral Pallor] : no oral pallor [No Oral Cyanosis] : no oral cyanosis [Normal Jugular Venous A Waves Present] : normal jugular venous A waves present [Normal Jugular Venous V Waves Present] : normal jugular venous V waves present [Respiration, Rhythm And Depth] : normal respiratory rhythm and effort [Exaggerated Use Of Accessory Muscles For Inspiration] : no accessory muscle use [Auscultation Breath Sounds / Voice Sounds] : lungs were clear to auscultation bilaterally [Bowel Sounds] : normal bowel sounds [Abdomen Soft] : soft [Abdomen Tenderness] : non-tender [Nail Clubbing] : no clubbing of the fingernails [Cyanosis, Localized] : no localized cyanosis [Petechial Hemorrhages (___cm)] : no petechial hemorrhages [Skin Color & Pigmentation] : normal skin color and pigmentation [] : no rash [Skin Lesions] : no skin lesions [No Skin Ulcers] : no skin ulcer [Affect] : the affect was normal [Mood] : the mood was normal [No Anxiety] : not feeling anxious [Normal Rate] : normal [Rhythm Regular] : regular [Normal S1] : normal S1 [Normal S2] : normal S2 [I] : a grade 1 [No Pitting Edema] : no pitting edema present [Normal Appearance] : normal appearance [General Appearance - In No Acute Distress] : no acute distress [FreeTextEntry1] : Her gait is slow.  [S3] : no S3 [Right Carotid Bruit] : no bruit heard over the right carotid [Left Carotid Bruit] : no bruit heard over the left carotid [Bruit] : no bruit heard

## 2019-02-10 NOTE — DISCUSSION/SUMMARY
[FreeTextEntry1] : IMPRESSION: Mrs. Lundberg is an 89 year old woman with a history of HTN, hyperlipidemia, nonobstructive CAD, mitral regurgitation, LV dysfunction, newly diagnosed anemia, subdural hematoma in [place] s/p [place] holes and osteoporosis who presents today for follow up after a hospitalization for UTI and bilateral PE.\par \par PLAN:\par 1. Her blood pressure is well controlled, however, given her heart disease she will start on Toprol XL 12.5mg daily.\par 2. She will continue on Simvastatin 20mg daily for her cholesterol. I will be checking her fasting lipid panel and CMP today.\par 3. She will continue on Eliquis 5mg twice daily for her bilateral PE and DVTs. She will also follow up with Vascular Cardiology within the month. I will be checking a CBC today.\par 4. She will follow up with me in three months or sooner if she is symptomatic.

## 2019-02-15 ENCOUNTER — APPOINTMENT (OUTPATIENT)
Dept: CARDIOLOGY | Facility: CLINIC | Age: 84
End: 2019-02-15
Payer: MEDICARE

## 2019-02-15 VITALS
BODY MASS INDEX: 20.99 KG/M2 | HEIGHT: 65 IN | HEART RATE: 85 BPM | OXYGEN SATURATION: 99 % | SYSTOLIC BLOOD PRESSURE: 122 MMHG | DIASTOLIC BLOOD PRESSURE: 65 MMHG | RESPIRATION RATE: 12 BRPM | WEIGHT: 126 LBS | TEMPERATURE: 98 F

## 2019-02-15 PROCEDURE — 99215 OFFICE O/P EST HI 40 MIN: CPT

## 2019-02-15 NOTE — ASSESSMENT
[FreeTextEntry1] : Assessment:\par 1.  Provoked PE and DVT\par - prolonged illness\par 2.  ICH s/p ronny hole\par - stable repeat head CT at [facility]\par 3.  HTN\par 4.  HLD\par \par Plan\par 1.  Continue with eliquis 5mg BID\par 2.  Given extent, size and severity of PE will favor treatment duration of 6 months\par 3.  In 5 months (May) plan to repeat venous duplex and send labowork to risk stratify risk of recurrence\par - if duplex shows chronic/resolved DVT and d-dimer negative then stop Eliquis and transition to aspirin \par - if higher risk features, then will change to eliquis 2.5mg BID\par 4.  discussed with dr. jones and family in detail\par 5.  Return in June 2019 to discuss #3.\par 6.  Stop fish oil, no NSAIDS, discussed if any neuro symptoms to go directly to ER\par \par Thanks\par \par Karsten Heredia

## 2019-02-15 NOTE — PHYSICAL EXAM
[General Appearance - Well Developed] : well developed [Normal Appearance] : normal appearance [Well Groomed] : well groomed [General Appearance - Well Nourished] : well nourished [No Deformities] : no deformities [General Appearance - In No Acute Distress] : no acute distress [Normal Conjunctiva] : the conjunctiva exhibited no abnormalities [Eyelids - No Xanthelasma] : the eyelids demonstrated no xanthelasmas [Normal Oral Mucosa] : normal oral mucosa [No Oral Pallor] : no oral pallor [No Oral Cyanosis] : no oral cyanosis [Normal Jugular Venous A Waves Present] : normal jugular venous A waves present [Normal Jugular Venous V Waves Present] : normal jugular venous V waves present [No Jugular Venous Montesinos A Waves] : no jugular venous montesinos A waves [Respiration, Rhythm And Depth] : normal respiratory rhythm and effort [Exaggerated Use Of Accessory Muscles For Inspiration] : no accessory muscle use [Auscultation Breath Sounds / Voice Sounds] : lungs were clear to auscultation bilaterally [Heart Rate And Rhythm] : heart rate and rhythm were normal [Heart Sounds] : normal S1 and S2 [Murmurs] : no murmurs present [Abdomen Soft] : soft [Abdomen Tenderness] : non-tender [Abdomen Mass (___ Cm)] : no abdominal mass palpated [Abnormal Walk] : normal gait [Gait - Sufficient For Exercise Testing] : the gait was sufficient for exercise testing [Nail Clubbing] : no clubbing of the fingernails [Cyanosis, Localized] : no localized cyanosis [Petechial Hemorrhages (___cm)] : no petechial hemorrhages [Skin Color & Pigmentation] : normal skin color and pigmentation [] : no rash [No Venous Stasis] : no venous stasis [Skin Lesions] : no skin lesions [No Skin Ulcers] : no skin ulcer [No Xanthoma] : no  xanthoma was observed [Oriented To Time, Place, And Person] : oriented to person, place, and time [Affect] : the affect was normal [Mood] : the mood was normal [No Anxiety] : not feeling anxious

## 2019-02-15 NOTE — REASON FOR VISIT
[Follow-Up - From Hospitalization] : follow-up of a recent hospitalization for [FreeTextEntry1] : 88F hx of dementia, HTN, HLD, osteoporosis, ICH s/p ronny hole, returned from [facility], hosptial stay for UTI, rehab, then returned to [place] found to have PE and DVT.

## 2019-02-15 NOTE — HISTORY OF PRESENT ILLNESS
[FreeTextEntry1] : 2/15/2019\par Doing well\par Here with her  and carl\par breathing is ok\par remains on eliquis 5mg BID\par no chest pain\par No sob\par legs feel fine.  no bleeding or bruising\par daughter states she is the most alert she has been since rehab. \par no prior DVT/PE\par it is felt that her PE is secondary to prolonged hospital stay/flight from [place]/prolonged illness\par

## 2019-02-15 NOTE — REVIEW OF SYSTEMS
[Shortness Of Breath] : no shortness of breath [Chest Pain] : no chest pain [Palpitations] : no palpitations [Negative] : Heme/Lymph

## 2019-02-19 LAB
BASOPHILS # BLD AUTO: 0.08 K/UL
BASOPHILS NFR BLD AUTO: 1.1 %
EOSINOPHIL # BLD AUTO: 0.28 K/UL
EOSINOPHIL NFR BLD AUTO: 4 %
HCT VFR BLD CALC: 31.8 %
HGB BLD-MCNC: 9.6 G/DL
IMM GRANULOCYTES NFR BLD AUTO: 0.1 %
LYMPHOCYTES # BLD AUTO: 2.71 K/UL
LYMPHOCYTES NFR BLD AUTO: 38.4 %
MAN DIFF?: NORMAL
MCHC RBC-ENTMCNC: 25.9 PG
MCHC RBC-ENTMCNC: 30.2 GM/DL
MCV RBC AUTO: 85.7 FL
MONOCYTES # BLD AUTO: 0.7 K/UL
MONOCYTES NFR BLD AUTO: 9.9 %
NEUTROPHILS # BLD AUTO: 3.28 K/UL
NEUTROPHILS NFR BLD AUTO: 46.5 %
PLATELET # BLD AUTO: 362 K/UL
RBC # BLD: 3.71 M/UL
RBC # FLD: 17.6 %
WBC # FLD AUTO: 7.06 K/UL

## 2019-02-27 ENCOUNTER — RX RENEWAL (OUTPATIENT)
Age: 84
End: 2019-02-27

## 2019-03-11 ENCOUNTER — RX RENEWAL (OUTPATIENT)
Age: 84
End: 2019-03-11

## 2019-04-02 ENCOUNTER — RX CHANGE (OUTPATIENT)
Age: 84
End: 2019-04-02

## 2019-05-01 ENCOUNTER — RX CHANGE (OUTPATIENT)
Age: 84
End: 2019-05-01

## 2019-05-04 ENCOUNTER — APPOINTMENT (OUTPATIENT)
Dept: CARDIOLOGY | Facility: CLINIC | Age: 84
End: 2019-05-04
Payer: MEDICARE

## 2019-05-04 ENCOUNTER — NON-APPOINTMENT (OUTPATIENT)
Age: 84
End: 2019-05-04

## 2019-05-04 VITALS
OXYGEN SATURATION: 100 % | HEART RATE: 84 BPM | WEIGHT: 130 LBS | SYSTOLIC BLOOD PRESSURE: 121 MMHG | TEMPERATURE: 98.1 F | DIASTOLIC BLOOD PRESSURE: 72 MMHG | RESPIRATION RATE: 12 BRPM | BODY MASS INDEX: 21.66 KG/M2 | HEIGHT: 65 IN

## 2019-05-04 PROCEDURE — 99214 OFFICE O/P EST MOD 30 MIN: CPT | Mod: 25

## 2019-05-04 PROCEDURE — 93000 ELECTROCARDIOGRAM COMPLETE: CPT

## 2019-05-04 RX ORDER — ALENDRONATE SODIUM 70 MG/1
70 TABLET ORAL
Refills: 0 | Status: ACTIVE | COMMUNITY
Start: 2019-05-04

## 2019-05-05 ENCOUNTER — NON-APPOINTMENT (OUTPATIENT)
Age: 84
End: 2019-05-05

## 2019-05-05 NOTE — HISTORY OF PRESENT ILLNESS
[FreeTextEntry1] : Patient is an 89 year old woman with a history of HTN, hyperlipidemia, nonobstructive coronary artery disease, mitral regurgitation, LV dysfunction,subdural hematoma in [place] status post [place] holes, osteoporosis, and bilateral PE/DVTs currently on Eliquis 5mg BID who presents for follow up of HTN. Currently she reports feeling well denying any chest pain, dyspnea, headaches, palpitations, or dizziness. She does report chronic constipation/abdominal pain, for which she is taking OTC stool softeners with some relief. She denies any blood in her stool. She never started taking the Metoprolol 12.5mg daily. She states that she has not been walking and has not been drinking much water.

## 2019-05-05 NOTE — PHYSICAL EXAM
[General Appearance - Well Developed] : well developed [Normal Appearance] : normal appearance [General Appearance - Well Nourished] : well nourished [Well Groomed] : well groomed [No Deformities] : no deformities [Normal Conjunctiva] : the conjunctiva exhibited no abnormalities [Normal Oral Mucosa] : normal oral mucosa [No Oral Pallor] : no oral pallor [Normal Jugular Venous A Waves Present] : normal jugular venous A waves present [No Oral Cyanosis] : no oral cyanosis [Normal Jugular Venous V Waves Present] : normal jugular venous V waves present [Respiration, Rhythm And Depth] : normal respiratory rhythm and effort [Exaggerated Use Of Accessory Muscles For Inspiration] : no accessory muscle use [Auscultation Breath Sounds / Voice Sounds] : lungs were clear to auscultation bilaterally [Normal S1] : normal S1 [Normal S2] : normal S2 [I] : a grade 1 [Abdomen Soft] : soft [Bowel Sounds] : normal bowel sounds [Abdomen Tenderness] : non-tender [Abnormal Walk] : normal gait [Cyanosis, Localized] : no localized cyanosis [Petechial Hemorrhages (___cm)] : no petechial hemorrhages [Nail Clubbing] : no clubbing of the fingernails [Skin Color & Pigmentation] : normal skin color and pigmentation [] : no rash [No Skin Ulcers] : no skin ulcer [Affect] : the affect was normal [Skin Lesions] : no skin lesions [Mood] : the mood was normal [No Anxiety] : not feeling anxious [Normal Rate] : normal [Rhythm Regular] : regular [___ +] : bilateral [unfilled]U+ pretibial pitting edema [FreeTextEntry1] : Extraocular muscles intact. Anicteric sclerae. [Right Carotid Bruit] : no bruit heard over the right carotid [S3] : no S3 [Bruit] : no bruit heard [Left Carotid Bruit] : no bruit heard over the left carotid

## 2019-05-05 NOTE — DISCUSSION/SUMMARY
[FreeTextEntry1] : IMPRESSION: Mrs. Lnudberg is an 89 year old woman with a history of HTN, hyperlipidemia, nonobstructive CAD, mitral regurgitation, LV dysfunction, anemia, subdural hematoma in [place] s/p [place] holes, osteoporosis, bilateral PEs/DVT currently on Eliquis who presents today for follow up of HTN\par \par PLAN:\par 1. Her blood pressure is well controlled, however, given her heart disease she will start on Toprol XL 12.5mg daily.\par 2. She will continue on Simvastatin 20mg daily for her cholesterol. I will be checking her fasting lipid panel and CMP today.\par 3. She will continue on Eliquis 5mg twice daily for her bilateral PE and DVTs. She will also follow up with Vascular Cardiology within the month. She will schedule a follow up venous duplex to evaluate her DVT. I will be checking a CBC today to evaluate her anemia.\par 4. She will follow up with me in three months or sooner if she is symptomatic.

## 2019-05-10 ENCOUNTER — RX CHANGE (OUTPATIENT)
Age: 84
End: 2019-05-10

## 2019-05-13 NOTE — HISTORY OF PRESENT ILLNESS
[de-identified] : 89 year old female, accompannied by her daughter and , with h/o Hypertension/ Hyperlipidemia/ / CAD/ Osteoporosis presents for follow up visit\par She is s/p hospitalization for UTI in November, s/p rehab, s/p hospitalization at end of November for b/l PE's and back to rehab. She was d/c'd from rehab about a week and a half ago. \par She is on Eliquis 5mg po BID, \par She is doine well. offers no complaints

## 2019-05-13 NOTE — PHYSICAL EXAM
[No Acute Distress] : no acute distress [Normal Sclera/Conjunctiva] : normal sclera/conjunctiva [Normal Outer Ear/Nose] : the outer ears and nose were normal in appearance [No JVD] : no jugular venous distention [No Lymphadenopathy] : no lymphadenopathy [No Respiratory Distress] : no respiratory distress  [Clear to Auscultation] : lungs were clear to auscultation bilaterally [Normal Rate] : normal rate  [Regular Rhythm] : with a regular rhythm [Normal S1, S2] : normal S1 and S2 [No Edema] : there was no peripheral edema [Soft] : abdomen soft [Non Tender] : non-tender [Non-distended] : non-distended [No Joint Swelling] : no joint swelling [No CVA Tenderness] : no CVA  tenderness [Normal Gait] : normal gait [No Rash] : no rash [No Focal Deficits] : no focal deficits [Alert and Oriented x3] : oriented to person, place, and time [Speech Grossly Normal] : speech grossly normal [Normal Insight/Judgement] : insight and judgment were intact

## 2019-05-13 NOTE — ASSESSMENT
[FreeTextEntry1] : s/p hospitalization/rehab for PE\par cont Eliquis 5mg po BID\par f/u with cardiology as scheduled\par \par Hx of Hypertension/ Hyperlipidemia/ / CAD/ Osteoporosis\par cont current meds

## 2019-05-15 ENCOUNTER — RX CHANGE (OUTPATIENT)
Age: 84
End: 2019-05-15

## 2019-05-17 ENCOUNTER — APPOINTMENT (OUTPATIENT)
Dept: CARDIOLOGY | Facility: CLINIC | Age: 84
End: 2019-05-17
Payer: MEDICARE

## 2019-05-17 PROCEDURE — 93970 EXTREMITY STUDY: CPT

## 2019-05-17 PROCEDURE — XXXXX: CPT

## 2019-05-24 ENCOUNTER — APPOINTMENT (OUTPATIENT)
Dept: CARDIOLOGY | Facility: CLINIC | Age: 84
End: 2019-05-24
Payer: MEDICARE

## 2019-05-24 VITALS
TEMPERATURE: 97.5 F | BODY MASS INDEX: 21.66 KG/M2 | HEART RATE: 63 BPM | WEIGHT: 130 LBS | DIASTOLIC BLOOD PRESSURE: 75 MMHG | SYSTOLIC BLOOD PRESSURE: 129 MMHG | RESPIRATION RATE: 12 BRPM | OXYGEN SATURATION: 95 % | HEIGHT: 65 IN

## 2019-05-24 PROCEDURE — 99214 OFFICE O/P EST MOD 30 MIN: CPT

## 2019-05-24 NOTE — ASSESSMENT
[FreeTextEntry1] : Assessment:\par 1.  Provoked PE and DVT\par - prolonged illness\par 2.  ICH s/p ronny hole\par - stable repeat head CT at [facility]\par 3.  HTN\par 4.  HLD\par \par Plan\par 1.  Continue with eliquis 5mg BID\par 2.   Send Dimer and factor VIII now. \par If  d-dimer negative then stop Eliquis and transition to aspirin 81\par - if higher risk features, then will change to eliquis 2.5mg BID\par 3.  discussed with   family in detail\par 4.  Daughter Taisha.  [phone]\par \par \par Thanks\par \par Karsten Heredia

## 2019-05-24 NOTE — PHYSICAL EXAM
[General Appearance - Well Developed] : well developed [Normal Appearance] : normal appearance [Well Groomed] : well groomed [General Appearance - Well Nourished] : well nourished [No Deformities] : no deformities [General Appearance - In No Acute Distress] : no acute distress [Normal Conjunctiva] : the conjunctiva exhibited no abnormalities [Eyelids - No Xanthelasma] : the eyelids demonstrated no xanthelasmas [No Oral Pallor] : no oral pallor [Normal Oral Mucosa] : normal oral mucosa [No Oral Cyanosis] : no oral cyanosis [Normal Jugular Venous A Waves Present] : normal jugular venous A waves present [No Jugular Venous Montesinos A Waves] : no jugular venous montesinos A waves [Normal Jugular Venous V Waves Present] : normal jugular venous V waves present [Respiration, Rhythm And Depth] : normal respiratory rhythm and effort [Auscultation Breath Sounds / Voice Sounds] : lungs were clear to auscultation bilaterally [Exaggerated Use Of Accessory Muscles For Inspiration] : no accessory muscle use [Heart Rate And Rhythm] : heart rate and rhythm were normal [Heart Sounds] : normal S1 and S2 [Murmurs] : no murmurs present [Abdomen Soft] : soft [Abdomen Tenderness] : non-tender [Abdomen Mass (___ Cm)] : no abdominal mass palpated [Abnormal Walk] : normal gait [Gait - Sufficient For Exercise Testing] : the gait was sufficient for exercise testing [Cyanosis, Localized] : no localized cyanosis [Nail Clubbing] : no clubbing of the fingernails [Petechial Hemorrhages (___cm)] : no petechial hemorrhages [Skin Color & Pigmentation] : normal skin color and pigmentation [No Venous Stasis] : no venous stasis [] : no rash [No Skin Ulcers] : no skin ulcer [Skin Lesions] : no skin lesions [No Xanthoma] : no  xanthoma was observed [Mood] : the mood was normal [Affect] : the affect was normal [Oriented To Time, Place, And Person] : oriented to person, place, and time [No Anxiety] : not feeling anxious

## 2019-05-24 NOTE — HISTORY OF PRESENT ILLNESS
[FreeTextEntry1] : 5/24/2019\par Venous duplex no DVT last week\par Eliquis 5mg BID - no bleeding or bruising.  Breathing ok.  Legs feel ok. \par no prior DVT/PE\par \par 2/15/2019\par Doing well\par Here with her  and duaghter\par breathing is ok\par remains on eliquis 5mg BID\par no chest pain\par No sob\par legs feel fine.  no bleeding or bruising\par daughter states she is the most alert she has been since rehab. \par no prior DVT/PE\par it is felt that her PE is secondary to prolonged hospital stay/flight from [place]/prolonged illness\par

## 2019-05-28 LAB
DEPRECATED D DIMER PPP IA-ACNC: 210 NG/ML DDU
FACT VIII ACT/NOR PPP: 168 %

## 2019-05-28 RX ORDER — APIXABAN 5 MG/1
5 TABLET, FILM COATED ORAL
Qty: 60 | Refills: 0 | Status: DISCONTINUED | COMMUNITY
Start: 2019-01-24 | End: 2019-05-28

## 2019-07-09 ENCOUNTER — MEDICATION RENEWAL (OUTPATIENT)
Age: 84
End: 2019-07-09

## 2019-08-03 ENCOUNTER — APPOINTMENT (OUTPATIENT)
Dept: CARDIOLOGY | Facility: CLINIC | Age: 84
End: 2019-08-03
Payer: MEDICARE

## 2019-08-03 ENCOUNTER — LABORATORY RESULT (OUTPATIENT)
Age: 84
End: 2019-08-03

## 2019-08-03 ENCOUNTER — NON-APPOINTMENT (OUTPATIENT)
Age: 84
End: 2019-08-03

## 2019-08-03 VITALS
DIASTOLIC BLOOD PRESSURE: 69 MMHG | RESPIRATION RATE: 12 BRPM | BODY MASS INDEX: 22.49 KG/M2 | HEART RATE: 72 BPM | HEIGHT: 65 IN | TEMPERATURE: 97.4 F | WEIGHT: 135 LBS | SYSTOLIC BLOOD PRESSURE: 120 MMHG | OXYGEN SATURATION: 97 %

## 2019-08-03 LAB
ALBUMIN SERPL ELPH-MCNC: 4.2 G/DL
ALP BLD-CCNC: 60 U/L
ALT SERPL-CCNC: 11 U/L
ANION GAP SERPL CALC-SCNC: 15 MMOL/L
AST SERPL-CCNC: 14 U/L
BASOPHILS # BLD AUTO: 0.1 K/UL
BASOPHILS NFR BLD AUTO: 1.2 %
BILIRUB SERPL-MCNC: 0.3 MG/DL
BUN SERPL-MCNC: 20 MG/DL
CALCIUM SERPL-MCNC: 9.4 MG/DL
CHLORIDE SERPL-SCNC: 105 MMOL/L
CHOLEST SERPL-MCNC: 153 MG/DL
CHOLEST/HDLC SERPL: 3 RATIO
CO2 SERPL-SCNC: 24 MMOL/L
CREAT SERPL-MCNC: 0.86 MG/DL
EOSINOPHIL # BLD AUTO: 0.21 K/UL
EOSINOPHIL NFR BLD AUTO: 2.6 %
ESTIMATED AVERAGE GLUCOSE: 97 MG/DL
GLUCOSE SERPL-MCNC: 99 MG/DL
HBA1C MFR BLD HPLC: 5 %
HCT VFR BLD CALC: 32.4 %
HDLC SERPL-MCNC: 51 MG/DL
HGB BLD-MCNC: 9 G/DL
IMM GRANULOCYTES NFR BLD AUTO: 0.4 %
LDLC SERPL CALC-MCNC: 81 MG/DL
LYMPHOCYTES # BLD AUTO: 2.33 K/UL
LYMPHOCYTES NFR BLD AUTO: 28.3 %
MAN DIFF?: NORMAL
MCHC RBC-ENTMCNC: 22.3 PG
MCHC RBC-ENTMCNC: 27.8 GM/DL
MCV RBC AUTO: 80.2 FL
MONOCYTES # BLD AUTO: 0.61 K/UL
MONOCYTES NFR BLD AUTO: 7.4 %
NEUTROPHILS # BLD AUTO: 4.94 K/UL
NEUTROPHILS NFR BLD AUTO: 60.1 %
PLATELET # BLD AUTO: 338 K/UL
POTASSIUM SERPL-SCNC: 4.4 MMOL/L
PROT SERPL-MCNC: 6.6 G/DL
RBC # BLD: 4.04 M/UL
RBC # FLD: 21.5 %
SODIUM SERPL-SCNC: 144 MMOL/L
TRIGL SERPL-MCNC: 107 MG/DL
TSH SERPL-ACNC: 1.87 UIU/ML
WBC # FLD AUTO: 8.22 K/UL

## 2019-08-03 PROCEDURE — 93000 ELECTROCARDIOGRAM COMPLETE: CPT

## 2019-08-03 PROCEDURE — 99214 OFFICE O/P EST MOD 30 MIN: CPT | Mod: 25

## 2019-08-03 NOTE — DISCUSSION/SUMMARY
[FreeTextEntry1] : IMPRESSION: Mrs. Lundberg is an 89 year old woman with a history of HTN, hyperlipidemia, nonobstructive CAD, mitral regurgitation, LV dysfunction, anemia, subdural hematoma in [place] s/p [place] holes, osteoporosis, bilateral PEs/DVT currently on Eliquis who presents today for follow up of HTN\par \par PLAN:\par 1. Her blood pressure is well controlled, thus she will continue on Toprol XL 12.5mg daily.\par 2. She will continue on Simvastatin 20mg daily for her cholesterol. I will be checking her fasting lipid panel and CMP today.\par 3. She will continue on Eliquis 2.5mg twice daily for her bilateral PE and DVTs. She will also follow up with Vascular Cardiology. I will be checking a CBC today to evaluate her anemia.\par 4. She will follow up with me in four months or sooner if she is symptomatic.

## 2019-08-03 NOTE — PHYSICAL EXAM
[General Appearance - Well Developed] : well developed [Normal Appearance] : normal appearance [Well Groomed] : well groomed [No Deformities] : no deformities [General Appearance - In No Acute Distress] : no acute distress [Normal Conjunctiva] : the conjunctiva exhibited no abnormalities [Normal Oral Mucosa] : normal oral mucosa [No Oral Pallor] : no oral pallor [No Oral Cyanosis] : no oral cyanosis [Normal Jugular Venous A Waves Present] : normal jugular venous A waves present [Normal Jugular Venous V Waves Present] : normal jugular venous V waves present [Respiration, Rhythm And Depth] : normal respiratory rhythm and effort [Exaggerated Use Of Accessory Muscles For Inspiration] : no accessory muscle use [Auscultation Breath Sounds / Voice Sounds] : lungs were clear to auscultation bilaterally [Bowel Sounds] : normal bowel sounds [Abdomen Soft] : soft [Abdomen Tenderness] : non-tender [Abnormal Walk] : normal gait [Nail Clubbing] : no clubbing of the fingernails [Cyanosis, Localized] : no localized cyanosis [Petechial Hemorrhages (___cm)] : no petechial hemorrhages [Skin Color & Pigmentation] : normal skin color and pigmentation [] : no rash [Skin Lesions] : no skin lesions [No Skin Ulcers] : no skin ulcer [Affect] : the affect was normal [Mood] : the mood was normal [No Anxiety] : not feeling anxious [Normal Rate] : normal [Rhythm Regular] : regular [Normal S1] : normal S1 [Normal S2] : normal S2 [I] : a grade 1 [___ +] : bilateral [unfilled]U+ pretibial pitting edema [S3] : no S3 [FreeTextEntry1] : Extraocular muscles intact. Anicteric sclerae. [Right Carotid Bruit] : no bruit heard over the right carotid [Bruit] : no bruit heard [Left Carotid Bruit] : no bruit heard over the left carotid

## 2019-08-03 NOTE — HISTORY OF PRESENT ILLNESS
[FreeTextEntry1] : Patient is an 89 year old woman with a history of HTN, hyperlipidemia, nonobstructive coronary artery disease, mitral regurgitation, LV dysfunction,subdural hematoma in [place] status post Lolita holes, osteoporosis, and bilateral PE/DVTs currently on Eliquis who presents for follow up of HTN. Currently she reports feeling well denying any chest pain, dyspnea, headaches, palpitations, or dizziness.

## 2019-08-12 ENCOUNTER — APPOINTMENT (OUTPATIENT)
Dept: PULMONOLOGY | Facility: CLINIC | Age: 84
End: 2019-08-12

## 2019-10-27 ENCOUNTER — RX RENEWAL (OUTPATIENT)
Age: 84
End: 2019-10-27

## 2019-10-29 ENCOUNTER — RX RENEWAL (OUTPATIENT)
Age: 84
End: 2019-10-29

## 2019-10-29 ENCOUNTER — APPOINTMENT (OUTPATIENT)
Dept: INTERNAL MEDICINE | Facility: CLINIC | Age: 84
End: 2019-10-29
Payer: MEDICARE

## 2019-10-29 VITALS
WEIGHT: 138 LBS | HEART RATE: 81 BPM | RESPIRATION RATE: 12 BRPM | SYSTOLIC BLOOD PRESSURE: 117 MMHG | OXYGEN SATURATION: 99 % | DIASTOLIC BLOOD PRESSURE: 68 MMHG | HEIGHT: 65 IN | TEMPERATURE: 98 F | BODY MASS INDEX: 22.99 KG/M2

## 2019-10-29 DIAGNOSIS — R10.9 UNSPECIFIED ABDOMINAL PAIN: ICD-10-CM

## 2019-10-29 PROCEDURE — 99214 OFFICE O/P EST MOD 30 MIN: CPT | Mod: 25

## 2019-10-29 PROCEDURE — 90662 IIV NO PRSV INCREASED AG IM: CPT

## 2019-10-29 PROCEDURE — G0008: CPT

## 2019-10-29 NOTE — PHYSICAL EXAM
[No Acute Distress] : no acute distress [Normal Sclera/Conjunctiva] : normal sclera/conjunctiva [Normal Outer Ear/Nose] : the outer ears and nose were normal in appearance [No JVD] : no jugular venous distention [Normal] : normal rate, regular rhythm, normal S1 and S2 and no murmur heard [No Edema] : there was no peripheral edema [Soft] : abdomen soft [Non Tender] : non-tender [Non-distended] : non-distended [Normal Bowel Sounds] : normal bowel sounds [Normal Anterior Cervical Nodes] : no anterior cervical lymphadenopathy [No CVA Tenderness] : no CVA  tenderness [No Joint Swelling] : no joint swelling [No Rash] : no rash [No Focal Deficits] : no focal deficits [Normal Gait] : normal gait [Alert and Oriented x3] : oriented to person, place, and time

## 2019-11-04 NOTE — ASSESSMENT
[FreeTextEntry1] : abdominal discomfort\par does not want to go for abdominal CT\par f/u with GI\par \par Health maintenance\par flu vaccine administered

## 2019-11-04 NOTE — HISTORY OF PRESENT ILLNESS
[de-identified] : 89 year old female, accompannied by her , with h/o Hypertension/ Hyperlipidemia/ / CAD/ Osteoporosis, subdural hematoma in [place] 2018, s/p [place] holes, b/l DVT, PE on eliquis,  presents for follow up visit\par She continuous to complaint of having abdominal discomfort and says she is very gassy.

## 2019-11-08 ENCOUNTER — APPOINTMENT (OUTPATIENT)
Dept: GASTROENTEROLOGY | Facility: CLINIC | Age: 84
End: 2019-11-08

## 2019-11-22 ENCOUNTER — LABORATORY RESULT (OUTPATIENT)
Age: 84
End: 2019-11-22

## 2019-11-22 ENCOUNTER — APPOINTMENT (OUTPATIENT)
Dept: CARDIOLOGY | Facility: CLINIC | Age: 84
End: 2019-11-22
Payer: MEDICARE

## 2019-11-22 VITALS
TEMPERATURE: 98.3 F | OXYGEN SATURATION: 92 % | SYSTOLIC BLOOD PRESSURE: 95 MMHG | BODY MASS INDEX: 22.82 KG/M2 | RESPIRATION RATE: 12 BRPM | WEIGHT: 137 LBS | HEART RATE: 81 BPM | DIASTOLIC BLOOD PRESSURE: 55 MMHG | HEIGHT: 65 IN

## 2019-11-22 PROCEDURE — 99214 OFFICE O/P EST MOD 30 MIN: CPT

## 2019-11-22 NOTE — ASSESSMENT
[FreeTextEntry1] : Assessment:\par 1.  Provoked PE and DVT\par - prolonged illness\par 2.  ICH s/p ronny hole\par - stable repeat head CT at [facility]\par 3.  HTN\par 4.  HLD\par \par Plan\par 1.  Continue with eliquis 2.5mg BID\par 2.   Send Dimer and factor VIII now. \par If  d-dimer and factor VIII negative then stop Eliquis and transition to aspirin 81\par - if higher risk features, then will continue eliquis 2.5mg BID to reduce risk of recurrence.  \par  3.  Call Daughter Taisha.  [phone] with results\par 4.  Await testing. \par 5.  If we switch to aspirin, then will need eliquis 1 hour prior to flights.  \par \par Thanks\par \par Karsten Heredia

## 2019-11-22 NOTE — HISTORY OF PRESENT ILLNESS
[FreeTextEntry1] : Followup visit 11/22/2019\par Remains on Eliquis 2.5mg BID (for elevated Factor VIII and high normal dimer levels last visit)\par  No blood in urine or stool\par No travel is planned soon. \par Legs feel ok.\par Breathing ok\par Here with her . \par Going to bathroom at night.\par No dizziness.

## 2019-11-22 NOTE — REASON FOR VISIT
[Follow-Up - Clinic] : a clinic follow-up of [FreeTextEntry1] : 88F hx of dementia, HTN, HLD, osteoporosis, ICH s/p ronny hole, returned from [facility], hosptial stay for UTI, rehab, then returned to [place] found to have PE and DVT.

## 2019-11-25 LAB
ALBUMIN SERPL ELPH-MCNC: 3.7 G/DL
ALP BLD-CCNC: 71 U/L
ALT SERPL-CCNC: 12 U/L
ANION GAP SERPL CALC-SCNC: 18 MMOL/L
AST SERPL-CCNC: 20 U/L
BASOPHILS # BLD AUTO: 0.11 K/UL
BASOPHILS NFR BLD AUTO: 1.3 %
BILIRUB SERPL-MCNC: 0.2 MG/DL
BUN SERPL-MCNC: 14 MG/DL
CALCIUM SERPL-MCNC: 9.5 MG/DL
CHLORIDE SERPL-SCNC: 104 MMOL/L
CO2 SERPL-SCNC: 21 MMOL/L
CREAT SERPL-MCNC: 0.83 MG/DL
DEPRECATED D DIMER PPP IA-ACNC: 201 NG/ML DDU
EOSINOPHIL # BLD AUTO: 0.39 K/UL
EOSINOPHIL NFR BLD AUTO: 4.5 %
FACT VIII ACT/NOR PPP: 174 %
GLUCOSE SERPL-MCNC: 84 MG/DL
HCT VFR BLD CALC: 30.8 %
HGB BLD-MCNC: 8.8 G/DL
IMM GRANULOCYTES NFR BLD AUTO: 0.1 %
LYMPHOCYTES # BLD AUTO: 3.24 K/UL
LYMPHOCYTES NFR BLD AUTO: 37.4 %
MAN DIFF?: NORMAL
MCHC RBC-ENTMCNC: 21.7 PG
MCHC RBC-ENTMCNC: 28.6 GM/DL
MCV RBC AUTO: 76 FL
MONOCYTES # BLD AUTO: 0.75 K/UL
MONOCYTES NFR BLD AUTO: 8.7 %
NEUTROPHILS # BLD AUTO: 4.17 K/UL
NEUTROPHILS NFR BLD AUTO: 48 %
PLATELET # BLD AUTO: 390 K/UL
POTASSIUM SERPL-SCNC: 3.9 MMOL/L
PROT SERPL-MCNC: 6.5 G/DL
RBC # BLD: 4.05 M/UL
RBC # FLD: 22.2 %
SODIUM SERPL-SCNC: 143 MMOL/L
WBC # FLD AUTO: 8.67 K/UL

## 2019-12-07 ENCOUNTER — APPOINTMENT (OUTPATIENT)
Dept: CARDIOLOGY | Facility: CLINIC | Age: 84
End: 2019-12-07
Payer: MEDICARE

## 2019-12-07 ENCOUNTER — NON-APPOINTMENT (OUTPATIENT)
Age: 84
End: 2019-12-07

## 2019-12-07 VITALS
WEIGHT: 138 LBS | SYSTOLIC BLOOD PRESSURE: 119 MMHG | RESPIRATION RATE: 12 BRPM | BODY MASS INDEX: 22.99 KG/M2 | OXYGEN SATURATION: 99 % | DIASTOLIC BLOOD PRESSURE: 61 MMHG | HEIGHT: 65 IN | HEART RATE: 72 BPM

## 2019-12-07 PROCEDURE — 93000 ELECTROCARDIOGRAM COMPLETE: CPT

## 2019-12-07 PROCEDURE — 99214 OFFICE O/P EST MOD 30 MIN: CPT | Mod: 25

## 2019-12-07 RX ORDER — FERROUS SULFATE 325(65) MG
325 (65 FE) TABLET ORAL DAILY
Qty: 90 | Refills: 0 | Status: ACTIVE | COMMUNITY
Start: 2019-12-07 | End: 1900-01-01

## 2019-12-07 RX ORDER — MULTIVIT-MIN/FOLIC/VIT K/LYCOP 400-300MCG
25 MCG TABLET ORAL
Qty: 90 | Refills: 0 | Status: ACTIVE | COMMUNITY
Start: 2019-12-07 | End: 1900-01-01

## 2019-12-08 LAB
25(OH)D3 SERPL-MCNC: 23.8 NG/ML
ALBUMIN SERPL ELPH-MCNC: 3.9 G/DL
ALP BLD-CCNC: 65 U/L
ALT SERPL-CCNC: 9 U/L
ANION GAP SERPL CALC-SCNC: 12 MMOL/L
AST SERPL-CCNC: 13 U/L
BASOPHILS # BLD AUTO: 0.19 K/UL
BASOPHILS NFR BLD AUTO: 2.6 %
BILIRUB SERPL-MCNC: <0.2 MG/DL
BUN SERPL-MCNC: 12 MG/DL
CALCIUM SERPL-MCNC: 9.1 MG/DL
CHLORIDE SERPL-SCNC: 108 MMOL/L
CHOLEST SERPL-MCNC: 154 MG/DL
CHOLEST/HDLC SERPL: 3.3 RATIO
CO2 SERPL-SCNC: 24 MMOL/L
CREAT SERPL-MCNC: 0.85 MG/DL
EOSINOPHIL # BLD AUTO: 0.58 K/UL
EOSINOPHIL NFR BLD AUTO: 7.8 %
GLUCOSE SERPL-MCNC: 95 MG/DL
HCT VFR BLD CALC: 32.9 %
HDLC SERPL-MCNC: 47 MG/DL
HGB BLD-MCNC: 9.1 G/DL
IRON SERPL-MCNC: 15 UG/DL
LDLC SERPL CALC-MCNC: 87 MG/DL
LYMPHOCYTES # BLD AUTO: 2.96 K/UL
LYMPHOCYTES NFR BLD AUTO: 39.7 %
MAN DIFF?: NORMAL
MCHC RBC-ENTMCNC: 21.7 PG
MCHC RBC-ENTMCNC: 27.7 GM/DL
MCV RBC AUTO: 78.3 FL
MONOCYTES # BLD AUTO: 0.45 K/UL
MONOCYTES NFR BLD AUTO: 6 %
NEUTROPHILS # BLD AUTO: 3.15 K/UL
NEUTROPHILS NFR BLD AUTO: 42.2 %
PLATELET # BLD AUTO: 425 K/UL
POTASSIUM SERPL-SCNC: 4.2 MMOL/L
PROT SERPL-MCNC: 6.3 G/DL
RBC # BLD: 4.2 M/UL
RBC # FLD: 23.1 %
SODIUM SERPL-SCNC: 144 MMOL/L
TRIGL SERPL-MCNC: 100 MG/DL
TSH SERPL-ACNC: 1.4 UIU/ML
WBC # FLD AUTO: 7.46 K/UL

## 2019-12-08 NOTE — HISTORY OF PRESENT ILLNESS
[FreeTextEntry1] : Patient is a 89 year old woman with a history of HTN, hyperlipidemia, nonobstructive coronary artery disease, mitral regurgitation, LV dysfunction,subdural hematoma in [place] status post [place] holes, osteoporosis, and bilateral PE/DVTs currently on Eliquis who presents for follow up of HTN. She states that her major complaint at this time is that she continues to complain of abdominal discomfort and feels that [facility] has not given her any improvement. She otherwise denies any chest pain, dyspnea at rest, headaches, palpitations, or dizziness.

## 2019-12-08 NOTE — PHYSICAL EXAM
[General Appearance - Well Developed] : well developed [Normal Appearance] : normal appearance [Well Groomed] : well groomed [No Deformities] : no deformities [General Appearance - In No Acute Distress] : no acute distress [Normal Oral Mucosa] : normal oral mucosa [No Oral Pallor] : no oral pallor [No Oral Cyanosis] : no oral cyanosis [Normal Jugular Venous A Waves Present] : normal jugular venous A waves present [Normal Jugular Venous V Waves Present] : normal jugular venous V waves present [Respiration, Rhythm And Depth] : normal respiratory rhythm and effort [Exaggerated Use Of Accessory Muscles For Inspiration] : no accessory muscle use [Auscultation Breath Sounds / Voice Sounds] : lungs were clear to auscultation bilaterally [Bowel Sounds] : normal bowel sounds [Abdomen Soft] : soft [Abdomen Tenderness] : non-tender [Cyanosis, Localized] : no localized cyanosis [Nail Clubbing] : no clubbing of the fingernails [Petechial Hemorrhages (___cm)] : no petechial hemorrhages [Skin Color & Pigmentation] : normal skin color and pigmentation [] : no rash [No Skin Ulcers] : no skin ulcer [Affect] : the affect was normal [Mood] : the mood was normal [No Anxiety] : not feeling anxious [Normal Rate] : normal [Rhythm Regular] : regular [Normal S1] : normal S1 [Normal S2] : normal S2 [I] : a grade 1 [FreeTextEntry1] : Her gait is slow. [S3] : no S3 [Right Carotid Bruit] : no bruit heard over the right carotid [Left Carotid Bruit] : no bruit heard over the left carotid [Bruit] : no bruit heard [No Pitting Edema] : no pitting edema present

## 2019-12-08 NOTE — DISCUSSION/SUMMARY
[FreeTextEntry1] : IMPRESSION: Mrs. Lundberg is a 89 year old woman with a history of HTN, hyperlipidemia, nonobstructive CAD, mitral regurgitation, LV dysfunction, anemia, subdural hematoma in [place] s/p [place] holes, osteoporosis, bilateral PEs/DVT currently on Eliquis who presents today for follow up of HTN\par \par PLAN:\par 1. Her blood pressure is well controlled, thus she will continue on Toprol XL 12.5mg daily.\par 2. She will continue on Simvastatin 20mg daily for her cholesterol. Her most recent LDL was OK. \par 3. She will continue on Eliquis 2.5mg twice daily for her bilateral PE and DVTs. She will also follow up with Vascular Cardiology. I have asked her to start on iron once daily given her anemia.\par 4. She will follow up with me in three months or sooner if she is symptomatic. She will also have an echo at that time given her CAD and her valvular heart disease.

## 2020-01-22 ENCOUNTER — RX RENEWAL (OUTPATIENT)
Age: 85
End: 2020-01-22

## 2020-03-12 ENCOUNTER — NON-APPOINTMENT (OUTPATIENT)
Age: 85
End: 2020-03-12

## 2020-03-12 ENCOUNTER — APPOINTMENT (OUTPATIENT)
Dept: CARDIOLOGY | Facility: CLINIC | Age: 85
End: 2020-03-12
Payer: MEDICARE

## 2020-03-12 VITALS
RESPIRATION RATE: 12 BRPM | TEMPERATURE: 98.6 F | WEIGHT: 132 LBS | HEIGHT: 66 IN | OXYGEN SATURATION: 98 % | BODY MASS INDEX: 21.21 KG/M2 | HEART RATE: 56 BPM | DIASTOLIC BLOOD PRESSURE: 66 MMHG | SYSTOLIC BLOOD PRESSURE: 108 MMHG

## 2020-03-12 PROCEDURE — 93306 TTE W/DOPPLER COMPLETE: CPT

## 2020-03-12 PROCEDURE — 93000 ELECTROCARDIOGRAM COMPLETE: CPT

## 2020-03-12 PROCEDURE — 99214 OFFICE O/P EST MOD 30 MIN: CPT | Mod: 25

## 2020-03-13 ENCOUNTER — APPOINTMENT (OUTPATIENT)
Dept: COLORECTAL SURGERY | Facility: CLINIC | Age: 85
End: 2020-03-13
Payer: MEDICARE

## 2020-03-13 VITALS
HEIGHT: 62 IN | BODY MASS INDEX: 24.29 KG/M2 | SYSTOLIC BLOOD PRESSURE: 130 MMHG | HEART RATE: 58 BPM | WEIGHT: 132 LBS | RESPIRATION RATE: 14 BRPM | DIASTOLIC BLOOD PRESSURE: 80 MMHG

## 2020-03-13 DIAGNOSIS — K62.89 OTHER SPECIFIED DISEASES OF ANUS AND RECTUM: ICD-10-CM

## 2020-03-13 PROCEDURE — 99205 OFFICE O/P NEW HI 60 MIN: CPT | Mod: 25

## 2020-03-13 PROCEDURE — 45300 PROCTOSIGMOIDOSCOPY DX: CPT

## 2020-03-18 ENCOUNTER — INPATIENT (INPATIENT)
Facility: HOSPITAL | Age: 85
LOS: 4 days | Discharge: HOME | DRG: 330 | End: 2020-03-23
Attending: SURGERY | Admitting: SURGERY
Payer: COMMERCIAL

## 2020-03-18 ENCOUNTER — TRANSCRIPTION ENCOUNTER (OUTPATIENT)
Age: 85
End: 2020-03-18

## 2020-03-18 VITALS
RESPIRATION RATE: 18 BRPM | TEMPERATURE: 98 F | WEIGHT: 130.07 LBS | DIASTOLIC BLOOD PRESSURE: 72 MMHG | SYSTOLIC BLOOD PRESSURE: 111 MMHG | HEART RATE: 78 BPM | HEIGHT: 63 IN | OXYGEN SATURATION: 97 %

## 2020-03-18 DIAGNOSIS — S06.5X9A TRAUMATIC SUBDURAL HEMORRHAGE WITH LOSS OF CONSCIOUSNESS OF UNSPECIFIED DURATION, INITIAL ENCOUNTER: Chronic | ICD-10-CM

## 2020-03-18 DIAGNOSIS — I26.99 OTHER PULMONARY EMBOLISM WITHOUT ACUTE COR PULMONALE: ICD-10-CM

## 2020-03-18 DIAGNOSIS — K62.89 OTHER SPECIFIED DISEASES OF ANUS AND RECTUM: ICD-10-CM

## 2020-03-18 LAB
ANION GAP SERPL CALC-SCNC: 14 MMOL/L — SIGNIFICANT CHANGE UP (ref 5–17)
APTT BLD: 31.4 SEC — SIGNIFICANT CHANGE UP (ref 27.5–36.3)
BLD GP AB SCN SERPL QL: NEGATIVE — SIGNIFICANT CHANGE UP
BUN SERPL-MCNC: 14 MG/DL — SIGNIFICANT CHANGE UP (ref 7–23)
CALCIUM SERPL-MCNC: 9 MG/DL — SIGNIFICANT CHANGE UP (ref 8.4–10.5)
CHLORIDE SERPL-SCNC: 108 MMOL/L — SIGNIFICANT CHANGE UP (ref 96–108)
CO2 SERPL-SCNC: 22 MMOL/L — SIGNIFICANT CHANGE UP (ref 22–31)
CREAT SERPL-MCNC: 0.76 MG/DL — SIGNIFICANT CHANGE UP (ref 0.5–1.3)
GLUCOSE SERPL-MCNC: 90 MG/DL — SIGNIFICANT CHANGE UP (ref 70–99)
HCT VFR BLD CALC: 36 % — SIGNIFICANT CHANGE UP (ref 34.5–45)
HGB BLD-MCNC: 10.9 G/DL — LOW (ref 11.5–15.5)
INR BLD: 1.03 RATIO — SIGNIFICANT CHANGE UP (ref 0.88–1.16)
MAGNESIUM SERPL-MCNC: 2.2 MG/DL — SIGNIFICANT CHANGE UP (ref 1.6–2.6)
MCHC RBC-ENTMCNC: 25.8 PG — LOW (ref 27–34)
MCHC RBC-ENTMCNC: 30.3 GM/DL — LOW (ref 32–36)
MCV RBC AUTO: 85.1 FL — SIGNIFICANT CHANGE UP (ref 80–100)
NRBC # BLD: 0 /100 WBCS — SIGNIFICANT CHANGE UP (ref 0–0)
PHOSPHATE SERPL-MCNC: 3.8 MG/DL — SIGNIFICANT CHANGE UP (ref 2.5–4.5)
PLATELET # BLD AUTO: 302 K/UL — SIGNIFICANT CHANGE UP (ref 150–400)
POTASSIUM SERPL-MCNC: 3.7 MMOL/L — SIGNIFICANT CHANGE UP (ref 3.5–5.3)
POTASSIUM SERPL-SCNC: 3.7 MMOL/L — SIGNIFICANT CHANGE UP (ref 3.5–5.3)
PROTHROM AB SERPL-ACNC: 11.8 SEC — SIGNIFICANT CHANGE UP (ref 10–12.9)
RBC # BLD: 4.23 M/UL — SIGNIFICANT CHANGE UP (ref 3.8–5.2)
RBC # FLD: 18.6 % — HIGH (ref 10.3–14.5)
RH IG SCN BLD-IMP: POSITIVE — SIGNIFICANT CHANGE UP
SODIUM SERPL-SCNC: 144 MMOL/L — SIGNIFICANT CHANGE UP (ref 135–145)
WBC # BLD: 8.53 K/UL — SIGNIFICANT CHANGE UP (ref 3.8–10.5)
WBC # FLD AUTO: 8.53 K/UL — SIGNIFICANT CHANGE UP (ref 3.8–10.5)

## 2020-03-18 PROCEDURE — 71045 X-RAY EXAM CHEST 1 VIEW: CPT | Mod: 26

## 2020-03-18 PROCEDURE — 93010 ELECTROCARDIOGRAM REPORT: CPT

## 2020-03-18 RX ORDER — CIPROFLOXACIN LACTATE 400MG/40ML
250 VIAL (ML) INTRAVENOUS
Refills: 0 | Status: COMPLETED | OUTPATIENT
Start: 2020-03-18 | End: 2020-03-18

## 2020-03-18 RX ORDER — METOPROLOL TARTRATE 50 MG
12.5 TABLET ORAL DAILY
Refills: 0 | Status: DISCONTINUED | OUTPATIENT
Start: 2020-03-18 | End: 2020-03-19

## 2020-03-18 RX ORDER — METRONIDAZOLE 500 MG
250 TABLET ORAL
Refills: 0 | Status: COMPLETED | OUTPATIENT
Start: 2020-03-18 | End: 2020-03-18

## 2020-03-18 RX ORDER — SIMVASTATIN 20 MG/1
20 TABLET, FILM COATED ORAL AT BEDTIME
Refills: 0 | Status: DISCONTINUED | OUTPATIENT
Start: 2020-03-18 | End: 2020-03-19

## 2020-03-18 RX ORDER — POLYETHYLENE GLYCOL 3350 17 G/17G
17 POWDER, FOR SOLUTION ORAL
Refills: 0 | Status: COMPLETED | OUTPATIENT
Start: 2020-03-18 | End: 2020-03-18

## 2020-03-18 RX ORDER — GABAPENTIN 400 MG/1
600 CAPSULE ORAL ONCE
Refills: 0 | Status: COMPLETED | OUTPATIENT
Start: 2020-03-19 | End: 2020-03-19

## 2020-03-18 RX ORDER — SODIUM CHLORIDE 9 MG/ML
1000 INJECTION, SOLUTION INTRAVENOUS
Refills: 0 | Status: DISCONTINUED | OUTPATIENT
Start: 2020-03-18 | End: 2020-03-18

## 2020-03-18 RX ORDER — MEMANTINE HYDROCHLORIDE 10 MG/1
5 TABLET ORAL DAILY
Refills: 0 | Status: DISCONTINUED | OUTPATIENT
Start: 2020-03-18 | End: 2020-03-19

## 2020-03-18 RX ORDER — METOPROLOL TARTRATE 50 MG
12.5 TABLET ORAL DAILY
Refills: 0 | Status: DISCONTINUED | OUTPATIENT
Start: 2020-03-18 | End: 2020-03-18

## 2020-03-18 RX ADMIN — MEMANTINE HYDROCHLORIDE 5 MILLIGRAM(S): 10 TABLET ORAL at 22:05

## 2020-03-18 RX ADMIN — Medication 250 MILLIGRAM(S): at 17:36

## 2020-03-18 RX ADMIN — SIMVASTATIN 20 MILLIGRAM(S): 20 TABLET, FILM COATED ORAL at 22:05

## 2020-03-18 RX ADMIN — Medication 250 MILLIGRAM(S): at 19:34

## 2020-03-18 RX ADMIN — POLYETHYLENE GLYCOL 3350 17 GRAM(S): 17 POWDER, FOR SOLUTION ORAL at 22:09

## 2020-03-18 RX ADMIN — Medication 250 MILLIGRAM(S): at 17:35

## 2020-03-18 RX ADMIN — POLYETHYLENE GLYCOL 3350 17 GRAM(S): 17 POWDER, FOR SOLUTION ORAL at 17:36

## 2020-03-18 RX ADMIN — POLYETHYLENE GLYCOL 3350 17 GRAM(S): 17 POWDER, FOR SOLUTION ORAL at 22:57

## 2020-03-18 RX ADMIN — POLYETHYLENE GLYCOL 3350 17 GRAM(S): 17 POWDER, FOR SOLUTION ORAL at 22:06

## 2020-03-18 RX ADMIN — POLYETHYLENE GLYCOL 3350 17 GRAM(S): 17 POWDER, FOR SOLUTION ORAL at 18:27

## 2020-03-18 RX ADMIN — POLYETHYLENE GLYCOL 3350 17 GRAM(S): 17 POWDER, FOR SOLUTION ORAL at 19:34

## 2020-03-18 RX ADMIN — POLYETHYLENE GLYCOL 3350 17 GRAM(S): 17 POWDER, FOR SOLUTION ORAL at 20:42

## 2020-03-18 NOTE — H&P ADULT - NSICDXPASTMEDICALHX_GEN_ALL_CORE_FT
PAST MEDICAL HISTORY:  Asthma     Deep vein thrombosis     Generalized Osteoarthritis     History of Osteoporosis     Hyperlipemia     Hypertension     Pulmonary embolus     PVD (Peripheral Vascular Disease)

## 2020-03-18 NOTE — H&P ADULT - NSHPPHYSICALEXAM_GEN_ALL_CORE
Gen: nontoxic appearing  HEENT: atraumatic/normocephalic, conjunctiva clear, OP without erythema or exudates  Neck: FROM, supple, no cervical LAD  Chest: good air entry, no tachypnea or retractions  CV: regular rate and rhythm, no murmurs   Abd: soft, nontender, nondistended, no HSM appreciated  Extrem:  warm and well perfused, no peripheral edema  Neuro: nonfocal

## 2020-03-18 NOTE — H&P ADULT - ASSESSMENT
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      -OR for Nikky's procedure, planned for 7:30 AM 3/19  - Pre-op ERAS protocol: start prep   	- miralax x8  	 - cipro/flagyl PO x3  	- Fulls until dinner, CLD after dinner, Ensure Surgery at 5am tomorrow AM before OR  - BMP/CBC   - patient has been holding Eliquis for 3 days, will resume postoperatively when deemed appropriate    Red Team Surgery [id number]

## 2020-03-18 NOTE — CHART NOTE - NSCHARTNOTEFT_GEN_A_CORE
CAPRINI SCORE [CLOT]    AGE RELATED RISK FACTORS                                                       MOBILITY RELATED FACTORS  [ ] Age 41-60 years                                            (1 Point)                  [ ] Bed rest                                                        (1 Point)  [ ] Age: 61-74 years                                           (2 Points)                 [ ] Plaster cast                                                   (2 Points)  [ 3] Age= 75 years                                              (3 Points)                 [ ] Bed bound for more than 72 hours                 (2 Points)    DISEASE RELATED RISK FACTORS                                               GENDER SPECIFIC FACTORS  [ ] Edema in the lower extremities                       (1 Point)                  [ ] Pregnancy                                                     (1 Point)  [ ] Varicose veins                                               (1 Point)                  [ ] Post-partum < 6 weeks                                   (1 Point)             [ ] BMI > 25 Kg/m2                                            (1 Point)                  [ ] Hormonal therapy  or oral contraception          (1 Point)                 [ ] Sepsis (in the previous month)                        (1 Point)                  [ ] History of pregnancy complications                 (1 point)  [ ] Pneumonia or serious lung disease                                               [ ] Unexplained or recurrent                     (1 Point)           (in the previous month)                               (1 Point)  [ ] Abnormal pulmonary function test                     (1 Point)                 SURGERY RELATED RISK FACTORS  [ ] Acute myocardial infarction                              (1 Point)                 [ ]  Section                                             (1 Point)  [ ] Congestive heart failure (in the previous month)  (1 Point)               [ ] Minor surgery                                                  (1 Point)   [ ] Inflammatory bowel disease                             (1 Point)                 [ ] Arthroscopic surgery                                        (2 Points)  [ ] Central venous access                                      (2 Points)                [ 2] General surgery lasting more than 45 minutes   (2 Points)       [ ] Stroke (in the previous month)                          (5 Points)               [ ] Elective arthroplasty                                         (5 Points)                                                                                                                                               HEMATOLOGY RELATED FACTORS                                                 TRAUMA RELATED RISK FACTORS  [ 3] Prior episodes of VTE                                     (3 Points)                [ ] Fracture of the hip, pelvis, or leg                       (5 Points)  [ ] Positive family history for VTE                         (3 Points)                 [ ] Acute spinal cord injury (in the previous month)  (5 Points)  [ ] Prothrombin 16242 A                                     (3 Points)                 [ ] Paralysis  (less than 1 month)                             (5 Points)  [ ] Factor V Leiden                                             (3 Points)                  [ ] Multiple Trauma within 1 month                        (5 Points)  [ ] Lupus anticoagulants                                     (3 Points)                                                           [ ] Anticardiolipin antibodies                               (3 Points)                                                       [ ] High homocysteine in the blood                      (3 Points)                                             [ ] Other congenital or acquired thrombophilia      (3 Points)                                                [ ] Heparin induced thrombocytopenia                  (3 Points)                                          Total Score [     8     ]    Caprini Score 0 - 2:  Low Risk, No VTE Prophylaxis required for most patients, encourage ambulation  Caprini Score 3 - 6:  At Risk, pharmacologic VTE prophylaxis is indicated for most patients (in the absence of a contraindication)  Caprini Score Greater than or = 7:  High Risk, pharmacologic VTE prophylaxis is indicated for most patients (in the absence of a contraindication)

## 2020-03-18 NOTE — H&P ADULT - NSICDXPASTSURGICALHX_GEN_ALL_CORE_FT
PAST SURGICAL HISTORY:  S/P Cataract Surgery left eye    SDH (subdural hematoma) 7/2018 With evacuatinon in [place]

## 2020-03-18 NOTE — H&P ADULT - HISTORY OF PRESENT ILLNESS
89yoF PMH CAD, SDH s/p ronny hole, PE/DVTs on Elliquis, chronic constipation. Patient was complaining of explosive diarrhea earlier this year and went to GI, CT [place] 02/2020 revealed mid to upper rectal narrowing with significant proximal fecal loading, suspicious for carcinoma. No gross distant metastatic disease was noted. Attempted colonoscopy 3/4/2020 was made and the patient was found to have an obstructing, biopsy-proven carcinoma of the rectum 15 cm from the anal verge. The scope could not pass proximal to the lesion. Planned for Nikky's procedure on 3/19/2020, patient presenting for pre-op colon prep.   Of note past medical history is significant for the above-mentioned subdural hematoma, DVTs, PE and coronary artery disease status post stent placement. She has been followed by cardiology, stable w/out heart failure.

## 2020-03-18 NOTE — H&P ADULT - NSICDXFAMILYHX_GEN_ALL_CORE_FT
FAMILY HISTORY:  Sibling  Still living? Unknown  Family history of breast cancer in sister, Age at diagnosis: Age Unknown

## 2020-03-19 ENCOUNTER — APPOINTMENT (OUTPATIENT)
Dept: COLORECTAL SURGERY | Facility: HOSPITAL | Age: 85
End: 2020-03-19
Payer: MEDICARE

## 2020-03-19 ENCOUNTER — RESULT REVIEW (OUTPATIENT)
Age: 85
End: 2020-03-19

## 2020-03-19 LAB
ANION GAP SERPL CALC-SCNC: 11 MMOL/L — SIGNIFICANT CHANGE UP (ref 5–17)
APPEARANCE UR: CLEAR — SIGNIFICANT CHANGE UP
BACTERIA # UR AUTO: NEGATIVE — SIGNIFICANT CHANGE UP
BILIRUB UR-MCNC: NEGATIVE — SIGNIFICANT CHANGE UP
BLD GP AB SCN SERPL QL: NEGATIVE — SIGNIFICANT CHANGE UP
BUN SERPL-MCNC: 12 MG/DL — SIGNIFICANT CHANGE UP (ref 7–23)
CALCIUM SERPL-MCNC: 8.8 MG/DL — SIGNIFICANT CHANGE UP (ref 8.4–10.5)
CHLORIDE SERPL-SCNC: 109 MMOL/L — HIGH (ref 96–108)
CO2 SERPL-SCNC: 23 MMOL/L — SIGNIFICANT CHANGE UP (ref 22–31)
COLOR SPEC: SIGNIFICANT CHANGE UP
CREAT SERPL-MCNC: 0.72 MG/DL — SIGNIFICANT CHANGE UP (ref 0.5–1.3)
DIFF PNL FLD: NEGATIVE — SIGNIFICANT CHANGE UP
EPI CELLS # UR: 1 /HPF — SIGNIFICANT CHANGE UP
GLUCOSE SERPL-MCNC: 79 MG/DL — SIGNIFICANT CHANGE UP (ref 70–99)
GLUCOSE UR QL: NEGATIVE — SIGNIFICANT CHANGE UP
HCT VFR BLD CALC: 32.2 % — LOW (ref 34.5–45)
HGB BLD-MCNC: 10.1 G/DL — LOW (ref 11.5–15.5)
KETONES UR-MCNC: NEGATIVE — SIGNIFICANT CHANGE UP
LEUKOCYTE ESTERASE UR-ACNC: ABNORMAL
MAGNESIUM SERPL-MCNC: 2.2 MG/DL — SIGNIFICANT CHANGE UP (ref 1.6–2.6)
MCHC RBC-ENTMCNC: 26.2 PG — LOW (ref 27–34)
MCHC RBC-ENTMCNC: 31.4 GM/DL — LOW (ref 32–36)
MCV RBC AUTO: 83.6 FL — SIGNIFICANT CHANGE UP (ref 80–100)
NITRITE UR-MCNC: NEGATIVE — SIGNIFICANT CHANGE UP
NRBC # BLD: 0 /100 WBCS — SIGNIFICANT CHANGE UP (ref 0–0)
PH UR: 6 — SIGNIFICANT CHANGE UP (ref 5–8)
PHOSPHATE SERPL-MCNC: 3.4 MG/DL — SIGNIFICANT CHANGE UP (ref 2.5–4.5)
PLATELET # BLD AUTO: 264 K/UL — SIGNIFICANT CHANGE UP (ref 150–400)
POTASSIUM SERPL-MCNC: 4.1 MMOL/L — SIGNIFICANT CHANGE UP (ref 3.5–5.3)
POTASSIUM SERPL-SCNC: 4.1 MMOL/L — SIGNIFICANT CHANGE UP (ref 3.5–5.3)
PROT UR-MCNC: NEGATIVE — SIGNIFICANT CHANGE UP
RBC # BLD: 3.85 M/UL — SIGNIFICANT CHANGE UP (ref 3.8–5.2)
RBC # FLD: 18.4 % — HIGH (ref 10.3–14.5)
RBC CASTS # UR COMP ASSIST: 1 /HPF — SIGNIFICANT CHANGE UP (ref 0–4)
RH IG SCN BLD-IMP: POSITIVE — SIGNIFICANT CHANGE UP
SODIUM SERPL-SCNC: 143 MMOL/L — SIGNIFICANT CHANGE UP (ref 135–145)
SP GR SPEC: 1 — LOW (ref 1.01–1.02)
UROBILINOGEN FLD QL: NEGATIVE — SIGNIFICANT CHANGE UP
WBC # BLD: 6.38 K/UL — SIGNIFICANT CHANGE UP (ref 3.8–10.5)
WBC # FLD AUTO: 6.38 K/UL — SIGNIFICANT CHANGE UP (ref 3.8–10.5)
WBC UR QL: 16 /HPF — HIGH (ref 0–5)

## 2020-03-19 PROCEDURE — 93010 ELECTROCARDIOGRAM REPORT: CPT

## 2020-03-19 PROCEDURE — 88342 IMHCHEM/IMCYTCHM 1ST ANTB: CPT | Mod: 26

## 2020-03-19 PROCEDURE — 88309 TISSUE EXAM BY PATHOLOGIST: CPT | Mod: 26

## 2020-03-19 PROCEDURE — 44143 PARTIAL REMOVAL OF COLON: CPT

## 2020-03-19 PROCEDURE — 88341 IMHCHEM/IMCYTCHM EA ADD ANTB: CPT | Mod: 26

## 2020-03-19 PROCEDURE — 58150 TOTAL HYSTERECTOMY: CPT

## 2020-03-19 RX ORDER — ACETAMINOPHEN 500 MG
1000 TABLET ORAL EVERY 6 HOURS
Refills: 0 | Status: COMPLETED | OUTPATIENT
Start: 2020-03-19 | End: 2020-03-20

## 2020-03-19 RX ORDER — METOPROLOL TARTRATE 50 MG
12.5 TABLET ORAL DAILY
Refills: 0 | Status: DISCONTINUED | OUTPATIENT
Start: 2020-03-19 | End: 2020-03-23

## 2020-03-19 RX ORDER — ONDANSETRON 8 MG/1
4 TABLET, FILM COATED ORAL EVERY 6 HOURS
Refills: 0 | Status: DISCONTINUED | OUTPATIENT
Start: 2020-03-19 | End: 2020-03-19

## 2020-03-19 RX ORDER — MORPHINE SULFATE 50 MG/1
0.1 CAPSULE, EXTENDED RELEASE ORAL ONCE
Refills: 0 | Status: DISCONTINUED | OUTPATIENT
Start: 2020-03-19 | End: 2020-03-20

## 2020-03-19 RX ORDER — ONDANSETRON 8 MG/1
4 TABLET, FILM COATED ORAL EVERY 6 HOURS
Refills: 0 | Status: DISCONTINUED | OUTPATIENT
Start: 2020-03-19 | End: 2020-03-22

## 2020-03-19 RX ORDER — SODIUM CHLORIDE 9 MG/ML
1000 INJECTION, SOLUTION INTRAVENOUS
Refills: 0 | Status: DISCONTINUED | OUTPATIENT
Start: 2020-03-19 | End: 2020-03-20

## 2020-03-19 RX ORDER — ENOXAPARIN SODIUM 100 MG/ML
40 INJECTION SUBCUTANEOUS DAILY
Refills: 0 | Status: DISCONTINUED | OUTPATIENT
Start: 2020-03-20 | End: 2020-03-23

## 2020-03-19 RX ORDER — MORPHINE SULFATE 50 MG/1
1 CAPSULE, EXTENDED RELEASE ORAL
Refills: 0 | Status: DISCONTINUED | OUTPATIENT
Start: 2020-03-19 | End: 2020-03-22

## 2020-03-19 RX ORDER — NALOXONE HYDROCHLORIDE 4 MG/.1ML
0.1 SPRAY NASAL
Refills: 0 | Status: DISCONTINUED | OUTPATIENT
Start: 2020-03-19 | End: 2020-03-22

## 2020-03-19 RX ORDER — HYDROMORPHONE HYDROCHLORIDE 2 MG/ML
0.5 INJECTION INTRAMUSCULAR; INTRAVENOUS; SUBCUTANEOUS
Refills: 0 | Status: DISCONTINUED | OUTPATIENT
Start: 2020-03-19 | End: 2020-03-19

## 2020-03-19 RX ORDER — MEMANTINE HYDROCHLORIDE 10 MG/1
5 TABLET ORAL DAILY
Refills: 0 | Status: DISCONTINUED | OUTPATIENT
Start: 2020-03-19 | End: 2020-03-23

## 2020-03-19 RX ORDER — ONDANSETRON 8 MG/1
4 TABLET, FILM COATED ORAL ONCE
Refills: 0 | Status: DISCONTINUED | OUTPATIENT
Start: 2020-03-19 | End: 2020-03-19

## 2020-03-19 RX ORDER — SIMVASTATIN 20 MG/1
20 TABLET, FILM COATED ORAL AT BEDTIME
Refills: 0 | Status: DISCONTINUED | OUTPATIENT
Start: 2020-03-19 | End: 2020-03-23

## 2020-03-19 RX ADMIN — Medication 250 MILLIGRAM(S): at 00:02

## 2020-03-19 RX ADMIN — SODIUM CHLORIDE 75 MILLILITER(S): 9 INJECTION, SOLUTION INTRAVENOUS at 15:16

## 2020-03-19 RX ADMIN — Medication 1 ENEMA: at 04:42

## 2020-03-19 RX ADMIN — Medication 1 ENEMA: at 05:10

## 2020-03-19 RX ADMIN — POLYETHYLENE GLYCOL 3350 17 GRAM(S): 17 POWDER, FOR SOLUTION ORAL at 00:02

## 2020-03-19 RX ADMIN — GABAPENTIN 600 MILLIGRAM(S): 400 CAPSULE ORAL at 04:42

## 2020-03-19 RX ADMIN — MEMANTINE HYDROCHLORIDE 5 MILLIGRAM(S): 10 TABLET ORAL at 21:45

## 2020-03-19 RX ADMIN — SIMVASTATIN 20 MILLIGRAM(S): 20 TABLET, FILM COATED ORAL at 21:45

## 2020-03-19 NOTE — PROGRESS NOTE ADULT - ASSESSMENT
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    -OR for Nikky's procedure today  - ERAS  - patient has been holding Eliquis for 3 days, will resume postoperatively when deemed appropriate    Red Team Surgery [id number]

## 2020-03-19 NOTE — BRIEF OPERATIVE NOTE - NSICDXBRIEFPROCEDURE_GEN_ALL_CORE_FT
PROCEDURES:  Cystoscopy, with bilateral ureteral stent replacement 19-Mar-2020 12:08:47  Sveta Blackwood  APOLLO & BSO 19-Mar-2020 12:08:38  Sveta Blackwood  Nikky's procedure 19-Mar-2020 12:08:30  Sveta Blackwood  Exploratory laparotomy 19-Mar-2020 11:58:07  Sveta Blackwood

## 2020-03-19 NOTE — PHYSICAL THERAPY INITIAL EVALUATION ADULT - PERTINENT HX OF CURRENT PROBLEM, REHAB EVAL
89 y/o F, PMH CAD, SDH s/p ronny hole, PE/DVTs on Elliquis, chronic constipation. Pt was c/o explosive diarrhea earlier this year and went to GI, CT [place] 02/2020 revealed mid to upper rectal narrowing with significant proximal fecal loading, suspicious for carcinoma. No gross distant metastatic disease was noted.

## 2020-03-19 NOTE — CHART NOTE - NSCHARTNOTEFT_GEN_A_CORE
STATUS POST:  Nikky's, APOLLO/BSO, cysto with u caths placement    POST OPERATIVE DAY #: 0    SUBJECTIVE: Pt seen in PACU  large ostomy output with dressing change x 2 while in PACU +/+  has not had PO intake yet  SBP 90s-100s, HR WNL  has not amb oob  +bloody urine in andrade (R ucath removed)      Vital Signs Last 24 Hrs  T(C): 36.6 (19 Mar 2020 15:30), Max: 36.7 (19 Mar 2020 12:00)  T(F): 97.9 (19 Mar 2020 15:30), Max: 98.1 (19 Mar 2020 12:00)  HR: 71 (19 Mar 2020 16:45) (65 - 75)  BP: 101/54 (19 Mar 2020 16:30) (88/44 - 120/70)  BP(mean): 75 (19 Mar 2020 16:30) (64 - 77)  RR: 14 (19 Mar 2020 16:45) (13 - 18)  SpO2: 100% (19 Mar 2020 16:45) (94% - 100%)  I&O's Summary    18 Mar 2020 07:  -  19 Mar 2020 07:00  --------------------------------------------------------  IN: 800 mL / OUT: 500 mL / NET: 300 mL    19 Mar 2020 07  -  19 Mar 2020 16:49  --------------------------------------------------------  IN: 375 mL / OUT: 640 mL / NET: -265 mL      I&O's Detail    18 Mar 2020 07:  -  19 Mar 2020 07:00  --------------------------------------------------------  IN:    Oral Fluid: 800 mL  Total IN: 800 mL    OUT:    Voided: 500 mL  Total OUT: 500 mL    Total NET: 300 mL      19 Mar 2020 07:01  -  19 Mar 2020 16:49  --------------------------------------------------------  IN:    lactated ringers.: 375 mL  Total IN: 375 mL    OUT:    Colostomy: 400 mL    Indwelling Catheter - Urethral: 240 mL  Total OUT: 640 mL    Total NET: -265 mL          MEDICATIONS  (STANDING):  acetaminophen  IVPB .. 1000 milliGRAM(s) IV Intermittent every 6 hours  lactated ringers. 1000 milliLiter(s) (75 mL/Hr) IV Continuous <Continuous>  memantine 5 milliGRAM(s) Oral daily  metoprolol succinate ER 12.5 milliGRAM(s) Oral daily  morphine PF Spinal 0.1 milliGRAM(s) IntraThecal. once  simvastatin 20 milliGRAM(s) Oral at bedtime    MEDICATIONS  (PRN):  HYDROmorphone  Injectable 0.5 milliGRAM(s) IV Push every 10 minutes PRN Moderate Pain (4 - 6)  morphine  - Injectable 1 milliGRAM(s) IV Push every 3 hours PRN Severe Pain (7 - 10)  naloxone Injectable 0.1 milliGRAM(s) IV Push every 3 minutes PRN For ANY of the following changes in patient status:  A. RR LESS THAN 10 breaths per minute, B. Oxygen saturation LESS THAN 90%, C. Sedation score of 6  ondansetron Injectable 4 milliGRAM(s) IV Push every 6 hours PRN Nausea  ondansetron Injectable 4 milliGRAM(s) IV Push once PRN Nausea and/or Vomiting      LABS:                        10.1   6.38  )-----------( 264      ( 19 Mar 2020 05:31 )             32.2     03-19    143  |  109<H>  |  12  ----------------------------<  79  4.1   |  23  |  0.72    Ca    8.8      19 Mar 2020 05:31  Phos  3.4     03-19  Mg     2.2     -19      PT/INR - ( 18 Mar 2020 17:10 )   PT: 11.8 sec;   INR: 1.03 ratio         PTT - ( 18 Mar 2020 17:10 )  PTT:31.4 sec  Urinalysis Basic - ( 19 Mar 2020 00:34 )    Color: Light Yellow / Appearance: Clear / S.005 / pH: x  Gluc: x / Ketone: Negative  / Bili: Negative / Urobili: Negative   Blood: x / Protein: Negative / Nitrite: Negative   Leuk Esterase: Large / RBC: 1 /hpf / WBC 16 /HPF   Sq Epi: x / Non Sq Epi: 1 /hpf / Bacteria: Negative        RADIOLOGY & ADDITIONAL STUDIES:    PHYSICAL EXAM:  General: NAD  Resp: breathing comfortably  CV: RRR  Ab: midline incision c/d/i, ostomy pink viable +/+, SNTND  : blood tinged urine        A/P: 89y Female PMH CAD, SDH s/p ronny hole, 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

## 2020-03-19 NOTE — PHYSICAL THERAPY INITIAL EVALUATION ADULT - PRECAUTIONS/LIMITATIONS, REHAB EVAL
CONT: Attempted colonoscopy 3/4/2020 was made and the patient was found to have an obstructing, biopsy-proven carcinoma of the rectum 15 cm from the anal verge. The scope could not pass proximal to the lesion. Pt s/p Cystoscopy, with b/l ureteral stent replacement, APOLLO & BSO, Nikky's procedure, exploratory laparotomy on 3/19/20./fall precautions/surgical precautions

## 2020-03-19 NOTE — PHYSICAL THERAPY INITIAL EVALUATION ADULT - GENERAL OBSERVATIONS, REHAB EVAL
Received pt bedside. +andrade, IV, venodynes, tele, cont pulse ox, O2 NC. /66, HR 83, O2 sats 97%. NC removed for PT session Received pt bedside. +andrade, IV, venodynes, O2 NC. /66, HR 83, O2 sats 97%. NC removed and IV disconnected for PT session

## 2020-03-19 NOTE — PHYSICAL THERAPY INITIAL EVALUATION ADULT - DID THE PATIENT HAVE SURGERY?
Cystoscopy, with b/l ureteral stent replacement, APOLLO & BSO, Nikky's procedure, exploratory laparotomy/yes

## 2020-03-19 NOTE — PHYSICAL THERAPY INITIAL EVALUATION ADULT - DISCHARGE DISPOSITION, PT EVAL
assist for functional activities as needed. As per CM Va who spoke with pt's daughter, family is willing to private hire assistance as needed/home w/ home PT

## 2020-03-19 NOTE — PRE-OP CHECKLIST - COMMENTS
ensure clear this am ensure clear this am. Croatian  #[phone] used- pt refused to use  as" she understands and speaks english"

## 2020-03-19 NOTE — PROGRESS NOTE ADULT - SUBJECTIVE AND OBJECTIVE BOX
GENERAL SURGERY PROGRESS NOTE    SUBJECTIVE  Patient seen and examined. No acute events overnight.   bowel prep  ERAS for Nikky's today      OBJECTIVE    PHYSICAL EXAM  General: Appears well, NAD  CHEST: breathing comfortably  CV: appears well perfused  Abdomen: soft, R side TTP, nondistended, no rebound or guarding  Extremities: Grossly symmetric    T(C): 36.5 (20 @ 07:52), Max: 36.7 (20 @ 14:45)  HR: 68 (20 @ 07:52) (67 - 78)  BP: 108/62 (20 @ 07:52) (108/62 - 120/70)  RR: 17 (20 @ 07:52) (17 - 18)  SpO2: 98% (20 @ 07:52) (97% - 98%)    20 @ 07:01  -  20 @ 07:00  --------------------------------------------------------  IN: 800 mL / OUT: 500 mL / NET: 300 mL        MEDICATIONS  memantine 5 milliGRAM(s) Oral daily  metoprolol succinate ER 12.5 milliGRAM(s) Oral daily  simvastatin 20 milliGRAM(s) Oral at bedtime      LABS                        10.1   6.38  )-----------( 264      ( 19 Mar 2020 05:31 )             32.2     -19    143  |  109<H>  |  12  ----------------------------<  79  4.1   |  23  |  0.72    Ca    8.8      19 Mar 2020 05:31  Phos  3.4     -  Mg     2.2     -19      PT/INR - ( 18 Mar 2020 17:10 )   PT: 11.8 sec;   INR: 1.03 ratio         PTT - ( 18 Mar 2020 17:10 )  PTT:31.4 sec  Urinalysis Basic - ( 19 Mar 2020 00:34 )    Color: Light Yellow / Appearance: Clear / S.005 / pH: x  Gluc: x / Ketone: Negative  / Bili: Negative / Urobili: Negative   Blood: x / Protein: Negative / Nitrite: Negative   Leuk Esterase: Large / RBC: 1 /hpf / WBC 16 /HPF   Sq Epi: x / Non Sq Epi: 1 /hpf / Bacteria: Negative        RADIOLOGY & ADDITIONAL STUDIES

## 2020-03-19 NOTE — PHYSICAL THERAPY INITIAL EVALUATION ADULT - ADDITIONAL COMMENTS
As per pt and CM Va, pt lives with  in private house, no stairs to negotiate. Pt stating was did not use assistive device pta. As per CM who spoke with pts' daughter, pt owns rolling walker. As per pt and CM Va, pt lives with  in private house, no stairs to negotiate. Pt stating was did not use assistive device pta. As per CM who spoke with pts' daughter, pt owns rolling walker and shower chair.

## 2020-03-19 NOTE — PROVIDER CONTACT NOTE (OTHER) - SITUATION
2nd degree Type II Heart block for 15minutes, lowest HR 35. Per danni Spencer RN. Pt had 2nd degree Type II Heart block for 15minutes, lowest HR 35 since becoming bradycardic.

## 2020-03-19 NOTE — PRE-ANESTHESIA EVALUATION ADULT - NSANTHPMHFT_GEN_ALL_CORE
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 Patient was complaining of explosive diarrhea earlier this year and went to GI, CT [place] 02/2020 revealed mid to upper rectal narrowing with significant proximal fecal loading, suspicious for carcinoma. No gross distant metastatic disease was noted. Attempted colonoscopy 3/4/2020 was made and the patient was found to have an obstructing, biopsy-proven carcinoma of the rectum 15 cm from the anal verge. The scope could not pass proximal to the lesion. Planned for Nikky's procedure on 3/19/2020, patient presenting for pre-op colon prep.   Of note past medical history is significant for the above-mentioned subdural hematoma, DVTs, PE and coronary artery disease status post stent placement. She has been followed by cardiology, stable w/out heart failure.

## 2020-03-19 NOTE — BRIEF OPERATIVE NOTE - OPERATION/FINDINGS
large rectal cancer w/ some adhesions to the posterior right aspect of the uterus, multiple large diverticulosis

## 2020-03-20 LAB
ANION GAP SERPL CALC-SCNC: 10 MMOL/L — SIGNIFICANT CHANGE UP (ref 5–17)
ANION GAP SERPL CALC-SCNC: 13 MMOL/L — SIGNIFICANT CHANGE UP (ref 5–17)
ANION GAP SERPL CALC-SCNC: 28 MMOL/L — HIGH (ref 5–17)
BASOPHILS # BLD AUTO: 0.02 K/UL — SIGNIFICANT CHANGE UP (ref 0–0.2)
BASOPHILS NFR BLD AUTO: 0.2 % — SIGNIFICANT CHANGE UP (ref 0–2)
BUN SERPL-MCNC: 13 MG/DL — SIGNIFICANT CHANGE UP (ref 7–23)
BUN SERPL-MCNC: 13 MG/DL — SIGNIFICANT CHANGE UP (ref 7–23)
BUN SERPL-MCNC: 14 MG/DL — SIGNIFICANT CHANGE UP (ref 7–23)
CALCIUM SERPL-MCNC: 8.3 MG/DL — LOW (ref 8.4–10.5)
CALCIUM SERPL-MCNC: 8.7 MG/DL — SIGNIFICANT CHANGE UP (ref 8.4–10.5)
CALCIUM SERPL-MCNC: <3 MG/DL — CRITICAL LOW (ref 8.4–10.5)
CHLORIDE SERPL-SCNC: 105 MMOL/L — SIGNIFICANT CHANGE UP (ref 96–108)
CHLORIDE SERPL-SCNC: 107 MMOL/L — SIGNIFICANT CHANGE UP (ref 96–108)
CHLORIDE SERPL-SCNC: 92 MMOL/L — LOW (ref 96–108)
CO2 SERPL-SCNC: 15 MMOL/L — LOW (ref 22–31)
CO2 SERPL-SCNC: 23 MMOL/L — SIGNIFICANT CHANGE UP (ref 22–31)
CO2 SERPL-SCNC: 25 MMOL/L — SIGNIFICANT CHANGE UP (ref 22–31)
CREAT SERPL-MCNC: 0.71 MG/DL — SIGNIFICANT CHANGE UP (ref 0.5–1.3)
CREAT SERPL-MCNC: 0.75 MG/DL — SIGNIFICANT CHANGE UP (ref 0.5–1.3)
CREAT SERPL-MCNC: 0.76 MG/DL — SIGNIFICANT CHANGE UP (ref 0.5–1.3)
EOSINOPHIL # BLD AUTO: 0 K/UL — SIGNIFICANT CHANGE UP (ref 0–0.5)
EOSINOPHIL NFR BLD AUTO: 0 % — SIGNIFICANT CHANGE UP (ref 0–6)
GLUCOSE SERPL-MCNC: 123 MG/DL — HIGH (ref 70–99)
GLUCOSE SERPL-MCNC: 148 MG/DL — HIGH (ref 70–99)
GLUCOSE SERPL-MCNC: 206 MG/DL — HIGH (ref 70–99)
HCT VFR BLD CALC: 32 % — LOW (ref 34.5–45)
HCT VFR BLD CALC: 33 % — LOW (ref 34.5–45)
HGB BLD-MCNC: 9.7 G/DL — LOW (ref 11.5–15.5)
HGB BLD-MCNC: 9.8 G/DL — LOW (ref 11.5–15.5)
IMM GRANULOCYTES NFR BLD AUTO: 0.4 % — SIGNIFICANT CHANGE UP (ref 0–1.5)
LYMPHOCYTES # BLD AUTO: 1.69 K/UL — SIGNIFICANT CHANGE UP (ref 1–3.3)
LYMPHOCYTES # BLD AUTO: 15.1 % — SIGNIFICANT CHANGE UP (ref 13–44)
MAGNESIUM SERPL-MCNC: 2.1 MG/DL — SIGNIFICANT CHANGE UP (ref 1.6–2.6)
MAGNESIUM SERPL-MCNC: <.6 MG/DL — CRITICAL LOW (ref 1.6–2.6)
MCHC RBC-ENTMCNC: 25.8 PG — LOW (ref 27–34)
MCHC RBC-ENTMCNC: 25.8 PG — LOW (ref 27–34)
MCHC RBC-ENTMCNC: 29.7 GM/DL — LOW (ref 32–36)
MCHC RBC-ENTMCNC: 30.3 GM/DL — LOW (ref 32–36)
MCV RBC AUTO: 85.1 FL — SIGNIFICANT CHANGE UP (ref 80–100)
MCV RBC AUTO: 86.8 FL — SIGNIFICANT CHANGE UP (ref 80–100)
MONOCYTES # BLD AUTO: 0.77 K/UL — SIGNIFICANT CHANGE UP (ref 0–0.9)
MONOCYTES NFR BLD AUTO: 6.9 % — SIGNIFICANT CHANGE UP (ref 2–14)
NEUTROPHILS # BLD AUTO: 8.63 K/UL — HIGH (ref 1.8–7.4)
NEUTROPHILS NFR BLD AUTO: 77.4 % — HIGH (ref 43–77)
NRBC # BLD: 0 /100 WBCS — SIGNIFICANT CHANGE UP (ref 0–0)
NRBC # BLD: 0 /100 WBCS — SIGNIFICANT CHANGE UP (ref 0–0)
PHOSPHATE SERPL-MCNC: 3.8 MG/DL — SIGNIFICANT CHANGE UP (ref 2.5–4.5)
PHOSPHATE SERPL-MCNC: 4.4 MG/DL — SIGNIFICANT CHANGE UP (ref 2.5–4.5)
PLATELET # BLD AUTO: 229 K/UL — SIGNIFICANT CHANGE UP (ref 150–400)
PLATELET # BLD AUTO: 282 K/UL — SIGNIFICANT CHANGE UP (ref 150–400)
POTASSIUM SERPL-MCNC: 4.5 MMOL/L — SIGNIFICANT CHANGE UP (ref 3.5–5.3)
POTASSIUM SERPL-MCNC: 4.6 MMOL/L — SIGNIFICANT CHANGE UP (ref 3.5–5.3)
POTASSIUM SERPL-MCNC: >9 MMOL/L — CRITICAL HIGH (ref 3.5–5.3)
POTASSIUM SERPL-SCNC: 4.5 MMOL/L — SIGNIFICANT CHANGE UP (ref 3.5–5.3)
POTASSIUM SERPL-SCNC: 4.6 MMOL/L — SIGNIFICANT CHANGE UP (ref 3.5–5.3)
POTASSIUM SERPL-SCNC: >9 MMOL/L — CRITICAL HIGH (ref 3.5–5.3)
RBC # BLD: 3.76 M/UL — LOW (ref 3.8–5.2)
RBC # BLD: 3.8 M/UL — SIGNIFICANT CHANGE UP (ref 3.8–5.2)
RBC # FLD: 18.4 % — HIGH (ref 10.3–14.5)
RBC # FLD: 18.6 % — HIGH (ref 10.3–14.5)
SODIUM SERPL-SCNC: 135 MMOL/L — SIGNIFICANT CHANGE UP (ref 135–145)
SODIUM SERPL-SCNC: 140 MMOL/L — SIGNIFICANT CHANGE UP (ref 135–145)
SODIUM SERPL-SCNC: 143 MMOL/L — SIGNIFICANT CHANGE UP (ref 135–145)
WBC # BLD: 10.42 K/UL — SIGNIFICANT CHANGE UP (ref 3.8–10.5)
WBC # BLD: 11.16 K/UL — HIGH (ref 3.8–10.5)
WBC # FLD AUTO: 10.42 K/UL — SIGNIFICANT CHANGE UP (ref 3.8–10.5)
WBC # FLD AUTO: 11.16 K/UL — HIGH (ref 3.8–10.5)

## 2020-03-20 RX ORDER — ACETAMINOPHEN 500 MG
650 TABLET ORAL EVERY 6 HOURS
Refills: 0 | Status: DISCONTINUED | OUTPATIENT
Start: 2020-03-20 | End: 2020-03-20

## 2020-03-20 RX ORDER — SODIUM CHLORIDE 9 MG/ML
1000 INJECTION, SOLUTION INTRAVENOUS
Refills: 0 | Status: DISCONTINUED | OUTPATIENT
Start: 2020-03-20 | End: 2020-03-22

## 2020-03-20 RX ORDER — ACETAMINOPHEN 500 MG
1000 TABLET ORAL EVERY 6 HOURS
Refills: 0 | Status: DISCONTINUED | OUTPATIENT
Start: 2020-03-20 | End: 2020-03-23

## 2020-03-20 RX ADMIN — Medication 1000 MILLIGRAM(S): at 22:15

## 2020-03-20 RX ADMIN — MEMANTINE HYDROCHLORIDE 5 MILLIGRAM(S): 10 TABLET ORAL at 12:09

## 2020-03-20 RX ADMIN — Medication 12.5 MILLIGRAM(S): at 12:12

## 2020-03-20 RX ADMIN — SIMVASTATIN 20 MILLIGRAM(S): 20 TABLET, FILM COATED ORAL at 21:46

## 2020-03-20 RX ADMIN — Medication 400 MILLIGRAM(S): at 09:12

## 2020-03-20 RX ADMIN — Medication 1000 MILLIGRAM(S): at 21:46

## 2020-03-20 RX ADMIN — ENOXAPARIN SODIUM 40 MILLIGRAM(S): 100 INJECTION SUBCUTANEOUS at 12:09

## 2020-03-20 RX ADMIN — MORPHINE SULFATE 1 MILLIGRAM(S): 50 CAPSULE, EXTENDED RELEASE ORAL at 21:46

## 2020-03-20 RX ADMIN — MORPHINE SULFATE 1 MILLIGRAM(S): 50 CAPSULE, EXTENDED RELEASE ORAL at 22:15

## 2020-03-20 NOTE — ASSESSMENT
[FreeTextEntry1] : Patient is a 89-year-old [place] female who presents today for consultation regarding management of a biopsy-proven rectal cancer.\par \par Patient was in [place] and was diagnosed with a subdural hematoma. She eventually required ronny holes and evacuation of  the hematoma. When she returned to the [place] she was diagnosed with DVTs and pulmonary embolism. She was placed Eliquis. She developed explosive diarrhea and difficulty with bowel movements. She eventually was referred to gastroenterology and a CAT scan was performed. She was found to have mid to upper rectum thickening and significant fecal loading proximal to this. No gross distant metastatic disease was noted. Attempt at colonoscopy was made and the patient was found to have an obstructing, biopsy-proven carcinoma of the rectum. The scope could not pass proximal to the lesion.\par \par The patient's past medical history is significant for the above-mentioned subdural hematoma, DVTs, PE and coronary artery disease status post stent placement. She has been followed by cardiology and she is stable and not in failure.\par \par Physical examination reveals an elderly, thin female. Her abdomen is soft and nontender. The mass is not palpable on digital exam. Rigid sigmoidoscopy reveals an obvious malignancy at the 10-11 cm level. The scope cannot pass proximal to the lesion.\par \par Patient is elderly and has medical comorbidities. Thankfully, she has no evidence of metastatic disease. Her rectal cancer is near totally obstructing and therefore requires urgent intervention. The safest procedure in this individual would be to resect the lesion and make a colostomy and therefore eliminate any possibility of anastomotic leak, which could be impossible for her to survive.\par \par I had a long discussion with the patient, her  and daughter who accompanied her. We talked about anticipated operative time, hospital stay and time to complete recuperation. Risks, alternatives and benefits were also discussed. I spoke with her medical vascular doctor. We will hold her anticoagulants.\par \par Questions were answered and they wish to proceed with surgery.

## 2020-03-20 NOTE — DIETITIAN INITIAL EVALUATION ADULT. - OTHER INFO
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     Pt reports fair PO intake and appetite PTA, states she is a "picky eater" at baseline, lives with her  and has daughters who often bring her food. Denies following any specific diet. Confirms NKFA, denies micronutrient supplementation (none noted in H&P). Pt denies chewing/swallowing difficulty, nausea, vomiting, diarrhea, noted with chronic constipation PTA.     Weights: Pt report UBW as ~ 130lbs +/- a few pounds. Current dosing weight ~130lbs, weight per [facility] .6lbs (11/22/19).     Pt s/p Blake's procedure, currently on clear liquid diet, tolerating well with no nausea, emesis per report. ostomy output x 24hrs: 400ml (3/19). Per provider, diet likely advanced to low residue tomorrow. Pt amendable to brief diet education in anticipation of diet advancement. Reviewed Colostomy nutrition therapy/food list handout. Discussed eating low-fiber foods, chewing foods well, small frequent meals high in protein & energy. Reviewed foods that may cause odors &/or gas, discussed foods that bulk stool and thin stool, and importance of adequate hydration. Written material left at bedside for family to review. Patient with no nutrition-related questions at this time. Made aware RD remains available as needed.

## 2020-03-20 NOTE — PROVIDER CONTACT NOTE (OTHER) - RECOMMENDATIONS
If additional intervention needed at this time.
If bolus is needed, if additional intervention is needed at this time.
Will continue to monitor
If any intervention needed at this time.

## 2020-03-20 NOTE — PROGRESS NOTE ADULT - ASSESSMENT
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    PLAN:   - Diet: sips/chips, IVF, advance to CLD today  - Activity: as tolerated  - Labs: f/u AM labs  - Pain medication PRN (NO toradol/NSAIDs)  - hold ASA and eliquis  - Remove L u cath and then d/c andrade, F/U void  - DVT ppx: Lovenox    RED SURGERY  [id number]. 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    PLAN:   - Diet: sips/chips, IVF, advance to CLD today  - Activity: as tolerated  - Labs: f/u AM labs  - Pain medication PRN (NO toradol/NSAIDs)  - hold ASA and eliquis  - Remove L u cath, andrade catheter to remain  - DVT ppx: Lovenox    Red Surgery  [id number]

## 2020-03-20 NOTE — PROVIDER CONTACT NOTE (OTHER) - ACTION/TREATMENT ORDERED:
Continue to monitor. No intervention needed at this time.
Do not assess patient until 8pm.
Will order EKG and stat labs.
Continue to monitor. Recheck vitals @ 0000 and will come to bedside to assess pt.

## 2020-03-20 NOTE — PROVIDER CONTACT NOTE (OTHER) - BACKGROUND
Pt is s/p Blake's procedure, cysto, and GYN procedure.
Pt is s/p Blake's procedure, cysto, and GYN procedure.
Pt is s/p Hartmans procedure, cysto, and GYN surgery today.
Pt is s/p Hartmans, cysto, GYN procedure today.

## 2020-03-20 NOTE — PROGRESS NOTE ADULT - SUBJECTIVE AND OBJECTIVE BOX
RED TEAM SURGERY PROGRESS NOTE    89yFemale    SUBJECTIVE:  Patient seen and examined at bedside. Overnight 15 mins of 2nd degree AV block with bradycardia, self-resolved. Ostomy +/+. Pain well controlled. Denies fevers, chills, nausea, vomiting, chest pain, and shortness of breath.     --------------------------------------------------------------------------------------------------  OBJECTIVE:     Physical Exam:  General: AAOx3, NAD, resting comfortably   HEENT: NC/AT  Respiratory: no increased work of breathing   Abdomen: soft, nontender, nondistended, no rebound/guarding, ostomy with gas and stool  Mejia: hematuria  Extremities: warm and well perfused  --------------------------------------------------------------------------------------------------  Vital Signs:  Vital Signs Last 24 Hrs  T(C): 36.4 (20 Mar 2020 05:10), Max: 36.7 (19 Mar 2020 12:00)  T(F): 97.5 (20 Mar 2020 05:10), Max: 98.1 (19 Mar 2020 12:00)  HR: 71 (20 Mar 2020 05:10) (38 - 75)  BP: 111/67 (20 Mar 2020 05:10) (84/54 - 111/67)  BP(mean): 75 (19 Mar 2020 17:00) (64 - 77)  RR: 18 (20 Mar 2020 05:10) (13 - 18)  SpO2: 97% (20 Mar 2020 05:10) (94% - 100%)    --------------------------------------------------------------------------------------------------  Inputs/Outputs:    18 Mar 2020 07:01  -  19 Mar 2020 07:00  --------------------------------------------------------  IN:    Oral Fluid: 800 mL  Total IN: 800 mL    OUT:    Voided: 500 mL  Total OUT: 500 mL    Total NET: 300 mL      19 Mar 2020 07:01  -  20 Mar 2020 05:40  --------------------------------------------------------  IN:    lactated ringers.: 975 mL    Oral Fluid: 20 mL  Total IN: 995 mL    OUT:    Colostomy: 400 mL    Indwelling Catheter - Urethral: 720 mL  Total OUT: 1120 mL    Total NET: -125 mL        --------------------------------------------------------------------------------------------------  Laboratories:                        9.7    10.42 )-----------( 282      ( 20 Mar 2020 00:02 )             32.0         20 Mar 2020 00:02    143    |  107    |  13     ----------------------------<  206    4.5     |  23     |  0.76     Ca    8.3        20 Mar 2020 00:02  Phos  4.4       20 Mar 2020 00:02  Mg     2.1       20 Mar 2020 00:02      PT/INR - ( 18 Mar 2020 17:10 )   PT: 11.8 sec;   INR: 1.03 ratio         PTT - ( 18 Mar 2020 17:10 )  PTT:31.4 sec  CAPILLARY BLOOD GLUCOSE            Urinalysis Basic - ( 19 Mar 2020 00:34 )    Color: Light Yellow / Appearance: Clear / S.005 / pH: x  Gluc: x / Ketone: Negative  / Bili: Negative / Urobili: Negative   Blood: x / Protein: Negative / Nitrite: Negative   Leuk Esterase: Large / RBC: 1 /hpf / WBC 16 /HPF   Sq Epi: x / Non Sq Epi: 1 /hpf / Bacteria: Negative      --------------------------------------------------------------------------------------------------  Medications:  MEDICATIONS  (STANDING):  acetaminophen  IVPB .. 1000 milliGRAM(s) IV Intermittent every 6 hours  enoxaparin Injectable 40 milliGRAM(s) SubCutaneous daily  lactated ringers. 1000 milliLiter(s) (75 mL/Hr) IV Continuous <Continuous>  memantine 5 milliGRAM(s) Oral daily  metoprolol succinate ER 12.5 milliGRAM(s) Oral daily  morphine PF Spinal 0.1 milliGRAM(s) IntraThecal. once  simvastatin 20 milliGRAM(s) Oral at bedtime    MEDICATIONS  (PRN):  morphine  - Injectable 1 milliGRAM(s) IV Push every 3 hours PRN Severe Pain (7 - 10)  naloxone Injectable 0.1 milliGRAM(s) IV Push every 3 minutes PRN For ANY of the following changes in patient status:  A. RR LESS THAN 10 breaths per minute, B. Oxygen saturation LESS THAN 90%, C. Sedation score of 6  ondansetron Injectable 4 milliGRAM(s) IV Push every 6 hours PRN Nausea

## 2020-03-20 NOTE — PHYSICAL EXAM
[Normal Breath Sounds] : Normal breath sounds [Normal Heart Sounds] : normal heart sounds [Normal Rate and Rhythm] : normal rate and rhythm [No Edema] : No edema [Alert] : alert [Oriented to Person] : oriented to person [Oriented to Place] : oriented to place [Oriented to Time] : oriented to time [Calm] : calm [de-identified] : round soft +BS NT/ND [de-identified] : NC/AT [de-identified] : +ROM [de-identified] : intact

## 2020-03-20 NOTE — DIETITIAN INITIAL EVALUATION ADULT. - ADD RECOMMEND
1) Medical team to advance diet when medically feasible via tolerated route. Consider advancing to low fiber diet as tolerated. 2) Continue Ensure Clear while on clear liquid diet. 3) Diet education provided, reinforce as needed.

## 2020-03-20 NOTE — HISTORY OF PRESENT ILLNESS
[FreeTextEntry1] : 89yo [place] female with hx chronic constipation.  In 2019 pt underwent surgery in [facility] for subdural hematoma (ronny holes).  Returned to [place]. Admitted to rehab. Developed DVTs/PEs.  Started Eliquis.  Pt complaining of abdominal pains, explosive diarrhea.  Feb 2020 followed up with GI.  CT scan revealed mid to upper rectal narrowing, suspicious for carcinoma.  Initial screening colonoscopy 3.4.20 revealed obstructing mass @15cm from AV.  Presents for surgical consultation.

## 2020-03-20 NOTE — REVIEW OF SYSTEMS
[As Noted in HPI] : as noted in HPI [Negative] : Endocrine [Chest Pain] : no chest pain [Palpitations] : no palpitations [Shortness Of Breath] : no shortness of breath [Easy Bleeding] : no tendency for easy bleeding [Easy Bruising] : no tendency for easy bruising [de-identified] : hx DVTs/PEs

## 2020-03-20 NOTE — PROVIDER CONTACT NOTE (OTHER) - ASSESSMENT
Patient yelling and screaming, will not let us assess or touch her. A&Ox3, confused at times
Pt is bradycardic stabilizing at 38. BP at 85/40. 99% with 2L NC. Verbalized feeling lethargic but was able to have long conversation. Denies dizziness or pain.
Tele event PATs up to 132 for 4.4 seconds. Pt resting comfortably in bed, denies of pain or discomfort. HR in 60s. BP is 84/54 @ 0111.
Pt is resting comfortably, decline of dizziness, discomfort, or pain. Pt also has dementia, was forgetful and stated she forgot she already had to surgery done.

## 2020-03-20 NOTE — PROGRESS NOTE ADULT - SUBJECTIVE AND OBJECTIVE BOX
Day 1 of Anesthesia Pain Management Service    SUBJECTIVE:  Pain Scale Score:          [X] Refer to charted pain scores    THERAPY:    s/p 100 mcg PF morphine on 3/19/2020      MEDICATIONS  (STANDING):  acetaminophen   Tablet .. 1000 milliGRAM(s) Oral every 6 hours  enoxaparin Injectable 40 milliGRAM(s) SubCutaneous daily  lactated ringers. 1000 milliLiter(s) (50 mL/Hr) IV Continuous <Continuous>  memantine 5 milliGRAM(s) Oral daily  metoprolol succinate ER 12.5 milliGRAM(s) Oral daily  morphine PF Spinal 0.1 milliGRAM(s) IntraThecal. once  simvastatin 20 milliGRAM(s) Oral at bedtime    MEDICATIONS  (PRN):  morphine  - Injectable 1 milliGRAM(s) IV Push every 3 hours PRN Severe Pain (7 - 10)  naloxone Injectable 0.1 milliGRAM(s) IV Push every 3 minutes PRN For ANY of the following changes in patient status:  A. RR LESS THAN 10 breaths per minute, B. Oxygen saturation LESS THAN 90%, C. Sedation score of 6  ondansetron Injectable 4 milliGRAM(s) IV Push every 6 hours PRN Nausea      OBJECTIVE:    Sedation:        	[X] Alert	 [ ] Drowsy	[ ] Arousable      [ ] Asleep       [ ] Unresponsive    Side Effects:	[X] None 	[ ] Nausea	[ ] Vomiting         [ ] Pruritus  		[ ] Weakness            [ ] Numbness	          [ ] Other:    Vital Signs Last 24 Hrs  T(C): 36.6 (20 Mar 2020 08:42), Max: 36.7 (19 Mar 2020 12:00)  T(F): 97.9 (20 Mar 2020 08:42), Max: 98.1 (19 Mar 2020 12:00)  HR: 71 (20 Mar 2020 05:10) (38 - 75)  BP: 111/67 (20 Mar 2020 05:10) (84/54 - 111/67)  BP(mean): 75 (19 Mar 2020 17:00) (64 - 77)  RR: 18 (20 Mar 2020 08:42) (13 - 18)  SpO2: 100% (20 Mar 2020 08:42) (94% - 100%)    ASSESSMENT/ PLAN  [X] Patient transitioned to prn analgesics  [X] Pain management per primary service, pain service to sign off   [X]Documentation and Verification of current medications

## 2020-03-20 NOTE — DIETITIAN INITIAL EVALUATION ADULT. - PHYSICAL APPEARANCE
other (specify)/well nourished Ht: 63 inches , Wt: 130lbs, BMI: 23kg/m2, IBW: 115lbs +/- 10%, %IBW: 113%  Edema: none, Skin: free of pressure injuries per nursing flow sheets, midline abdomen    Nutrition focused physical exam deferred at this time, some age related depletion expected in setting of advanced age.

## 2020-03-20 NOTE — PROVIDER CONTACT NOTE (CRITICAL VALUE NOTIFICATION) - ASSESSMENT
Lab called, BMP according to  stated it seem contaminated, potassium >9.0, calcium <3.0, magnesium <0.6.

## 2020-03-20 NOTE — PROVIDER CONTACT NOTE (OTHER) - SITUATION
Patient refusing all care at this time, yelling and screaming since she has been transferred to the unit.

## 2020-03-21 LAB
ANION GAP SERPL CALC-SCNC: 10 MMOL/L — SIGNIFICANT CHANGE UP (ref 5–17)
BUN SERPL-MCNC: 9 MG/DL — SIGNIFICANT CHANGE UP (ref 7–23)
CALCIUM SERPL-MCNC: 8.6 MG/DL — SIGNIFICANT CHANGE UP (ref 8.4–10.5)
CHLORIDE SERPL-SCNC: 107 MMOL/L — SIGNIFICANT CHANGE UP (ref 96–108)
CO2 SERPL-SCNC: 25 MMOL/L — SIGNIFICANT CHANGE UP (ref 22–31)
CREAT SERPL-MCNC: 0.6 MG/DL — SIGNIFICANT CHANGE UP (ref 0.5–1.3)
GLUCOSE SERPL-MCNC: 87 MG/DL — SIGNIFICANT CHANGE UP (ref 70–99)
HCT VFR BLD CALC: 27.4 % — LOW (ref 34.5–45)
HGB BLD-MCNC: 8.7 G/DL — LOW (ref 11.5–15.5)
MAGNESIUM SERPL-MCNC: 2.2 MG/DL — SIGNIFICANT CHANGE UP (ref 1.6–2.6)
MCHC RBC-ENTMCNC: 26.4 PG — LOW (ref 27–34)
MCHC RBC-ENTMCNC: 31.8 GM/DL — LOW (ref 32–36)
MCV RBC AUTO: 83 FL — SIGNIFICANT CHANGE UP (ref 80–100)
NRBC # BLD: 0 /100 WBCS — SIGNIFICANT CHANGE UP (ref 0–0)
PHOSPHATE SERPL-MCNC: 2.9 MG/DL — SIGNIFICANT CHANGE UP (ref 2.5–4.5)
PLATELET # BLD AUTO: 268 K/UL — SIGNIFICANT CHANGE UP (ref 150–400)
POTASSIUM SERPL-MCNC: 4.2 MMOL/L — SIGNIFICANT CHANGE UP (ref 3.5–5.3)
POTASSIUM SERPL-SCNC: 4.2 MMOL/L — SIGNIFICANT CHANGE UP (ref 3.5–5.3)
RBC # BLD: 3.3 M/UL — LOW (ref 3.8–5.2)
RBC # FLD: 18.4 % — HIGH (ref 10.3–14.5)
SODIUM SERPL-SCNC: 142 MMOL/L — SIGNIFICANT CHANGE UP (ref 135–145)
WBC # BLD: 9.45 K/UL — SIGNIFICANT CHANGE UP (ref 3.8–10.5)
WBC # FLD AUTO: 9.45 K/UL — SIGNIFICANT CHANGE UP (ref 3.8–10.5)

## 2020-03-21 RX ADMIN — Medication 1000 MILLIGRAM(S): at 12:53

## 2020-03-21 RX ADMIN — MORPHINE SULFATE 1 MILLIGRAM(S): 50 CAPSULE, EXTENDED RELEASE ORAL at 05:20

## 2020-03-21 RX ADMIN — MORPHINE SULFATE 1 MILLIGRAM(S): 50 CAPSULE, EXTENDED RELEASE ORAL at 23:00

## 2020-03-21 RX ADMIN — SIMVASTATIN 20 MILLIGRAM(S): 20 TABLET, FILM COATED ORAL at 22:05

## 2020-03-21 RX ADMIN — Medication 12.5 MILLIGRAM(S): at 05:20

## 2020-03-21 RX ADMIN — Medication 1000 MILLIGRAM(S): at 19:20

## 2020-03-21 RX ADMIN — ENOXAPARIN SODIUM 40 MILLIGRAM(S): 100 INJECTION SUBCUTANEOUS at 12:23

## 2020-03-21 RX ADMIN — MORPHINE SULFATE 1 MILLIGRAM(S): 50 CAPSULE, EXTENDED RELEASE ORAL at 22:05

## 2020-03-21 RX ADMIN — MEMANTINE HYDROCHLORIDE 5 MILLIGRAM(S): 10 TABLET ORAL at 12:22

## 2020-03-21 RX ADMIN — Medication 1000 MILLIGRAM(S): at 05:20

## 2020-03-21 RX ADMIN — Medication 1000 MILLIGRAM(S): at 18:03

## 2020-03-21 RX ADMIN — Medication 1000 MILLIGRAM(S): at 12:23

## 2020-03-21 RX ADMIN — MORPHINE SULFATE 1 MILLIGRAM(S): 50 CAPSULE, EXTENDED RELEASE ORAL at 05:50

## 2020-03-21 NOTE — PROGRESS NOTE ADULT - ATTENDING COMMENTS
s/p hartmans for obstructing rectal cancer  -Clears to LRD  -OOB  -dvt ppx  -likely DC lupe later today

## 2020-03-21 NOTE — PROGRESS NOTE ADULT - ASSESSMENT
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    PLAN:   - Diet: CLD, possibly advance later today   - Labs: f/u AM labs  - Pain medication PRN (NO toradol/NSAIDs)  - hold ASA and eliquis  - C/w andrade catheter, possibly remove later today   - Encourage OOB/ambulate   - DVT ppx: Lovenox    Red Surgery  [id number]

## 2020-03-22 LAB
ANION GAP SERPL CALC-SCNC: 9 MMOL/L — SIGNIFICANT CHANGE UP (ref 5–17)
BUN SERPL-MCNC: 7 MG/DL — SIGNIFICANT CHANGE UP (ref 7–23)
CALCIUM SERPL-MCNC: 8.5 MG/DL — SIGNIFICANT CHANGE UP (ref 8.4–10.5)
CHLORIDE SERPL-SCNC: 108 MMOL/L — SIGNIFICANT CHANGE UP (ref 96–108)
CO2 SERPL-SCNC: 26 MMOL/L — SIGNIFICANT CHANGE UP (ref 22–31)
CREAT SERPL-MCNC: 0.66 MG/DL — SIGNIFICANT CHANGE UP (ref 0.5–1.3)
GLUCOSE SERPL-MCNC: 83 MG/DL — SIGNIFICANT CHANGE UP (ref 70–99)
HCT VFR BLD CALC: 32.2 % — LOW (ref 34.5–45)
HGB BLD-MCNC: 9.5 G/DL — LOW (ref 11.5–15.5)
MAGNESIUM SERPL-MCNC: 2 MG/DL — SIGNIFICANT CHANGE UP (ref 1.6–2.6)
MCHC RBC-ENTMCNC: 25.1 PG — LOW (ref 27–34)
MCHC RBC-ENTMCNC: 29.5 GM/DL — LOW (ref 32–36)
MCV RBC AUTO: 85 FL — SIGNIFICANT CHANGE UP (ref 80–100)
NRBC # BLD: 0 /100 WBCS — SIGNIFICANT CHANGE UP (ref 0–0)
PHOSPHATE SERPL-MCNC: 2.9 MG/DL — SIGNIFICANT CHANGE UP (ref 2.5–4.5)
PLATELET # BLD AUTO: 301 K/UL — SIGNIFICANT CHANGE UP (ref 150–400)
POTASSIUM SERPL-MCNC: 4.1 MMOL/L — SIGNIFICANT CHANGE UP (ref 3.5–5.3)
POTASSIUM SERPL-SCNC: 4.1 MMOL/L — SIGNIFICANT CHANGE UP (ref 3.5–5.3)
RBC # BLD: 3.79 M/UL — LOW (ref 3.8–5.2)
RBC # FLD: 18.6 % — HIGH (ref 10.3–14.5)
SODIUM SERPL-SCNC: 143 MMOL/L — SIGNIFICANT CHANGE UP (ref 135–145)
WBC # BLD: 7.38 K/UL — SIGNIFICANT CHANGE UP (ref 3.8–10.5)
WBC # FLD AUTO: 7.38 K/UL — SIGNIFICANT CHANGE UP (ref 3.8–10.5)

## 2020-03-22 RX ORDER — APIXABAN 2.5 MG/1
1 TABLET, FILM COATED ORAL
Qty: 30 | Refills: 0
Start: 2020-03-22 | End: 2020-04-20

## 2020-03-22 RX ADMIN — Medication 1000 MILLIGRAM(S): at 18:37

## 2020-03-22 RX ADMIN — Medication 1000 MILLIGRAM(S): at 00:32

## 2020-03-22 RX ADMIN — Medication 12.5 MILLIGRAM(S): at 05:46

## 2020-03-22 RX ADMIN — MEMANTINE HYDROCHLORIDE 5 MILLIGRAM(S): 10 TABLET ORAL at 12:52

## 2020-03-22 RX ADMIN — Medication 1000 MILLIGRAM(S): at 12:51

## 2020-03-22 RX ADMIN — Medication 1000 MILLIGRAM(S): at 18:07

## 2020-03-22 RX ADMIN — Medication 1000 MILLIGRAM(S): at 13:20

## 2020-03-22 RX ADMIN — SIMVASTATIN 20 MILLIGRAM(S): 20 TABLET, FILM COATED ORAL at 22:11

## 2020-03-22 RX ADMIN — ENOXAPARIN SODIUM 40 MILLIGRAM(S): 100 INJECTION SUBCUTANEOUS at 12:52

## 2020-03-22 RX ADMIN — Medication 1000 MILLIGRAM(S): at 05:46

## 2020-03-22 NOTE — PROGRESS NOTE ADULT - SUBJECTIVE AND OBJECTIVE BOX
RED TEAM SURGERY PROGRESS NOTE    89yFemale    SUBJECTIVE:  Patient seen and examined at bedside. No acute events overnight. Mejia removed yesterday and patient is voiding spontaneously. Pain well controlled. Tolerating diet w/o N/V. OOB and ambulating at baseline. Denies fevers, chills chest pain, and shortness of breath.     --------------------------------------------------------------------------------------------------  OBJECTIVE:     Physical Exam:  General: AAOx3, NAD, resting comfortably   HEENT: NC/AT  Respiratory: no increased work of breathing   Abdomen: soft, nontender, nondistended, no rebound/guarding, ostomy with gas and stool  Extremities: warm and well perfused  --------------------------------------------------------------------------------------------------  Vital Signs Last 24 Hrs  T(C): 36.7 (22 Mar 2020 05:18), Max: 37.1 (21 Mar 2020 21:00)  T(F): 98 (22 Mar 2020 05:18), Max: 98.7 (21 Mar 2020 21:00)  HR: 67 (22 Mar 2020 05:44) (61 - 78)  BP: 137/84 (22 Mar 2020 05:44) (118/68 - 137/84)  BP(mean): --  RR: 18 (22 Mar 2020 05:44) (18 - 18)  SpO2: 96% (22 Mar 2020 05:44) (94% - 99%)  --------------------------------------------------------------------------------------------------  I&O's Summary    21 Mar 2020 07:01  -  22 Mar 2020 07:00  --------------------------------------------------------  IN: 1250 mL / OUT: 2000 mL / NET: -750 mL        --------------------------------------------------------------------------------------------------    LABS:                        9.5    7.38  )-----------( 301      ( 22 Mar 2020 06:48 )             32.2     03-22    143  |  108  |  7   ----------------------------<  83  4.1   |  26  |  0.66    Ca    8.5      22 Mar 2020 06:48  Phos  2.9     03-22  Mg     2.0     03-22            --------------------------------------------------------------------------------------------------

## 2020-03-22 NOTE — PROGRESS NOTE ADULT - ASSESSMENT
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    PLAN:   - LRD  - ostomy discharge  - Labs: f/u AM labs  - Pain medication PO, No NSAIDS  - hold ASA and eliquis, f/u upon discharge  - Encourage OOB/ambulate   - DVT ppx: Lovenox  - possible d/c tomorrow    Red Surgery  [id number]

## 2020-03-23 ENCOUNTER — TRANSCRIPTION ENCOUNTER (OUTPATIENT)
Age: 85
End: 2020-03-23

## 2020-03-23 VITALS
OXYGEN SATURATION: 93 % | DIASTOLIC BLOOD PRESSURE: 66 MMHG | RESPIRATION RATE: 18 BRPM | TEMPERATURE: 98 F | HEART RATE: 65 BPM | SYSTOLIC BLOOD PRESSURE: 123 MMHG

## 2020-03-23 PROCEDURE — 88342 IMHCHEM/IMCYTCHM 1ST ANTB: CPT

## 2020-03-23 PROCEDURE — 83735 ASSAY OF MAGNESIUM: CPT

## 2020-03-23 PROCEDURE — 80048 BASIC METABOLIC PNL TOTAL CA: CPT

## 2020-03-23 PROCEDURE — 85730 THROMBOPLASTIN TIME PARTIAL: CPT

## 2020-03-23 PROCEDURE — 86900 BLOOD TYPING SEROLOGIC ABO: CPT

## 2020-03-23 PROCEDURE — C2617: CPT

## 2020-03-23 PROCEDURE — 85027 COMPLETE CBC AUTOMATED: CPT

## 2020-03-23 PROCEDURE — 88309 TISSUE EXAM BY PATHOLOGIST: CPT

## 2020-03-23 PROCEDURE — 71045 X-RAY EXAM CHEST 1 VIEW: CPT

## 2020-03-23 PROCEDURE — 85610 PROTHROMBIN TIME: CPT

## 2020-03-23 PROCEDURE — 88341 IMHCHEM/IMCYTCHM EA ADD ANTB: CPT

## 2020-03-23 PROCEDURE — 93005 ELECTROCARDIOGRAM TRACING: CPT

## 2020-03-23 PROCEDURE — 84100 ASSAY OF PHOSPHORUS: CPT

## 2020-03-23 PROCEDURE — 86901 BLOOD TYPING SEROLOGIC RH(D): CPT

## 2020-03-23 PROCEDURE — 86850 RBC ANTIBODY SCREEN: CPT

## 2020-03-23 PROCEDURE — 81001 URINALYSIS AUTO W/SCOPE: CPT

## 2020-03-23 PROCEDURE — C1889: CPT

## 2020-03-23 PROCEDURE — 97161 PT EVAL LOW COMPLEX 20 MIN: CPT

## 2020-03-23 RX ORDER — APIXABAN 2.5 MG/1
2.5 TABLET, FILM COATED ORAL
Refills: 0 | Status: DISCONTINUED | OUTPATIENT
Start: 2020-03-23 | End: 2020-03-23

## 2020-03-23 RX ORDER — ACETAMINOPHEN 500 MG
2 TABLET ORAL
Qty: 0 | Refills: 0 | DISCHARGE
Start: 2020-03-23

## 2020-03-23 RX ADMIN — Medication 1000 MILLIGRAM(S): at 03:30

## 2020-03-23 RX ADMIN — Medication 1000 MILLIGRAM(S): at 02:54

## 2020-03-23 RX ADMIN — MEMANTINE HYDROCHLORIDE 5 MILLIGRAM(S): 10 TABLET ORAL at 12:32

## 2020-03-23 NOTE — DISCHARGE NOTE PROVIDER - NSDCFUSCHEDAPPT_GEN_ALL_CORE_FT
AMY CHACON ; 05/22/2020 ; [facility] [phone]  AMY CHACON ; 06/11/2020 ; [facility] [phone]

## 2020-03-23 NOTE — DISCHARGE NOTE NURSING/CASE MANAGEMENT/SOCIAL WORK - PATIENT PORTAL LINK FT
You can access the FollowMyHealth Patient Portal offered by [facility] by registering at the following website: [email or web address]. By joining Robert Applebaum MD’s FollowMyHealth portal, you will also be able to view your health information using other applications (apps) compatible with our system.

## 2020-03-23 NOTE — PROGRESS NOTE ADULT - REASON FOR ADMISSION
Nikky's procedure

## 2020-03-23 NOTE — DISCHARGE NOTE PROVIDER - NSDCCPCAREPLAN_GEN_ALL_CORE_FT
PRINCIPAL DISCHARGE DIAGNOSIS  Diagnosis: Rectal mass  Assessment and Plan of Treatment: Removed with Nikky's procedure.

## 2020-03-23 NOTE — DISCHARGE NOTE PROVIDER - HOSPITAL COURSE
89yoF history of CAD, DVT/PE, on Eliquis, found to have a near obstructing rectal mass now s/p Nikky's w/ APOLLO/BSO, cystoscopy with U cath placement 3/19. Patient tolerated procedure well. Her home Eliquis was held. Post operatively she was stable and brought to the floor for further monitoring. Ucath's were removed POD1, and Mejia removed POD2. Patient passed her trial of void. PCA was discontinued and her pain was controlled on PO medication. Her diet was advanced as tolerated. Her ostomy was functioning appropriately starting post op day 1. On post op day 4 patient was tolerating a regular diet, ambulating as per her baseline, voiding appropriately, having good ostomy function. She was deemed stable and appropriate for discharge home with VNS for new ostomy care.

## 2020-03-23 NOTE — PROGRESS NOTE ADULT - ASSESSMENT
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 Tolerating regular diet.     PLAN:   - LRD  - ostomy VNS for discharge  - Pain medication PO, No NSAIDS  - restart eliquis 2.5 BID, patient not on ASA  - Encourage OOB/ambulate   - DVT ppx: Lovenox, d/c  - discharge today with home PT and VNS    Red Surgery  [id number]

## 2020-03-23 NOTE — DISCHARGE NOTE PROVIDER - CARE PROVIDER_API CALL
Jefe Basilio)  ColonRectal Surgery; Surgery  [address], [address]  [place] [address]  Phone: [phone]  Fax: [phone]  Follow Up Time:

## 2020-03-23 NOTE — DISCHARGE NOTE PROVIDER - NSDCMRMEDTOKEN_GEN_ALL_CORE_FT
alendronate 70 mg oral tablet: 1 tab(s) orally once a week  apixaban 5 mg oral tablet: 1 tab(s) orally   dronabinol 2.5 mg oral capsule: 1 cap(s) orally once a day  enalapril 2.5 mg oral tablet: 1 tab(s) orally once a day  memantine 5 mg oral tablet: 1 tab(s) orally once a day  metoprolol succinate 25 mg oral tablet, extended release: 0.5 tab(s) orally once a day  simvastatin 20 mg oral tablet: 1 tab(s) orally once a day (at bedtime)

## 2020-03-23 NOTE — PROGRESS NOTE ADULT - SUBJECTIVE AND OBJECTIVE BOX
RED TEAM SURGERY PROGRESS NOTE    89yFemale    SUBJECTIVE:  Patient seen and examined at bedside. No acute events overnight. Pain well controlled. Tolerating diet w/o N/V. OOB and ambulating at baseline. Denies fevers, chills chest pain, and shortness of breath. Some delirium/agitation, sitting at Nurses station.      --------------------------------------------------------------------------------------------------  OBJECTIVE:     Physical Exam:  General: AAOx2, NAD, agitated in chair at RN station  HEENT: NC/AT  Respiratory: no increased work of breathing   Abdomen: soft, nontender, nondistended, no rebound/guarding, ostomy with gas and stool  Extremities: warm and well perfused  --------------------------------------------------------------------------------------------------  Vital Signs Last 24 Hrs  T(C): 36.4 (23 Mar 2020 05:01), Max: 37.1 (22 Mar 2020 17:00)  T(F): 97.6 (23 Mar 2020 05:01), Max: 98.7 (22 Mar 2020 17:00)  HR: 87 (23 Mar 2020 05:01) (60 - 91)  BP: 145/75 (23 Mar 2020 05:01) (108/62 - 145/75)  BP(mean): --  RR: 18 (23 Mar 2020 05:01) (18 - 18)  SpO2: 96% (23 Mar 2020 05:01) (96% - 98%)  --------------------------------------------------------------------------------------------------  I&O's Detail    22 Mar 2020 07:01  -  23 Mar 2020 07:00  --------------------------------------------------------  IN:    Oral Fluid: 1440 mL  Total IN: 1440 mL    OUT:    Colostomy: 15 mL    Voided: 1000 mL  Total OUT: 1015 mL    Total NET: 425 mL          --------------------------------------------------------------------------------------------------    LABS:                        03-22    143  |  108  |  7   ----------------------------<  83  4.1   |  26  |  0.66    Ca    8.5      22 Mar 2020 06:48  Phos  2.9     03-22  Mg     2.0     03-22                              9.5    7.38  )-----------( 301      ( 22 Mar 2020 06:48 )             32.2     --------------------------------------------------------------------------------------------------

## 2020-03-23 NOTE — DISCHARGE NOTE PROVIDER - NSDCCPTREATMENT_GEN_ALL_CORE_FT
PRINCIPAL PROCEDURE  Procedure: Nikky's procedure  Findings and Treatment: Ostomy in place and functioning well. Will plan for follow up and eventual reversal      SECONDARY PROCEDURE  Procedure: APOLLO & BSO  Findings and Treatment:     Procedure: Cystoscopy, with bilateral ureteral stent replacement  Findings and Treatment:

## 2020-03-24 LAB — SURGICAL PATHOLOGY STUDY: SIGNIFICANT CHANGE UP

## 2020-03-27 PROBLEM — I82.409 ACUTE EMBOLISM AND THROMBOSIS OF UNSPECIFIED DEEP VEINS OF UNSPECIFIED LOWER EXTREMITY: Chronic | Status: ACTIVE | Noted: 2020-03-18

## 2020-03-27 PROBLEM — I26.99 OTHER PULMONARY EMBOLISM WITHOUT ACUTE COR PULMONALE: Chronic | Status: ACTIVE | Noted: 2020-03-18

## 2020-04-03 ENCOUNTER — APPOINTMENT (OUTPATIENT)
Dept: COLORECTAL SURGERY | Facility: CLINIC | Age: 85
End: 2020-04-03
Payer: MEDICARE

## 2020-04-03 VITALS — DIASTOLIC BLOOD PRESSURE: 77 MMHG | HEART RATE: 67 BPM | TEMPERATURE: 97.3 F | SYSTOLIC BLOOD PRESSURE: 144 MMHG

## 2020-04-03 PROCEDURE — 99024 POSTOP FOLLOW-UP VISIT: CPT

## 2020-04-06 NOTE — HISTORY OF PRESENT ILLNESS
[FreeTextEntry1] : Very pleasant 89-year-old Azeri female who presents for her first postoperative visit. She is approximately 15 days status post Nikky procedure for locally advanced mid rectal carcinoma which was near totally obstructing.\par \par She looks and feels quite well and is accompanied by her daughter. She is tolerating diet and her ostomy is functioning without difficulty. She has no significant incisional pain. She denies blood per ostomy. There is no temperature elevation.\par \par Final pathology revealed a T3 N0 M0 lesion. The uterus, tubes and ovaries which were abutting the lesion were removed and had no cancer present.\par \par Physical examination reveals a totally healthy appearing elderly female. Her abdomen is soft, nontender and nondistended. The specimen extraction site in the low midline is well healed with no evidence of infection. Staples were removed. Her left lower quadrant end colostomy is pink, viable and healthy in appearance. She has a well adherent ostomy appliance in place.\par \par The patient and her daughter were encouraged that she is making an excellent recuperation. I want her to remain on a low-residue diet.\par \par I will re-evaluate her in one month.

## 2020-04-17 ENCOUNTER — RX RENEWAL (OUTPATIENT)
Age: 85
End: 2020-04-17

## 2020-05-01 ENCOUNTER — APPOINTMENT (OUTPATIENT)
Dept: COLORECTAL SURGERY | Facility: CLINIC | Age: 85
End: 2020-05-01
Payer: MEDICARE

## 2020-05-01 PROCEDURE — 99024 POSTOP FOLLOW-UP VISIT: CPT

## 2020-05-01 NOTE — HISTORY OF PRESENT ILLNESS
[FreeTextEntry1] : Pleasant 89-year-old white female who presents for her second postoperative visit. The patient is approximately 6 weeks status post Nikky procedure with APOLLO BSO for a locally advanced rectal carcinoma.\par \par The patient looks and feels excellently. She has no incisional pain. She is tolerating diet and her ostomy is functioning well.\par \par Physical examination reveals a healthy appearing elderly female. Her abdomen is totally soft and nontender. Her specimen extraction site is fully healed. The left lower quadrant colostomy appliance was taken down. The ostomy is pink, viable and healthy in appearance. The peristomal skin is intact.\par \par The patient and her daughter were assured that she has made an excellent recuperation. She has no further dietary or physical activity restrictions. Due to her advanced age, I would not advise any additional therapy.\par \par I will see her in 3 months.

## 2020-05-04 ENCOUNTER — NON-APPOINTMENT (OUTPATIENT)
Age: 85
End: 2020-05-04

## 2020-05-04 NOTE — HISTORY OF PRESENT ILLNESS
[FreeTextEntry1] : Patient is a 89 year old woman with a history of HTN, hyperlipidemia, nonobstructive coronary artery disease, mitral regurgitation, LV dysfunction,subdural hematoma in [place] status post [place] holes, osteoporosis, and bilateral PE/DVTs currently on Eliquis who presents for follow up of HTN. She states that she feels well from the cardiac standpoint and denies any chest pain, dyspnea at rest, headaches, palpitations, or dizziness. She had a biopsy done of a rectal lesion and was found to have adenocarcinoma. She was than referred to see a colorectal surgeon for follow up. She continues to have abdominal discomfort.\par

## 2020-05-04 NOTE — DISCUSSION/SUMMARY
[FreeTextEntry1] : IMPRESSION: Mrs. Lundberg is a 89 year old woman with a history of HTN, hyperlipidemia, nonobstructive CAD, mitral regurgitation, LV dysfunction, anemia, subdural hematoma in [place] s/p [place] holes, osteoporosis, bilateral PEs/DVT currently on Eliquis, and recently diagnosed rectal adenocarcinoma who presents today for follow up of HTN\par \par PLAN:\par 1. Her blood pressure is well controlled, thus she will continue on Toprol XL 12.5mg daily. She had one PVC on her EKG today with rare ectopy on exam. \par 2. She will continue on Simvastatin 20mg daily for her cholesterol. Her most recent LDL from a little over 6 months ago was OK. \par 3. She will continue on Eliquis 2.5mg twice daily for her bilateral PE and DVTs. She will also follow up with Vascular Cardiology.\par 4. She is scheduled to see Dr. Basilio tomorrow to discuss possible treatment for her rectal adenocarcinoma. She will call me after the appointment to discuss.\par 5. She will follow up with me in three months or sooner if she is symptomatic.\par 6. She is scheduled to have an echocardiogram today to follow up her LV function and her valvular heart disease. \par 7. I spent approximately 30 minutes with the patient and her  discussing her various medical conditions and the possibility of requiring surgery for her rectal adenocarcinoma.

## 2020-05-04 NOTE — PHYSICAL EXAM
[General Appearance - Well Developed] : well developed [Normal Appearance] : normal appearance [Well Groomed] : well groomed [General Appearance - Well Nourished] : well nourished [No Deformities] : no deformities [Normal Oral Mucosa] : normal oral mucosa [No Oral Pallor] : no oral pallor [No Oral Cyanosis] : no oral cyanosis [Normal Jugular Venous A Waves Present] : normal jugular venous A waves present [Normal Jugular Venous V Waves Present] : normal jugular venous V waves present [Respiration, Rhythm And Depth] : normal respiratory rhythm and effort [Exaggerated Use Of Accessory Muscles For Inspiration] : no accessory muscle use [Auscultation Breath Sounds / Voice Sounds] : lungs were clear to auscultation bilaterally [Normal Rate] : normal [Rhythm Regular] : regular [Normal S1] : normal S1 [Normal S2] : normal S2 [No Pitting Edema] : no pitting edema present [Abdomen Soft] : soft [Nail Clubbing] : no clubbing of the fingernails [Cyanosis, Localized] : no localized cyanosis [Petechial Hemorrhages (___cm)] : no petechial hemorrhages [Skin Color & Pigmentation] : normal skin color and pigmentation [] : no rash [Affect] : the affect was normal [Mood] : the mood was normal [No Anxiety] : not feeling anxious [S3] : no S3 [I] : a grade 1 [Right Carotid Bruit] : no bruit heard over the right carotid [Left Carotid Bruit] : no bruit heard over the left carotid [Bruit] : no bruit heard [Bowel Sounds] : normal bowel sounds [FreeTextEntry1] : Her gait is slow. [No Skin Ulcers] : no skin ulcer

## 2020-05-22 ENCOUNTER — APPOINTMENT (OUTPATIENT)
Dept: CARDIOLOGY | Facility: CLINIC | Age: 85
End: 2020-05-22
Payer: MEDICARE

## 2020-05-22 VITALS
RESPIRATION RATE: 14 BRPM | TEMPERATURE: 98.5 F | HEIGHT: 62 IN | OXYGEN SATURATION: 96 % | HEART RATE: 67 BPM | WEIGHT: 144 LBS | SYSTOLIC BLOOD PRESSURE: 108 MMHG | DIASTOLIC BLOOD PRESSURE: 64 MMHG | BODY MASS INDEX: 26.5 KG/M2

## 2020-05-22 PROCEDURE — 99214 OFFICE O/P EST MOD 30 MIN: CPT

## 2020-05-22 NOTE — PHYSICAL EXAM
[General Appearance - Well Developed] : well developed [Normal Appearance] : normal appearance [Well Groomed] : well groomed [No Deformities] : no deformities [General Appearance - Well Nourished] : well nourished [General Appearance - In No Acute Distress] : no acute distress [Normal Conjunctiva] : the conjunctiva exhibited no abnormalities [Eyelids - No Xanthelasma] : the eyelids demonstrated no xanthelasmas [Normal Oral Mucosa] : normal oral mucosa [No Oral Pallor] : no oral pallor [No Oral Cyanosis] : no oral cyanosis [Normal Jugular Venous A Waves Present] : normal jugular venous A waves present [Normal Jugular Venous V Waves Present] : normal jugular venous V waves present [No Jugular Venous Montesinos A Waves] : no jugular venous montesinos A waves [Respiration, Rhythm And Depth] : normal respiratory rhythm and effort [Exaggerated Use Of Accessory Muscles For Inspiration] : no accessory muscle use [Auscultation Breath Sounds / Voice Sounds] : lungs were clear to auscultation bilaterally [Heart Rate And Rhythm] : heart rate and rhythm were normal [Heart Sounds] : normal S1 and S2 [Murmurs] : no murmurs present [Abdomen Soft] : soft [Abdomen Tenderness] : non-tender [Abdomen Mass (___ Cm)] : no abdominal mass palpated [Abnormal Walk] : normal gait [Gait - Sufficient For Exercise Testing] : the gait was sufficient for exercise testing [Cyanosis, Localized] : no localized cyanosis [Nail Clubbing] : no clubbing of the fingernails [Skin Color & Pigmentation] : normal skin color and pigmentation [Petechial Hemorrhages (___cm)] : no petechial hemorrhages [No Venous Stasis] : no venous stasis [] : no rash [Skin Lesions] : no skin lesions [No Skin Ulcers] : no skin ulcer [No Xanthoma] : no  xanthoma was observed [Affect] : the affect was normal [Oriented To Time, Place, And Person] : oriented to person, place, and time [Mood] : the mood was normal [No Anxiety] : not feeling anxious

## 2020-05-22 NOTE — REVIEW OF SYSTEMS
[Negative] : Endocrine [Shortness Of Breath] : no shortness of breath [Chest Pain] : no chest pain [Lower Ext Edema] : no extremity edema [Palpitations] : no palpitations

## 2020-05-22 NOTE — REASON FOR VISIT
[Follow-Up - Clinic] : a clinic follow-up of [FreeTextEntry1] : 88F hx of dementia, HTN, HLD, osteoporosis, ICH s/p ronny hole, returned from [facility], hosptial stay for UTI, rehab, then returned to [facility] found to have PE and DVT.

## 2020-05-22 NOTE — ASSESSMENT
[FreeTextEntry1] : Assessment:\par 1.  Provoked PE and DVT\par - prolonged illness\par 2.  ICH s/p ronny hole\par - stable repeat head CT at [facility]\par 3.  HTN\par 4.  HLD\par \par Plan\par 1.  Continue with eliquis 2.5mg BID- the bleeding risk is that of placebo and will reduce risk of recurrence.\par 2.  Labwork today\par 3.  Continue current medications.\par 4.  Return in 1 year, sooner if any issues\par 5.  Continue excellent care with Dr. Jauregui.

## 2020-05-22 NOTE — HISTORY OF PRESENT ILLNESS
[FreeTextEntry1] :  5/22/2020\par She underwent colorectal sx march 19th.  At [facility]. \par Went home March 23rd. \par Eliquis was resumed post operatively.\par No issues with eliquis.\par No bleeding or bruising\par No leg pains\par \par Plans to see Dr. Jauregui next month.\par No chest pain or shortness of breath\par \par No blood in the stool\par She has an ostomy.  no issues.  Daughter takes care of the ostomy.  \par \par Medication\par Eliquis 2.5mg BID\par Simvastatin\par Namenda\par Metoprolol \par Vitamin D. \par \par no aspirin.

## 2020-05-26 LAB
25(OH)D3 SERPL-MCNC: 34.1 NG/ML
ALBUMIN SERPL ELPH-MCNC: 4.2 G/DL
ALP BLD-CCNC: 64 U/L
ALT SERPL-CCNC: 8 U/L
ANION GAP SERPL CALC-SCNC: 15 MMOL/L
APPEARANCE: CLEAR
AST SERPL-CCNC: 14 U/L
BACTERIA: NEGATIVE
BASOPHILS # BLD AUTO: 0.09 K/UL
BASOPHILS NFR BLD AUTO: 1.4 %
BILIRUB SERPL-MCNC: 0.2 MG/DL
BILIRUBIN URINE: NEGATIVE
BLOOD URINE: NEGATIVE
BUN SERPL-MCNC: 23 MG/DL
CALCIUM SERPL-MCNC: 9.5 MG/DL
CHLORIDE SERPL-SCNC: 105 MMOL/L
CHOLEST SERPL-MCNC: 167 MG/DL
CHOLEST/HDLC SERPL: 3.3 RATIO
CO2 SERPL-SCNC: 24 MMOL/L
COLOR: NORMAL
CREAT SERPL-MCNC: 0.8 MG/DL
DEPRECATED D DIMER PPP IA-ACNC: 255 NG/ML DDU
EOSINOPHIL # BLD AUTO: 0.37 K/UL
EOSINOPHIL NFR BLD AUTO: 5.6 %
GLUCOSE QUALITATIVE U: NEGATIVE
GLUCOSE SERPL-MCNC: 77 MG/DL
HCT VFR BLD CALC: 36.1 %
HDLC SERPL-MCNC: 51 MG/DL
HGB BLD-MCNC: 10.7 G/DL
HYALINE CASTS: 1 /LPF
IMM GRANULOCYTES NFR BLD AUTO: 0.2 %
KETONES URINE: NEGATIVE
LDLC SERPL CALC-MCNC: 91 MG/DL
LEUKOCYTE ESTERASE URINE: ABNORMAL
LYMPHOCYTES # BLD AUTO: 2.8 K/UL
LYMPHOCYTES NFR BLD AUTO: 42.7 %
MAN DIFF?: NORMAL
MCHC RBC-ENTMCNC: 26 PG
MCHC RBC-ENTMCNC: 29.6 GM/DL
MCV RBC AUTO: 87.6 FL
MICROSCOPIC-UA: NORMAL
MONOCYTES # BLD AUTO: 0.58 K/UL
MONOCYTES NFR BLD AUTO: 8.9 %
NEUTROPHILS # BLD AUTO: 2.7 K/UL
NEUTROPHILS NFR BLD AUTO: 41.2 %
NITRITE URINE: NEGATIVE
PH URINE: 6.5
PLATELET # BLD AUTO: 278 K/UL
POTASSIUM SERPL-SCNC: 4.8 MMOL/L
PROT SERPL-MCNC: 6.6 G/DL
PROTEIN URINE: NEGATIVE
RBC # BLD: 4.12 M/UL
RBC # FLD: 18.4 %
RED BLOOD CELLS URINE: 5 /HPF
SODIUM SERPL-SCNC: 143 MMOL/L
SPECIFIC GRAVITY URINE: 1.01
SQUAMOUS EPITHELIAL CELLS: 1 /HPF
TRIGL SERPL-MCNC: 126 MG/DL
UROBILINOGEN URINE: NORMAL
WBC # FLD AUTO: 6.55 K/UL
WHITE BLOOD CELLS URINE: 1 /HPF

## 2020-06-11 ENCOUNTER — NON-APPOINTMENT (OUTPATIENT)
Age: 85
End: 2020-06-11

## 2020-06-11 ENCOUNTER — APPOINTMENT (OUTPATIENT)
Dept: CARDIOLOGY | Facility: CLINIC | Age: 85
End: 2020-06-11
Payer: MEDICARE

## 2020-06-11 VITALS
OXYGEN SATURATION: 97 % | DIASTOLIC BLOOD PRESSURE: 75 MMHG | RESPIRATION RATE: 14 BRPM | HEART RATE: 70 BPM | SYSTOLIC BLOOD PRESSURE: 134 MMHG | HEIGHT: 62 IN | WEIGHT: 148 LBS | BODY MASS INDEX: 27.23 KG/M2

## 2020-06-11 PROCEDURE — 93000 ELECTROCARDIOGRAM COMPLETE: CPT

## 2020-06-11 PROCEDURE — 99214 OFFICE O/P EST MOD 30 MIN: CPT | Mod: 25

## 2020-06-13 NOTE — PHYSICAL EXAM
[General Appearance - Well Developed] : well developed [Normal Appearance] : normal appearance [Well Groomed] : well groomed [General Appearance - Well Nourished] : well nourished [No Deformities] : no deformities [Normal Oral Mucosa] : normal oral mucosa [No Oral Pallor] : no oral pallor [No Oral Cyanosis] : no oral cyanosis [Normal Jugular Venous A Waves Present] : normal jugular venous A waves present [Normal Jugular Venous V Waves Present] : normal jugular venous V waves present [Respiration, Rhythm And Depth] : normal respiratory rhythm and effort [Exaggerated Use Of Accessory Muscles For Inspiration] : no accessory muscle use [Auscultation Breath Sounds / Voice Sounds] : lungs were clear to auscultation bilaterally [Bowel Sounds] : normal bowel sounds [Abdomen Soft] : soft [Nail Clubbing] : no clubbing of the fingernails [Cyanosis, Localized] : no localized cyanosis [Petechial Hemorrhages (___cm)] : no petechial hemorrhages [Skin Color & Pigmentation] : normal skin color and pigmentation [] : no rash [No Skin Ulcers] : no skin ulcer [Affect] : the affect was normal [No Anxiety] : not feeling anxious [Mood] : the mood was normal [Normal Rate] : normal [Rhythm Regular] : regular [Normal S1] : normal S1 [Normal S2] : normal S2 [I] : a grade 1 [No Pitting Edema] : no pitting edema present [General Appearance - In No Acute Distress] : no acute distress [Abdomen Tenderness] : non-tender [FreeTextEntry1] : Her gait is slow. [S3] : no S3 [Right Carotid Bruit] : no bruit heard over the right carotid [Bruit] : no bruit heard [Left Carotid Bruit] : no bruit heard over the left carotid

## 2020-06-13 NOTE — DISCUSSION/SUMMARY
[FreeTextEntry1] : IMPRESSION: Mrs. Lundberg is a 89 year old woman with a history of HTN, hyperlipidemia, nonobstructive CAD, mitral regurgitation, LV dysfunction, anemia, subdural hematoma in [place] s/p [place] holes, osteoporosis, bilateral PEs/DVT currently on Eliquis, and recently diagnosed rectal adenocarcinoma who presents today for follow up of HTN.\par \par PLAN:\par 1. Her blood pressure is adequately controlled, thus she will continue on Toprol XL 12.5mg daily. She had one PVC on her EKG today with rare ectopy on exam. \par 2. She will continue on Simvastatin 20mg daily for her cholesterol. Her most recent LDL from a few weeks ago was OK. \par 3. She will continue on Eliquis 2.5mg twice daily for her bilateral PE and DVTs. She will also follow up with Vascular Cardiology. Her most recent D-Dimer was mildly elevated. \par 4. She will follow up with me in four months or sooner if she develops any symptoms in the interim.\par 5. She should have a repeat echocardiogram in about a year to follow up her mitral regurgitation.

## 2020-06-13 NOTE — HISTORY OF PRESENT ILLNESS
[FreeTextEntry1] : Patient is a 89 year old woman with a history of HTN, hyperlipidemia, nonobstructive coronary artery disease, mitral regurgitation, LV dysfunction,subdural hematoma in [place] status post [place] holes, osteoporosis, bilateral PE/DVTs currently on Eliquis, and rectal cancer status post resection who presents for follow up of HTN. She states that she feels well from the cardiac standpoint and denies any chest pain, dyspnea at rest, headaches, palpitations, or dizziness. She states that she has been feeling well following her surgery about 3 months ago.

## 2020-07-31 ENCOUNTER — RX RENEWAL (OUTPATIENT)
Age: 85
End: 2020-07-31

## 2020-08-07 ENCOUNTER — APPOINTMENT (OUTPATIENT)
Dept: COLORECTAL SURGERY | Facility: CLINIC | Age: 85
End: 2020-08-07
Payer: MEDICARE

## 2020-08-07 PROCEDURE — 99212 OFFICE O/P EST SF 10 MIN: CPT

## 2020-08-07 NOTE — HISTORY OF PRESENT ILLNESS
[FreeTextEntry1] : Pleasant 89-year-old white female who presents for a follow-up visit. The patient is approximately 5 months status post Nikky procedure and a APOLLO/BSO for locally advanced rectal carcinoma. Lymph node samples were negative.\par \par The patient looks and feels quite well. She has gained approximately 15-20 pounds postoperatively. She is tolerating diet without difficulty and her ostomy is functioning well with formed stool and no blood per ostomy.\par \par Physical examination reveals a soft, nontender, nondistended abdomen. Her lower midline incision is fully healed. Her left lower quadrant end colostomy was examined. The mucosa is pink and healthy in appearance.\par \par Patient has done quite well after a major operation. Due to her advanced age, she will not receive adjuvant therapy. She continues to follow-up with vascular medicine since she initially presented with a DVT and eventually was found to have a rectal cancer.\par \par I will see her in 6 months.

## 2020-10-03 ENCOUNTER — APPOINTMENT (OUTPATIENT)
Dept: CARDIOLOGY | Facility: CLINIC | Age: 85
End: 2020-10-03
Payer: MEDICARE

## 2020-10-03 ENCOUNTER — NON-APPOINTMENT (OUTPATIENT)
Age: 85
End: 2020-10-03

## 2020-10-03 VITALS
HEIGHT: 62 IN | DIASTOLIC BLOOD PRESSURE: 81 MMHG | HEART RATE: 73 BPM | WEIGHT: 157 LBS | SYSTOLIC BLOOD PRESSURE: 112 MMHG | RESPIRATION RATE: 12 BRPM | BODY MASS INDEX: 28.89 KG/M2 | OXYGEN SATURATION: 97 %

## 2020-10-03 VITALS — TEMPERATURE: 97.5 F

## 2020-10-03 DIAGNOSIS — Z23 ENCOUNTER FOR IMMUNIZATION: ICD-10-CM

## 2020-10-03 DIAGNOSIS — I82.409 ACUTE EMBOLISM AND THROMBOSIS OF UNSPECIFIED DEEP VEINS OF UNSPECIFIED LOWER EXTREMITY: ICD-10-CM

## 2020-10-03 DIAGNOSIS — E78.5 HYPERLIPIDEMIA, UNSPECIFIED: ICD-10-CM

## 2020-10-03 PROCEDURE — 99214 OFFICE O/P EST MOD 30 MIN: CPT | Mod: 25

## 2020-10-03 PROCEDURE — 90662 IIV NO PRSV INCREASED AG IM: CPT

## 2020-10-03 PROCEDURE — G0008: CPT

## 2020-10-03 PROCEDURE — 93000 ELECTROCARDIOGRAM COMPLETE: CPT

## 2020-10-03 NOTE — PHYSICAL EXAM
[General Appearance - Well Developed] : well developed [Normal Appearance] : normal appearance [Well Groomed] : well groomed [General Appearance - Well Nourished] : well nourished [No Deformities] : no deformities [General Appearance - In No Acute Distress] : no acute distress [Normal Conjunctiva] : the conjunctiva exhibited no abnormalities [Normal Jugular Venous A Waves Present] : normal jugular venous A waves present [Normal Jugular Venous V Waves Present] : normal jugular venous V waves present [Respiration, Rhythm And Depth] : normal respiratory rhythm and effort [Exaggerated Use Of Accessory Muscles For Inspiration] : no accessory muscle use [Auscultation Breath Sounds / Voice Sounds] : lungs were clear to auscultation bilaterally [Bowel Sounds] : normal bowel sounds [Abdomen Soft] : soft [Abdomen Tenderness] : non-tender [Nail Clubbing] : no clubbing of the fingernails [Cyanosis, Localized] : no localized cyanosis [Petechial Hemorrhages (___cm)] : no petechial hemorrhages [Skin Color & Pigmentation] : normal skin color and pigmentation [] : no rash [No Skin Ulcers] : no skin ulcer [Affect] : the affect was normal [Mood] : the mood was normal [No Anxiety] : not feeling anxious [Normal Rate] : normal [Rhythm Regular] : regular [Normal S1] : normal S1 [Normal S2] : normal S2 [I] : a grade 1 [No Pitting Edema] : no pitting edema present [FreeTextEntry1] : Her gait is slow. [S3] : no S3 [Right Carotid Bruit] : no bruit heard over the right carotid [Left Carotid Bruit] : no bruit heard over the left carotid [Bruit] : no bruit heard

## 2020-10-03 NOTE — DISCUSSION/SUMMARY
[FreeTextEntry1] : IMPRESSION: Mrs. Lundberg is a 89 year old woman with a history of HTN, hyperlipidemia, nonobstructive CAD, mitral regurgitation, LV dysfunction, anemia, subdural hematoma in [place] s/p [place] holes, osteoporosis, bilateral PEs/DVT currently on Eliquis, and rectal adenocarcinoma status post resection who presents today for follow up of HTN.\par \par PLAN:\par 1. Her blood pressure is well controlled, thus she will continue on Toprol XL 12.5mg daily. She had one APC on her ECG and rare ectopy on exam. \par 2. She will continue on Simvastatin 20mg daily for her cholesterol. I will be checking a CMP and lipid profile today.\par 3. She will continue on Eliquis 2.5mg twice daily for her bilateral PE and DVTs. She will also follow up with Vascular Cardiology. I will be checking a D-Dimer today.\par 4. I will be checking a CBC given her history of anemia. \par 5. She will follow up with me in four months or sooner if she develops any symptoms in the interim.\par 6. She should have a repeat echocardiogram in about 6 months to follow up her mitral regurgitation.\par 7. She was given a flu shot today.

## 2020-10-03 NOTE — HISTORY OF PRESENT ILLNESS
[FreeTextEntry1] : Patient is a 89 year old woman with a history of HTN, hyperlipidemia, nonobstructive coronary artery disease, mitral regurgitation, LV dysfunction, subdural hematoma in [place] status post [place] holes, osteoporosis, bilateral PE/DVTs currently on Eliquis, and rectal cancer status post resection who presents for follow up of HTN. She states that she feels well from the cardiac standpoint and denies any chest pain, dyspnea at rest, headaches, palpitations, or dizziness. She does complain of bilateral knee pain.

## 2020-10-04 LAB
25(OH)D3 SERPL-MCNC: 44.3 NG/ML
ALBUMIN SERPL ELPH-MCNC: 4.3 G/DL
ALP BLD-CCNC: 67 U/L
ALT SERPL-CCNC: 9 U/L
ANION GAP SERPL CALC-SCNC: 14 MMOL/L
AST SERPL-CCNC: 16 U/L
BASOPHILS # BLD AUTO: 0.1 K/UL
BASOPHILS NFR BLD AUTO: 1.2 %
BILIRUB SERPL-MCNC: 0.3 MG/DL
BUN SERPL-MCNC: 19 MG/DL
CALCIUM SERPL-MCNC: 9.9 MG/DL
CHLORIDE SERPL-SCNC: 106 MMOL/L
CHOLEST SERPL-MCNC: 183 MG/DL
CHOLEST/HDLC SERPL: 4.3 RATIO
CO2 SERPL-SCNC: 24 MMOL/L
CREAT SERPL-MCNC: 0.83 MG/DL
DEPRECATED D DIMER PPP IA-ACNC: 205 NG/ML DDU
EOSINOPHIL # BLD AUTO: 0.26 K/UL
EOSINOPHIL NFR BLD AUTO: 3.2 %
ESTIMATED AVERAGE GLUCOSE: 105 MG/DL
GLUCOSE SERPL-MCNC: 85 MG/DL
HBA1C MFR BLD HPLC: 5.3 %
HCT VFR BLD CALC: 41.8 %
HDLC SERPL-MCNC: 43 MG/DL
HGB BLD-MCNC: 12.5 G/DL
IMM GRANULOCYTES NFR BLD AUTO: 0.1 %
LDLC SERPL CALC-MCNC: 102 MG/DL
LYMPHOCYTES # BLD AUTO: 3.13 K/UL
LYMPHOCYTES NFR BLD AUTO: 39 %
MAN DIFF?: NORMAL
MCHC RBC-ENTMCNC: 27.9 PG
MCHC RBC-ENTMCNC: 29.9 GM/DL
MCV RBC AUTO: 93.3 FL
MONOCYTES # BLD AUTO: 0.63 K/UL
MONOCYTES NFR BLD AUTO: 7.8 %
NEUTROPHILS # BLD AUTO: 3.9 K/UL
NEUTROPHILS NFR BLD AUTO: 48.7 %
PLATELET # BLD AUTO: 248 K/UL
POTASSIUM SERPL-SCNC: 4.3 MMOL/L
PROT SERPL-MCNC: 6.7 G/DL
RBC # BLD: 4.48 M/UL
RBC # FLD: 17.8 %
SODIUM SERPL-SCNC: 144 MMOL/L
TRIGL SERPL-MCNC: 188 MG/DL
TSH SERPL-ACNC: 2.04 UIU/ML
WBC # FLD AUTO: 8.03 K/UL

## 2020-12-23 NOTE — DIETITIAN INITIAL EVALUATION ADULT. - 25 CAL
6970 Isotretinoin Counseling: Patient should get monthly blood tests, not donate blood, not drive at night if vision affected, not share medication, and not undergo elective surgery for 6 months after tx completed. Side effects reviewed, pt to contact office should one occur.

## 2020-12-28 ENCOUNTER — INPATIENT (INPATIENT)
Facility: HOSPITAL | Age: 85
LOS: 2 days | Discharge: HOME | DRG: 228 | End: 2020-12-31
Attending: INTERNAL MEDICINE | Admitting: INTERNAL MEDICINE
Payer: COMMERCIAL

## 2020-12-28 VITALS
DIASTOLIC BLOOD PRESSURE: 85 MMHG | HEART RATE: 51 BPM | OXYGEN SATURATION: 97 % | HEIGHT: 63 IN | WEIGHT: 154.98 LBS | RESPIRATION RATE: 27 BRPM | TEMPERATURE: 97 F | SYSTOLIC BLOOD PRESSURE: 142 MMHG

## 2020-12-28 DIAGNOSIS — I45.9 CONDUCTION DISORDER, UNSPECIFIED: ICD-10-CM

## 2020-12-28 DIAGNOSIS — S06.5X9A TRAUMATIC SUBDURAL HEMORRHAGE WITH LOSS OF CONSCIOUSNESS OF UNSPECIFIED DURATION, INITIAL ENCOUNTER: Chronic | ICD-10-CM

## 2020-12-28 DIAGNOSIS — K62.89 OTHER SPECIFIED DISEASES OF ANUS AND RECTUM: ICD-10-CM

## 2020-12-28 LAB
ALBUMIN SERPL ELPH-MCNC: 3.8 G/DL — SIGNIFICANT CHANGE UP (ref 3.3–5)
ALP SERPL-CCNC: 92 U/L — SIGNIFICANT CHANGE UP (ref 40–120)
ALT FLD-CCNC: 47 U/L — HIGH (ref 10–45)
ANION GAP SERPL CALC-SCNC: 14 MMOL/L — SIGNIFICANT CHANGE UP (ref 5–17)
AST SERPL-CCNC: 40 U/L — SIGNIFICANT CHANGE UP (ref 10–40)
BILIRUB SERPL-MCNC: 0.4 MG/DL — SIGNIFICANT CHANGE UP (ref 0.2–1.2)
BUN SERPL-MCNC: 23 MG/DL — SIGNIFICANT CHANGE UP (ref 7–23)
CALCIUM SERPL-MCNC: 9 MG/DL — SIGNIFICANT CHANGE UP (ref 8.4–10.5)
CHLORIDE SERPL-SCNC: 109 MMOL/L — HIGH (ref 96–108)
CK MB CFR SERPL CALC: 1.9 NG/ML — SIGNIFICANT CHANGE UP (ref 0–3.8)
CK SERPL-CCNC: 51 U/L — SIGNIFICANT CHANGE UP (ref 25–170)
CO2 SERPL-SCNC: 18 MMOL/L — LOW (ref 22–31)
CREAT SERPL-MCNC: 0.94 MG/DL — SIGNIFICANT CHANGE UP (ref 0.5–1.3)
GLUCOSE SERPL-MCNC: 137 MG/DL — HIGH (ref 70–99)
HCT VFR BLD CALC: 35.5 % — SIGNIFICANT CHANGE UP (ref 34.5–45)
HGB BLD-MCNC: 11.4 G/DL — LOW (ref 11.5–15.5)
MCHC RBC-ENTMCNC: 29 PG — SIGNIFICANT CHANGE UP (ref 27–34)
MCHC RBC-ENTMCNC: 32.1 GM/DL — SIGNIFICANT CHANGE UP (ref 32–36)
MCV RBC AUTO: 90.3 FL — SIGNIFICANT CHANGE UP (ref 80–100)
NRBC # BLD: 0 /100 WBCS — SIGNIFICANT CHANGE UP (ref 0–0)
PLATELET # BLD AUTO: 260 K/UL — SIGNIFICANT CHANGE UP (ref 150–400)
POTASSIUM SERPL-MCNC: 4 MMOL/L — SIGNIFICANT CHANGE UP (ref 3.5–5.3)
POTASSIUM SERPL-SCNC: 4 MMOL/L — SIGNIFICANT CHANGE UP (ref 3.5–5.3)
PROT SERPL-MCNC: 6.5 G/DL — SIGNIFICANT CHANGE UP (ref 6–8.3)
RBC # BLD: 3.93 M/UL — SIGNIFICANT CHANGE UP (ref 3.8–5.2)
RBC # FLD: 15.2 % — HIGH (ref 10.3–14.5)
SARS-COV-2 RNA SPEC QL NAA+PROBE: SIGNIFICANT CHANGE UP
SODIUM SERPL-SCNC: 141 MMOL/L — SIGNIFICANT CHANGE UP (ref 135–145)
TROPONIN T, HIGH SENSITIVITY RESULT: 17 NG/L — SIGNIFICANT CHANGE UP (ref 0–51)
WBC # BLD: 7.96 K/UL — SIGNIFICANT CHANGE UP (ref 3.8–10.5)
WBC # FLD AUTO: 7.96 K/UL — SIGNIFICANT CHANGE UP (ref 3.8–10.5)

## 2020-12-28 PROCEDURE — 93010 ELECTROCARDIOGRAM REPORT: CPT

## 2020-12-28 PROCEDURE — 99291 CRITICAL CARE FIRST HOUR: CPT

## 2020-12-28 PROCEDURE — 71045 X-RAY EXAM CHEST 1 VIEW: CPT | Mod: 26

## 2020-12-28 RX ORDER — CHLORHEXIDINE GLUCONATE 213 G/1000ML
1 SOLUTION TOPICAL
Refills: 0 | Status: DISCONTINUED | OUTPATIENT
Start: 2020-12-28 | End: 2020-12-30

## 2020-12-28 RX ORDER — SIMVASTATIN 20 MG/1
20 TABLET, FILM COATED ORAL AT BEDTIME
Refills: 0 | Status: DISCONTINUED | OUTPATIENT
Start: 2020-12-28 | End: 2020-12-31

## 2020-12-28 RX ORDER — MEMANTINE HYDROCHLORIDE 10 MG/1
5 TABLET ORAL DAILY
Refills: 0 | Status: DISCONTINUED | OUTPATIENT
Start: 2020-12-28 | End: 2020-12-31

## 2020-12-28 RX ORDER — METOPROLOL TARTRATE 50 MG
0 TABLET ORAL
Qty: 0 | Refills: 0 | DISCHARGE

## 2020-12-28 RX ORDER — LANOLIN ALCOHOL/MO/W.PET/CERES
5 CREAM (GRAM) TOPICAL AT BEDTIME
Refills: 0 | Status: DISCONTINUED | OUTPATIENT
Start: 2020-12-28 | End: 2020-12-31

## 2020-12-28 RX ADMIN — SIMVASTATIN 20 MILLIGRAM(S): 20 TABLET, FILM COATED ORAL at 23:52

## 2020-12-28 RX ADMIN — Medication 5 MILLIGRAM(S): at 23:52

## 2020-12-28 NOTE — H&P ADULT - NSHPREVIEWOFSYSTEMS_GEN_ALL_CORE
REVIEW OF SYSTEMS:    CONSTITUTIONAL: No weakness, fevers or chills  EYES/ENT: No visual changes;  No vertigo or throat pain   NECK: No pain or stiffness  RESPIRATORY: No cough, wheezing, hemoptysis; + shortness of breath  CARDIOVASCULAR: No chest pain or palpitations  GASTROINTESTINAL: No abdominal or epigastric pain. No nausea, vomiting, or hematemesis; No diarrhea or constipation. No melena or hematochezia.  GENITOURINARY: No dysuria, frequency or hematuria  NEUROLOGICAL: No numbness or weakness  SKIN: No itching, rashes

## 2020-12-28 NOTE — ED ADULT NURSE NOTE - OBJECTIVE STATEMENT
89 y/o F presents to ED c/o SOB. As per pt daughter, pt uses "oximeter" at home, and noted HR for past 3 days was low to mid 30s. Over past 3 days, pt became increasingly SOB at rest. Pt daughter called cardiologist and was advised to come to ER. Endorses mild HA 2/10. Pt placed on cardiac monitor, HR in the 30s noted. EKG completed. Denies CP, palpitations, dizziness. Ostomy noted on left side of abdomen, since March 19, 2020. Site clean, dry, intact. Patient placed in position of comfort, daughter at bedside, bed locked and in lowest position. Call bell within reach. 89 y/o F presents to ED c/o SOB. As per pt daughter, pt uses "oximeter" at home, and noted HR for past 3 days was low to mid 30s. Over past 3 days, pt became increasingly SOB at rest. Pt daughter called cardiologist and was advised to come to ER. Endorses mild HA 2/10. Pt placed on cardiac monitor, HR in the 30s noted. EKG completed. Denies CP, palpitations, dizziness. Ostomy noted on left side of abdomen, since March 19, 2020. Site clean, dry, intact. Denies numbness, tingling, fever, chills, abdominal pain, n/v/d, urinary symptoms. Patient placed in position of comfort, daughter at bedside, bed locked and in lowest position. Call bell within reach.

## 2020-12-28 NOTE — ED ADULT NURSE REASSESSMENT NOTE - NS ED NURSE REASSESS COMMENT FT1
ED MD made aware that patient is currently bradycardic. Per MD, patient placed on pacer pads at this time.

## 2020-12-28 NOTE — ED ADULT TRIAGE NOTE - WEIGHT METHOD
"Subjective:       Patient ID: Alexander Jimenez is a 27 y.o. male.    Vitals:  height is 5' 11" (1.803 m) and weight is 88.5 kg (195 lb). His tympanic temperature is 97.4 °F (36.3 °C). His blood pressure is 115/70 and his pulse is 98. His oxygen saturation is 98%.     Chief Complaint: Knee Injury (right )    This is a 27 y.o. male with History reviewed. No pertinent past medical history. who presents today with a chief complaint of right knee injury.   Pt seen here on 11/24/17 for same c/o, x-rays done in , x-ray results showed:   Impression   No evidence of acute fracture or dislocation of the right knee. Small suprapatellar joint effusion.     Pt treated with NSAIDS & Tylenol for pain relief, RICE and instructed to f/u with Ortho, states he could not get an appt. Pt here today requesting a work note, states he "reinjured" his right knee on 12/10/17 and missed x3 days of work and needs a note to return to work. Denies pain at this time, no pain on exam.            Knee Injury   This is a new problem. The current episode started in the past 7 days. The problem occurs 2 to 4 times per day. The problem has been unchanged. Pertinent negatives include no abdominal pain, chest pain, neck pain, numbness or weakness. Nothing aggravates the symptoms. He has tried nothing for the symptoms. The treatment provided no relief.     Review of Systems   Constitution: Negative for weakness and malaise/fatigue.   HENT: Negative for nosebleeds.    Cardiovascular: Negative for chest pain and syncope.   Respiratory: Negative for shortness of breath.    Musculoskeletal: Negative for back pain, joint pain and neck pain.   Gastrointestinal: Negative for abdominal pain.   Genitourinary: Negative for hematuria.   Neurological: Negative for dizziness and numbness.   All other systems reviewed and are negative.      Objective:      Physical Exam   Constitutional: He is oriented to person, place, and time. He appears well-developed and " well-nourished. He is cooperative.  Non-toxic appearance. He does not appear ill. No distress.   HENT:   Head: Normocephalic and atraumatic.   Right Ear: Hearing, tympanic membrane, external ear and ear canal normal.   Left Ear: Hearing, tympanic membrane, external ear and ear canal normal.   Nose: Nose normal. No mucosal edema, rhinorrhea or nasal deformity. No epistaxis. Right sinus exhibits no maxillary sinus tenderness and no frontal sinus tenderness. Left sinus exhibits no maxillary sinus tenderness and no frontal sinus tenderness.   Mouth/Throat: Uvula is midline, oropharynx is clear and moist and mucous membranes are normal. No trismus in the jaw. Normal dentition. No uvula swelling. No posterior oropharyngeal erythema.   Eyes: Conjunctivae and lids are normal. Right eye exhibits no discharge. Left eye exhibits no discharge. No scleral icterus.   Sclera clear bilat   Neck: Trachea normal, normal range of motion, full passive range of motion without pain and phonation normal. Neck supple.   Cardiovascular: Normal rate, regular rhythm, normal heart sounds, intact distal pulses and normal pulses.    Pulmonary/Chest: Effort normal and breath sounds normal. No respiratory distress.   Abdominal: Soft. Normal appearance and bowel sounds are normal. He exhibits no distension, no pulsatile midline mass and no mass. There is no tenderness.   Musculoskeletal: Normal range of motion. He exhibits no edema or deformity.        Right knee: He exhibits normal range of motion, no swelling, no deformity, no erythema, no LCL laxity, normal patellar mobility, no bony tenderness and no MCL laxity. No tenderness found. No medial joint line, no lateral joint line, no MCL, no LCL and no patellar tendon tenderness noted.   Neurological: He is alert and oriented to person, place, and time. He exhibits normal muscle tone. Coordination normal.   Skin: Skin is warm, dry and intact. He is not diaphoretic. No pallor.   Psychiatric: He has  a normal mood and affect. His speech is normal and behavior is normal. Judgment and thought content normal. Cognition and memory are normal.   Nursing note and vitals reviewed.      Assessment:       1. Encounter to obtain excuse from work    2. Acute pain of right knee        Plan:         Encounter to obtain excuse from work    Acute pain of right knee           stated

## 2020-12-28 NOTE — H&P ADULT - ASSESSMENT
89 yoF PMH nonobstructive CAD ([facility] 2012), SDH s/p ronny hole, dementia, DVT/PE, and s/p Nikky for rectal adenocarcinoma admitted with bradycardia for possible PPM placement.     #Neuro  - baseline dementia  - hold memantine while admitted    #Resp:  *hx of PE/DVT  *CXR suggestive of pulm edema  - hold Eliquis pending PPM  - heparin drip    #CV  *Complete heart block with asymptomatic bradycardia to upper 30's/40's.   *hx of MR  - 24 hour telemetry  - EP consult for PPM placement  - NPO past midnight prior to possible PPM placement tomorrow  - if symptomatic bradycardia can place TVP and/or start dopamine drip  - hold home metoprolol  - TTE  - switch Eliquis to heparin drip for now    #GI:  - NPO past midnight  - after ppm can consider bowel regiment    #Renal  - JANEEN  - trend bun/cr    #Heme  *hx of DVT/PE  - switch home eliquis to heparin drip prior to ppm placement    #ID  JANEEN    #Endo  -f/u hgb a1c, lipid panel, TSH  -c/w simvastatin 20    PPX:  heparin drip, after PPM placement can restart elequis 89 yoF PMH nonobstructive CAD ([facility] 2012), SDH s/p ronny hole, dementia, DVT/PE, and s/p Nikky for rectal adenocarcinoma admitted with bradycardia for possible PPM placement.     #Neuro  - baseline dementia  - hold memantine while admitted    #Resp:  *hx of PE/DVT  *CXR suggestive of pulm edema  - hold Eliquis pending PPM  - heparin drip    #CV  *Complete heart block with asymptomatic bradycardia to upper 30's/40's.   *hx of MR  - 24 hour telemetry  - EP consult for PPM placement  - NPO past midnight prior to possible PPM placement tomorrow  - if symptomatic bradycardia can place TVP and/or start dopamine drip  - hold home metoprolol  - TTE  - switch Eliquis to heparin drip for now    #GI:  - NPO past midnight  - colostomy care    #Renal  - JANEEN  - trend bun/cr    #Heme  *hx of DVT/PE  - switch home eliquis to heparin drip prior to ppm placement    #ID  JANEEN    #Endo  -f/u hgb a1c, lipid panel, TSH  -c/w simvastatin 20    PPX:  heparin drip, after PPM placement can restart elequis

## 2020-12-28 NOTE — CHART NOTE - NSCHARTNOTEFT_GEN_A_CORE
Padua Prediction Score for VTE Risk within 24hours of admission:    Active malignancy:                                                    [  ] YES +3, [ X ] NO   Previous VTE (Excluding Superficial Vein Thrombosis): [  ] YES +3, [ X ] NO  Reduced mobility:                                                     [ X ] YES +3, [  ] NO  Already known thrombophilic condition:                     [ X ] YES +3, [  ] NO  Recent (</=1 month trauma and/or surgery):             [  ] YES +2, [ X ] NO  Elderly age (>/=70):                                                  [ X ] YES +1, [  ] NO  Heart and/or Respiratory Failure:                               [  ] YES +1, [ X ] NO   Acute MI and/or ischemic CVA:                                  [  ] YES +1, [ X ] NO   Acute infection and/or rheumatologic disorder:          [  ] YES +1, [ X ] NO   BMI>/= 30:                                                              [  ] YES +1, [ X ] NO   Ongoing hormonal treatment:                                   [  ] YES +1, [ X ] NO    Total Score: [ 7 ]  points    [  ] Padua Score <  3: Low Risk of VTE         - Chemical Thromboprophylaxis should be considered on case-by-case basis  [ X ] Padua Score >/= 4: High Risk of VTE         - Chemical Thromboprophylaxis is recommended for nonpregnant patients without contraindications (Major bleeding, thrombocytopenia) who are >/=  18 years of age                         VTE Prophylaxis Recommendations:  Mechanical Pneumatic Compression Devices                                [ X ]  Yes,  [  ] No, Contraindicated    Chemical VTE Prophylaxis (Heparin/ Lovenox/ Fondaparinux)        [   ] Yes,  [  ] No              [ ] Contraindicated, because _____              [ X] Already receiving Systemic Anticoagulation

## 2020-12-28 NOTE — ED PROVIDER NOTE - CARE PLAN
Principal Discharge DX:	Heart block  Goal:	Admission to CCU  Assessment and plan of treatment:	Symptomatic bradycardia c/f AV dissociation. Admitted to CCU for pacemaker eval.

## 2020-12-28 NOTE — H&P ADULT - HISTORY OF PRESENT ILLNESS
89 yoF PMH nonobstructive CAD ([facility] 2012), SDH s/p ronny hole, dementia, DVT/PE, and s/p Nikky for rectal adenocarcinoma presenting for SOB. Pt had SOB for several days with "labored breathing". Cardiologist (Dr. Jauregui) instructed pt to go to ED. HR found to be in upper 30's-40's (baseline 70's). Denies any other symptoms including fever/chills/n/v/HA/falls/chest pain/dizziness. Upon arrival to ED SOB improved without any interventions. No known sick contacts or recent travel.  Took metoprolol succ 12.5mg in AM 89 yoF PMH nonobstructive CAD ([facility] 2012), SDH s/p ronny hole, dementia, DVT/PE, and s/p Nikky for rectal adenocarcinoma presenting for SOB. Pt had SOB for several days with "labored breathing". Cardiologist (Dr. Jauregui) instructed pt to go to ED. HR found to be in upper 30's-40's (baseline 70's). Denies any other symptoms including fever/chills/n/v/HA/falls/chest pain/dizziness. Upon arrival to ED SOB improved without any interventions. No known sick contacts or recent travel.  Took metoprolol succ 12.5mg in AM

## 2020-12-28 NOTE — ED PROVIDER NOTE - PHYSICAL EXAMINATION
GENERAL: NAD, well-groomed, well-developed  HEAD: Atraumatic, Normocephalic  EYES: EOMI, PERRLA, conjunctiva and sclera clear  ENMT: No tonsillar erythema, exudates, or enlargement; Moist mucous membranes  NECK: Supple, No JVD  NERVOUS SYSTEM: AOX3, motor and sensation grossly intact in b/l UE and b/l LE  PSYCHIATRIC: Appropriate affect and mood  CHEST/LUNG: Clear to auscultation bilaterally; No rales, rhonchi, wheezing, or rubs  HEART: bradycardic, irregular rhythm; no murmurs, rubs, gallops; No LE edema  ABDOMEN: Soft, Nontender, Nondistended; +ostomy bag clean and without erythema  EXTREMITIES:  2+ Peripheral Pulses, No clubbing, cyanosis  SKIN: No rashes or lesions

## 2020-12-28 NOTE — ED PROVIDER NOTE - NS ED ROS FT
CONSTITUTIONAL: No fever or fatigue  EYES: No eye pain, visual disturbances  ENMT: No difficulty hearing; No sinus or throat pain  RESPIRATORY: No cough, wheezing, chills or hemoptysis; +SOB, currently improved  CARDIOVASCULAR: No chest pain, palpitations, dizziness, or leg swelling  GASTROINTESTINAL: No abdominal pain  GENITOURINARY: No dysuria or hematuria  NEUROLOGICAL: No headaches, loss of strength  SKIN: No itching, burning, rashes, or lesions   LYMPH NODES: No enlarged glands  ENDOCRINE: No heat or cold intolerance  MUSCULOSKELETAL: No joint pain or swelling  PSYCHIATRIC: Denies depression, anxiety  HEME/LYMPH: No easy bruising, or bleeding gums  ALLERGY AND IMMUNOLOGIC: No hives or eczema

## 2020-12-28 NOTE — ED PROVIDER NOTE - CLINICAL SUMMARY MEDICAL DECISION MAKING FREE TEXT BOX
Bradycardia, shortness of breath.  concern for intermittent 3rd degree heart block and sinus bradycardia.  evaluated by cardiology fellow in the ED.  will admit to CCU for further evaluation.

## 2020-12-28 NOTE — ED PROVIDER NOTE - PMH
Asthma    Deep vein thrombosis    Generalized Osteoarthritis    History of Osteoporosis    Hyperlipemia    Hypertension    Pulmonary embolus    PVD (Peripheral Vascular Disease)

## 2020-12-28 NOTE — ED PROVIDER NOTE - OBJECTIVE STATEMENT
89-yo F w/ PMH nonobstructive CAD ([facility] 2012), SDH s/p ronny hole, dementia, DVT/PE, and s/p Nikky for rectal adenocarcinoma, presenting for SOB. Patient lives with family. Per daughter, patient was with "labored breathing". Noted to have SOB for the past couple of days. Called cardiologist (Dr. Jauregui), who referred patient to ED. En route, HR noted to be high 30s-40s (baseline 70s). Took her beta blocker this AM (metoprolol succinate 12.5 mg). No sick contacts, known COVID-19 exposure, or travel history. Currently improved breathing pattern per daughter without intervention. 89-yo F w/ PMH nonobstructive CAD ([facility] 2012), SDH s/p ronny hole, dementia, DVT/PE, and s/p Nikky for rectal adenocarcinoma, presenting for SOB. Patient lives with family. Per daughter, patient was with "labored breathing". Noted to have SOB for the past couple of days. Called cardiologist (Dr. Jauregui), who referred patient to ED. En route, HR noted to be high 30s-40s (baseline 70s). Took her beta blocker this AM (metoprolol succinate 12.5 mg). No sick contacts, known COVID-19 exposure, or travel history. Currently improved breathing pattern per daughter without intervention. Currently no SOB, fever, chills, nausea, vomiting, chest pain, dizziness, or headache. 89-yo F w/ PMH nonobstructive CAD ([facility] ), SDH s/p ronny hole, dementia, DVT/PE, and s/p Nikky for rectal adenocarcinoma, presenting for SOB. Patient lives with family. Per daughter, patient was with "labored breathing". Noted to have SOB for the past couple of days. Called cardiologist (Dr. Jauregui), who referred patient to ED. En route, HR noted to be high 30s-40s (baseline 70s). Took her beta blocker this AM (metoprolol succinate 12.5 mg). No sick contacts, known COVID-19 exposure, or travel history. Currently improved breathing pattern per daughter without intervention. Currently no SOB, fever, chills, nausea, vomiting, chest pain, dizziness, or headache.    Attendinyo female presents with bradycardia and labored breathing for a couple of days.  noted to have slow heart rate today.  pt states she feels well at this time.

## 2020-12-28 NOTE — H&P ADULT - NSHPLABSRESULTS_GEN_ALL_CORE
Previous cardiac w/u:  TTE Dec 2018:  1. Mitral annular calcification, otherwise normal mitral  valve. Mild mitral regurgitation.  2. Aortic valve not well visualized; appears calcified.  Peak transaortic valve gradient equals 6 mm Hg.  Mild-moderate aortic regurgitation.  3. Severely dilated left atrium.  LA volume index = 49  cc/m2. Atrial Septal Aneurysm is present.  4. Eccentric left ventricular hypertrophy (dilated left  ventricle with normal relative wall thickness).  5. Moderate segmental left ventricular systolic  dysfunction. The basal inferoseptum, mid to distal lateral,  mid to distal inferior, and mid inferolateral wall are  hypokinetic.  6. Right ventricular enlargement with decreased right  ventricular systolic function. TV s'= 8.5 cm/sec.  7. Estimated right ventricular systolic pressure equals 58  mm Hg, assuming right atrial pressure equals 8 mm Hg,  consistent with moderate pulmonary hypertension.  8. Normal tricuspid valve. Moderate tricuspid  regurgitation.  9. Color Doppler suggestive of patent foramen ovale (as  noted on prior echo 02/15/2018). Consider repeat limited  study with agitated saline.  *** Compared with echocardiogram of 2/5/2018, pulmonary  hypertension and right ventricular dysfunction are now  seen. Mitral regurgitation has decreased. Atrial Septal  aneurysm  is again seen as noted on previous echo.  ------------------------------------------------------------------------  Confirmed on  12/7/2018 - 20:15:05 by Ranjit Caballero M.D.    < from: Nuclear Stress Test-Exercise (10.24.16 @ 10:26) >    IMPRESSIONS:Abnormal Study  * Exercise capacity: 6 METS, Poor for age and gender.  * Chest Pain: No chest pain with exercise.  * Symptom: Fatigue.  * HR Response: Excessive increase in HR with stress due to  poor physical condition and/or reduced cardiovascular  reserve.  * BP Response: Appropriate.  * Heart Rhythm: Sinus Rhythm - Rare VPD's - 61 BPM.  * Q Waves: Anteroseptal MI.  * Baseline ECG: Nonspecific ST-T wave abnormality.  * ECG Changes: ECG interpretation limited by excessive  artifact during exercise.  Grossly, peaked T waves  developed in leads V2-V6 starting at 1:00 min of exercise  at 104 bpm and returning to baseline by 5:00 min in  recovery.  Otherwise, no significant ischemic ST segment  changes noted on interpretable electrocardiograms.  * Arrhythmia: Occasional APDs occurred during recovery.  Occasional VPDs occurred during rest and recovery.  Frequent VPDs, including ventricular couplets and  triplets, occurred during stress.  * Review of raw data shows: The study is of good technical  quality.  * The left ventricle was normal in size. There is a medium  sized moderate predominantly fixed defect in the basal to  mid inferior wall consistent with scar  * Post-stress gated wall motion analysis was performed  (LVEF = 51 %;LVEDV = 118 ml.) Reduced systolic thickening  of the basal to mid inferior wall.  ------------------------------------------------------------------------  Confirmed on  10/24/2016 - 16:11:43 by Janes Franks MD    < end of copied text >  < from: Cardiac Cath Lab (06.19.12 @ 09:59) >    Procedure:  Left coronary angiography.  Right coronary angiography.  Left heart catheterization with ventriculography.  Right heart catheterization.    Summary: Normal rightheart pressures. Low [place] LV function with subtle  posterobasal hypokinesis. Mild MR. Mild CAD.    < end of copied text >

## 2020-12-28 NOTE — ED PROVIDER NOTE - PROGRESS NOTE DETAILS
Called cardiology consult. Repeat EKG to be taken. Called cardiology consult. Repeat EKG to be taken. - Taina, PGY-3

## 2020-12-29 LAB
A1C WITH ESTIMATED AVERAGE GLUCOSE RESULT: 5.3 % — SIGNIFICANT CHANGE UP (ref 4–5.6)
ALBUMIN SERPL ELPH-MCNC: 3.6 G/DL — SIGNIFICANT CHANGE UP (ref 3.3–5)
ALBUMIN SERPL ELPH-MCNC: 3.7 G/DL — SIGNIFICANT CHANGE UP (ref 3.3–5)
ALP SERPL-CCNC: 90 U/L — SIGNIFICANT CHANGE UP (ref 40–120)
ALP SERPL-CCNC: 93 U/L — SIGNIFICANT CHANGE UP (ref 40–120)
ALT FLD-CCNC: 161 U/L — HIGH (ref 10–45)
ALT FLD-CCNC: 45 U/L — SIGNIFICANT CHANGE UP (ref 10–45)
ANION GAP SERPL CALC-SCNC: 12 MMOL/L — SIGNIFICANT CHANGE UP (ref 5–17)
ANION GAP SERPL CALC-SCNC: 16 MMOL/L — SIGNIFICANT CHANGE UP (ref 5–17)
APTT BLD: 30.6 SEC — SIGNIFICANT CHANGE UP (ref 27.5–35.5)
APTT BLD: 64.2 SEC — HIGH (ref 27.5–35.5)
AST SERPL-CCNC: 197 U/L — HIGH (ref 10–40)
AST SERPL-CCNC: 38 U/L — SIGNIFICANT CHANGE UP (ref 10–40)
BASOPHILS # BLD AUTO: 0.08 K/UL — SIGNIFICANT CHANGE UP (ref 0–0.2)
BASOPHILS # BLD AUTO: 0.12 K/UL — SIGNIFICANT CHANGE UP (ref 0–0.2)
BASOPHILS NFR BLD AUTO: 1 % — SIGNIFICANT CHANGE UP (ref 0–2)
BASOPHILS NFR BLD AUTO: 1.4 % — SIGNIFICANT CHANGE UP (ref 0–2)
BILIRUB SERPL-MCNC: 0.4 MG/DL — SIGNIFICANT CHANGE UP (ref 0.2–1.2)
BILIRUB SERPL-MCNC: 0.6 MG/DL — SIGNIFICANT CHANGE UP (ref 0.2–1.2)
BLD GP AB SCN SERPL QL: NEGATIVE — SIGNIFICANT CHANGE UP
BUN SERPL-MCNC: 20 MG/DL — SIGNIFICANT CHANGE UP (ref 7–23)
BUN SERPL-MCNC: 22 MG/DL — SIGNIFICANT CHANGE UP (ref 7–23)
CALCIUM SERPL-MCNC: 8.8 MG/DL — SIGNIFICANT CHANGE UP (ref 8.4–10.5)
CALCIUM SERPL-MCNC: 9.1 MG/DL — SIGNIFICANT CHANGE UP (ref 8.4–10.5)
CHLORIDE SERPL-SCNC: 108 MMOL/L — SIGNIFICANT CHANGE UP (ref 96–108)
CHLORIDE SERPL-SCNC: 110 MMOL/L — HIGH (ref 96–108)
CHOLEST SERPL-MCNC: 124 MG/DL — SIGNIFICANT CHANGE UP
CK MB CFR SERPL CALC: 1.8 NG/ML — SIGNIFICANT CHANGE UP (ref 0–3.8)
CK SERPL-CCNC: 53 U/L — SIGNIFICANT CHANGE UP (ref 25–170)
CO2 SERPL-SCNC: 18 MMOL/L — LOW (ref 22–31)
CO2 SERPL-SCNC: 18 MMOL/L — LOW (ref 22–31)
CREAT SERPL-MCNC: 0.84 MG/DL — SIGNIFICANT CHANGE UP (ref 0.5–1.3)
CREAT SERPL-MCNC: 1.07 MG/DL — SIGNIFICANT CHANGE UP (ref 0.5–1.3)
EOSINOPHIL # BLD AUTO: 0.15 K/UL — SIGNIFICANT CHANGE UP (ref 0–0.5)
EOSINOPHIL # BLD AUTO: 0.17 K/UL — SIGNIFICANT CHANGE UP (ref 0–0.5)
EOSINOPHIL NFR BLD AUTO: 1.9 % — SIGNIFICANT CHANGE UP (ref 0–6)
EOSINOPHIL NFR BLD AUTO: 2 % — SIGNIFICANT CHANGE UP (ref 0–6)
ESTIMATED AVERAGE GLUCOSE: 105 MG/DL — SIGNIFICANT CHANGE UP (ref 68–114)
GAS PNL BLDA: SIGNIFICANT CHANGE UP
GAS PNL BLDA: SIGNIFICANT CHANGE UP
GLUCOSE SERPL-MCNC: 124 MG/DL — HIGH (ref 70–99)
GLUCOSE SERPL-MCNC: 157 MG/DL — HIGH (ref 70–99)
HCT VFR BLD CALC: 35.8 % — SIGNIFICANT CHANGE UP (ref 34.5–45)
HCT VFR BLD CALC: 36.7 % — SIGNIFICANT CHANGE UP (ref 34.5–45)
HDLC SERPL-MCNC: 30 MG/DL — LOW
HGB BLD-MCNC: 11.2 G/DL — LOW (ref 11.5–15.5)
HGB BLD-MCNC: 11.4 G/DL — LOW (ref 11.5–15.5)
IMM GRANULOCYTES NFR BLD AUTO: 0.4 % — SIGNIFICANT CHANGE UP (ref 0–1.5)
IMM GRANULOCYTES NFR BLD AUTO: 0.7 % — SIGNIFICANT CHANGE UP (ref 0–1.5)
INR BLD: 1.5 RATIO — HIGH (ref 0.88–1.16)
LACTATE SERPL-SCNC: 1.5 MMOL/L — SIGNIFICANT CHANGE UP (ref 0.7–2)
LIPID PNL WITH DIRECT LDL SERPL: 77 MG/DL — SIGNIFICANT CHANGE UP
LYMPHOCYTES # BLD AUTO: 1.27 K/UL — SIGNIFICANT CHANGE UP (ref 1–3.3)
LYMPHOCYTES # BLD AUTO: 14.9 % — SIGNIFICANT CHANGE UP (ref 13–44)
LYMPHOCYTES # BLD AUTO: 2.99 K/UL — SIGNIFICANT CHANGE UP (ref 1–3.3)
LYMPHOCYTES # BLD AUTO: 37.1 % — SIGNIFICANT CHANGE UP (ref 13–44)
MAGNESIUM SERPL-MCNC: 2.1 MG/DL — SIGNIFICANT CHANGE UP (ref 1.6–2.6)
MAGNESIUM SERPL-MCNC: 2.3 MG/DL — SIGNIFICANT CHANGE UP (ref 1.6–2.6)
MCHC RBC-ENTMCNC: 28.3 PG — SIGNIFICANT CHANGE UP (ref 27–34)
MCHC RBC-ENTMCNC: 29.1 PG — SIGNIFICANT CHANGE UP (ref 27–34)
MCHC RBC-ENTMCNC: 30.5 GM/DL — LOW (ref 32–36)
MCHC RBC-ENTMCNC: 31.8 GM/DL — LOW (ref 32–36)
MCV RBC AUTO: 91.3 FL — SIGNIFICANT CHANGE UP (ref 80–100)
MCV RBC AUTO: 92.7 FL — SIGNIFICANT CHANGE UP (ref 80–100)
MONOCYTES # BLD AUTO: 0.52 K/UL — SIGNIFICANT CHANGE UP (ref 0–0.9)
MONOCYTES # BLD AUTO: 0.75 K/UL — SIGNIFICANT CHANGE UP (ref 0–0.9)
MONOCYTES NFR BLD AUTO: 6.1 % — SIGNIFICANT CHANGE UP (ref 2–14)
MONOCYTES NFR BLD AUTO: 9.3 % — SIGNIFICANT CHANGE UP (ref 2–14)
NEUTROPHILS # BLD AUTO: 4.06 K/UL — SIGNIFICANT CHANGE UP (ref 1.8–7.4)
NEUTROPHILS # BLD AUTO: 6.41 K/UL — SIGNIFICANT CHANGE UP (ref 1.8–7.4)
NEUTROPHILS NFR BLD AUTO: 50.3 % — SIGNIFICANT CHANGE UP (ref 43–77)
NEUTROPHILS NFR BLD AUTO: 74.9 % — SIGNIFICANT CHANGE UP (ref 43–77)
NON HDL CHOLESTEROL: 94 MG/DL — SIGNIFICANT CHANGE UP
NRBC # BLD: 0 /100 WBCS — SIGNIFICANT CHANGE UP (ref 0–0)
NRBC # BLD: 0 /100 WBCS — SIGNIFICANT CHANGE UP (ref 0–0)
PHOSPHATE SERPL-MCNC: 3.6 MG/DL — SIGNIFICANT CHANGE UP (ref 2.5–4.5)
PHOSPHATE SERPL-MCNC: 4.4 MG/DL — SIGNIFICANT CHANGE UP (ref 2.5–4.5)
PLATELET # BLD AUTO: 218 K/UL — SIGNIFICANT CHANGE UP (ref 150–400)
PLATELET # BLD AUTO: 261 K/UL — SIGNIFICANT CHANGE UP (ref 150–400)
POTASSIUM SERPL-MCNC: 3.3 MMOL/L — LOW (ref 3.5–5.3)
POTASSIUM SERPL-MCNC: 4 MMOL/L — SIGNIFICANT CHANGE UP (ref 3.5–5.3)
POTASSIUM SERPL-SCNC: 3.3 MMOL/L — LOW (ref 3.5–5.3)
POTASSIUM SERPL-SCNC: 4 MMOL/L — SIGNIFICANT CHANGE UP (ref 3.5–5.3)
PROT SERPL-MCNC: 6.3 G/DL — SIGNIFICANT CHANGE UP (ref 6–8.3)
PROT SERPL-MCNC: 6.3 G/DL — SIGNIFICANT CHANGE UP (ref 6–8.3)
PROTHROM AB SERPL-ACNC: 17.6 SEC — HIGH (ref 10.6–13.6)
RBC # BLD: 3.92 M/UL — SIGNIFICANT CHANGE UP (ref 3.8–5.2)
RBC # BLD: 3.96 M/UL — SIGNIFICANT CHANGE UP (ref 3.8–5.2)
RBC # FLD: 15.2 % — HIGH (ref 10.3–14.5)
RBC # FLD: 15.4 % — HIGH (ref 10.3–14.5)
RH IG SCN BLD-IMP: POSITIVE — SIGNIFICANT CHANGE UP
SODIUM SERPL-SCNC: 138 MMOL/L — SIGNIFICANT CHANGE UP (ref 135–145)
SODIUM SERPL-SCNC: 144 MMOL/L — SIGNIFICANT CHANGE UP (ref 135–145)
TRIGL SERPL-MCNC: 85 MG/DL — SIGNIFICANT CHANGE UP
TROPONIN T, HIGH SENSITIVITY RESULT: 18 NG/L — SIGNIFICANT CHANGE UP (ref 0–51)
TSH SERPL-MCNC: 2.33 UIU/ML — SIGNIFICANT CHANGE UP (ref 0.27–4.2)
TSH SERPL-MCNC: 3.1 UIU/ML — SIGNIFICANT CHANGE UP (ref 0.27–4.2)
WBC # BLD: 8.06 K/UL — SIGNIFICANT CHANGE UP (ref 3.8–10.5)
WBC # BLD: 8.55 K/UL — SIGNIFICANT CHANGE UP (ref 3.8–10.5)
WBC # FLD AUTO: 8.06 K/UL — SIGNIFICANT CHANGE UP (ref 3.8–10.5)
WBC # FLD AUTO: 8.55 K/UL — SIGNIFICANT CHANGE UP (ref 3.8–10.5)

## 2020-12-29 PROCEDURE — 93010 ELECTROCARDIOGRAM REPORT: CPT | Mod: 77

## 2020-12-29 PROCEDURE — 93306 TTE W/DOPPLER COMPLETE: CPT | Mod: 26

## 2020-12-29 PROCEDURE — 33274 TCAT INSJ/RPL PERM LDLS PM: CPT | Mod: Q0

## 2020-12-29 PROCEDURE — 99291 CRITICAL CARE FIRST HOUR: CPT

## 2020-12-29 PROCEDURE — 99291 CRITICAL CARE FIRST HOUR: CPT | Mod: 25

## 2020-12-29 PROCEDURE — 99222 1ST HOSP IP/OBS MODERATE 55: CPT

## 2020-12-29 PROCEDURE — 71045 X-RAY EXAM CHEST 1 VIEW: CPT | Mod: 26

## 2020-12-29 PROCEDURE — 93010 ELECTROCARDIOGRAM REPORT: CPT

## 2020-12-29 PROCEDURE — 99292 CRITICAL CARE ADDL 30 MIN: CPT

## 2020-12-29 RX ORDER — QUETIAPINE FUMARATE 200 MG/1
12.5 TABLET, FILM COATED ORAL ONCE
Refills: 0 | Status: COMPLETED | OUTPATIENT
Start: 2020-12-29 | End: 2020-12-29

## 2020-12-29 RX ORDER — DEXMEDETOMIDINE HYDROCHLORIDE IN 0.9% SODIUM CHLORIDE 4 UG/ML
0.2 INJECTION INTRAVENOUS
Qty: 200 | Refills: 0 | Status: DISCONTINUED | OUTPATIENT
Start: 2020-12-29 | End: 2020-12-29

## 2020-12-29 RX ORDER — HEPARIN SODIUM 5000 [USP'U]/ML
5500 INJECTION INTRAVENOUS; SUBCUTANEOUS EVERY 6 HOURS
Refills: 0 | Status: DISCONTINUED | OUTPATIENT
Start: 2020-12-29 | End: 2020-12-29

## 2020-12-29 RX ORDER — APIXABAN 2.5 MG/1
2.5 TABLET, FILM COATED ORAL EVERY 12 HOURS
Refills: 0 | Status: DISCONTINUED | OUTPATIENT
Start: 2020-12-30 | End: 2020-12-31

## 2020-12-29 RX ORDER — HEPARIN SODIUM 5000 [USP'U]/ML
2500 INJECTION INTRAVENOUS; SUBCUTANEOUS EVERY 6 HOURS
Refills: 0 | Status: DISCONTINUED | OUTPATIENT
Start: 2020-12-29 | End: 2020-12-29

## 2020-12-29 RX ORDER — ACETAMINOPHEN 500 MG
1000 TABLET ORAL ONCE
Refills: 0 | Status: COMPLETED | OUTPATIENT
Start: 2020-12-29 | End: 2020-12-29

## 2020-12-29 RX ORDER — HEPARIN SODIUM 5000 [USP'U]/ML
INJECTION INTRAVENOUS; SUBCUTANEOUS
Qty: 25000 | Refills: 0 | Status: DISCONTINUED | OUTPATIENT
Start: 2020-12-29 | End: 2020-12-29

## 2020-12-29 RX ORDER — CHLORHEXIDINE GLUCONATE 213 G/1000ML
15 SOLUTION TOPICAL EVERY 12 HOURS
Refills: 0 | Status: DISCONTINUED | OUTPATIENT
Start: 2020-12-29 | End: 2020-12-29

## 2020-12-29 RX ORDER — POTASSIUM CHLORIDE 20 MEQ
10 PACKET (EA) ORAL
Refills: 0 | Status: COMPLETED | OUTPATIENT
Start: 2020-12-29 | End: 2020-12-29

## 2020-12-29 RX ORDER — SODIUM CHLORIDE 9 MG/ML
250 INJECTION INTRAMUSCULAR; INTRAVENOUS; SUBCUTANEOUS ONCE
Refills: 0 | Status: COMPLETED | OUTPATIENT
Start: 2020-12-29 | End: 2020-12-29

## 2020-12-29 RX ADMIN — Medication 5 MILLIGRAM(S): at 21:15

## 2020-12-29 RX ADMIN — Medication 1000 MILLIGRAM(S): at 22:45

## 2020-12-29 RX ADMIN — Medication 100 MILLIEQUIVALENT(S): at 17:37

## 2020-12-29 RX ADMIN — Medication 400 MILLIGRAM(S): at 22:00

## 2020-12-29 RX ADMIN — CHLORHEXIDINE GLUCONATE 1 APPLICATION(S): 213 SOLUTION TOPICAL at 06:16

## 2020-12-29 RX ADMIN — HEPARIN SODIUM 1200 UNIT(S)/HR: 5000 INJECTION INTRAVENOUS; SUBCUTANEOUS at 08:45

## 2020-12-29 RX ADMIN — QUETIAPINE FUMARATE 12.5 MILLIGRAM(S): 200 TABLET, FILM COATED ORAL at 02:27

## 2020-12-29 RX ADMIN — DEXMEDETOMIDINE HYDROCHLORIDE IN 0.9% SODIUM CHLORIDE 3.52 MICROGRAM(S)/KG/HR: 4 INJECTION INTRAVENOUS at 16:00

## 2020-12-29 RX ADMIN — Medication 100 MILLIEQUIVALENT(S): at 18:55

## 2020-12-29 RX ADMIN — Medication 100 MILLIEQUIVALENT(S): at 20:03

## 2020-12-29 RX ADMIN — SIMVASTATIN 20 MILLIGRAM(S): 20 TABLET, FILM COATED ORAL at 21:15

## 2020-12-29 RX ADMIN — HEPARIN SODIUM 1200 UNIT(S)/HR: 5000 INJECTION INTRAVENOUS; SUBCUTANEOUS at 01:35

## 2020-12-29 RX ADMIN — SODIUM CHLORIDE 250 MILLILITER(S): 9 INJECTION INTRAMUSCULAR; INTRAVENOUS; SUBCUTANEOUS at 23:11

## 2020-12-29 NOTE — PROGRESS NOTE ADULT - ASSESSMENT
89y F PMH nonobstructive CAD ([facility] 2012), SDH s/p ronny hole, dementia, DVT/PE, and s/p Nikky for rectal adenocarcinoma admitted with bradycardia for possible PPM placement.     #Neuro  - baseline dementia A&Ox1-2  - hold memantine while admitted  -Seroquel 12.5mg at bedtime for agitation     #Resp:  *hx of PE/DVT  *CXR suggestive of pulm edema  - hold Eliquis pending PPM  - heparin drip w/ goal PTT 58-99    #CV  *Complete heart block with asymptomatic bradycardia to upper 30's/40's.   *hx of MR  - 24 hour telemetry  - PPM placement w/ EP today  - if symptomatic bradycardia can place TVP and/or start dopamine drip  - hold home metoprolol  - TTE  - switch Eliquis to heparin drip for now    #GI:  - NPO past midnight for PPM 12/29  - colostomy care    #Renal  - JANEEN  - trend bun/cr    #Heme  *hx of DVT/PE  - switch home Eliquis to heparin drip prior to ppm placement    #ID  JANEEN  -WBC WNL, monitor for fever/leukocytosis     #Endo  -f/u hgb a1c, lipid panel, TSH  -c/w simvastatin 20    PPX:  heparin drip, after PPM placement can restart Eliquis   89y F PMH nonobstructive CAD ([facility] 2012), SDH s/p ronny hole, dementia, DVT/PE, and s/p Nikky for rectal adenocarcinoma admitted with bradycardia for possible PPM placement.     #Neuro  - baseline dementia A&Ox1-2  - hold memantine while admitted  -Seroquel 12.5mg at bedtime for agitation     #Resp:  *hx of PE/DVT  *CXR suggestive of pulm edema  - hold Eliquis pending PPM  - heparin drip w/ goal PTT 58-99    #CV  *Complete heart block with asymptomatic bradycardia to upper 30's/40's.   *hx of MR  - 24 hour telemetry  - PPM placement w/ EP today  - if symptomatic bradycardia can place TVP and/or start dopamine drip  - hold home metoprolol  - TTE  - switch Eliquis to heparin drip for now    #GI:  - NPO past midnight for PPM 12/29  - colostomy care    #Renal  - JANEEN  - trend bun/cr    #Heme  *hx of DVT/PE  - switch home Eliquis to heparin drip prior to ppm placement    #ID  JANEEN  -WBC WNL, monitor for fever/leukocytosis     #Endo  -f/u hgb a1c, lipid panel, TSH  -c/w simvastatin 20    PPX:  heparin drip, after PPM placement can restart Eliquis            ****Fellow Note****    Plan  -CHB; for PPM however patient arrested prior to device placement; currently intubated  -TTE w/ EF 35%, severe LV enlargement, akinesis of the anterolateral wall, anterior wall. Severe functional MR directed posterolaterally. The reduced EF, anterior akinesis, and worsening MR severity appear new compared to 2018 echo (EF 54% at that time). The inferolateral wall, basal inferoseptum, and basal inferior wall also appear akinetic and there is moderate AR which are unchanged compared to 2018 echo.  -the new worsening LV function, reduced EF, and severe functional MR are concerning for possible ischemia. In the setting of dementia and other comorbidities, pt a poor candidate for invasive revascularization. Will continue to discuss GOC w/ family and medically manage patient's heart failure    Mainor Sam MD  Cardiology Fellow - F1  Text or Call: [phone]  For all New Consults and Questions:  [email or web address]   Login: [email or web address] 89y F PMH nonobstructive CAD ([facility] 2012), SDH s/p ronny hole, dementia, DVT/PE, and s/p Nikky for rectal adenocarcinoma admitted with bradycardia for possible PPM placement.     #Neuro  - baseline dementia A&Ox1-2  - hold memantine while admitted  -Seroquel 12.5mg at bedtime for agitation     #Resp:  *hx of PE/DVT  *CXR suggestive of pulm edema  - hold Eliquis pending PPM  - heparin drip w/ goal PTT 58-99    #CV  *Complete heart block with asymptomatic bradycardia to upper 30's/40's.   *hx of MR  - 24 hour telemetry  - PPM placement w/ EP today  - if symptomatic bradycardia can place TVP and/or start dopamine drip  - hold home metoprolol  - TTE  - switch Eliquis to heparin drip for now    #GI:  - NPO past midnight for PPM 12/29  - colostomy care    #Renal  - JANEEN  - trend bun/cr    #Heme  *hx of DVT/PE  - switch home Eliquis to heparin drip prior to ppm placement    #ID  JANEEN  -WBC WNL, monitor for fever/leukocytosis     #Endo  -f/u hgb a1c, lipid panel, TSH  -c/w simvastatin 20    PPX:  heparin drip, after PPM placement can restart Eliquis            ****Fellow Note****    Plan  -CHB; for PPM however patient w/ VF arrest prior to device placement; currently required defibrillation x 1 w/ ROSC achieved and emergently intubated  -TTE w/ EF 35%, severe LV enlargement, akinesis of the anterolateral wall, anterior wall. Severe functional MR directed posterolaterally. The reduced EF, anterior akinesis, and worsening MR severity appear new compared to 2018 echo (EF 40-45% at that time). The inferolateral wall, basal inferoseptum, and basal inferior wall also appear akinetic and there is moderate AR, both which are unchanged compared to 2018 echo.  -the new worsening LV function, reduced EF, and severe functional MR are concerning for possible ischemia. In the setting of dementia and other comorbidities, pt a poor candidate for invasive revascularization. Will continue to discuss GOC w/ family and medically manage patient's heart failure    Mainor Sam MD  Cardiology Fellow - F1  Text or Call: [phone]  For all New Consults and Questions:  [email or web address]   Login: [email or web address]

## 2020-12-29 NOTE — PROGRESS NOTE ADULT - SUBJECTIVE AND OBJECTIVE BOX
PATIENT:  AMY CHACON  [id number]    CHIEF COMPLAINT:  Patient is a 89y old  Female who presents with a chief complaint of Bradycardia (29 Dec 2020 10:21)    HPI: 89y F PMH nonobstructive CAD ([facility] ), SDH s/p ronny hole, dementia, DVT/PE, and s/p Nikky for rectal adenocarcinoma presenting for SOB. Pt had SOB for several days with "labored breathing". Cardiologist (Dr. Jauregui) instructed pt to go to ED. HR found to be in upper 30's-40's (baseline 70's). Denies any other symptoms including fever/chills/n/v/HA/falls/chest pain/dizziness. Upon arrival to ED SOB improved without any interventions. No known sick contacts or recent travel.  Took metoprolol succ 12.5mg in AM    INTERVAL HISTORY/OVERNIGHT EVENTS:      REVIEW OF SYSTEMS:    Constitutional:     [ ] negative [ ] fevers [ ] chills [ ] weight loss [ ] weight gain  HEENT:                  [ ] negative [ ] dry eyes [ ] eye irritation [ ] postnasal drip [ ] nasal congestion  CV:                         [ ] negative  [ ] chest pain [ ] orthopnea [ ] palpitations [ ] murmur  Resp:                     [ ] negative [ ] cough [ ] shortness of breath [ ] dyspnea [ ] wheezing [ ] sputum [ ] hemoptysis  GI:                          [ ] negative [ ] nausea [ ] vomiting [ ] diarrhea [ ] constipation [ ] abd pain [ ] dysphagia   :                        [ ] negative [ ] dysuria [ ] nocturia [ ] hematuria [ ] increased urinary frequency  Musculoskeletal: [ ] negative [ ] back pain [ ] myalgias [ ] arthralgias [ ] fracture  Skin:                       [ ] negative [ ] rash [ ] itch  Neurological:        [ ] negative [ ] headache [ ] dizziness [ ] syncope [ ] weakness [ ] numbness  Psychiatric:           [ ] negative [ ] anxiety [ ] depression  Endocrine:            [ ] negative [ ] diabetes [ ] thyroid problem  Heme/Lymph:      [ ] negative [ ] anemia [ ] bleeding problem  Allergic/Immune: [ ] negative [ ] itchy eyes [ ] nasal discharge [ ] hives [ ] angioedema    [ ] All other systems negative  [ ] Unable to assess ROS because ________.    MEDICATIONS:  MEDICATIONS  (STANDING):  chlorhexidine 4% Liquid 1 Application(s) Topical <User Schedule>  heparin  Infusion.  Unit(s)/Hr (12 mL/Hr) IV Continuous <Continuous>  melatonin 5 milliGRAM(s) Oral at bedtime  memantine 5 milliGRAM(s) Oral daily  simvastatin 20 milliGRAM(s) Oral at bedtime    MEDICATIONS  (PRN):  heparin   Injectable 5500 Unit(s) IV Push every 6 hours PRN For aPTT less than 40  heparin   Injectable 2500 Unit(s) IV Push every 6 hours PRN For aPTT between 40 - 57      ALLERGIES:  Allergies    No Known Allergies    Intolerances        OBJECTIVE:  ICU Vital Signs Last 24 Hrs  T(C): 36.5 (29 Dec 2020 12:00), Max: 36.9 (28 Dec 2020 21:11)  T(F): 97.7 (29 Dec 2020 12:00), Max: 98.4 (28 Dec 2020 21:11)  HR: 36 (29 Dec 2020 12:00) (32 - 51)  BP: 105/52 (29 Dec 2020 12:00) (81/58 - 147/98)  BP(mean): 79 (29 Dec 2020 12:00) (62 - 115)  ABP: --  ABP(mean): --  RR: 24 (29 Dec 2020 12:00) (15 - 29)  SpO2: 95% (29 Dec 2020 12:00) (94% - 99%)      Adult Advanced Hemodynamics Last 24 Hrs  CVP(mm Hg): --  CVP(cm H2O): --  CO: --  CI: --  PA: --  PA(mean): --  PCWP: --  SVR: --  SVRI: --  PVR: --  PVRI: --  CAPILLARY BLOOD GLUCOSE        CAPILLARY BLOOD GLUCOSE        I&O's Summary    28 Dec 2020 07:  -  29 Dec 2020 07:00  --------------------------------------------------------  IN: 192 mL / OUT: 0 mL / NET: 192 mL    29 Dec 2020 07:  -  29 Dec 2020 12:55  --------------------------------------------------------  IN: 24 mL / OUT: 0 mL / NET: 24 mL      Daily Height in cm: 160.02 (28 Dec 2020 18:52)    Daily Weight in k (28 Dec 2020 22:45)    PHYSICAL EXAMINATION:  General: WN/WD NAD  HEENT: PERRLA, EOMI, moist mucous membranes  Neurology: A&Ox3, nonfocal, MCKINNEY x 4  Respiratory: CTA B/L, normal respiratory effort, no wheezes, crackles, rales  CV: RRR, S1S2, no murmurs, rubs or gallops  Abdominal: Soft, NT, ND +BS, Last BM  Extremities: No edema, + peripheral pulses  Incisions:   Tubes:    LABS:                          11.4   8.06  )-----------( 261      ( 29 Dec 2020 00:15 )             35.8         138  |  108  |  22  ----------------------------<  124<H>  4.0   |  18<L>  |  0.84    Ca    9.1      29 Dec 2020 00:15  Phos  3.6       Mg     2.3         TPro  6.3  /  Alb  3.7  /  TBili  0.4  /  DBili  x   /  AST  38  /  ALT  45  /  AlkPhos  90      LIVER FUNCTIONS - ( 29 Dec 2020 00:15 )  Alb: 3.7 g/dL / Pro: 6.3 g/dL / ALK PHOS: 90 U/L / ALT: 45 U/L / AST: 38 U/L / GGT: x           PT/INR - ( 29 Dec 2020 00:15 )   PT: 17.6 sec;   INR: 1.50 ratio         PTT - ( 29 Dec 2020 07:34 )  PTT:64.2 sec  CKMB Units: 1.8 ng/mL ( @ 00:15)  Creatine Kinase, Serum: 53 U/L ( @ 00:15)  Creatine Kinase, Serum: 51 U/L ( @ 19:47)  CKMB Units: 1.9 ng/mL ( @ 19:47)    CARDIAC MARKERS ( 29 Dec 2020 00:15 )  x     / x     / 53 U/L / x     / 1.8 ng/mL  CARDIAC MARKERS ( 28 Dec 2020 19:47 )  x     / x     / 51 U/L / x     / 1.9 ng/mL          TELEMETRY:     EKG:     IMAGING:         PATIENT:  AYM CHACON  [id number]    CHIEF COMPLAINT:  Patient is a 89y old  Female who presents with a chief complaint of Bradycardia (29 Dec 2020 10:21)    HPI: 89y F PMH nonobstructive CAD ([facility] ), SDH s/p ronny hole, dementia, DVT/PE, and s/p Nikky for rectal adenocarcinoma presenting for SOB. Pt had SOB for several days with "labored breathing". Cardiologist (Dr. Jauregui) instructed pt to go to ED. HR found to be in upper 30's-40's (baseline 70's). Denies any other symptoms including fever/chills/n/v/HA/falls/chest pain/dizziness. Upon arrival to ED SOB improved without any interventions. No known sick contacts or recent travel.  Took metoprolol succ 12.5mg in AM    INTERVAL HISTORY/OVERNIGHT EVENTS:  -Became agitated ON, hx of dementia, administered Seroquel 12.5mg x1, symptoms improved  -NPO at midnight for plan for PPM       REVIEW OF SYSTEMS:    Constitutional:     [x ] negative [ ] fevers [ ] chills [ ] weight loss [ ] weight gain  HEENT:                  [ ] negative [ ] dry eyes [ ] eye irritation [ ] postnasal drip [ ] nasal congestion  CV:                         [x] negative  [ ] chest pain [ ] orthopnea [ ] palpitations [ ] murmur  Resp:                     [x ] negative [ ] cough [ ] shortness of breath [ ] dyspnea [ ] wheezing [ ] sputum [ ] hemoptysis  GI:                          [ x] negative [ ] nausea [ ] vomiting [ ] diarrhea [ ] constipation [ ] abd pain [ ] dysphagia   :                        [ ] negative [ ] dysuria [ ] nocturia [ ] hematuria [ ] increased urinary frequency  Musculoskeletal: [ ] negative [ ] back pain [ ] myalgias [ ] arthralgias [ ] fracture  Skin:                       [ ] negative [ ] rash [ ] itch  Neurological:        [x ] negative [ ] headache [ ] dizziness [ ] syncope [ ] weakness [ ] numbness  Psychiatric:           [ ] negative [ ] anxiety [ ] depression  Endocrine:            [ ] negative [ ] diabetes [ ] thyroid problem  Heme/Lymph:      [ ] negative [ ] anemia [ ] bleeding problem  Allergic/Immune: [ ] negative [ ] itchy eyes [ ] nasal discharge [ ] hives [ ] angioedema    [ ] All other systems negative  [x] Unable to assess full ROS because patient has history of dementia.     MEDICATIONS:  MEDICATIONS  (STANDING):  chlorhexidine 4% Liquid 1 Application(s) Topical <User Schedule>  heparin  Infusion.  Unit(s)/Hr (12 mL/Hr) IV Continuous <Continuous>  melatonin 5 milliGRAM(s) Oral at bedtime  memantine 5 milliGRAM(s) Oral daily  simvastatin 20 milliGRAM(s) Oral at bedtime    MEDICATIONS  (PRN):  heparin   Injectable 5500 Unit(s) IV Push every 6 hours PRN For aPTT less than 40  heparin   Injectable 2500 Unit(s) IV Push every 6 hours PRN For aPTT between 40 - 57      ALLERGIES:  Allergies    No Known Allergies    Intolerances        OBJECTIVE:  ICU Vital Signs Last 24 Hrs  T(C): 36.5 (29 Dec 2020 12:00), Max: 36.9 (28 Dec 2020 21:11)  T(F): 97.7 (29 Dec 2020 12:00), Max: 98.4 (28 Dec 2020 21:11)  HR: 36 (29 Dec 2020 12:00) (32 - 51)  BP: 105/52 (29 Dec 2020 12:00) (81/58 - 147/98)  BP(mean): 79 (29 Dec 2020 12:00) (62 - 115)  ABP: --  ABP(mean): --  RR: 24 (29 Dec 2020 12:00) (15 - 29)  SpO2: 95% (29 Dec 2020 12:00) (94% - 99%)      Adult Advanced Hemodynamics Last 24 Hrs  CVP(mm Hg): --  CVP(cm H2O): --  CO: --  CI: --  PA: --  PA(mean): --  PCWP: --  SVR: --  SVRI: --  PVR: --  PVRI: --  CAPILLARY BLOOD GLUCOSE      CAPILLARY BLOOD GLUCOSE        I&O's Summary    28 Dec 2020 07:  -  29 Dec 2020 07:00  --------------------------------------------------------  IN: 192 mL / OUT: 0 mL / NET: 192 mL    29 Dec 2020 07:  -  29 Dec 2020 12:55  --------------------------------------------------------  IN: 24 mL / OUT: 0 mL / NET: 24 mL      Daily Height in cm: 160.02 (28 Dec 2020 18:52)    Daily Weight in k (28 Dec 2020 22:45)    PHYSICAL EXAMINATION:  General: WN/WD NAD  HEENT: PERRL, moist mucous membranes  Neurology: A&Ox1, nonfocal, MCKINNEY x 4  Respiratory: +crackles to R lung base, normal respiratory effort  CV: bradycardic, S1S2, no murmurs, rubs or gallops  Abdominal: Soft, NT, ND +BS   Extremities: 1+ pedal edema, + peripheral pulses  Incisions:   Tubes:    LABS:                          11.4   8.06  )-----------( 261      ( 29 Dec 2020 00:15 )             35.8         138  |  108  |  22  ----------------------------<  124<H>  4.0   |  18<L>  |  0.84    Ca    9.1      29 Dec 2020 00:15  Phos  3.6       Mg     2.3         TPro  6.3  /  Alb  3.7  /  TBili  0.4  /  DBili  x   /  AST  38  /  ALT  45  /  AlkPhos  90      LIVER FUNCTIONS - ( 29 Dec 2020 00:15 )  Alb: 3.7 g/dL / Pro: 6.3 g/dL / ALK PHOS: 90 U/L / ALT: 45 U/L / AST: 38 U/L / GGT: x           PT/INR - ( 29 Dec 2020 00:15 )   PT: 17.6 sec;   INR: 1.50 ratio         PTT - ( 29 Dec 2020 07:34 )  PTT:64.2 sec  CKMB Units: 1.8 ng/mL ( @ 00:15)  Creatine Kinase, Serum: 53 U/L ( @ 00:15)  Creatine Kinase, Serum: 51 U/L ( @ 19:47)  CKMB Units: 1.9 ng/mL ( @ 19:47)    CARDIAC MARKERS ( 29 Dec 2020 00:15 )  x     / x     / 53 U/L / x     / 1.8 ng/mL  CARDIAC MARKERS ( 28 Dec 2020 19:47 )  x     / x     / 51 U/L / x     / 1.9 ng/mL      TELEMETRY: Reviewed    EKG: Reviewed     IMAGING: Reviewed

## 2020-12-29 NOTE — CHART NOTE - NSCHARTNOTEFT_GEN_A_CORE
Pre-op Diagnosis:  Complete heart block  PVC mediated (long-short) VF requiring emergent defibrillation and intubation    Post-op Diagnosis:  Same    Procedure:  Implant of VDD leadless pacemaker ([employer]onic Micra AV)    Electrophysiologist:  EVERARDO Sutton MD    Assistant:  KRISTIE Dumont MD    Anesthesia:  General anesthesia by the department of anesthesiology    Access:  RFV    Description:  Successful implant of Micra AV in high-RV septum under fluoroscopic guidance  Excellent pacing and sensing confirmed by the PSA  Tug test confirmed position of the device as well-seated  Figure-of-8 Silk suture in the right groin used to assist in hemostasis    Complications:  None    EBL:  10 cc    Disposition:  Stable    Plan:  - Bedrest x 6 hrs then remove right groin suture  - Resume home dose of anticoagulation in the AM  - CXR, ECG, and device interrogation in the AM

## 2020-12-29 NOTE — CONSULT NOTE ADULT - SUBJECTIVE AND OBJECTIVE BOX
Patient seen and evaluated at bedside    Chief Complaint:    HPI:  89 yoF PMH nonobstructive CAD ([facility] ), SDH s/p ronny hole, dementia, DVT/PE, and s/p Nikky for rectal adenocarcinoma presenting for SOB. Pt had SOB for several days with "labored breathing". Cardiologist (Dr. Jauregui) instructed pt to go to ED. HR found to be in upper 30's-40's (baseline 70's). Denies any other symptoms including fever/chills/n/v/HA/falls/chest pain/dizziness. Upon arrival to ED SOB improved without any interventions. No known sick contacts or recent travel.  Patient was found to have ventricular HR of 30s and 2:1 AV block with intermittent episodes of CHB.  Examined at bedside, no complaints but appears AOx1        PMHx:   Deep vein thrombosis    Pulmonary embolus    Generalized Osteoarthritis    PVD (Peripheral Vascular Disease)    History of Osteoporosis    Asthma    Hyperlipemia    Hypertension        PSHx:   SDH (subdural hematoma)    S/P Cataract Surgery        Allergies:  No Known Allergies      Home Meds:    Current Medications:   chlorhexidine 4% Liquid 1 Application(s) Topical <User Schedule>  heparin   Injectable 5500 Unit(s) IV Push every 6 hours PRN  heparin   Injectable 2500 Unit(s) IV Push every 6 hours PRN  heparin  Infusion.  Unit(s)/Hr IV Continuous <Continuous>  melatonin 5 milliGRAM(s) Oral at bedtime  memantine 5 milliGRAM(s) Oral daily  simvastatin 20 milliGRAM(s) Oral at bedtime      FAMILY HISTORY:  Family history of breast cancer in sister (Sibling)          Physical Exam:  T(F): 97.5 (12-29), Max: 98.4 (12-28)  HR: 36 (12-29) (32 - 51)  BP: 104/47 (12-29) (81/58 - 147/98)  RR: 29 (12-29)  SpO2: 98% (12-29)  \    Cardiovascular Diagnostic Testing:    ECG: Personally reviewed:    Echo: Personally reviewed:    Stress Testing:    Cath:    Imaging:    CXR: Personally reviewed    Labs: Personally reviewed                        11.4   8.06  )-----------( 261      ( 29 Dec 2020 00:15 )             35.8     -    138  |  108  |  22  ----------------------------<  124<H>  4.0   |  18<L>  |  0.84    Ca    9.1      29 Dec 2020 00:15  Phos  3.6       Mg     2.3         TPro  6.3  /  Alb  3.7  /  TBili  0.4  /  DBili  x   /  AST  38  /  ALT  45  /  AlkPhos  90      PT/INR - ( 29 Dec 2020 00:15 )   PT: 17.6 sec;   INR: 1.50 ratio         PTT - ( 29 Dec 2020 07:34 )  PTT:64.2 sec    Total Cholesterol: 124  LDL: --  HDL: 30  T      Thyroid Stimulating Hormone, Serum: 2.33 uIU/mL ( @ 02:49)  Thyroid Stimulating Hormone, Serum: 3.10 uIU/mL ( @ 01:54)

## 2020-12-29 NOTE — CONSULT NOTE ADULT - ASSESSMENT
89 yo F with non obstructive CAD, dementia, presents with SOB found to have HRs in 30s in 2:1 AV block with intermittent epsiodes of CHB.  Plan for micra today  Keep NPO  ensure active type and screen

## 2020-12-29 NOTE — CHART NOTE - NSCHARTNOTEFT_GEN_A_CORE
PATIENT:  AMY CHACON  [id number]    CHIEF COMPLAINT:  Patient is a 89y old  Female who presents with a chief complaint of Bradycardia (29 Dec 2020 10:21)    HPI: 89y F PMH nonobstructive CAD ([facility] 2012), SDH s/p ronny hole, dementia, DVT/PE, and s/p Nikky for rectal adenocarcinoma presenting for SOB. Pt had SOB for several days with "labored breathing". Cardiologist (Dr. Jauregui) instructed pt to go to ED. HR found to be in upper 30's-40's (baseline 70's). Denies any other symptoms including fever/chills/n/v/HA/falls/chest pain/dizziness. Upon arrival to ED SOB improved without any interventions. No known sick contacts or recent travel.    CICU Course:   -Became agitated overnight, requiring 12.5mg Seroquel x1, symptoms improved   -TTE performed at bedside 12/29 AM  -Patient taken to lab for PPM placement PATIENT:  AMY CHACON  [id number]    CICU Transfer Note    Transfer from: CICU  Transfer to:  (  ) Medicine    ( x ) Telemetry    (  ) RCU    (  ) Palliative    (  ) Stroke Unit    (  ) _______________  Accepting physican:     CHIEF COMPLAINT:  Patient is a 89y old  Female who presents with a chief complaint of Bradycardia (29 Dec 2020 10:21)    HPI: 89y F PMH nonobstructive CAD ([facility] 2012), SDH s/p ronny hole, dementia, DVT/PE, and s/p Nikky for rectal adenocarcinoma presenting for SOB. Pt had SOB for several days with "labored breathing". Cardiologist (Dr. Jauregui) instructed pt to go to ED. HR found to be in upper 30's-40's (baseline 70's). Denies any other symptoms including fever/chills/n/v/HA/falls/chest pain/dizziness. Upon arrival to ED SOB improved without any interventions. No known sick contacts or recent travel.    CICU Course:   -Started on Hep gtt for hx of B/L extensive DVT and hx PE   -Became agitated overnight, requiring 12.5mg Seroquel x1, symptoms improved   -TTE performed at bedside 12/29 AM  -Patient taken to lab for PPM placement      ASSESSMENT & PLAN:   89y F PMH nonobstructive CAD ([facility] 2012), SDH s/p ronny hole, dementia, DVT/PE, and s/p Nikky for rectal adenocarcinoma admitted with bradycardia for possible PPM placement.     #Neuro  - baseline dementia A&Ox1-2  - hold memantine while admitted  -Seroquel 12.5mg at bedtime for agitation prn    #Resp:  *hx of PE/DVT  *CXR suggestive of pulm edema  - hold Eliquis pending PPM  - heparin drip w/ goal PTT 58-99    #CV  *Complete heart block with asymptomatic bradycardia to upper 30's/40's.   *hx of MR  - 24 hour telemetry  - PPM placement w/ EP today  - if symptomatic bradycardia can place TVP and/or start dopamine drip  - hold home metoprolol  - TTE  - switch Eliquis to heparin drip for now    #GI:  - NPO past midnight for PPM 12/29  - colostomy care    #Renal  - JANEEN  - trend bun/cr    #Heme  *hx of DVT/PE  - switch home Eliquis to heparin drip prior to ppm placement    #ID  JANEEN  -WBC WNL, monitor for fever/leukocytosis     #Endo  -f/u hgb a1c, lipid panel, TSH  -c/w simvastatin 20    PPX:  heparin drip, after PPM placement can restart Eliquis      For Follow-Up:  [] EP recs for restarting Eliquis   [] Repeat CXR in setting of small R pleural effusion  [] Cardiology should continue to follow patient on floor in setting of HFrEF (35%)      Vital Signs Last 24 Hrs  T(C): 36.5 (29 Dec 2020 12:00), Max: 36.9 (28 Dec 2020 21:11)  T(F): 97.7 (29 Dec 2020 12:00), Max: 98.4 (28 Dec 2020 21:11)  HR: 36 (29 Dec 2020 12:00) (32 - 51)  BP: 105/52 (29 Dec 2020 12:00) (81/58 - 147/98)  BP(mean): 79 (29 Dec 2020 12:00) (62 - 115)  RR: 24 (29 Dec 2020 12:00) (15 - 29)  SpO2: 95% (29 Dec 2020 12:00) (94% - 99%)  I&O's Summary    28 Dec 2020 07:01  -  29 Dec 2020 07:00  --------------------------------------------------------  IN: 192 mL / OUT: 0 mL / NET: 192 mL    29 Dec 2020 07:01  -  29 Dec 2020 14:11  --------------------------------------------------------  IN: 24 mL / OUT: 500 mL / NET: -476 mL          MEDICATIONS  (STANDING):  chlorhexidine 4% Liquid 1 Application(s) Topical <User Schedule>  heparin  Infusion.  Unit(s)/Hr (12 mL/Hr) IV Continuous <Continuous>  melatonin 5 milliGRAM(s) Oral at bedtime  memantine 5 milliGRAM(s) Oral daily  simvastatin 20 milliGRAM(s) Oral at bedtime    MEDICATIONS  (PRN):  heparin   Injectable 5500 Unit(s) IV Push every 6 hours PRN For aPTT less than 40  heparin   Injectable 2500 Unit(s) IV Push every 6 hours PRN For aPTT between 40 - 57        LABS                                            11.4                  Neurophils% (auto):   50.3   (12-29 @ 00:15):    8.06 )-----------(261          Lymphocytes% (auto):  37.1                                          35.8                   Eosinphils% (auto):   1.9      Manual%: Neutrophils x    ; Lymphocytes x    ; Eosinophils x    ; Bands%: x    ; Blasts x                                    138    |  108    |  22                  Calcium: 9.1   / iCa: x      (12-29 @ 00:15)    ----------------------------<  124       Magnesium: 2.3                              4.0     |  18     |  0.84             Phosphorous: 3.6      TPro  6.3    /  Alb  3.7    /  TBili  0.4    /  DBili  x      /  AST  38     /  ALT  45     /  AlkPhos  90     29 Dec 2020 00:15    ( 12-29 @ 07:34 )   PT: x    ;   INR: x      aPTT: 64.2 sec PATIENT:  AMY CHACON  [id number]    CICU Transfer Note    Transfer from: CICU  Transfer to:  (  ) Medicine    ( x ) Telemetry    (  ) RCU    (  ) Palliative    (  ) Stroke Unit    (  ) _______________  Accepting physican:     CHIEF COMPLAINT:  Patient is a 89y old  Female who presents with a chief complaint of Bradycardia (29 Dec 2020 10:21)    HPI: 89y F PMH nonobstructive CAD ([facility] 2012), SDH s/p ronny hole, dementia, DVT/PE, and s/p Nikky for rectal adenocarcinoma presenting for SOB. Pt had SOB for several days with "labored breathing". Cardiologist (Dr. Jauregui) instructed pt to go to ED. HR found to be in upper 30's-40's (baseline 70's). Denies any other symptoms including fever/chills/n/v/HA/falls/chest pain/dizziness. Upon arrival to ED SOB improved without any interventions. No known sick contacts or recent travel.    CICU Course:   -Started on Hep gtt for hx of B/L extensive DVT and hx PE   -Became agitated overnight, requiring 12.5mg Seroquel x1, symptoms improved   -TTE performed at bedside 12/29 AM  -Patient taken to lab for PPM placement      ASSESSMENT & PLAN:   89y F PMH nonobstructive CAD ([facility] 2012), SDH s/p ronny hole, dementia, DVT/PE, and s/p Nikky for rectal adenocarcinoma admitted with bradycardia for possible PPM placement.     #Neuro  - baseline dementia A&Ox1-2  - hold memantine while admitted  -Seroquel 12.5mg at bedtime for agitation prn    #Resp:  *hx of PE/DVT  *CXR suggestive of pulm edema  - hold Eliquis pending PPM  - heparin drip w/ goal PTT 58-99    #CV  *Complete heart block with asymptomatic bradycardia to upper 30's/40's.   *hx of MR  - 24 hour telemetry  - PPM placement w/ EP today  - if symptomatic bradycardia can place TVP and/or start dopamine drip  - hold home metoprolol  - TTE  - switch Eliquis to heparin drip for now    #GI:  - NPO past midnight for PPM 12/29  - colostomy care    #Renal  - JANEEN  - trend bun/cr    #Heme  *hx of DVT/PE  - switch home Eliquis to heparin drip prior to ppm placement    #ID  JANEEN  -WBC WNL, monitor for fever/leukocytosis     #Endo  -f/u hgb a1c, lipid panel, TSH  -c/w simvastatin 20    PPX:  heparin drip, after PPM placement can restart Eliquis      For Follow-Up:  [] EP recs for restarting Eliquis   [] Repeat CXR in setting of small R pleural effusion  [] Cardiology should continue to follow patient on floor in setting of HFrEF (35%)  [] Advance diet following PPM placement       Vital Signs Last 24 Hrs  T(C): 36.5 (29 Dec 2020 12:00), Max: 36.9 (28 Dec 2020 21:11)  T(F): 97.7 (29 Dec 2020 12:00), Max: 98.4 (28 Dec 2020 21:11)  HR: 36 (29 Dec 2020 12:00) (32 - 51)  BP: 105/52 (29 Dec 2020 12:00) (81/58 - 147/98)  BP(mean): 79 (29 Dec 2020 12:00) (62 - 115)  RR: 24 (29 Dec 2020 12:00) (15 - 29)  SpO2: 95% (29 Dec 2020 12:00) (94% - 99%)  I&O's Summary    28 Dec 2020 07:01  -  29 Dec 2020 07:00  --------------------------------------------------------  IN: 192 mL / OUT: 0 mL / NET: 192 mL    29 Dec 2020 07:01  -  29 Dec 2020 14:11  --------------------------------------------------------  IN: 24 mL / OUT: 500 mL / NET: -476 mL          MEDICATIONS  (STANDING):  chlorhexidine 4% Liquid 1 Application(s) Topical <User Schedule>  heparin  Infusion.  Unit(s)/Hr (12 mL/Hr) IV Continuous <Continuous>  melatonin 5 milliGRAM(s) Oral at bedtime  memantine 5 milliGRAM(s) Oral daily  simvastatin 20 milliGRAM(s) Oral at bedtime    MEDICATIONS  (PRN):  heparin   Injectable 5500 Unit(s) IV Push every 6 hours PRN For aPTT less than 40  heparin   Injectable 2500 Unit(s) IV Push every 6 hours PRN For aPTT between 40 - 57        LABS                                            11.4                  Neurophils% (auto):   50.3   (12-29 @ 00:15):    8.06 )-----------(261          Lymphocytes% (auto):  37.1                                          35.8                   Eosinphils% (auto):   1.9      Manual%: Neutrophils x    ; Lymphocytes x    ; Eosinophils x    ; Bands%: x    ; Blasts x                                    138    |  108    |  22                  Calcium: 9.1   / iCa: x      (12-29 @ 00:15)    ----------------------------<  124       Magnesium: 2.3                              4.0     |  18     |  0.84             Phosphorous: 3.6      TPro  6.3    /  Alb  3.7    /  TBili  0.4    /  DBili  x      /  AST  38     /  ALT  45     /  AlkPhos  90     29 Dec 2020 00:15    ( 12-29 @ 07:34 )   PT: x    ;   INR: x      aPTT: 64.2 sec PATIENT:  AMY CHACON  [id number]    CICU Transfer Note    Transfer from: CICU  Transfer to:  (  ) Medicine    ( x ) Telemetry    (  ) RCU    (  ) Palliative    (  ) Stroke Unit    (  ) _______________  Accepting physican: Dr. Gates     CHIEF COMPLAINT:  Patient is a 89y old  Female who presents with a chief complaint of Bradycardia (29 Dec 2020 10:21)    HPI: 89y F PMH nonobstructive CAD ([facility] 2012), SDH s/p ronny hole, dementia, DVT/PE, and s/p Nikky for rectal adenocarcinoma presenting for SOB. Pt had SOB for several days with "labored breathing". Cardiologist (Dr. Jauregui) instructed pt to go to ED. HR found to be in upper 30's-40's (baseline 70's). Denies any other symptoms including fever/chills/n/v/HA/falls/chest pain/dizziness. Upon arrival to ED SOB improved without any interventions. No known sick contacts or recent travel.    CICU Course:   -Started on Hep gtt for hx of B/L extensive DVT and hx PE   -Became agitated overnight, requiring 12.5mg Seroquel x1, symptoms improved   -TTE performed at bedside 12/29 AM  -Patient taken to lab for PPM placement      ASSESSMENT & PLAN:   89y F PMH nonobstructive CAD ([facility] 2012), SDH s/p ronny hole, dementia, DVT/PE, and s/p Nikky for rectal adenocarcinoma admitted with bradycardia for possible PPM placement.     #Neuro  - baseline dementia A&Ox1-2  - hold memantine while admitted  -Seroquel 12.5mg at bedtime for agitation prn    #Resp:  *hx of PE/DVT  *CXR suggestive of pulm edema  - hold Eliquis pending PPM  - heparin drip w/ goal PTT 58-99    #CV  *Complete heart block with asymptomatic bradycardia to upper 30's/40's.   *hx of MR  - 24 hour telemetry  - PPM placement w/ EP today  - if symptomatic bradycardia can place TVP and/or start dopamine drip  - hold home metoprolol  - TTE  - switch Eliquis to heparin drip for now    #GI:  - NPO past midnight for PPM 12/29  - colostomy care    #Renal  - JANEEN  - trend bun/cr    #Heme  *hx of DVT/PE  - switch home Eliquis to heparin drip prior to ppm placement    #ID  JANEEN  -WBC WNL, monitor for fever/leukocytosis     #Endo  -f/u hgb a1c, lipid panel, TSH  -c/w simvastatin 20    PPX:  heparin drip, after PPM placement can restart Eliquis      For Follow-Up:  [] EP recs for restarting Eliquis   [] Repeat CXR in setting of small R pleural effusion  [] Cardiology should continue to follow patient on floor in setting of HFrEF (35%)  [] Advance diet following PPM placement       Vital Signs Last 24 Hrs  T(C): 36.5 (29 Dec 2020 12:00), Max: 36.9 (28 Dec 2020 21:11)  T(F): 97.7 (29 Dec 2020 12:00), Max: 98.4 (28 Dec 2020 21:11)  HR: 36 (29 Dec 2020 12:00) (32 - 51)  BP: 105/52 (29 Dec 2020 12:00) (81/58 - 147/98)  BP(mean): 79 (29 Dec 2020 12:00) (62 - 115)  RR: 24 (29 Dec 2020 12:00) (15 - 29)  SpO2: 95% (29 Dec 2020 12:00) (94% - 99%)  I&O's Summary    28 Dec 2020 07:01  -  29 Dec 2020 07:00  --------------------------------------------------------  IN: 192 mL / OUT: 0 mL / NET: 192 mL    29 Dec 2020 07:01  -  29 Dec 2020 14:11  --------------------------------------------------------  IN: 24 mL / OUT: 500 mL / NET: -476 mL          MEDICATIONS  (STANDING):  chlorhexidine 4% Liquid 1 Application(s) Topical <User Schedule>  heparin  Infusion.  Unit(s)/Hr (12 mL/Hr) IV Continuous <Continuous>  melatonin 5 milliGRAM(s) Oral at bedtime  memantine 5 milliGRAM(s) Oral daily  simvastatin 20 milliGRAM(s) Oral at bedtime    MEDICATIONS  (PRN):  heparin   Injectable 5500 Unit(s) IV Push every 6 hours PRN For aPTT less than 40  heparin   Injectable 2500 Unit(s) IV Push every 6 hours PRN For aPTT between 40 - 57        LABS                                            11.4                  Neurophils% (auto):   50.3   (12-29 @ 00:15):    8.06 )-----------(261          Lymphocytes% (auto):  37.1                                          35.8                   Eosinphils% (auto):   1.9      Manual%: Neutrophils x    ; Lymphocytes x    ; Eosinophils x    ; Bands%: x    ; Blasts x                                    138    |  108    |  22                  Calcium: 9.1   / iCa: x      (12-29 @ 00:15)    ----------------------------<  124       Magnesium: 2.3                              4.0     |  18     |  0.84             Phosphorous: 3.6      TPro  6.3    /  Alb  3.7    /  TBili  0.4    /  DBili  x      /  AST  38     /  ALT  45     /  AlkPhos  90     29 Dec 2020 00:15    ( 12-29 @ 07:34 )   PT: x    ;   INR: x      aPTT: 64.2 sec PATIENT:  AMY CHACON  [id number]    CICU Transfer Note    Transfer from: CICU  Transfer to:  (  ) Medicine    ( x ) Telemetry    (  ) RCU    (  ) Palliative    (  ) Stroke Unit    (  ) _______________  Accepting physican: Dr. Blaine Gates    CHIEF COMPLAINT:  Patient is a 89y old  Female who presents with a chief complaint of Bradycardia (29 Dec 2020 10:21)    HPI: 89y F PMH nonobstructive CAD ([facility] 2012), SDH s/p ronny hole, dementia, DVT/PE, and s/p Nikky for rectal adenocarcinoma presenting for SOB. Pt had SOB for several days with "labored breathing". Cardiologist (Dr. Jauregui) instructed pt to go to ED. HR found to be in upper 30's-40's (baseline 70's). Denies any other symptoms including fever/chills/n/v/HA/falls/chest pain/dizziness. Upon arrival to ED SOB improved without any interventions. No known sick contacts or recent travel.    CICU Course:   -Started on Hep gtt for hx of B/L extensive DVT and hx PE   -Became agitated overnight, requiring 12.5mg Seroquel x1, symptoms improved   -TTE performed at bedside 12/29 AM  -Patient taken to lab for PPM placement      ASSESSMENT & PLAN:   89y F PMH nonobstructive CAD ([facility] 2012), SDH s/p ronny hole, dementia, DVT/PE, and s/p Nikky for rectal adenocarcinoma admitted with bradycardia for possible PPM placement.     #Neuro  - baseline dementia A&Ox1-2  - hold memantine while admitted  -Seroquel 12.5mg at bedtime for agitation prn    #Resp:  *hx of PE/DVT  *CXR suggestive of pulm edema  - hold Eliquis pending PPM  - heparin drip w/ goal PTT 58-99    #CV  *Complete heart block with asymptomatic bradycardia to upper 30's/40's.   *hx of MR  - 24 hour telemetry  - PPM placement w/ EP today  - if symptomatic bradycardia can place TVP and/or start dopamine drip  - hold home metoprolol  - TTE  - switch Eliquis to heparin drip for now    #GI:  - NPO past midnight for PPM 12/29  - colostomy care    #Renal  - JANEEN  - trend bun/cr    #Heme  *hx of DVT/PE  - switch home Eliquis to heparin drip prior to ppm placement    #ID  JANEEN  -WBC WNL, monitor for fever/leukocytosis     #Endo  -f/u hgb a1c, lipid panel, TSH  -c/w simvastatin 20    PPX:  heparin drip, after PPM placement can restart Eliquis      For Follow-Up:  [] EP recs for restarting Eliquis   [] Repeat CXR in setting of small R pleural effusion  [] Cardiology should continue to follow patient on floor in setting of HFrEF (35%)  [] Advance diet following PPM placement       Vital Signs Last 24 Hrs  T(C): 36.5 (29 Dec 2020 12:00), Max: 36.9 (28 Dec 2020 21:11)  T(F): 97.7 (29 Dec 2020 12:00), Max: 98.4 (28 Dec 2020 21:11)  HR: 36 (29 Dec 2020 12:00) (32 - 51)  BP: 105/52 (29 Dec 2020 12:00) (81/58 - 147/98)  BP(mean): 79 (29 Dec 2020 12:00) (62 - 115)  RR: 24 (29 Dec 2020 12:00) (15 - 29)  SpO2: 95% (29 Dec 2020 12:00) (94% - 99%)  I&O's Summary    28 Dec 2020 07:01  -  29 Dec 2020 07:00  --------------------------------------------------------  IN: 192 mL / OUT: 0 mL / NET: 192 mL    29 Dec 2020 07:01  -  29 Dec 2020 14:11  --------------------------------------------------------  IN: 24 mL / OUT: 500 mL / NET: -476 mL          MEDICATIONS  (STANDING):  chlorhexidine 4% Liquid 1 Application(s) Topical <User Schedule>  heparin  Infusion.  Unit(s)/Hr (12 mL/Hr) IV Continuous <Continuous>  melatonin 5 milliGRAM(s) Oral at bedtime  memantine 5 milliGRAM(s) Oral daily  simvastatin 20 milliGRAM(s) Oral at bedtime    MEDICATIONS  (PRN):  heparin   Injectable 5500 Unit(s) IV Push every 6 hours PRN For aPTT less than 40  heparin   Injectable 2500 Unit(s) IV Push every 6 hours PRN For aPTT between 40 - 57        LABS                                            11.4                  Neurophils% (auto):   50.3   (12-29 @ 00:15):    8.06 )-----------(261          Lymphocytes% (auto):  37.1                                          35.8                   Eosinphils% (auto):   1.9      Manual%: Neutrophils x    ; Lymphocytes x    ; Eosinophils x    ; Bands%: x    ; Blasts x                                    138    |  108    |  22                  Calcium: 9.1   / iCa: x      (12-29 @ 00:15)    ----------------------------<  124       Magnesium: 2.3                              4.0     |  18     |  0.84             Phosphorous: 3.6      TPro  6.3    /  Alb  3.7    /  TBili  0.4    /  DBili  x      /  AST  38     /  ALT  45     /  AlkPhos  90     29 Dec 2020 00:15    ( 12-29 @ 07:34 )   PT: x    ;   INR: x      aPTT: 64.2 sec

## 2020-12-29 NOTE — PROGRESS NOTE ADULT - SUBJECTIVE AND OBJECTIVE BOX
AMY CHACON  MRN-[id number]  Patient is a 89y old  Female who presents with a chief complaint of Bradycardia (29 Dec 2020 12:51)    HPI:  89 yoF PMH nonobstructive CAD ([facility] 2012), SDH s/p ronny hole, dementia, DVT/PE, and s/p Nikky for rectal adenocarcinoma presenting for SOB. Pt had SOB for several days with "labored breathing". Cardiologist (Dr. Jauregui) instructed pt to go to ED. HR found to be in upper 30's-40's (baseline 70's). Denies any other symptoms including fever/chills/n/v/HA/falls/chest pain/dizziness. Upon arrival to ED SOB improved without any interventions. No known sick contacts or recent travel.  Took metoprolol succ 12.5mg in AM     (28 Dec 2020 21:37)      Hospital Course:  12/28 Presenting with SOB and Bradycardia   12/29 Extubated. MIcra PPM placed.     24 HOUR EVENTS:  Extubated this evening.    REVIEW OF SYSTEMS:   Constitutional: No weakness, fevers, or chills  Eyes/ENT: No visual changes  Respiratory: No cough, wheezing, hemoptysis  Cardiovascular: No chest pain, no palpitations  Gastrointestinal: No abdominal pain. No nausea, vomiting, hematemesis.   Genitourinary: No dysuria  Neurological: No numbness, no weakness  Skin: No itching, rashes    ICU Vital Signs Last 24 Hrs  T(C): 36.9 (29 Dec 2020 19:00), Max: 36.9 (28 Dec 2020 22:14)  T(F): 98.5 (29 Dec 2020 19:00), Max: 98.5 (29 Dec 2020 19:00)  HR: 84 (29 Dec 2020 21:00) (32 - 94)  BP: 126/70 (29 Dec 2020 21:00) (81/58 - 151/62)  BP(mean): 91 (29 Dec 2020 21:00) (62 - 115)  ABP: --  ABP(mean): --  RR: 32 (29 Dec 2020 21:00) (15 - 42)  SpO2: 98% (29 Dec 2020 21:00) (91% - 99%)    Mode: AC/ CMV (Assist Control/ Continuous Mandatory Ventilation), RR (machine): 16, TV (machine): 450, FiO2: 50, PEEP: 5, ITime: 1  prog  I&O's Summary    28 Dec 2020 07:01  -  29 Dec 2020 07:00  --------------------------------------------------------  IN: 192 mL / OUT: 0 mL / NET: 192 mL    29 Dec 2020 07:01  -  29 Dec 2020 21:40  --------------------------------------------------------  IN: 564 mL / OUT: 1000 mL / NET: -436 mL          PHYSICAL EXAM:   General: No acute distress  Eyes: EOMI, PERRLA, conjunctiva and sclera clear  Chest/Lung: Right Lung base crackles, no wheezes, rales, or rhonchi  Heart: Paced rhythm . Normal S1/S2. No murmurs, rubs, or gallops.  Abdomen: Soft, nontender, nondistended. Normal bowel sounds.  Extremites: 2+ peripheral pulses B/L. No clubbing, cyanosis, 1+ pedal edema  Neurology: Dementia, A&Ox1, right sided weakness   Skin: No rashes or lesions  ============================I/O===========================   I&O's Detail    28 Dec 2020 07:01  -  29 Dec 2020 07:00  --------------------------------------------------------  IN:    Heparin Infusion: 72 mL    Oral Fluid: 120 mL  Total IN: 192 mL    OUT:  Total OUT: 0 mL    Total NET: 192 mL      29 Dec 2020 07:01  -  29 Dec 2020 21:40  --------------------------------------------------------  IN:    Heparin Infusion: 24 mL    IV PiggyBack: 300 mL    Oral Fluid: 240 mL  Total IN: 564 mL    OUT:    Voided (mL): 1000 mL  Total OUT: 1000 mL    Total NET: -436 mL        ============================ LABS =========================                        11.2   8.55  )-----------( 218      ( 29 Dec 2020 16:43 )             36.7     12-29    144  |  110<H>  |  20  ----------------------------<  157<H>  3.3<L>   |  18<L>  |  1.07    Ca    8.8      29 Dec 2020 16:43  Phos  4.4     12-29  Mg     2.1     12-29    TPro  6.3  /  Alb  3.6  /  TBili  0.6  /  DBili  x   /  AST  197<H>  /  ALT  161<H>  /  AlkPhos  93  12-29    Troponin T, High Sensitivity Result: 18 ng/L (12-29-20 @ 00:15)  Troponin T, High Sensitivity Result: 17 ng/L (12-28-20 @ 19:47)    CKMB Units: 1.8 ng/mL (12-29-20 @ 00:15)  CKMB Units: 1.9 ng/mL (12-28-20 @ 19:47)    Creatine Kinase, Serum: 53 U/L (12-29-20 @ 00:15)  Creatine Kinase, Serum: 51 U/L (12-28-20 @ 19:47)          LIVER FUNCTIONS - ( 29 Dec 2020 16:43 )  Alb: 3.6 g/dL / Pro: 6.3 g/dL / ALK PHOS: 93 U/L / ALT: 161 U/L / AST: 197 U/L / GGT: x           PT/INR - ( 29 Dec 2020 00:15 )   PT: 17.6 sec;   INR: 1.50 ratio         PTT - ( 29 Dec 2020 07:34 )  PTT:64.2 sec  ABG - ( 29 Dec 2020 16:52 )  pH, Arterial: 7.38  pH, Blood: x     /  pCO2: 36    /  pO2: 77    / HCO3: 21    / Base Excess: -3.2  /  SaO2: 94                Blood Gas Arterial, Lactate: 1.6 mmol/L (12-29-20 @ 16:52)  Lactate, Blood: 1.5 mmol/L (12-29-20 @ 00:15)      ======================Micro/Rad/Cardio=================  Telemetry: Reviewed   CXR: Reviewed  Echo: Reviewed  ======================================================  PAST MEDICAL & SURGICAL HISTORY:  Deep vein thrombosis    Pulmonary embolus    Generalized Osteoarthritis    PVD (Peripheral Vascular Disease)    History of Osteoporosis    Asthma    Hyperlipemia    Hypertension    SDH (subdural hematoma)  7/2018 With evacuatinon in [facility]    S/P Cataract Surgery  left eye      ====================ASSESSMENT ==============  CHB w/ bradycardia  HFrEF  Respiratory failure     Plan:  ====================== NEUROLOGY=====================  Hx Dementia A&Ox1   - Continue monitoring neuro status  - C/w memantine 5mg QD    Hx 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 1000 milliGRAM(s) IV Intermittent once  melatonin 5 milliGRAM(s) Oral at bedtime  memantine 5 milliGRAM(s) Oral daily    ==================== RESPIRATORY======================  Hx PE/ DVT   - will restart Eliquis tomorrow    Respiratory failure     - Required full ventilator support   - remains on 3L nasal cannula with SpO2 98%  s/p extubation today     Mechanical Ventilation:  Mode: AC/ CMV (Assist Control/ Continuous Mandatory Ventilation)  RR (machine): 16  TV (machine): 450  FiO2: 50  PEEP: 5  ITime: 1  MAP: 8  PIP: 22      ====================CARDIOVASCULAR==================  HFrEF with EF 35%   - TTE w cont (12/29) : EF 35%. LV dysfunction, moderate AR, mild pulmonary HTN, Severe functional MR,   - Monitor strict I&Os, lytes, daily weights     Complete heart block, AV block 2:1 with asymptomatic bradycardia to upper 30's/40's.  - s/p Micra PPM with EP today, will restart Eliquis tomorrow   - Holding home BB    Hx HLD  - c/w simvastatin 20mg qhs    simvastatin 20 milliGRAM(s) Oral at bedtime    ===================HEMATOLOGIC/ONC ===================  hx PVD/ DVT   - heparin gtt d/c   - Ordered Eliquis to start tomorrow 12/30  - f/u LE dopplers     Anemia  - H/H 11.2/ 36.7, stable.   - Continue to monitor hemoglobin and hematocrit levels.  - Trend CBC    ===================== RENAL =========================  Continue to monitor I/Os, BUN/Creatinine, and urine output.   Goal net even fluid balance. Replete lytes PRN. Keep K> 4 and Mg >2.     ==================== GASTROINTESTINAL===================  Tolerating PO DASH/TLC diet.     =======================    ENDOCRINE  =====================  sugars controlled   - Continue to monitor blood glucose level for need to initiate glycemic control    ========================INFECTIOUS DISEASE================  Afebrile, WBC within normal limits.  - Monitor for fever/leukocytosis     Patient requires continuous monitoring with bedside rhythm monitoring, pulse ox monitoring, and intermittent blood gas analysis. Care plan discussed with ICU care team. Patient remained critical and at risk for life threatening decompensation.  Patient seen, examined and plan discussed with CCU team during rounds.     I have personally provided 35 minutes of critical care time excluding time spent on separate procedures.    By signing my name below, I, Sunni Simms, attest that this documentation has been prepared under the direction and in the presence of Chayito Remy NP   Electronically signed: Jignesh Howe, 12-29-20 @ 21:40    I, Chayito Remy NP  , personally performed the services described in this documentation. all medical record entries made by the scribe were at my direction and in my presence. I have reviewed the chart and agree that the record reflects my personal performance and is accurate and complete  Electronically signed: Chayito Remy NP        AMY CHACON  MRN-[id number]  Patient is a 89y old  Female who presents with a chief complaint of Bradycardia (29 Dec 2020 12:51)    HPI:  89 yoF PMH nonobstructive CAD ([facility] 2012), SDH s/p ronny hole, dementia, DVT/PE, and s/p Nikky for rectal adenocarcinoma presenting for SOB. Pt had SOB for several days with "labored breathing". Cardiologist (Dr. Jauregui) instructed pt to go to ED. HR found to be in upper 30's-40's (baseline 70's). Denies any other symptoms including fever/chills/n/v/HA/falls/chest pain/dizziness. Upon arrival to ED SOB improved without any interventions. No known sick contacts or recent travel.  Took metoprolol succ 12.5mg in AM     (28 Dec 2020 21:37)      Hospital Course:  12/28 Presenting with SOB and Bradycardia   12/29 s/p Vfib arrest with shock x1.  Intubated with AV MIcra placed. Extubated to 2LNC    24 HOUR EVENTS:  tolerated extubation well    REVIEW OF SYSTEMS:   Constitutional: No weakness, fevers, or chills  Eyes/ENT: No visual changes  Respiratory: No cough, wheezing, hemoptysis  Cardiovascular: No chest pain, no palpitations  Gastrointestinal: No abdominal pain. No nausea, vomiting, hematemesis.   Genitourinary: No dysuria  Neurological: No numbness, no weakness  Skin: No itching, rashes    ICU Vital Signs Last 24 Hrs  T(C): 36.9 (29 Dec 2020 19:00), Max: 36.9 (28 Dec 2020 22:14)  T(F): 98.5 (29 Dec 2020 19:00), Max: 98.5 (29 Dec 2020 19:00)  HR: 84 (29 Dec 2020 21:00) (32 - 94)  BP: 126/70 (29 Dec 2020 21:00) (81/58 - 151/62)  BP(mean): 91 (29 Dec 2020 21:00) (62 - 115)  ABP: --  ABP(mean): --  RR: 32 (29 Dec 2020 21:00) (15 - 42)  SpO2: 98% (29 Dec 2020 21:00) (91% - 99%)    Mode: AC/ CMV (Assist Control/ Continuous Mandatory Ventilation), RR (machine): 16, TV (machine): 450, FiO2: 50, PEEP: 5, ITime: 1  prog  I&O's Summary    28 Dec 2020 07:01  -  29 Dec 2020 07:00  --------------------------------------------------------  IN: 192 mL / OUT: 0 mL / NET: 192 mL    29 Dec 2020 07:01  -  29 Dec 2020 21:40  --------------------------------------------------------  IN: 564 mL / OUT: 1000 mL / NET: -436 mL          PHYSICAL EXAM:   General: No acute distress  Eyes: EOMI, PERRLA, conjunctiva and sclera clear  Chest/Lung: Right Lung base crackles, no wheezes, rales, or rhonchi  Heart: Paced rhythm . Normal S1/S2. No murmurs, rubs, or gallops.  Abdomen: Soft, nontender, nondistended. Normal bowel sounds.  Extremites: 2+ peripheral pulses B/L. No clubbing, cyanosis, 1+ pedal edema  Neurology: Dementia, A&Ox1, right sided weakness   Skin: No rashes or lesions  ============================I/O===========================   I&O's Detail    28 Dec 2020 07:01  -  29 Dec 2020 07:00  --------------------------------------------------------  IN:    Heparin Infusion: 72 mL    Oral Fluid: 120 mL  Total IN: 192 mL    OUT:  Total OUT: 0 mL    Total NET: 192 mL      29 Dec 2020 07:01  -  29 Dec 2020 21:40  --------------------------------------------------------  IN:    Heparin Infusion: 24 mL    IV PiggyBack: 300 mL    Oral Fluid: 240 mL  Total IN: 564 mL    OUT:    Voided (mL): 1000 mL  Total OUT: 1000 mL    Total NET: -436 mL        ============================ LABS =========================                        11.2   8.55  )-----------( 218      ( 29 Dec 2020 16:43 )             36.7     12-29    144  |  110<H>  |  20  ----------------------------<  157<H>  3.3<L>   |  18<L>  |  1.07    Ca    8.8      29 Dec 2020 16:43  Phos  4.4     12-29  Mg     2.1     12-29    TPro  6.3  /  Alb  3.6  /  TBili  0.6  /  DBili  x   /  AST  197<H>  /  ALT  161<H>  /  AlkPhos  93  12-29    Troponin T, High Sensitivity Result: 18 ng/L (12-29-20 @ 00:15)  Troponin T, High Sensitivity Result: 17 ng/L (12-28-20 @ 19:47)    CKMB Units: 1.8 ng/mL (12-29-20 @ 00:15)  CKMB Units: 1.9 ng/mL (12-28-20 @ 19:47)    Creatine Kinase, Serum: 53 U/L (12-29-20 @ 00:15)  Creatine Kinase, Serum: 51 U/L (12-28-20 @ 19:47)          LIVER FUNCTIONS - ( 29 Dec 2020 16:43 )  Alb: 3.6 g/dL / Pro: 6.3 g/dL / ALK PHOS: 93 U/L / ALT: 161 U/L / AST: 197 U/L / GGT: x           PT/INR - ( 29 Dec 2020 00:15 )   PT: 17.6 sec;   INR: 1.50 ratio         PTT - ( 29 Dec 2020 07:34 )  PTT:64.2 sec  ABG - ( 29 Dec 2020 16:52 )  pH, Arterial: 7.38  pH, Blood: x     /  pCO2: 36    /  pO2: 77    / HCO3: 21    / Base Excess: -3.2  /  SaO2: 94                Blood Gas Arterial, Lactate: 1.6 mmol/L (12-29-20 @ 16:52)  Lactate, Blood: 1.5 mmol/L (12-29-20 @ 00:15)      ======================Micro/Rad/Cardio=================  Telemetry: Reviewed   CXR: Reviewed  Echo: Reviewed  ======================================================  PAST MEDICAL & SURGICAL HISTORY:  Deep vein thrombosis    Pulmonary embolus    Generalized Osteoarthritis    PVD (Peripheral Vascular Disease)    History of Osteoporosis    Asthma    Hyperlipemia    Hypertension    SDH (subdural hematoma)  7/2018 With evacuatinon in [place]    S/P Cataract Surgery  left eye      ====================ASSESSMENT ==============  CHB w/ bradycardia  HFrEF  Respiratory failure     Plan:  ====================== NEUROLOGY=====================  Hx Dementia A&Ox1   - Continue monitoring neuro status  - C/w memantine 5mg QD    Hx 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 1000 milliGRAM(s) IV Intermittent once  melatonin 5 milliGRAM(s) Oral at bedtime  memantine 5 milliGRAM(s) Oral daily    ==================== RESPIRATORY======================  Hx PE/ DVT   - will restart Eliquis tomorrow in the AM    Respiratory failure   - Required full ventilator support, intubated during Vfib arrest  - remains on 3L nasal cannula with SpO2 98%  s/p extubation today     Mechanical Ventilation:  Mode: AC/ CMV (Assist Control/ Continuous Mandatory Ventilation)  RR (machine): 16  TV (machine): 450  FiO2: 50  PEEP: 5  ITime: 1  MAP: 8  PIP: 22      ====================CARDIOVASCULAR==================  HFrEF with EF 35%   - TTE w cont (12/29) : EF 35%. LV dysfunction, moderate AR, mild pulmonary HTN, Severe functional MR,   - Monitor strict I&Os, lytes, daily weights     Complete heart block, AV block 2:1 with asymptomatic bradycardia to upper 30's/40's.  - s/p AV Micra with EP today, will restart Eliquis tomorrow   -CXR in the AM  -Sutures to be removed at 7AM   -Holding home BB    Hx HLD  - c/w simvastatin 20mg qhs    simvastatin 20 milliGRAM(s) Oral at bedtime    ===================HEMATOLOGIC/ONC ===================  hx PVD/ DVT   - heparin gtt d/c   - Ordered Eliquis to start tomorrow 12/30  - f/u LE dopplers     Anemia  - H/H 11.2/ 36.7, stable.   - Continue to monitor hemoglobin and hematocrit levels.  - Trend CBC    ===================== RENAL =========================  Continue to monitor I/Os, BUN/Creatinine, and urine output.   Goal net even fluid balance. Replete lytes PRN. Keep K> 4 and Mg >2.     ==================== GASTROINTESTINAL===================  Tolerating PO DASH/TLC diet.     =======================    ENDOCRINE  =====================  sugars controlled   - Continue to monitor blood glucose level for need to initiate glycemic control    ========================INFECTIOUS DISEASE================  Afebrile, WBC within normal limits.  - Monitor for fever/leukocytosis     Patient requires continuous monitoring with bedside rhythm monitoring, pulse ox monitoring, and intermittent blood gas analysis. Care plan discussed with ICU care team. Patient remained critical and at risk for life threatening decompensation.  Patient seen, examined and plan discussed with CCU team during rounds.     I have personally provided 35 minutes of critical care time excluding time spent on separate procedures.    By signing my name below, I, Sunni Simms, attest that this documentation has been prepared under the direction and in the presence of Chayito Remy NP   Electronically signed: Jignesh Howe, 12-29-20 @ 21:40    I, Chayito Remy NP  , personally performed the services described in this documentation. all medical record entries made by the la [email or web address] were at my direction and in my presence. I have reviewed the chart and agree that the record reflects my personal performance and is accurate and complete  Electronically signed: Chayito Remy NP        AMY CHACON  MRN-[id number]  Patient is a 89y old  Female who presents with a chief complaint of Bradycardia (29 Dec 2020 12:51)    HPI:  89 yoF PMH nonobstructive CAD ([facility] 2012), SDH s/p ronny hole, dementia, DVT/PE, and s/p Nikky for rectal adenocarcinoma presenting for SOB. Pt had SOB for several days with "labored breathing". Cardiologist (Dr. Jauregui) instructed pt to go to ED. HR found to be in upper 30's-40's (baseline 70's). Denies any other symptoms including fever/chills/n/v/HA/falls/chest pain/dizziness. Upon arrival to ED SOB improved without any interventions. No known sick contacts or recent travel.  Took metoprolol succ 12.5mg in AM     (28 Dec 2020 21:37)       Hospital Course:  12/28 Presenting with SOB and Bradycardia   12/29 s/p Vfib arrest with shock x1.  Intubated with AV MIcra placed. Extubated to 2LNC    24 HOUR EVENTS:  tolerated extubation well    REVIEW OF SYSTEMS:   Constitutional: No weakness, fevers, or chills  Eyes/ENT: No visual changes  Respiratory: No cough, wheezing, hemoptysis  Cardiovascular: No chest pain, no palpitations  Gastrointestinal: No abdominal pain. No nausea, vomiting, hematemesis.   Genitourinary: No dysuria  Neurological: No numbness, no weakness  Skin: No itching, rashes    ICU Vital Signs Last 24 Hrs  T(C): 36.9 (29 Dec 2020 19:00), Max: 36.9 (28 Dec 2020 22:14)  T(F): 98.5 (29 Dec 2020 19:00), Max: 98.5 (29 Dec 2020 19:00)  HR: 84 (29 Dec 2020 21:00) (32 - 94)  BP: 126/70 (29 Dec 2020 21:00) (81/58 - 151/62)  BP(mean): 91 (29 Dec 2020 21:00) (62 - 115)  ABP: --  ABP(mean): --  RR: 32 (29 Dec 2020 21:00) (15 - 42)  SpO2: 98% (29 Dec 2020 21:00) (91% - 99%)    Mode: AC/ CMV (Assist Control/ Continuous Mandatory Ventilation), RR (machine): 16, TV (machine): 450, FiO2: 50, PEEP: 5, ITime: 1  prog  I&O's Summary    28 Dec 2020 07:01  -  29 Dec 2020 07:00  --------------------------------------------------------  IN: 192 mL / OUT: 0 mL / NET: 192 mL    29 Dec 2020 07:01  -  29 Dec 2020 21:40  --------------------------------------------------------  IN: 564 mL / OUT: 1000 mL / NET: -436 mL          PHYSICAL EXAM:   General: No acute distress  Eyes: EOMI, PERRLA, conjunctiva and sclera clear  Chest/Lung: Right Lung base crackles, no wheezes, rales, or rhonchi  Heart: Paced rhythm . Normal S1/S2. No murmurs, rubs, or gallops.  Abdomen: Soft, nontender, nondistended. Normal bowel sounds.  Extremites: 2+ peripheral pulses B/L. No clubbing, cyanosis, 1+ pedal edema  Neurology: Dementia, A&Ox1, right sided weakness   Skin: No rashes or lesions  ============================I/O===========================   I&O's Detail    28 Dec 2020 07:01  -  29 Dec 2020 07:00  --------------------------------------------------------  IN:    Heparin Infusion: 72 mL    Oral Fluid: 120 mL  Total IN: 192 mL    OUT:  Total OUT: 0 mL    Total NET: 192 mL      29 Dec 2020 07:01  -  29 Dec 2020 21:40  --------------------------------------------------------  IN:    Heparin Infusion: 24 mL    IV PiggyBack: 300 mL    Oral Fluid: 240 mL  Total IN: 564 mL    OUT:    Voided (mL): 1000 mL  Total OUT: 1000 mL    Total NET: -436 mL        ============================ LABS =========================                        11.2   8.55  )-----------( 218      ( 29 Dec 2020 16:43 )             36.7     12-29    144  |  110<H>  |  20  ----------------------------<  157<H>  3.3<L>   |  18<L>  |  1.07    Ca    8.8      29 Dec 2020 16:43  Phos  4.4     12-29  Mg     2.1     12-29    TPro  6.3  /  Alb  3.6  /  TBili  0.6  /  DBili  x   /  AST  197<H>  /  ALT  161<H>  /  AlkPhos  93  12-29    Troponin T, High Sensitivity Result: 18 ng/L (12-29-20 @ 00:15)  Troponin T, High Sensitivity Result: 17 ng/L (12-28-20 @ 19:47)    CKMB Units: 1.8 ng/mL (12-29-20 @ 00:15)  CKMB Units: 1.9 ng/mL (12-28-20 @ 19:47)    Creatine Kinase, Serum: 53 U/L (12-29-20 @ 00:15)  Creatine Kinase, Serum: 51 U/L (12-28-20 @ 19:47)          LIVER FUNCTIONS - ( 29 Dec 2020 16:43 )  Alb: 3.6 g/dL / Pro: 6.3 g/dL / ALK PHOS: 93 U/L / ALT: 161 U/L / AST: 197 U/L / GGT: x           PT/INR - ( 29 Dec 2020 00:15 )   PT: 17.6 sec;   INR: 1.50 ratio         PTT - ( 29 Dec 2020 07:34 )  PTT:64.2 sec  ABG - ( 29 Dec 2020 16:52 )  pH, Arterial: 7.38  pH, Blood: x     /  pCO2: 36    /  pO2: 77    / HCO3: 21    / Base Excess: -3.2  /  SaO2: 94                Blood Gas Arterial, Lactate: 1.6 mmol/L (12-29-20 @ 16:52)  Lactate, Blood: 1.5 mmol/L (12-29-20 @ 00:15)      ======================Micro/Rad/Cardio=================  Telemetry: Reviewed   CXR: Reviewed  Echo: Reviewed  ======================================================  PAST MEDICAL & SURGICAL HISTORY:  Deep vein thrombosis    Pulmonary embolus    Generalized Osteoarthritis    PVD (Peripheral Vascular Disease)    History of Osteoporosis    Asthma    Hyperlipemia    Hypertension    SDH (subdural hematoma)  7/2018 With evacuatinon in [place]    S/P Cataract Surgery  left eye      ====================ASSESSMENT ==============  CHB w/ bradycardia  HFrEF  Respiratory failure     Plan:  ====================== NEUROLOGY=====================  Hx Dementia A&Ox1   - Continue monitoring neuro status  - C/w memantine 5mg QD    Hx 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 1000 milliGRAM(s) IV Intermittent once  melatonin 5 milliGRAM(s) Oral at bedtime  memantine 5 milliGRAM(s) Oral daily    ==================== RESPIRATORY======================  Hx PE/ DVT   - will restart Eliquis tomorrow in the AM    Respiratory failure   - Required full ventilator support, intubated during Vfib arrest  - remains on 3L nasal cannula with SpO2 98%  s/p extubation today     Mechanical Ventilation:  Mode: AC/ CMV (Assist Control/ Continuous Mandatory Ventilation)  RR (machine): 16  TV (machine): 450  FiO2: 50  PEEP: 5  ITime: 1  MAP: 8  PIP: 22      ====================CARDIOVASCULAR==================  HFrEF with EF 35%   - TTE w cont (12/29) : EF 35%. LV dysfunction, moderate AR, mild pulmonary HTN, Severe functional MR,   - Monitor strict I&Os, lytes, daily weights     Complete heart block, AV block 2:1 with asymptomatic bradycardia to upper 30's/40's.  - s/p AV Micra with EP today, will restart Eliquis tomorrow   -CXR in the AM  -Sutures to be removed at 7AM   -Holding home BB    Hx HLD  - c/w simvastatin 20mg qhs    simvastatin 20 milliGRAM(s) Oral at bedtime    ===================HEMATOLOGIC/ONC ===================  hx PVD/ DVT   - heparin gtt d/c   - Ordered Eliquis to start tomorrow 12/30  - f/u LE dopplers     Anemia  - H/H 11.2/ 36.7, stable.   - Continue to monitor hemoglobin and hematocrit levels.  - Trend CBC    ===================== RENAL =========================  Continue to monitor I/Os, BUN/Creatinine, and urine output.   Goal net even fluid balance. Replete lytes PRN. Keep K> 4 and Mg >2.     ==================== GASTROINTESTINAL===================  Tolerating PO DASH/TLC diet.     =======================    ENDOCRINE  =====================  sugars controlled   - Continue to monitor blood glucose level for need to initiate glycemic control    ========================INFECTIOUS DISEASE================  Afebrile, WBC within normal limits.  - Monitor for fever/leukocytosis     Patient requires continuous monitoring with bedside rhythm monitoring, pulse ox monitoring, and intermittent blood gas analysis. Care plan discussed with ICU care team. Patient remained critical and at risk for life threatening decompensation.  Patient seen, examined and plan discussed with CCU team during rounds.     I have personally provided 35 minutes of critical care time excluding time spent on separate procedures.    By signing my name below, I, Sunni Simms, attest that this documentation has been prepared under the direction and in the presence of Chayito Remy NP   Electronically signed: Jignesh Howe, 12-29-20 @ 21:40    I, Chayito Remy NP  , personally performed the services described in this documentation. all medical record entries made by the [email or web address] were at my direction and in my presence. I have reviewed the chart and agree that the record reflects my personal performance and is accurate and complete  Electronically signed: Chayito Remy NP

## 2020-12-30 DIAGNOSIS — I50.9 HEART FAILURE, UNSPECIFIED: ICD-10-CM

## 2020-12-30 DIAGNOSIS — E78.5 HYPERLIPIDEMIA, UNSPECIFIED: ICD-10-CM

## 2020-12-30 DIAGNOSIS — F03.90 UNSPECIFIED DEMENTIA WITHOUT BEHAVIORAL DISTURBANCE: ICD-10-CM

## 2020-12-30 DIAGNOSIS — I26.99 OTHER PULMONARY EMBOLISM WITHOUT ACUTE COR PULMONALE: ICD-10-CM

## 2020-12-30 DIAGNOSIS — I45.9 CONDUCTION DISORDER, UNSPECIFIED: ICD-10-CM

## 2020-12-30 DIAGNOSIS — Z29.9 ENCOUNTER FOR PROPHYLACTIC MEASURES, UNSPECIFIED: ICD-10-CM

## 2020-12-30 DIAGNOSIS — S06.5X9A TRAUMATIC SUBDURAL HEMORRHAGE WITH LOSS OF CONSCIOUSNESS OF UNSPECIFIED DURATION, INITIAL ENCOUNTER: ICD-10-CM

## 2020-12-30 LAB
ALBUMIN SERPL ELPH-MCNC: 3.2 G/DL — LOW (ref 3.3–5)
ALP SERPL-CCNC: 83 U/L — SIGNIFICANT CHANGE UP (ref 40–120)
ALT FLD-CCNC: 130 U/L — HIGH (ref 10–45)
ANION GAP SERPL CALC-SCNC: 12 MMOL/L — SIGNIFICANT CHANGE UP (ref 5–17)
APTT BLD: 27.9 SEC — SIGNIFICANT CHANGE UP (ref 27.5–35.5)
AST SERPL-CCNC: 130 U/L — HIGH (ref 10–40)
BASOPHILS # BLD AUTO: 0.06 K/UL — SIGNIFICANT CHANGE UP (ref 0–0.2)
BASOPHILS NFR BLD AUTO: 0.7 % — SIGNIFICANT CHANGE UP (ref 0–2)
BILIRUB SERPL-MCNC: 0.5 MG/DL — SIGNIFICANT CHANGE UP (ref 0.2–1.2)
BUN SERPL-MCNC: 22 MG/DL — SIGNIFICANT CHANGE UP (ref 7–23)
CALCIUM SERPL-MCNC: 8.3 MG/DL — LOW (ref 8.4–10.5)
CHLORIDE SERPL-SCNC: 108 MMOL/L — SIGNIFICANT CHANGE UP (ref 96–108)
CO2 SERPL-SCNC: 19 MMOL/L — LOW (ref 22–31)
CREAT SERPL-MCNC: 1.08 MG/DL — SIGNIFICANT CHANGE UP (ref 0.5–1.3)
EOSINOPHIL # BLD AUTO: 0.04 K/UL — SIGNIFICANT CHANGE UP (ref 0–0.5)
EOSINOPHIL NFR BLD AUTO: 0.5 % — SIGNIFICANT CHANGE UP (ref 0–6)
GAS PNL BLDA: SIGNIFICANT CHANGE UP
GLUCOSE SERPL-MCNC: 113 MG/DL — HIGH (ref 70–99)
HCT VFR BLD CALC: 33.9 % — LOW (ref 34.5–45)
HGB BLD-MCNC: 10.6 G/DL — LOW (ref 11.5–15.5)
IMM GRANULOCYTES NFR BLD AUTO: 0.4 % — SIGNIFICANT CHANGE UP (ref 0–1.5)
INR BLD: 1.4 RATIO — HIGH (ref 0.88–1.16)
LYMPHOCYTES # BLD AUTO: 1.49 K/UL — SIGNIFICANT CHANGE UP (ref 1–3.3)
LYMPHOCYTES # BLD AUTO: 17.4 % — SIGNIFICANT CHANGE UP (ref 13–44)
MAGNESIUM SERPL-MCNC: 2.1 MG/DL — SIGNIFICANT CHANGE UP (ref 1.6–2.6)
MCHC RBC-ENTMCNC: 29 PG — SIGNIFICANT CHANGE UP (ref 27–34)
MCHC RBC-ENTMCNC: 31.3 GM/DL — LOW (ref 32–36)
MCV RBC AUTO: 92.6 FL — SIGNIFICANT CHANGE UP (ref 80–100)
MONOCYTES # BLD AUTO: 0.76 K/UL — SIGNIFICANT CHANGE UP (ref 0–0.9)
MONOCYTES NFR BLD AUTO: 8.9 % — SIGNIFICANT CHANGE UP (ref 2–14)
NEUTROPHILS # BLD AUTO: 6.17 K/UL — SIGNIFICANT CHANGE UP (ref 1.8–7.4)
NEUTROPHILS NFR BLD AUTO: 72.1 % — SIGNIFICANT CHANGE UP (ref 43–77)
NRBC # BLD: 0 /100 WBCS — SIGNIFICANT CHANGE UP (ref 0–0)
PHOSPHATE SERPL-MCNC: 4.3 MG/DL — SIGNIFICANT CHANGE UP (ref 2.5–4.5)
PLATELET # BLD AUTO: 243 K/UL — SIGNIFICANT CHANGE UP (ref 150–400)
POTASSIUM SERPL-MCNC: 4.4 MMOL/L — SIGNIFICANT CHANGE UP (ref 3.5–5.3)
POTASSIUM SERPL-SCNC: 4.4 MMOL/L — SIGNIFICANT CHANGE UP (ref 3.5–5.3)
PROT SERPL-MCNC: 5.5 G/DL — LOW (ref 6–8.3)
PROTHROM AB SERPL-ACNC: 16.5 SEC — HIGH (ref 10.6–13.6)
RBC # BLD: 3.66 M/UL — LOW (ref 3.8–5.2)
RBC # FLD: 15.2 % — HIGH (ref 10.3–14.5)
SARS-COV-2 IGG SERPL QL IA: NEGATIVE — SIGNIFICANT CHANGE UP
SARS-COV-2 IGM SERPL IA-ACNC: 0.01 INDEX — SIGNIFICANT CHANGE UP
SODIUM SERPL-SCNC: 139 MMOL/L — SIGNIFICANT CHANGE UP (ref 135–145)
WBC # BLD: 8.55 K/UL — SIGNIFICANT CHANGE UP (ref 3.8–10.5)
WBC # FLD AUTO: 8.55 K/UL — SIGNIFICANT CHANGE UP (ref 3.8–10.5)

## 2020-12-30 PROCEDURE — 99233 SBSQ HOSP IP/OBS HIGH 50: CPT

## 2020-12-30 PROCEDURE — 93010 ELECTROCARDIOGRAM REPORT: CPT

## 2020-12-30 PROCEDURE — 99233 SBSQ HOSP IP/OBS HIGH 50: CPT | Mod: GC

## 2020-12-30 PROCEDURE — 71045 X-RAY EXAM CHEST 1 VIEW: CPT | Mod: 26

## 2020-12-30 RX ORDER — NOREPINEPHRINE BITARTRATE/D5W 8 MG/250ML
0.05 PLASTIC BAG, INJECTION (ML) INTRAVENOUS
Qty: 16 | Refills: 0 | Status: DISCONTINUED | OUTPATIENT
Start: 2020-12-30 | End: 2020-12-30

## 2020-12-30 RX ORDER — ACETAMINOPHEN 500 MG
650 TABLET ORAL ONCE
Refills: 0 | Status: COMPLETED | OUTPATIENT
Start: 2020-12-30 | End: 2020-12-30

## 2020-12-30 RX ORDER — METOPROLOL TARTRATE 50 MG
12.5 TABLET ORAL
Refills: 0 | Status: DISCONTINUED | OUTPATIENT
Start: 2020-12-30 | End: 2020-12-31

## 2020-12-30 RX ADMIN — Medication 5 MILLIGRAM(S): at 21:15

## 2020-12-30 RX ADMIN — SIMVASTATIN 20 MILLIGRAM(S): 20 TABLET, FILM COATED ORAL at 21:15

## 2020-12-30 RX ADMIN — APIXABAN 2.5 MILLIGRAM(S): 2.5 TABLET, FILM COATED ORAL at 05:42

## 2020-12-30 RX ADMIN — Medication 650 MILLIGRAM(S): at 08:58

## 2020-12-30 RX ADMIN — Medication 650 MILLIGRAM(S): at 08:24

## 2020-12-30 RX ADMIN — Medication 12.5 MILLIGRAM(S): at 10:31

## 2020-12-30 RX ADMIN — MEMANTINE HYDROCHLORIDE 5 MILLIGRAM(S): 10 TABLET ORAL at 13:29

## 2020-12-30 RX ADMIN — APIXABAN 2.5 MILLIGRAM(S): 2.5 TABLET, FILM COATED ORAL at 17:44

## 2020-12-30 RX ADMIN — CHLORHEXIDINE GLUCONATE 1 APPLICATION(S): 213 SOLUTION TOPICAL at 06:07

## 2020-12-30 RX ADMIN — Medication 12.5 MILLIGRAM(S): at 21:15

## 2020-12-30 NOTE — DIETITIAN INITIAL EVALUATION ADULT. - OTHER INFO
Unable to report UBW but denies weight loss. Per previous RD note, pt weighed 130 pounds 9 months ago (3/2020). Current weight 155 pounds suggests weight gain. Appears well-nourished. Fair PO intake in-house per report, tolerating current diet. RD added Mighty Shakes 2x/day to optimize intake. Nutrition education deferred in setting of patients confusion. Per chart: 1+ edema, no pressure injuries

## 2020-12-30 NOTE — CHART NOTE - NSCHARTNOTEFT_GEN_A_CORE
MAR MICU TRANSFER NOTE    Please refer to ccu transfer note for full details.    Briefly, this is a  89y F PMH nonobstructive CAD ([facility] 2012), SDH s/p ronny hole, dementia, DVT/PE, and s/p Nikky for rectal adenocarcinoma presenting for SOB. Pt had SOB for several days with "labored breathing". Cardiologist (Dr. Jauregui) instructed pt to go to ED. HR found to be in upper 30's-40's (baseline 70's). Denies any other symptoms including fever/chills/n/v/HA/falls/chest pain/dizziness. Upon arrival to ED SOB improved without any interventions. No known sick contacts or recent travel.    CICU COURSE:   12/28 Presenting with SOB and Bradycardia   12/29 s/p Vfib arrest with shock x1.  Intubated with AV MIcra placed. Extubated to 2LNC, now on RA         Vital Signs Last 24 Hrs  T(C): 36.6 (30 Dec 2020 11:09), Max: 37.3 (29 Dec 2020 23:00)  T(F): 97.9 (30 Dec 2020 11:09), Max: 99.2 (29 Dec 2020 23:00)  HR: 93 (30 Dec 2020 11:09) (34 - 94)  BP: 100/67 (30 Dec 2020 11:09) (86/47 - 151/62)  BP(mean): 69 (30 Dec 2020 10:00) (63 - 95)  RR: 20 (30 Dec 2020 11:09) (17 - 42)  SpO2: 93% (30 Dec 2020 11:09) (91% - 98%)  I&O's Summary    29 Dec 2020 07:01  -  30 Dec 2020 07:00  --------------------------------------------------------  IN: 1394 mL / OUT: 1000 mL / NET: 394 mL    30 Dec 2020 07:01  -  30 Dec 2020 12:58  --------------------------------------------------------  IN: 240 mL / OUT: 300 mL / NET: -60 mL      Allergies    No Known Allergies    Intolerances      MEDICATIONS  (STANDING):  apixaban 2.5 milliGRAM(s) Oral every 12 hours  chlorhexidine 4% Liquid 1 Application(s) Topical <User Schedule>  melatonin 5 milliGRAM(s) Oral at bedtime  memantine 5 milliGRAM(s) Oral daily  metoprolol tartrate 12.5 milliGRAM(s) Oral two times a day  simvastatin 20 milliGRAM(s) Oral at bedtime    MEDICATIONS  (PRN):        CARDIAC MARKERS ( 29 Dec 2020 00:15 )  x     / x     / 53 U/L / x     / 1.8 ng/mL  CARDIAC MARKERS ( 28 Dec 2020 19:47 )  x     / x     / 51 U/L / x     / 1.9 ng/mL                            10.6   8.55  )-----------( 243      ( 30 Dec 2020 01:01 )             33.9     12-30    139  |  108  |  22  ----------------------------<  113<H>  4.4   |  19<L>  |  1.08    Ca    8.3<L>      30 Dec 2020 01:01  Phos  4.3     12-30  Mg     2.1     12-30    TPro  5.5<L>  /  Alb  3.2<L>  /  TBili  0.5  /  DBili  x   /  AST  130<H>  /  ALT  130<H>  /  AlkPhos  83  12-30    PT/INR - ( 30 Dec 2020 01:01 )   PT: 16.5 sec;   INR: 1.40 ratio         PTT - ( 30 Dec 2020 01:01 )  PTT:27.9 sec    ASSESSMENT & PLAN:   Plan:  ====================== NEUROLOGY=====================  Hx Dementia A&Ox1   - Continue monitoring neuro status  - C/w memantine 5mg QD    Hx 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 1000 milliGRAM(s) IV Intermittent once  melatonin 5 milliGRAM(s) Oral at bedtime  memantine 5 milliGRAM(s) Oral daily    ==================== RESPIRATORY======================  Hx PE/ DVT   - will restart Eliquis tomorrow in the AM    Respiratory failure   - Required full ventilator support, intubated during Vfib arrest  - Extubated to 3L NC 12/29   - Now on RA 12/30 AM satting well     ====================CARDIOVASCULAR==================  HFrEF with EF 35%   - TTE w cont (12/29) : EF 35%. LV dysfunction, moderate AR, mild pulmonary HTN, Severe functional MR,   - Monitor strict I&Os, lytes, daily weights   -Starting patient on lopressor 12.5 BID     Complete heart block, AV block 2:1 with asymptomatic bradycardia to upper 30's/40's.  - s/p AV Micra with EP today, will restart Eliquis tomorrow   -CXR in the AM  -Sutures to be removed at 7AM   -Holding home BB    Hx HLD  - c/w simvastatin 20mg qhs    simvastatin 20 milliGRAM(s) Oral at bedtime    ===================HEMATOLOGIC/ONC ===================  hx PVD/ DVT   - heparin gtt d/c   - Started on Eliquis 2.6mg q6h 12/30    Anemia  - H/H stable.   - Continue to monitor hemoglobin and hematocrit levels.  - Trend CBC    ===================== RENAL =========================  Continue to monitor I/Os, BUN/Creatinine, and urine output.   Goal net even fluid balance. Replete lytes PRN. Keep K> 4 and Mg >2.     ==================== GASTROINTESTINAL===================  Tolerating PO DASH/TLC diet.     =======================    ENDOCRINE  =====================  sugars controlled   - Continue to monitor blood glucose level for need to initiate glycemic control    ========================INFECTIOUS DISEASE================  Afebrile, WBC within normal limits.  - Monitor for fever/leukocytosis       For Follow-Up:  [] F/u PT consult   [] C/w Eliquis for hx DVT/PE  [] C/w Lopressor 12.5mg BID with eventual conversion to Toprol XL  [] F/u EP recs

## 2020-12-30 NOTE — DIETITIAN INITIAL EVALUATION ADULT. - REASON FOR ADMISSION
Per chart pt is a 89 year old female with PMH of CAD, SDH s/p ronny hole, dementia, DVT/PE, s/p david for adenocarcinoma, admitted with "labored breathing" and bradycardiac. On 12/29 s/p Vfib arrest with shock x1. Intubated with AV Micra placed. Extubated to 2LNC, now on room air.

## 2020-12-30 NOTE — CHART NOTE - NSCHARTNOTESELECT_GEN_ALL_CORE
Transfer Note
Leadless Pacemaker Implantation Note/Event Note
Transfer Note
Transfer Note
VTE Risk/Event Note

## 2020-12-30 NOTE — PROGRESS NOTE ADULT - SUBJECTIVE AND OBJECTIVE BOX
24H hour events: Pt without complaints, s/p micra AV PPM implant yesterday. No acute events overnight     MEDICATIONS:  apixaban 2.5 milliGRAM(s) Oral every 12 hours  metoprolol tartrate 12.5 milliGRAM(s) Oral two times a day  melatonin 5 milliGRAM(s) Oral at bedtime  memantine 5 milliGRAM(s) Oral daily  simvastatin 20 milliGRAM(s) Oral at bedtime  chlorhexidine 4% Liquid 1 Application(s) Topical <User Schedule>    REVIEW OF SYSTEMS:  Complete 10point ROS negative.    PHYSICAL EXAM:  T(C): 36.6 (12-30-20 @ 11:09), Max: 37.3 (12-29-20 @ 23:00)  HR: 93 (12-30-20 @ 11:09) (34 - 94)  BP: 100/67 (12-30-20 @ 11:09) (86/47 - 151/62)  RR: 20 (12-30-20 @ 11:09) (17 - 42)  SpO2: 93% (12-30-20 @ 11:09) (91% - 98%)  Wt(kg): --  I&O's Summary    29 Dec 2020 07:01  -  30 Dec 2020 07:00  --------------------------------------------------------  IN: 1394 mL / OUT: 1000 mL / NET: 394 mL    30 Dec 2020 07:01  -  30 Dec 2020 12:48  --------------------------------------------------------  IN: 240 mL / OUT: 300 mL / NET: -60 mL      Appearance: Normal	  HEENT: Neck supple  Cardiovascular: Normal S1 S2, No JVD, No murmurs, No edema  Respiratory: Lungs clear to auscultation	  Psychiatry: A & O x 2, Mood & affect appropriate  Gastrointestinal:  Soft, Non-tender, + BS	  Skin: No rashes  Extremities: Normal range of motion, No clubbing, cyanosis or edema. Right groin surgical incision site C/D/I without suture or hematoma  Vascular: Peripheral pulses palpable 2+ bilaterally        LABS:	 	    CBC Full  -  ( 30 Dec 2020 01:01 )  WBC Count : 8.55 K/uL  Hemoglobin : 10.6 g/dL  Hematocrit : 33.9 %  Platelet Count - Automated : 243 K/uL  Mean Cell Volume : 92.6 fl  Mean Cell Hemoglobin : 29.0 pg  Mean Cell Hemoglobin Concentration : 31.3 gm/dL  Auto Neutrophil # : 6.17 K/uL  Auto Lymphocyte # : 1.49 K/uL  Auto Monocyte # : 0.76 K/uL  Auto Eosinophil # : 0.04 K/uL  Auto Basophil # : 0.06 K/uL  Auto Neutrophil % : 72.1 %  Auto Lymphocyte % : 17.4 %  Auto Monocyte % : 8.9 %  Auto Eosinophil % : 0.5 %  Auto Basophil % : 0.7 %    12-30    139  |  108  |  22  ----------------------------<  113<H>  4.4   |  19<L>  |  1.08  12-29    144  |  110<H>  |  20  ----------------------------<  157<H>  3.3<L>   |  18<L>  |  1.07    Ca    8.3<L>      30 Dec 2020 01:01  Ca    8.8      29 Dec 2020 16:43  Phos  4.3     12-30  Phos  4.4     12-29  Mg     2.1     12-30  Mg     2.1     12-29    TPro  5.5<L>  /  Alb  3.2<L>  /  TBili  0.5  /  DBili  x   /  AST  130<H>  /  ALT  130<H>  /  AlkPhos  83  12-30  TPro  6.3  /  Alb  3.6  /  TBili  0.6  /  DBili  x   /  AST  197<H>  /  ALT  161<H>  /  AlkPhos  93  12-29      TELEMETRY: V pace at 90's, no ventricular ectopy 	      Chest: Good device placement

## 2020-12-30 NOTE — DIETITIAN INITIAL EVALUATION ADULT. - ADD RECOMMEND
Monitor tolerance to diet prescription, nutritional intake, GI function, weight trends, labs and skin integrity.

## 2020-12-30 NOTE — PROGRESS NOTE ADULT - ASSESSMENT
89 year old female with non obstructive CAD, dementia, presents with SOB found to have HRs in 30s in 2:1 AV block with intermittent episodes of CHB, course complicated by josh induced [place]/VF with long-short pattern requiring defibrillation. Now s/p micra AV placement on 12/29/20.     -Post procedure teaching enforced with patient and patient's daughter (Taisha) via phone  -Post-op device interrogated by MDT rep this am; normal device function  -Chest x-ray from last night and this am were revealed with Dr. Sutton, good device placement in the RV  -Eliquis resumed today  -Right groin soft and nontender, suture was removed by CICU overnight  -Follow up in EP clinic as scheduled on 1/13/2021 at 9:40am  -Clear from EP perspective for discharge planning  -EPS will sign off, reconsult as needed    YELITZA Monsalve NP  [phone]

## 2020-12-30 NOTE — DIETITIAN INITIAL EVALUATION ADULT. - PERTINENT LABORATORY DATA
Labs: 12-30 @ 01:01: Sodium 139, Potassium 4.4, Calcium 8.3<L>, Magnesium 2.1, Phosphorus 4.3, BUN 22, Creatinine 1.08, Glucose 113<H>, Alk Phos 83, ALT/SGPT 130<H>, AST/SGOT 130<H>, Albumin 3.2<L>, Prealbumin --, Total Bilirubin 0.5, Hemoglobin 10.6<L>, Hematocrit 33.9<L>, Ferritin --, C-Reactive Protein --, Creatine Kinase <<27>  12-29 @ 16:43: Sodium 144, Potassium 3.3<L>, Calcium 8.8, Magnesium 2.1, Phosphorus 4.4, BUN 20, Creatinine 1.07, Glucose 157<H>, Alk Phos 93, ALT/SGPT 161<H>, AST/SGOT 197<H>, Albumin 3.6, Prealbumin --, Total Bilirubin 0.6, Hemoglobin 11.2<L>, Hematocrit 36.7, Ferritin --, C-Reactive Protein --, Creatine Kinase <<27>      Triglycerides, Serum: 85 mg/dL (12-29-20 @ 02:48)

## 2020-12-30 NOTE — PROGRESS NOTE ADULT - PROBLEM SELECTOR PLAN 2
HFrEF w/ EF of 35%. Appears euvolemic on exam at this time. No significant lower extremity edema.   - Lopressor 12.5 mg BID   - consider adding ace or arb depending on pressures   - TTE w cont (12/29) : EF 35%. LV dysfunction, moderate AR, mild pulmonary HTN, Severe functional MR   - Strict I & O   - daily weights

## 2020-12-30 NOTE — DIETITIAN INITIAL EVALUATION ADULT. - PERTINENT MEDS FT
MEDICATIONS  (STANDING):  apixaban 2.5 milliGRAM(s) Oral every 12 hours  melatonin 5 milliGRAM(s) Oral at bedtime  memantine 5 milliGRAM(s) Oral daily  metoprolol tartrate 12.5 milliGRAM(s) Oral two times a day  simvastatin 20 milliGRAM(s) Oral at bedtime    MEDICATIONS  (PRN):

## 2020-12-30 NOTE — PROGRESS NOTE ADULT - ASSESSMENT
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 1000 milliGRAM(s) IV Intermittent once  melatonin 5 milliGRAM(s) Oral at bedtime  memantine 5 milliGRAM(s) Oral daily    ==================== RESPIRATORY======================  Hx PE/ DVT   - Restarted Eliquis 2.5mg q12h 12/30    Respiratory failure   - Required full ventilator support, intubated during Vfib arrest  - Extubated to 3L NC 12/29   - Now on RA 12/30 AM satting well     ====================CARDIOVASCULAR==================  HFrEF with EF 35%   - TTE w cont (12/29) : EF 35%. LV dysfunction, moderate AR, mild pulmonary HTN, Severe functional MR,   - Monitor strict I&Os, lytes, daily weights   -Starting patient on lopressor 12.5 BID     Complete heart block, AV block 2:1 with asymptomatic bradycardia to upper 30's/40's.  - s/p AV Micra with EP today, will restart Eliquis tomorrow   -CXR in the AM  -Sutures removed at 7AM     Hx HLD  - c/w simvastatin 20mg qhs    simvastatin 20 milliGRAM(s) Oral at bedtime    ===================HEMATOLOGIC/ONC ===================  hx PVD/ DVT   - heparin gtt d/c'd   - Started on Eliquis 2.5mg q6h 12/30    Anemia  - H/H stable.   - Continue to monitor hemoglobin and hematocrit levels.  - Trend CBC    ===================== RENAL =========================  Continue to monitor I/Os, BUN/Creatinine, and urine output.   Goal net even fluid balance. Replete lytes PRN. Keep K> 4 and Mg >2.     ==================== GASTROINTESTINAL===================  Tolerating PO DASH/TLC diet.     =======================    ENDOCRINE  =====================  sugars controlled   - Continue to monitor blood glucose level for need to initiate glycemic control    ========================INFECTIOUS DISEASE================  Afebrile, WBC within normal limits.  - Monitor for fever/leukocytosis

## 2020-12-30 NOTE — PROGRESS NOTE ADULT - PROBLEM SELECTOR PLAN 1
Found to be in complete heart block. S/p V fib arrest and intubation.   - F/u EP recommendations   - s/p AV Micra with EP yesterday will restart Eliquis   - Repeat cxr today   - c/w lopressor 12.5 mg BID Found to be in complete heart block. S/p V fib arrest and intubation.   - s/p AV Micra with EP yesterday will restart Eliquis   - Repeat cxr today   - c/w lopressor 12.5 mg BID

## 2020-12-30 NOTE — PROGRESS NOTE ADULT - ASSESSMENT
89y F PMH nonobstructive CAD ([facility] 2012), SDH s/p ronny hole, dementia, DVT/PE, and s/p Nikky for rectal adenocarcinoma admitted for SOB found to have complete heart block c/b ventricular fibrillation arrest and intubation. Now on RA and in sinus rhythm s/p micra placement.

## 2020-12-30 NOTE — PROGRESS NOTE ADULT - SUBJECTIVE AND OBJECTIVE BOX
PATIENT:  AMY CHACON  [id number]    CHIEF COMPLAINT:  Patient is a 89y old  Female who presents with a chief complaint of Bradycardia (29 Dec 2020 21:40)      INTERVAL HISTORY/OVERNIGHT EVENTS:      REVIEW OF SYSTEMS:    Constitutional:     [ ] negative [ ] fevers [ ] chills [ ] weight loss [ ] weight gain  HEENT:                  [ ] negative [ ] dry eyes [ ] eye irritation [ ] postnasal drip [ ] nasal congestion  CV:                         [ ] negative  [ ] chest pain [ ] orthopnea [ ] palpitations [ ] murmur  Resp:                     [ ] negative [ ] cough [ ] shortness of breath [ ] dyspnea [ ] wheezing [ ] sputum [ ] hemoptysis  GI:                          [ ] negative [ ] nausea [ ] vomiting [ ] diarrhea [ ] constipation [ ] abd pain [ ] dysphagia   :                        [ ] negative [ ] dysuria [ ] nocturia [ ] hematuria [ ] increased urinary frequency  Musculoskeletal: [ ] negative [ ] back pain [ ] myalgias [ ] arthralgias [ ] fracture  Skin:                       [ ] negative [ ] rash [ ] itch  Neurological:        [ ] negative [ ] headache [ ] dizziness [ ] syncope [ ] weakness [ ] numbness  Psychiatric:           [ ] negative [ ] anxiety [ ] depression  Endocrine:            [ ] negative [ ] diabetes [ ] thyroid problem  Heme/Lymph:      [ ] negative [ ] anemia [ ] bleeding problem  Allergic/Immune: [ ] negative [ ] itchy eyes [ ] nasal discharge [ ] hives [ ] angioedema    [ ] All other systems negative  [ ] Unable to assess ROS because ________.    MEDICATIONS:  MEDICATIONS  (STANDING):  apixaban 2.5 milliGRAM(s) Oral every 12 hours  chlorhexidine 4% Liquid 1 Application(s) Topical <User Schedule>  melatonin 5 milliGRAM(s) Oral at bedtime  memantine 5 milliGRAM(s) Oral daily  simvastatin 20 milliGRAM(s) Oral at bedtime    MEDICATIONS  (PRN):      ALLERGIES:  Allergies    No Known Allergies    Intolerances        OBJECTIVE:  ICU Vital Signs Last 24 Hrs  T(C): 36.7 (30 Dec 2020 08:00), Max: 37.3 (29 Dec 2020 23:00)  T(F): 98 (30 Dec 2020 08:00), Max: 99.2 (29 Dec 2020 23:00)  HR: 94 (30 Dec 2020 08:00) (34 - 94)  BP: 107/66 (30 Dec 2020 08:00) (86/47 - 151/62)  BP(mean): 81 (30 Dec 2020 08:00) (62 - 95)  ABP: --  ABP(mean): --  RR: 39 (30 Dec 2020 08:00) (17 - 42)  SpO2: 95% (30 Dec 2020 08:00) (91% - 98%)    Mode: AC/ CMV (Assist Control/ Continuous Mandatory Ventilation)  RR (machine): 16  TV (machine): 450  FiO2: 50  PEEP: 5  ITime: 1  MAP: 8  PIP: 22    Adult Advanced Hemodynamics Last 24 Hrs  CVP(mm Hg): --  CVP(cm H2O): --  CO: --  CI: --  PA: --  PA(mean): --  PCWP: --  SVR: --  SVRI: --  PVR: --  PVRI: --  CAPILLARY BLOOD GLUCOSE        CAPILLARY BLOOD GLUCOSE        I&O's Summary    29 Dec 2020 07:01  -  30 Dec 2020 07:00  --------------------------------------------------------  IN: 1394 mL / OUT: 1000 mL / NET: 394 mL      Daily     Daily Weight in k.8 (30 Dec 2020 06:00)    PHYSICAL EXAMINATION:  General: WN/WD NAD  HEENT: PERRLA, EOMI, moist mucous membranes  Neurology: A&Ox3, nonfocal, MCKINNEY x 4  Respiratory: CTA B/L, normal respiratory effort, no wheezes, crackles, rales  CV: RRR, S1S2, no murmurs, rubs or gallops  Abdominal: Soft, NT, ND +BS, Last BM  Extremities: No edema, + peripheral pulses  Incisions:   Tubes:    LABS:  ABG - ( 30 Dec 2020 00:58 )  pH, Arterial: 7.44  pH, Blood: x     /  pCO2: 32    /  pO2: 95    / HCO3: 21    / Base Excess: -1.6  /  SaO2: 97                                      10.6   8.55  )-----------( 243      ( 30 Dec 2020 01:01 )             33.9     12-30    139  |  108  |  22  ----------------------------<  113<H>  4.4   |  19<L>  |  1.08    Ca    8.3<L>      30 Dec 2020 01:01  Phos  4.3     12  Mg     2.1     12    TPro  5.5<L>  /  Alb  3.2<L>  /  TBili  0.5  /  DBili  x   /  AST  130<H>  /  ALT  130<H>  /  AlkPhos  83  1230    LIVER FUNCTIONS - ( 30 Dec 2020 01:01 )  Alb: 3.2 g/dL / Pro: 5.5 g/dL / ALK PHOS: 83 U/L / ALT: 130 U/L / AST: 130 U/L / GGT: x           PT/INR - ( 30 Dec 2020 01:01 )   PT: 16.5 sec;   INR: 1.40 ratio         PTT - ( 30 Dec 2020 01:01 )  PTT:27.9 sec    CARDIAC MARKERS ( 29 Dec 2020 00:15 )  x     / x     / 53 U/L / x     / 1.8 ng/mL  CARDIAC MARKERS ( 28 Dec 2020 19:47 )  x     / x     / 51 U/L / x     / 1.9 ng/mL          TELEMETRY:     EKG:     IMAGING:         PATIENT:  AMY CHACON  [id number]    CHIEF COMPLAINT:  Patient is a 89y old  Female who presents with a chief complaint of Bradycardia (29 Dec 2020 21:40)  HPI: 89y F PMH nonobstructive CAD ([facility] ), SDH s/p ronny hole, dementia, DVT/PE, and s/p Nikky for rectal adenocarcinoma presenting for SOB. Pt had SOB for several days with "labored breathing". Cardiologist (Dr. Jauregui) instructed pt to go to ED. HR found to be in upper 30's-40's (baseline 70's). Denies any other symptoms including fever/chills/n/v/HA/falls/chest pain/dizziness. Upon arrival to ED SOB improved without any interventions. No known sick contacts or recent travel.    CICU COURSE:    Presenting with SOB and Bradycardia    s/p Vfib arrest with shock x1.  Intubated with AV MIcra placed. Extubated to 2LNC      INTERVAL HISTORY/OVERNIGHT EVENTS:  -Had episode of hypotension overnight w/ MAP in 50s, received small fluid bolus, hypotension improved       REVIEW OF SYSTEMS:    Constitutional:     [ ] negative [ ] fevers [ ] chills [ ] weight loss [ ] weight gain  HEENT:                  [ ] negative [ ] dry eyes [ ] eye irritation [ ] postnasal drip [ ] nasal congestion  CV:                         [ ] negative  [ ] chest pain [ ] orthopnea [ ] palpitations [ ] murmur  Resp:                     [ ] negative [ ] cough [ ] shortness of breath [ ] dyspnea [ ] wheezing [ ] sputum [ ] hemoptysis  GI:                          [ ] negative [ ] nausea [ ] vomiting [ ] diarrhea [ ] constipation [ ] abd pain [ ] dysphagia   :                        [ ] negative [ ] dysuria [ ] nocturia [ ] hematuria [ ] increased urinary frequency  Musculoskeletal: [ ] negative [ ] back pain [ ] myalgias [ ] arthralgias [ ] fracture  Skin:                       [ ] negative [ ] rash [ ] itch  Neurological:        [ ] negative [ ] headache [ ] dizziness [ ] syncope [ ] weakness [ ] numbness  Psychiatric:           [ ] negative [ ] anxiety [ ] depression  Endocrine:            [ ] negative [ ] diabetes [ ] thyroid problem  Heme/Lymph:      [ ] negative [ ] anemia [ ] bleeding problem  Allergic/Immune: [ ] negative [ ] itchy eyes [ ] nasal discharge [ ] hives [ ] angioedema    [ ] All other systems negative  [ ] Unable to assess ROS because ________.    MEDICATIONS:  MEDICATIONS  (STANDING):  apixaban 2.5 milliGRAM(s) Oral every 12 hours  chlorhexidine 4% Liquid 1 Application(s) Topical <User Schedule>  melatonin 5 milliGRAM(s) Oral at bedtime  memantine 5 milliGRAM(s) Oral daily  simvastatin 20 milliGRAM(s) Oral at bedtime    MEDICATIONS  (PRN):      ALLERGIES:  Allergies    No Known Allergies    Intolerances        OBJECTIVE:  ICU Vital Signs Last 24 Hrs  T(C): 36.7 (30 Dec 2020 08:00), Max: 37.3 (29 Dec 2020 23:00)  T(F): 98 (30 Dec 2020 08:00), Max: 99.2 (29 Dec 2020 23:00)  HR: 94 (30 Dec 2020 08:00) (34 - 94)  BP: 107/66 (30 Dec 2020 08:00) (86/47 - 151/62)  BP(mean): 81 (30 Dec 2020 08:00) (62 - 95)  ABP: --  ABP(mean): --  RR: 39 (30 Dec 2020 08:00) (17 - 42)  SpO2: 95% (30 Dec 2020 08:00) (91% - 98%)    Mode: AC/ CMV (Assist Control/ Continuous Mandatory Ventilation)  RR (machine): 16  TV (machine): 450  FiO2: 50  PEEP: 5  ITime: 1  MAP: 8  PIP: 22    Adult Advanced Hemodynamics Last 24 Hrs  CVP(mm Hg): --  CVP(cm H2O): --  CO: --  CI: --  PA: --  PA(mean): --  PCWP: --  SVR: --  SVRI: --  PVR: --  PVRI: --  CAPILLARY BLOOD GLUCOSE        CAPILLARY BLOOD GLUCOSE        I&O's Summary    29 Dec 2020 07:01  -  30 Dec 2020 07:00  --------------------------------------------------------  IN: 1394 mL / OUT: 1000 mL / NET: 394 mL      Daily     Daily Weight in k.8 (30 Dec 2020 06:00)    PHYSICAL EXAMINATION:  General: WN/WD NAD  HEENT: PERRLA, EOMI, moist mucous membranes  Neurology: A&Ox3, nonfocal, MCKINNEY x 4  Respiratory: CTA B/L, normal respiratory effort, no wheezes, crackles, rales  CV: RRR, S1S2, no murmurs, rubs or gallops  Abdominal: Soft, NT, ND +BS, Last BM  Extremities: No edema, + peripheral pulses  Incisions:   Tubes:    LABS:  ABG - ( 30 Dec 2020 00:58 )  pH, Arterial: 7.44  pH, Blood: x     /  pCO2: 32    /  pO2: 95    / HCO3: 21    / Base Excess: -1.6  /  SaO2: 97                                      10.6   8.55  )-----------( 243      ( 30 Dec 2020 01:01 )             33.9     12    139  |  108  |  22  ----------------------------<  113<H>  4.4   |  19<L>  |  1.08    Ca    8.3<L>      30 Dec 2020 01:01  Phos  4.3     12  Mg     2.1         TPro  5.5<L>  /  Alb  3.2<L>  /  TBili  0.5  /  DBili  x   /  AST  130<H>  /  ALT  130<H>  /  AlkPhos  83  1230    LIVER FUNCTIONS - ( 30 Dec 2020 01:01 )  Alb: 3.2 g/dL / Pro: 5.5 g/dL / ALK PHOS: 83 U/L / ALT: 130 U/L / AST: 130 U/L / GGT: x           PT/INR - ( 30 Dec 2020 01: )   PT: 16.5 sec;   INR: 1.40 ratio         PTT - ( 30 Dec 2020 01: )  PTT:27.9 sec    CARDIAC MARKERS ( 29 Dec 2020 00:15 )  x     / x     / 53 U/L / x     / 1.8 ng/mL  CARDIAC MARKERS ( 28 Dec 2020 19:47 )  x     / x     / 51 U/L / x     / 1.9 ng/mL          TELEMETRY:     EKG:     IMAGING:         PATIENT:  AMY CHACON  [id number]    CHIEF COMPLAINT:  Patient is a 89y old  Female who presents with a chief complaint of Bradycardia (29 Dec 2020 21:40)  HPI: 89y F PMH nonobstructive CAD ([facility] ), SDH s/p ronny hole, dementia, DVT/PE, and s/p Nikky for rectal adenocarcinoma presenting for SOB. Pt had SOB for several days with "labored breathing". Cardiologist (Dr. Jauregui) instructed pt to go to ED. HR found to be in upper 30's-40's (baseline 70's). Denies any other symptoms including fever/chills/n/v/HA/falls/chest pain/dizziness. Upon arrival to ED SOB improved without any interventions. No known sick contacts or recent travel.    CICU COURSE:    Presenting with SOB and Bradycardia    s/p Vfib arrest with shock x1.  Intubated with AV MIcra placed. Extubated to 2LNC      INTERVAL HISTORY/OVERNIGHT EVENTS:  -Had episode of hypotension overnight w/ MAP in 50s, received small fluid bolus, hypotension improved       REVIEW OF SYSTEMS:    Constitutional:     [x ] negative [ ] fevers [ ] chills [ ] weight loss [ ] weight gain  HEENT:                  [ ] negative [ ] dry eyes [ ] eye irritation [ ] postnasal drip [ ] nasal congestion  CV:                         [ ] negative  [x ] chest pain [ ] orthopnea [ ] palpitations [ ] murmur  Resp:                     [x ] negative [ ] cough [ ] shortness of breath [ ] dyspnea [ ] wheezing [ ] sputum [ ] hemoptysis  GI:                          [x ] negative [ ] nausea [ ] vomiting [ ] diarrhea [ ] constipation [ ] abd pain [ ] dysphagia   :                        [ ] negative [ ] dysuria [ ] nocturia [ ] hematuria [ ] increased urinary frequency  Musculoskeletal:      [x ] negative [ ] back pain [ ] myalgias [ ] arthralgias [ ] fracture  Skin:                       [x ] negative [ ] rash [ ] itch  Neurological:        [x ] negative [ ] headache [ ] dizziness [ ] syncope [ ] weakness [ ] numbness  Psychiatric:           [ ] negative [ ] anxiety [ ] depression  Endocrine:            [ ] negative [ ] diabetes [ ] thyroid problem  Heme/Lymph:      [ ] negative [ ] anemia [ ] bleeding problem  Allergic/Immune: [ ] negative [ ] itchy eyes [ ] nasal discharge [ ] hives [ ] angioedema    [ ] All other systems negative  [ ] Unable to assess ROS because ________.    MEDICATIONS:  MEDICATIONS  (STANDING):  apixaban 2.5 milliGRAM(s) Oral every 12 hours  chlorhexidine 4% Liquid 1 Application(s) Topical <User Schedule>  melatonin 5 milliGRAM(s) Oral at bedtime  memantine 5 milliGRAM(s) Oral daily  simvastatin 20 milliGRAM(s) Oral at bedtime    MEDICATIONS  (PRN):      ALLERGIES:  Allergies    No Known Allergies    Intolerances      OBJECTIVE:  ICU Vital Signs Last 24 Hrs  T(C): 36.7 (30 Dec 2020 08:00), Max: 37.3 (29 Dec 2020 23:00)  T(F): 98 (30 Dec 2020 08:00), Max: 99.2 (29 Dec 2020 23:00)  HR: 94 (30 Dec 2020 08:00) (34 - 94)  BP: 107/66 (30 Dec 2020 08:00) (86/47 - 151/62)  BP(mean): 81 (30 Dec 2020 08:00) (62 - 95)  ABP: --  ABP(mean): --  RR: 39 (30 Dec 2020 08:00) (17 - 42)  SpO2: 95% (30 Dec 2020 08:00) (91% - 98%)    Mode: AC/ CMV (Assist Control/ Continuous Mandatory Ventilation)  RR (machine): 16  TV (machine): 450  FiO2: 50  PEEP: 5  ITime: 1  MAP: 8  PIP: 22    Adult Advanced Hemodynamics Last 24 Hrs  CVP(mm Hg): --  CVP(cm H2O): --  CO: --  CI: --  PA: --  PA(mean): --  PCWP: --  SVR: --  SVRI: --  PVR: --  PVRI: --  CAPILLARY BLOOD GLUCOSE        CAPILLARY BLOOD GLUCOSE        I&O's Summary    29 Dec 2020 07:01  -  30 Dec 2020 07:00  --------------------------------------------------------  IN: 1394 mL / OUT: 1000 mL / NET: 394 mL      Daily     Daily Weight in k.8 (30 Dec 2020 06:00)    PHYSICAL EXAMINATION:  General: WN/WD NAD  HEENT: PERRL, EOMI, moist mucous membranes  Neurology: A&Ox1-2, nonfocal, MCKINNEY x 4  Respiratory: +mild crackles at L lung base, normal respiratory effort, no wheezes  CV: RRR, S1S2, no murmurs, rubs or gallops  Abdominal: Soft, NT, ND +BS  Extremities: 1+ pedal edema, + peripheral pulses  Incisions:   Tubes:    LABS:  ABG - ( 30 Dec 2020 00:58 )  pH, Arterial: 7.44  pH, Blood: x     /  pCO2: 32    /  pO2: 95    / HCO3: 21    / Base Excess: -1.6  /  SaO2: 97                                      10.6   8.55  )-----------( 243      ( 30 Dec 2020 01:01 )             33.9     12-    139  |  108  |  22  ----------------------------<  113<H>  4.4   |  19<L>  |  1.08    Ca    8.3<L>      30 Dec 2020 01:01  Phos  4.3     12-  Mg     2.1     12-    TPro  5.5<L>  /  Alb  3.2<L>  /  TBili  0.5  /  DBili  x   /  AST  130<H>  /  ALT  130<H>  /  AlkPhos  83  12-30    LIVER FUNCTIONS - ( 30 Dec 2020 01:01 )  Alb: 3.2 g/dL / Pro: 5.5 g/dL / ALK PHOS: 83 U/L / ALT: 130 U/L / AST: 130 U/L / GGT: x           PT/INR - ( 30 Dec 2020 01:01 )   PT: 16.5 sec;   INR: 1.40 ratio         PTT - ( 30 Dec 2020 01: )  PTT:27.9 sec    CARDIAC MARKERS ( 29 Dec 2020 00:15 )  x     / x     / 53 U/L / x     / 1.8 ng/mL  CARDIAC MARKERS ( 28 Dec 2020 19:47 )  x     / x     / 51 U/L / x     / 1.9 ng/mL          TELEMETRY: Reviewed    EKG: Reviewed    IMAGING: Reviewed

## 2020-12-30 NOTE — DIETITIAN INITIAL EVALUATION ADULT. - ORAL INTAKE PTA/DIET HISTORY
Limited information able to be obtained from pt; pt was pleasant and smiling but has dementia and is confused, disoriented, A&Ox1. Per care coordination notes, resides with spouse. Reports small appetite since she was much younger, does not eat frequently at baseline but overall adequate PO intake. No food allergies or micronutrient supplementation specified in chart.

## 2020-12-30 NOTE — PHYSICAL THERAPY INITIAL EVALUATION ADULT - ADDITIONAL COMMENTS
Pt states she lives in private house with  ([address], bedroom on second floor). Pt denies use of assistive device PTA. Pt states she was independent with functional mobility PTA.

## 2020-12-30 NOTE — PHYSICAL THERAPY INITIAL EVALUATION ADULT - PERTINENT HX OF CURRENT PROBLEM, REHAB EVAL
89y F PMH subdural hematoma s/p 2 evacuations with residual right sided weakness, nonobstructive CAD ([facility] 2012), SDH s/p ronny hole, dementia, DVT/PE, and s/p Nikky for rectal adenocarcinoma presenting for SOB. Pt had SOB for several days with "labored breathing". Cardiologist instructed pt to go to ED. HR found to be in upper 30's-40's (baseline 70's). 12/29 s/p Vfib arrest with shock x1.  Intubated with AV MIcra placed. Extubated to 2LNC.

## 2020-12-30 NOTE — PROGRESS NOTE ADULT - PROBLEM SELECTOR PLAN 7
DVT/PE: Eliquis 2.5 mg   Diet: DASH/TLC  Code: Full code   Dispo: DVT/PE: Eliquis 2.5 mg   Diet: DASH/TLC  Code: Full code   Dispo: Plan for discharge tomorrow

## 2020-12-30 NOTE — CHART NOTE - NSCHARTNOTEFT_GEN_A_CORE
CICU Transfer Note    Transfer from: CICU  Transfer to:  (  ) Medicine    (  ) Telemetry    (  ) RCU    (  ) Palliative    (  ) Stroke Unit    (  ) _______________  Accepting physican:      [facility] COURSE:          ASSESSMENT & PLAN:         For Follow-Up:          Vital Signs Last 24 Hrs  T(C): 36.7 (30 Dec 2020 08:00), Max: 37.3 (29 Dec 2020 23:00)  T(F): 98 (30 Dec 2020 08:00), Max: 99.2 (29 Dec 2020 23:00)  HR: 94 (30 Dec 2020 08:00) (34 - 94)  BP: 107/66 (30 Dec 2020 08:00) (86/47 - 151/62)  BP(mean): 81 (30 Dec 2020 08:00) (62 - 95)  RR: 39 (30 Dec 2020 08:00) (17 - 42)  SpO2: 95% (30 Dec 2020 08:00) (91% - 98%)  I&O's Summary    29 Dec 2020 07:01  -  30 Dec 2020 07:00  --------------------------------------------------------  IN: 1394 mL / OUT: 1000 mL / NET: 394 mL          MEDICATIONS  (STANDING):  apixaban 2.5 milliGRAM(s) Oral every 12 hours  chlorhexidine 4% Liquid 1 Application(s) Topical <User Schedule>  melatonin 5 milliGRAM(s) Oral at bedtime  memantine 5 milliGRAM(s) Oral daily  simvastatin 20 milliGRAM(s) Oral at bedtime    MEDICATIONS  (PRN):        LABS                                            10.6                  Neurophils% (auto):   72.1   (12-30 @ 01:01):    8.55 )-----------(243          Lymphocytes% (auto):  17.4                                          33.9                   Eosinphils% (auto):   0.5      Manual%: Neutrophils x    ; Lymphocytes x    ; Eosinophils x    ; Bands%: x    ; Blasts x                                    139    |  108    |  22                  Calcium: 8.3   / iCa: x      (12-30 @ 01:01)    ----------------------------<  113       Magnesium: 2.1                              4.4     |  19     |  1.08             Phosphorous: 4.3      TPro  5.5    /  Alb  3.2    /  TBili  0.5    /  DBili  x      /  AST  130    /  ALT  130    /  AlkPhos  83     30 Dec 2020 01:01    ( 12-30 @ 01:01 )   PT: 16.5 sec;   INR: 1.40 ratio  aPTT: 27.9 sec CICU Transfer Note    Transfer from: CICU  Transfer to:  (  ) Medicine    (  ) Telemetry    (  ) RCU    (  ) Palliative    (  ) Stroke Unit    (  ) _______________  Accepting physican:    HPI: 89y F PMH nonobstructive CAD ([facility] 2012), SDH s/p ronny hole, dementia, DVT/PE, and s/p Nikky for rectal adenocarcinoma presenting for SOB. Pt had SOB for several days with "labored breathing". Cardiologist (Dr. Jauregui) instructed pt to go to ED. HR found to be in upper 30's-40's (baseline 70's). Denies any other symptoms including fever/chills/n/v/HA/falls/chest pain/dizziness. Upon arrival to ED SOB improved without any interventions. No known sick contacts or recent travel.    CICU COURSE:   12/28 Presenting with SOB and Bradycardia   12/29 s/p Vfib arrest with shock x1.  Intubated with AV MIcra placed. Extubated to 2LNC      ASSESSMENT & PLAN:   Plan:  ====================== NEUROLOGY=====================  Hx Dementia A&Ox1   - Continue monitoring neuro status  - C/w memantine 5mg QD    Hx 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 1000 milliGRAM(s) IV Intermittent once  melatonin 5 milliGRAM(s) Oral at bedtime  memantine 5 milliGRAM(s) Oral daily    ==================== RESPIRATORY======================  Hx PE/ DVT   - will restart Eliquis tomorrow in the AM    Respiratory failure   - Required full ventilator support, intubated during Vfib arrest  - remains on 3L nasal cannula with SpO2 98%  s/p extubation today     Mechanical Ventilation:  Mode: AC/ CMV (Assist Control/ Continuous Mandatory Ventilation)  RR (machine): 16  TV (machine): 450  FiO2: 50  PEEP: 5  ITime: 1  MAP: 8  PIP: 22    ====================CARDIOVASCULAR==================  HFrEF with EF 35%   - TTE w cont (12/29) : EF 35%. LV dysfunction, moderate AR, mild pulmonary HTN, Severe functional MR,   - Monitor strict I&Os, lytes, daily weights     Complete heart block, AV block 2:1 with asymptomatic bradycardia to upper 30's/40's.  - s/p AV Micra with EP today, will restart Eliquis tomorrow   -CXR in the AM  -Sutures to be removed at 7AM   -Holding home BB    Hx HLD  - c/w simvastatin 20mg qhs    simvastatin 20 milliGRAM(s) Oral at bedtime    ===================HEMATOLOGIC/ONC ===================  hx PVD/ DVT   - heparin gtt d/c   - Ordered Eliquis to start tomorrow 12/30  - f/u LE dopplers     Anemia  - H/H 11.2/ 36.7, stable.   - Continue to monitor hemoglobin and hematocrit levels.  - Trend CBC    ===================== RENAL =========================  Continue to monitor I/Os, BUN/Creatinine, and urine output.   Goal net even fluid balance. Replete lytes PRN. Keep K> 4 and Mg >2.     ==================== GASTROINTESTINAL===================  Tolerating PO DASH/TLC diet.     =======================    ENDOCRINE  =====================  sugars controlled   - Continue to monitor blood glucose level for need to initiate glycemic control    ========================INFECTIOUS DISEASE================  Afebrile, WBC within normal limits.  - Monitor for fever/leukocytosis       For Follow-Up:        Vital Signs Last 24 Hrs  T(C): 36.7 (30 Dec 2020 08:00), Max: 37.3 (29 Dec 2020 23:00)  T(F): 98 (30 Dec 2020 08:00), Max: 99.2 (29 Dec 2020 23:00)  HR: 94 (30 Dec 2020 08:00) (34 - 94)  BP: 107/66 (30 Dec 2020 08:00) (86/47 - 151/62)  BP(mean): 81 (30 Dec 2020 08:00) (62 - 95)  RR: 39 (30 Dec 2020 08:00) (17 - 42)  SpO2: 95% (30 Dec 2020 08:00) (91% - 98%)  I&O's Summary    29 Dec 2020 07:01  -  30 Dec 2020 07:00  --------------------------------------------------------  IN: 1394 mL / OUT: 1000 mL / NET: 394 mL          MEDICATIONS  (STANDING):  apixaban 2.5 milliGRAM(s) Oral every 12 hours  chlorhexidine 4% Liquid 1 Application(s) Topical <User Schedule>  melatonin 5 milliGRAM(s) Oral at bedtime  memantine 5 milliGRAM(s) Oral daily  simvastatin 20 milliGRAM(s) Oral at bedtime    MEDICATIONS  (PRN):        LABS                                            10.6                  Neurophils% (auto):   72.1   (12-30 @ 01:01):    8.55 )-----------(243          Lymphocytes% (auto):  17.4                                          33.9                   Eosinphils% (auto):   0.5      Manual%: Neutrophils x    ; Lymphocytes x    ; Eosinophils x    ; Bands%: x    ; Blasts x                                    139    |  108    |  22                  Calcium: 8.3   / iCa: x      (12-30 @ 01:01)    ----------------------------<  113       Magnesium: 2.1                              4.4     |  19     |  1.08             Phosphorous: 4.3      TPro  5.5    /  Alb  3.2    /  TBili  0.5    /  DBili  x      /  AST  130    /  ALT  130    /  AlkPhos  83     30 Dec 2020 01:01    ( 12-30 @ 01:01 )   PT: 16.5 sec;   INR: 1.40 ratio  aPTT: 27.9 sec CICU Transfer Note    Transfer from: CICU  Transfer to:  (  ) Medicine    ( x) Telemetry    (  ) RCU    (  ) Palliative    (  ) Stroke Unit    (  ) _______________  Accepting physican: Dr. Mini [place]     HPI: 89y F PMH nonobstructive CAD ([facility] 2012), SDH s/p ronny hole, dementia, DVT/PE, and s/p Nikky for rectal adenocarcinoma presenting for SOB. Pt had SOB for several days with "labored breathing". Cardiologist (Dr. Jauregui) instructed pt to go to ED. HR found to be in upper 30's-40's (baseline 70's). Denies any other symptoms including fever/chills/n/v/HA/falls/chest pain/dizziness. Upon arrival to ED SOB improved without any interventions. No known sick contacts or recent travel.    CICU COURSE:   12/28 Presenting with SOB and Bradycardia   12/29 s/p Vfib arrest with shock x1.  Intubated with AV MIcra placed. Extubated to 2LNC, now on RA       ASSESSMENT & PLAN:   Plan:  ====================== NEUROLOGY=====================  Hx Dementia A&Ox1   - Continue monitoring neuro status  - C/w memantine 5mg QD    Hx 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 1000 milliGRAM(s) IV Intermittent once  melatonin 5 milliGRAM(s) Oral at bedtime  memantine 5 milliGRAM(s) Oral daily    ==================== RESPIRATORY======================  Hx PE/ DVT   - will restart Eliquis tomorrow in the AM    Respiratory failure   - Required full ventilator support, intubated during Vfib arrest  - Extubated to 3L NC 12/29   - Now on RA 12/30 AM satting well     ====================CARDIOVASCULAR==================  HFrEF with EF 35%   - TTE w cont (12/29) : EF 35%. LV dysfunction, moderate AR, mild pulmonary HTN, Severe functional MR,   - Monitor strict I&Os, lytes, daily weights   -Starting patient on lopressor 12.5 BID     Complete heart block, AV block 2:1 with asymptomatic bradycardia to upper 30's/40's.  - s/p AV Micra with EP today, will restart Eliquis tomorrow   -CXR in the AM  -Sutures to be removed at 7AM   -Holding home BB    Hx HLD  - c/w simvastatin 20mg qhs    simvastatin 20 milliGRAM(s) Oral at bedtime    ===================HEMATOLOGIC/ONC ===================  hx PVD/ DVT   - heparin gtt d/c   - Started on Eliquis 2.6mg q6h 12/30    Anemia  - H/H stable.   - Continue to monitor hemoglobin and hematocrit levels.  - Trend CBC    ===================== RENAL =========================  Continue to monitor I/Os, BUN/Creatinine, and urine output.   Goal net even fluid balance. Replete lytes PRN. Keep K> 4 and Mg >2.     ==================== GASTROINTESTINAL===================  Tolerating PO DASH/TLC diet.     =======================    ENDOCRINE  =====================  sugars controlled   - Continue to monitor blood glucose level for need to initiate glycemic control    ========================INFECTIOUS DISEASE================  Afebrile, WBC within normal limits.  - Monitor for fever/leukocytosis       For Follow-Up:  [] F/u PT consult   [] C/w Eliquis for hx DVT/PE  [] C/w Lopressor 12.5mg BID with eventual conversion to Toprol XL  [] F/u EP recs       Vital Signs Last 24 Hrs  T(C): 36.7 (30 Dec 2020 08:00), Max: 37.3 (29 Dec 2020 23:00)  T(F): 98 (30 Dec 2020 08:00), Max: 99.2 (29 Dec 2020 23:00)  HR: 94 (30 Dec 2020 08:00) (34 - 94)  BP: 107/66 (30 Dec 2020 08:00) (86/47 - 151/62)  BP(mean): 81 (30 Dec 2020 08:00) (62 - 95)  RR: 39 (30 Dec 2020 08:00) (17 - 42)  SpO2: 95% (30 Dec 2020 08:00) (91% - 98%)  I&O's Summary    29 Dec 2020 07:01  -  30 Dec 2020 07:00  --------------------------------------------------------  IN: 1394 mL / OUT: 1000 mL / NET: 394 mL      MEDICATIONS  (STANDING):  apixaban 2.5 milliGRAM(s) Oral every 12 hours  chlorhexidine 4% Liquid 1 Application(s) Topical <User Schedule>  melatonin 5 milliGRAM(s) Oral at bedtime  memantine 5 milliGRAM(s) Oral daily  simvastatin 20 milliGRAM(s) Oral at bedtime    MEDICATIONS  (PRN):      LABS                                            10.6                  Neurophils% (auto):   72.1   (12-30 @ 01:01):    8.55 )-----------(243          Lymphocytes% (auto):  17.4                                          33.9                   Eosinphils% (auto):   0.5      Manual%: Neutrophils x    ; Lymphocytes x    ; Eosinophils x    ; Bands%: x    ; Blasts x                                    139    |  108    |  22                  Calcium: 8.3   / iCa: x      (12-30 @ 01:01)    ----------------------------<  113       Magnesium: 2.1                              4.4     |  19     |  1.08             Phosphorous: 4.3      TPro  5.5    /  Alb  3.2    /  TBili  0.5    /  DBili  x      /  AST  130    /  ALT  130    /  AlkPhos  83     30 Dec 2020 01:01    ( 12-30 @ 01:01 )   PT: 16.5 sec;   INR: 1.40 ratio  aPTT: 27.9 sec

## 2020-12-30 NOTE — DIETITIAN INITIAL EVALUATION ADULT. - FACTORS AFF FOOD INTAKE
Forgetfulness. Denies chewing/swallowing problems, nausea/vomiting, diarrhea/constipation or other GI distress. Has colostomy, last BM today.

## 2020-12-30 NOTE — PROGRESS NOTE ADULT - SUBJECTIVE AND OBJECTIVE BOX
Sebastien Deleon, PGY-1  Internal Medicine  Pager: YT - [phone] and UQY - [phone]     PROGRESS NOTE:     HPI:  89y F PMH nonobstructive CAD ([facility] 2012), SDH s/p ronny hole, dementia, DVT/PE, and s/p Nikky for rectal adenocarcinoma presenting for SOB. Pt had SOB for several days with "labored breathing". Cardiologist (Dr. Jauregui) instructed pt to go to ED. HR found to be in upper 30's-40's (baseline 70's). Denies any other symptoms including fever/chills/n/v/HA/falls/chest pain/dizziness. Upon arrival to ED SOB improved without any interventions. No known sick contacts or recent travel.     Patient is a 89y old  Female who presents with a chief complaint of Bradycardia (30 Dec 2020 11:47)      MAJOR INTERVAL HOSPITAL EVENTS:     SUBJECTIVE / OVERNIGHT EVENTS: No acute overnight events. No acute physical complaints. Reports feeling uncomfortable in the bed due to the railings. Denies sob, cp, palpitations or dizziness.     REVIEW OF SYSTEMS:  CONSTITUTIONAL: No weakness, fevers or chills  EYES/ENT: No visual changes;  No vertigo or throat pain   NECK: No pain or stiffness  RESPIRATORY: No cough, wheezing, hemoptysis; No shortness of breath  CARDIOVASCULAR: No chest pain or palpitations  GASTROINTESTINAL: No abdominal or epigastric pain. No nausea, vomiting, or hematemesis; No diarrhea or constipation. No melena or hematochezia.  GENITOURINARY: No dysuria, frequency or hematuria  NEUROLOGICAL: No numbness or weakness  SKIN: No itching, burning, rashes, or lesions   All other review of systems is negative unless indicated above.    MEDICATIONS  (STANDING):  apixaban 2.5 milliGRAM(s) Oral every 12 hours  melatonin 5 milliGRAM(s) Oral at bedtime  memantine 5 milliGRAM(s) Oral daily  metoprolol tartrate 12.5 milliGRAM(s) Oral two times a day  simvastatin 20 milliGRAM(s) Oral at bedtime    MEDICATIONS  (PRN):      CAPILLARY BLOOD GLUCOSE        I&O's Summary    29 Dec 2020 07:01  -  30 Dec 2020 07:00  --------------------------------------------------------  IN: 1394 mL / OUT: 1000 mL / NET: 394 mL    30 Dec 2020 07:01  -  30 Dec 2020 14:15  --------------------------------------------------------  IN: 240 mL / OUT: 300 mL / NET: -60 mL        PHYSICAL EXAM:  Vital Signs Last 24 Hrs  T(C): 36.6 (30 Dec 2020 11:09), Max: 37.3 (29 Dec 2020 23:00)  T(F): 97.9 (30 Dec 2020 11:09), Max: 99.2 (29 Dec 2020 23:00)  HR: 93 (30 Dec 2020 11:09) (44 - 94)  BP: 100/67 (30 Dec 2020 11:09) (86/47 - 151/62)  BP(mean): 69 (30 Dec 2020 10:00) (63 - 95)  RR: 20 (30 Dec 2020 11:09) (17 - 42)  SpO2: 93% (30 Dec 2020 11:09) (91% - 98%)    GENERAL: No acute distress, Elderly, smiling   HEAD:  Atraumatic, Normocephalic  EYES: EOMI, PERRLA, conjunctiva and sclera clear  NECK: Supple, no lymphadenopathy, no JVD  CHEST/LUNG: CTAB; No wheezes, rales, or rhonchi  HEART: Regular rate and rhythm; Normal s1 and s2, No murmurs, rubs, or gallops  ABDOMEN: Soft, non-tender, non-distended; normal bowel sounds, no organomegaly  EXTREMITIES:  2+ peripheral pulses b/l, No clubbing, cyanosis, or edema  NEUROLOGY: A&O x 1, Dementia at baseline, No focal deficits   SKIN: No rashes or lesions          LABS:                        10.6   8.55  )-----------( 243      ( 30 Dec 2020 01:01 )             33.9     12-30    139  |  108  |  22  ----------------------------<  113<H>  4.4   |  19<L>  |  1.08    Ca    8.3<L>      30 Dec 2020 01:01  Phos  4.3     12-30  Mg     2.1     12-30    TPro  5.5<L>  /  Alb  3.2<L>  /  TBili  0.5  /  DBili  x   /  AST  130<H>  /  ALT  130<H>  /  AlkPhos  83  12-30    PT/INR - ( 30 Dec 2020 01:01 )   PT: 16.5 sec;   INR: 1.40 ratio         PTT - ( 30 Dec 2020 01:01 )  PTT:27.9 sec  CARDIAC MARKERS ( 29 Dec 2020 00:15 )  x     / x     / 53 U/L / x     / 1.8 ng/mL  CARDIAC MARKERS ( 28 Dec 2020 19:47 )  x     / x     / 51 U/L / x     / 1.9 ng/mL            RADIOLOGY & ADDITIONAL TESTS:  Results Reviewed:   Imaging Personally Reviewed:  Electrocardiogram Personally Reviewed:    COORDINATION OF CARE:  Care Discussed with Consultants/Other Providers [Y/N]:  Prior or Outpatient Records Reviewed [Y/N]:

## 2020-12-31 ENCOUNTER — TRANSCRIPTION ENCOUNTER (OUTPATIENT)
Age: 85
End: 2020-12-31

## 2020-12-31 VITALS
OXYGEN SATURATION: 95 % | HEART RATE: 75 BPM | RESPIRATION RATE: 18 BRPM | TEMPERATURE: 98 F | DIASTOLIC BLOOD PRESSURE: 80 MMHG | SYSTOLIC BLOOD PRESSURE: 120 MMHG

## 2020-12-31 LAB
ALBUMIN SERPL ELPH-MCNC: 3.1 G/DL — LOW (ref 3.3–5)
ALP SERPL-CCNC: 83 U/L — SIGNIFICANT CHANGE UP (ref 40–120)
ALT FLD-CCNC: 81 U/L — HIGH (ref 10–45)
ANION GAP SERPL CALC-SCNC: 15 MMOL/L — SIGNIFICANT CHANGE UP (ref 5–17)
AST SERPL-CCNC: 47 U/L — HIGH (ref 10–40)
BASOPHILS # BLD AUTO: 0.07 K/UL — SIGNIFICANT CHANGE UP (ref 0–0.2)
BASOPHILS NFR BLD AUTO: 0.9 % — SIGNIFICANT CHANGE UP (ref 0–2)
BILIRUB SERPL-MCNC: 0.6 MG/DL — SIGNIFICANT CHANGE UP (ref 0.2–1.2)
BUN SERPL-MCNC: 17 MG/DL — SIGNIFICANT CHANGE UP (ref 7–23)
CALCIUM SERPL-MCNC: 8.6 MG/DL — SIGNIFICANT CHANGE UP (ref 8.4–10.5)
CHLORIDE SERPL-SCNC: 108 MMOL/L — SIGNIFICANT CHANGE UP (ref 96–108)
CO2 SERPL-SCNC: 14 MMOL/L — LOW (ref 22–31)
CREAT SERPL-MCNC: 0.77 MG/DL — SIGNIFICANT CHANGE UP (ref 0.5–1.3)
EOSINOPHIL # BLD AUTO: 0.19 K/UL — SIGNIFICANT CHANGE UP (ref 0–0.5)
EOSINOPHIL NFR BLD AUTO: 2.6 % — SIGNIFICANT CHANGE UP (ref 0–6)
GLUCOSE SERPL-MCNC: 89 MG/DL — SIGNIFICANT CHANGE UP (ref 70–99)
HCT VFR BLD CALC: 38.5 % — SIGNIFICANT CHANGE UP (ref 34.5–45)
HGB BLD-MCNC: 11.9 G/DL — SIGNIFICANT CHANGE UP (ref 11.5–15.5)
IMM GRANULOCYTES NFR BLD AUTO: 0.3 % — SIGNIFICANT CHANGE UP (ref 0–1.5)
LYMPHOCYTES # BLD AUTO: 2.16 K/UL — SIGNIFICANT CHANGE UP (ref 1–3.3)
LYMPHOCYTES # BLD AUTO: 29 % — SIGNIFICANT CHANGE UP (ref 13–44)
MAGNESIUM SERPL-MCNC: 2.1 MG/DL — SIGNIFICANT CHANGE UP (ref 1.6–2.6)
MCHC RBC-ENTMCNC: 29 PG — SIGNIFICANT CHANGE UP (ref 27–34)
MCHC RBC-ENTMCNC: 30.9 GM/DL — LOW (ref 32–36)
MCV RBC AUTO: 93.7 FL — SIGNIFICANT CHANGE UP (ref 80–100)
MONOCYTES # BLD AUTO: 0.84 K/UL — SIGNIFICANT CHANGE UP (ref 0–0.9)
MONOCYTES NFR BLD AUTO: 11.3 % — SIGNIFICANT CHANGE UP (ref 2–14)
NEUTROPHILS # BLD AUTO: 4.16 K/UL — SIGNIFICANT CHANGE UP (ref 1.8–7.4)
NEUTROPHILS NFR BLD AUTO: 55.9 % — SIGNIFICANT CHANGE UP (ref 43–77)
NRBC # BLD: 0 /100 WBCS — SIGNIFICANT CHANGE UP (ref 0–0)
PHOSPHATE SERPL-MCNC: 3.2 MG/DL — SIGNIFICANT CHANGE UP (ref 2.5–4.5)
PLATELET # BLD AUTO: 253 K/UL — SIGNIFICANT CHANGE UP (ref 150–400)
POTASSIUM SERPL-MCNC: 4.2 MMOL/L — SIGNIFICANT CHANGE UP (ref 3.5–5.3)
POTASSIUM SERPL-MCNC: 4.9 MMOL/L — SIGNIFICANT CHANGE UP (ref 3.5–5.3)
POTASSIUM SERPL-SCNC: 4.2 MMOL/L — SIGNIFICANT CHANGE UP (ref 3.5–5.3)
POTASSIUM SERPL-SCNC: 4.9 MMOL/L — SIGNIFICANT CHANGE UP (ref 3.5–5.3)
PROT SERPL-MCNC: 6.2 G/DL — SIGNIFICANT CHANGE UP (ref 6–8.3)
RBC # BLD: 4.11 M/UL — SIGNIFICANT CHANGE UP (ref 3.8–5.2)
RBC # FLD: 15.4 % — HIGH (ref 10.3–14.5)
SODIUM SERPL-SCNC: 137 MMOL/L — SIGNIFICANT CHANGE UP (ref 135–145)
WBC # BLD: 7.44 K/UL — SIGNIFICANT CHANGE UP (ref 3.8–10.5)
WBC # FLD AUTO: 7.44 K/UL — SIGNIFICANT CHANGE UP (ref 3.8–10.5)

## 2020-12-31 PROCEDURE — 99239 HOSP IP/OBS DSCHRG MGMT >30: CPT | Mod: GC

## 2020-12-31 PROCEDURE — 71045 X-RAY EXAM CHEST 1 VIEW: CPT | Mod: 26

## 2020-12-31 RX ORDER — METOPROLOL TARTRATE 50 MG
12.5 TABLET ORAL DAILY
Refills: 0 | Status: DISCONTINUED | OUTPATIENT
Start: 2020-12-31 | End: 2020-12-31

## 2020-12-31 RX ADMIN — APIXABAN 2.5 MILLIGRAM(S): 2.5 TABLET, FILM COATED ORAL at 06:13

## 2020-12-31 RX ADMIN — MEMANTINE HYDROCHLORIDE 5 MILLIGRAM(S): 10 TABLET ORAL at 11:10

## 2020-12-31 RX ADMIN — Medication 12.5 MILLIGRAM(S): at 11:10

## 2020-12-31 NOTE — PROGRESS NOTE ADULT - PROBLEM SELECTOR PLAN 2
HFrEF w/ EF of 35%. Appears euvolemic on exam at this time. No significant lower extremity edema.   - Lopressor 12.5 mg BID   - consider adding ace or arb depending on pressures   - TTE w cont (12/29) : EF 35%. LV dysfunction, moderate AR, mild pulmonary HTN, Severe functional MR   - Strict I & O   - daily weights HFrEF w/ EF of 35%. Appears euvolemic on exam at this time. No significant lower extremity edema.   - switched to toprol xl home dose  - consider adding ace or arb depending on pressures   - TTE w cont (12/29) : EF 35%. LV dysfunction, moderate AR, mild pulmonary HTN, Severe functional MR   - Strict I & O   - daily weights

## 2020-12-31 NOTE — PROGRESS NOTE ADULT - SUBJECTIVE AND OBJECTIVE BOX
Sebastien Deleon, PGY-1  Internal Medicine  Pager: [phone] - [phone] and ECZ - [phone]     PROGRESS NOTE:    ID #:     Patient is a 89y old  Female who presents with a chief complaint of Bradycardia (30 Dec 2020 14:15)      MAJOR INTERVAL HOSPITAL EVENTS:     SUBJECTIVE / OVERNIGHT EVENTS: No acute overnight events.     REVIEW OF SYSTEMS:  CONSTITUTIONAL: No weakness, fevers or chills  EYES/ENT: No visual changes;  No vertigo or throat pain   NECK: No pain or stiffness  RESPIRATORY: No cough, wheezing, hemoptysis; No shortness of breath  CARDIOVASCULAR: No chest pain or palpitations  GASTROINTESTINAL: No abdominal or epigastric pain. No nausea, vomiting, or hematemesis; No diarrhea or constipation. No melena or hematochezia.  GENITOURINARY: No dysuria, frequency or hematuria  NEUROLOGICAL: No numbness or weakness  SKIN: No itching, burning, rashes, or lesions   All other review of systems is negative unless indicated above.    MEDICATIONS  (STANDING):  apixaban 2.5 milliGRAM(s) Oral every 12 hours  melatonin 5 milliGRAM(s) Oral at bedtime  memantine 5 milliGRAM(s) Oral daily  metoprolol tartrate 12.5 milliGRAM(s) Oral two times a day  simvastatin 20 milliGRAM(s) Oral at bedtime    MEDICATIONS  (PRN):      CAPILLARY BLOOD GLUCOSE        I&O's Summary    29 Dec 2020 07:01  -  30 Dec 2020 07:00  --------------------------------------------------------  IN: 1394 mL / OUT: 1000 mL / NET: 394 mL    30 Dec 2020 07:01  -  31 Dec 2020 06:33  --------------------------------------------------------  IN: 1080 mL / OUT: 1010 mL / NET: 70 mL        PHYSICAL EXAM:  Vital Signs Last 24 Hrs  T(C): 36.6 (30 Dec 2020 11:09), Max: 36.7 (30 Dec 2020 08:00)  T(F): 97.9 (30 Dec 2020 11:09), Max: 98 (30 Dec 2020 08:00)  HR: 95 (30 Dec 2020 16:12) (93 - 95)  BP: 111/72 (30 Dec 2020 16:12) (94/56 - 113/63)  BP(mean): 69 (30 Dec 2020 10:00) (69 - 86)  RR: 20 (30 Dec 2020 11:09) (20 - 26)  SpO2: 97% (30 Dec 2020 16:12) (93% - 97%)    GENERAL: No acute distress, Elderly, smiling   HEAD:  Atraumatic, Normocephalic  EYES: EOMI, PERRLA, conjunctiva and sclera clear  NECK: Supple, no lymphadenopathy, no JVD  CHEST/LUNG: CTAB; No wheezes, rales, or rhonchi  HEART: Regular rate and rhythm; Normal s1 and s2, No murmurs, rubs, or gallops  ABDOMEN: Soft, non-tender, non-distended; normal bowel sounds, no organomegaly  EXTREMITIES:  2+ peripheral pulses b/l, No clubbing, cyanosis, or edema  NEUROLOGY: A&O x 1, Dementia at baseline, No focal deficits   SKIN: No rashes or lesions          LABS:                        10.6   8.55  )-----------( 243      ( 30 Dec 2020 01:01 )             33.9     12-31    137  |  108  |  17  ----------------------------<  89  4.9   |  14<L>  |  0.77    Ca    8.6      31 Dec 2020 05:49  Phos  3.2     12-31  Mg     2.1     12-31    TPro  6.2  /  Alb  3.1<L>  /  TBili  0.6  /  DBili  x   /  AST  47<H>  /  ALT  81<H>  /  AlkPhos  83  12-31    PT/INR - ( 30 Dec 2020 01:01 )   PT: 16.5 sec;   INR: 1.40 ratio         PTT - ( 30 Dec 2020 01:01 )  PTT:27.9 sec            RADIOLOGY & ADDITIONAL TESTS:  Results Reviewed:   Imaging Personally Reviewed:  Electrocardiogram Personally Reviewed:    COORDINATION OF CARE:  Care Discussed with Consultants/Other Providers [Y/N]:  Prior or Outpatient Records Reviewed [Y/N]:   Sebasiten Deleon, PGY-1  Internal Medicine  Pager: WM - [phone] and AQN - [phone]     PROGRESS NOTE:    ID #:     Patient is a 89y old  Female who presents with a chief complaint of Bradycardia (30 Dec 2020 14:15)      MAJOR INTERVAL HOSPITAL EVENTS:     SUBJECTIVE / OVERNIGHT EVENTS: No acute overnight events. No acute physical complaints.     REVIEW OF SYSTEMS:  CONSTITUTIONAL: No weakness, fevers or chills  EYES/ENT: No visual changes;  No vertigo or throat pain   NECK: No pain or stiffness  RESPIRATORY: No cough, wheezing, hemoptysis; No shortness of breath  CARDIOVASCULAR: No chest pain or palpitations  GASTROINTESTINAL: No abdominal or epigastric pain. No nausea, vomiting, or hematemesis; No diarrhea or constipation. No melena or hematochezia.  GENITOURINARY: No dysuria, frequency or hematuria  NEUROLOGICAL: No numbness or weakness  SKIN: No itching, burning, rashes, or lesions   All other review of systems is negative unless indicated above.    MEDICATIONS  (STANDING):  apixaban 2.5 milliGRAM(s) Oral every 12 hours  melatonin 5 milliGRAM(s) Oral at bedtime  memantine 5 milliGRAM(s) Oral daily  metoprolol tartrate 12.5 milliGRAM(s) Oral two times a day  simvastatin 20 milliGRAM(s) Oral at bedtime    MEDICATIONS  (PRN):      CAPILLARY BLOOD GLUCOSE        I&O's Summary    29 Dec 2020 07:01  -  30 Dec 2020 07:00  --------------------------------------------------------  IN: 1394 mL / OUT: 1000 mL / NET: 394 mL    30 Dec 2020 07:01  -  31 Dec 2020 06:33  --------------------------------------------------------  IN: 1080 mL / OUT: 1010 mL / NET: 70 mL        PHYSICAL EXAM:  Vital Signs Last 24 Hrs  T(C): 36.6 (30 Dec 2020 11:09), Max: 36.7 (30 Dec 2020 08:00)  T(F): 97.9 (30 Dec 2020 11:09), Max: 98 (30 Dec 2020 08:00)  HR: 95 (30 Dec 2020 16:12) (93 - 95)  BP: 111/72 (30 Dec 2020 16:12) (94/56 - 113/63)  BP(mean): 69 (30 Dec 2020 10:00) (69 - 86)  RR: 20 (30 Dec 2020 11:09) (20 - 26)  SpO2: 97% (30 Dec 2020 16:12) (93% - 97%)    GENERAL: No acute distress, Elderly, smiling   HEAD:  Atraumatic, Normocephalic  EYES: EOMI, PERRLA, conjunctiva and sclera clear  NECK: Supple, no lymphadenopathy, no JVD  CHEST/LUNG: CTAB; No wheezes, rales, or rhonchi  HEART: Regular rate and rhythm; Normal s1 and s2, No murmurs, rubs, or gallops  ABDOMEN: Soft, non-tender, non-distended; normal bowel sounds, no organomegaly  EXTREMITIES:  2+ peripheral pulses b/l, No clubbing, cyanosis, or edema  NEUROLOGY: A&O x 1, Dementia at baseline, No focal deficits   SKIN: No rashes or lesions          LABS:                        10.6   8.55  )-----------( 243      ( 30 Dec 2020 01:01 )             33.9     12-31    137  |  108  |  17  ----------------------------<  89  4.9   |  14<L>  |  0.77    Ca    8.6      31 Dec 2020 05:49  Phos  3.2     12-31  Mg     2.1     12-31    TPro  6.2  /  Alb  3.1<L>  /  TBili  0.6  /  DBili  x   /  AST  47<H>  /  ALT  81<H>  /  AlkPhos  83  12-31    PT/INR - ( 30 Dec 2020 01:01 )   PT: 16.5 sec;   INR: 1.40 ratio         PTT - ( 30 Dec 2020 01:01 )  PTT:27.9 sec            RADIOLOGY & ADDITIONAL TESTS:  Results Reviewed:   Imaging Personally Reviewed:  Electrocardiogram Personally Reviewed:    COORDINATION OF CARE:  Care Discussed with Consultants/Other Providers [Y/N]:  Prior or Outpatient Records Reviewed [Y/N]:

## 2020-12-31 NOTE — PROGRESS NOTE ADULT - PROBLEM SELECTOR PLAN 1
Found to be in complete heart block. S/p V fib arrest and intubation.   - s/p AV Micra with EP yesterday will restart Eliquis   - Repeat cxr today   - c/w lopressor 12.5 mg BID Found to be in complete heart block. S/p V fib arrest and intubation.   - s/p AV Micra with EP yesterday   - tolerating eliquis w/o issue   - switched to toprol xl home dose

## 2020-12-31 NOTE — PROGRESS NOTE ADULT - ATTENDING COMMENTS
Continue to work on weight loss.  Wear compression stockings or higher socks if she can't tolerate those or they are too expensive.  I could refer her to a [facility] and have them check her for Varicose Veins.  
89 year-old presents with symptomatic bradycardia.  Prior to PPM, patient had VF cardiac arrest, received CPR, intubated.  Now status post Micra AV PPM.  Extubated upon return to CICU.     No further arrhythmic events status post PPM implant.
89 year-old with dementia at baseline presents with symptomatic bradycardia.  Prior to PPM, patient had VF cardiac arrest, received CPR, intubated.  Now status post Micra AV PPM.  Extubated upon return to CICU.
medically ready for discharge, discharge today  1 hour spent in preparation of discharge    Uzma Magana D.O.  Hospitalist Pager # [phone]
Patient presented with shortness of breath found to have complete heart block complicated ventricular fibrillation arrest and intubation. now s/p extubation. s/p AV micra placement. Will repeat labs in the am if remain stable- dc planning.     Uzma Magana D.O.  Hospitalist Pager # [phone]

## 2020-12-31 NOTE — PROGRESS NOTE ADULT - ASSESSMENT
89y F PMH nonobstructive CAD ([facility] 2012), SDH s/p ronny hole, dementia, DVT/PE, and s/p Nikky for rectal adenocarcinoma admitted for SOB found to have complete heart block c/b ventricular fibrillation arrest and intubation. Now on RA and in sinus rhythm s/p micra placement.    89y F PMH nonobstructive CAD ([facility] 2012), SDH s/p ronny hole, dementia, DVT/PE, and s/p Nikky for rectal adenocarcinoma admitted for SOB found to have complete heart block c/b ventricular fibrillation arrest and intubation. Now on RA and in sinus rhythm s/p micra placement. Planned for discharge today given home PT requirements.

## 2020-12-31 NOTE — DISCHARGE NOTE PROVIDER - NSDCCPCAREPLAN_GEN_ALL_CORE_FT
PRINCIPAL DISCHARGE DIAGNOSIS  Diagnosis: Heart block  Assessment and Plan of Treatment: You had slow heart rate as well as a block in the conduction of the heart rate. A pacemaker was placed to regular the heart rate.  It is important to take your heart medication as prescribed  Please follow up with electrophysiologist for further outpatient management of your heart.

## 2020-12-31 NOTE — DISCHARGE NOTE PROVIDER - NSDCFUSCHEDAPPT_GEN_ALL_CORE_FT
AMY CHACON ; 01/13/2021 ; NPP Cardio [address] AMY Dominguez ; 02/03/2021 ; NPP Cardio [address]  AMY CHACON ; 02/05/2021 ; NPP Surg [place] [address] [facility]

## 2020-12-31 NOTE — DISCHARGE NOTE PROVIDER - HOSPITAL COURSE
HPI:  89 yoF PMH nonobstructive CAD ([facility] 2012), SDH s/p ronny hole, dementia, DVT/PE, and s/p Nikky for rectal adenocarcinoma presenting for SOB. Pt had SOB for several days with "labored breathing". Cardiologist (Dr. Jauregui) instructed pt to go to ED. HR found to be in upper 30's-40's (baseline 70's). Denies any other symptoms including fever/chills/n/v/HA/falls/chest pain/dizziness. Upon arrival to ED SOB improved without any interventions. No known sick contacts or recent travel.    CCU course:  Patient noted with bradycardia in the 30s (baseline 70s) to be in complete heart block. S/p V fib arrest, with ROSC after 1 shock. She was intubated and s/p Micra placement. Extubated on 12/29 post procedure to RA.    Medicine Floor:   Patient transferred to floor on 12/30. Lopressor PO changed to home Toprol. PT worked with the patient and recommended home PT.    She is stable for discharge to home. HPI:  89 yoF PMH nonobstructive CAD ([facility] 2012), SDH s/p ronny hole, dementia, DVT/PE, and s/p Nikky for rectal adenocarcinoma presenting for SOB. Pt had SOB for several days with "labored breathing". Cardiologist (Dr. Jauregui) instructed pt to go to ED. HR found to be in upper 30's-40's (baseline 70's). Denies any other symptoms including fever/chills/n/v/HA/falls/chest pain/dizziness. Upon arrival to ED SOB improved without any interventions. No known sick contacts or recent travel.    CCU course:  Patient noted with bradycardia in the 30s (baseline 70s) to be in complete heart block. S/p V fib arrest, with ROSC after 1 shock. She was intubated and s/p Micra placement. Extubated on 12/29 post procedure to RA.    Medicine Floor:   Patient transferred to floor on 12/30. Lopressor PO changed to home Toprol. PT worked with the patient and recommended home PT.    She is stable for discharge to home. HPI:  89 yoF PMH nonobstructive CAD ([facility] 2012), SDH s/p ronny hole, dementia, DVT/PE, and s/p Nikky for rectal adenocarcinoma presenting for SOB. Pt had SOB for several days with "labored breathing". Cardiologist (Dr. Jauregui) instructed pt to go to ED. HR found to be in upper 30's-40's (baseline 70's). Denies any other symptoms including fever/chills/n/v/HA/falls/chest pain/dizziness. Upon arrival to ED SOB improved without any interventions. No known sick contacts or recent travel.    CCU course:  Patient noted with bradycardia in the 30s (baseline 70s) to be in complete heart block. S/p V fib arrest, with ROSC after 1 shock. She was intubated and s/p Micra placement. Extubated on 12/29 post procedure to RA. Seeing by EP in the CCU and normal device interrogation.     Medicine Floor:   Patient transferred to floor on 12/30. Lopressor PO changed to home Toprol. PT worked with the patient and recommended home PT.    She is stable for discharge to home.

## 2020-12-31 NOTE — DISCHARGE NOTE PROVIDER - NSDCMRMEDTOKEN_GEN_ALL_CORE_FT
acetaminophen 500 mg oral tablet: 2 tab(s) orally every 6 hours  alendronate 70 mg oral tablet: 1 tab(s) orally once a week  apixaban 5 mg oral tablet: 0.5 tab(s) orally 2 times a day  enalapril 2.5 mg oral tablet: 1 tab(s) orally once a day  memantine 5 mg oral tablet: 1 tab(s) orally once a day  metoprolol succinate 25 mg oral tablet, extended release: 0.5 tab(s) orally once a day  simvastatin 20 mg oral tablet: 1 tab(s) orally once a day (at bedtime)

## 2020-12-31 NOTE — DISCHARGE NOTE PROVIDER - CARE PROVIDER_API CALL
Wyatt Jauregui  CARDIOVASCULAR DISEASE  [address] [address] [place] [address], [address]  [place] [id number]  Phone: [phone]  Fax: [phone]  Follow Up Time: 2 weeks

## 2021-01-01 ENCOUNTER — RX RENEWAL (OUTPATIENT)
Age: 86
End: 2021-01-01

## 2021-01-01 ENCOUNTER — NON-APPOINTMENT (OUTPATIENT)
Age: 86
End: 2021-01-01

## 2021-01-01 ENCOUNTER — APPOINTMENT (OUTPATIENT)
Dept: CARDIOLOGY | Facility: CLINIC | Age: 86
End: 2021-01-01

## 2021-01-01 ENCOUNTER — TRANSCRIPTION ENCOUNTER (OUTPATIENT)
Age: 86
End: 2021-01-01

## 2021-01-01 ENCOUNTER — APPOINTMENT (OUTPATIENT)
Dept: CARDIOLOGY | Facility: CLINIC | Age: 86
End: 2021-01-01
Payer: MEDICARE

## 2021-01-01 ENCOUNTER — APPOINTMENT (OUTPATIENT)
Dept: ELECTROPHYSIOLOGY | Facility: CLINIC | Age: 86
End: 2021-01-01
Payer: MEDICARE

## 2021-01-01 ENCOUNTER — APPOINTMENT (OUTPATIENT)
Dept: INTERNAL MEDICINE | Facility: CLINIC | Age: 86
End: 2021-01-01

## 2021-01-01 ENCOUNTER — INPATIENT (INPATIENT)
Facility: HOSPITAL | Age: 86
LOS: 3 days | Discharge: HOME HEALTH | DRG: 292 | End: 2021-09-14
Attending: HOSPITALIST | Admitting: HOSPITALIST
Payer: COMMERCIAL

## 2021-01-01 ENCOUNTER — APPOINTMENT (OUTPATIENT)
Dept: INTERNAL MEDICINE | Facility: CLINIC | Age: 86
End: 2021-01-01
Payer: MEDICARE

## 2021-01-01 VITALS — DIASTOLIC BLOOD PRESSURE: 68 MMHG | SYSTOLIC BLOOD PRESSURE: 110 MMHG

## 2021-01-01 VITALS
RESPIRATION RATE: 12 BRPM | HEART RATE: 72 BPM | TEMPERATURE: 97.7 F | DIASTOLIC BLOOD PRESSURE: 55 MMHG | SYSTOLIC BLOOD PRESSURE: 93 MMHG | OXYGEN SATURATION: 100 %

## 2021-01-01 VITALS
RESPIRATION RATE: 14 BRPM | TEMPERATURE: 97.5 F | HEART RATE: 103 BPM | OXYGEN SATURATION: 99 % | WEIGHT: 159 LBS | BODY MASS INDEX: 29.26 KG/M2 | HEIGHT: 62 IN

## 2021-01-01 VITALS
DIASTOLIC BLOOD PRESSURE: 74 MMHG | OXYGEN SATURATION: 99 % | HEART RATE: 93 BPM | TEMPERATURE: 98 F | WEIGHT: 149.91 LBS | RESPIRATION RATE: 23 BRPM | HEIGHT: 63 IN | SYSTOLIC BLOOD PRESSURE: 112 MMHG

## 2021-01-01 VITALS
BODY MASS INDEX: 27.42 KG/M2 | TEMPERATURE: 97.7 F | OXYGEN SATURATION: 98 % | HEART RATE: 91 BPM | RESPIRATION RATE: 16 BRPM | DIASTOLIC BLOOD PRESSURE: 60 MMHG | SYSTOLIC BLOOD PRESSURE: 84 MMHG | WEIGHT: 149 LBS | HEIGHT: 62 IN

## 2021-01-01 VITALS
HEART RATE: 95 BPM | TEMPERATURE: 97.5 F | OXYGEN SATURATION: 99 % | HEIGHT: 62 IN | WEIGHT: 156 LBS | RESPIRATION RATE: 16 BRPM | BODY MASS INDEX: 28.71 KG/M2 | SYSTOLIC BLOOD PRESSURE: 97 MMHG | DIASTOLIC BLOOD PRESSURE: 65 MMHG

## 2021-01-01 VITALS
OXYGEN SATURATION: 97 % | RESPIRATION RATE: 14 BRPM | WEIGHT: 150 LBS | TEMPERATURE: 96.3 F | HEART RATE: 93 BPM | DIASTOLIC BLOOD PRESSURE: 65 MMHG | SYSTOLIC BLOOD PRESSURE: 97 MMHG | BODY MASS INDEX: 27.44 KG/M2

## 2021-01-01 VITALS — DIASTOLIC BLOOD PRESSURE: 70 MMHG | SYSTOLIC BLOOD PRESSURE: 98 MMHG

## 2021-01-01 VITALS
HEART RATE: 85 BPM | OXYGEN SATURATION: 98 % | DIASTOLIC BLOOD PRESSURE: 70 MMHG | SYSTOLIC BLOOD PRESSURE: 100 MMHG | RESPIRATION RATE: 18 BRPM | TEMPERATURE: 99 F

## 2021-01-01 DIAGNOSIS — I25.10 ATHEROSCLEROTIC HEART DISEASE OF NATIVE CORONARY ARTERY WITHOUT ANGINA PECTORIS: ICD-10-CM

## 2021-01-01 DIAGNOSIS — M25.552 PAIN IN LEFT HIP: ICD-10-CM

## 2021-01-01 DIAGNOSIS — R21 RASH AND OTHER NONSPECIFIC SKIN ERUPTION: ICD-10-CM

## 2021-01-01 DIAGNOSIS — R53.81 OTHER MALAISE: ICD-10-CM

## 2021-01-01 DIAGNOSIS — I26.99 OTHER PULMONARY EMBOLISM W/OUT ACUTE COR PULMONALE: ICD-10-CM

## 2021-01-01 DIAGNOSIS — I50.9 HEART FAILURE, UNSPECIFIED: ICD-10-CM

## 2021-01-01 DIAGNOSIS — I50.23 ACUTE ON CHRONIC SYSTOLIC (CONGESTIVE) HEART FAILURE: ICD-10-CM

## 2021-01-01 DIAGNOSIS — Z95.0 PRESENCE OF CARDIAC PACEMAKER: ICD-10-CM

## 2021-01-01 DIAGNOSIS — R82.998 OTHER ABNORMAL FINDINGS IN URINE: ICD-10-CM

## 2021-01-01 DIAGNOSIS — I44.2 ATRIOVENTRICULAR BLOCK, COMPLETE: ICD-10-CM

## 2021-01-01 DIAGNOSIS — Z86.72 PERSONAL HISTORY OF THROMBOPHLEBITIS: ICD-10-CM

## 2021-01-01 DIAGNOSIS — S06.5X9A TRAUMATIC SUBDURAL HEMORRHAGE WITH LOSS OF CONSCIOUSNESS OF UNSPECIFIED DURATION, INITIAL ENCOUNTER: Chronic | ICD-10-CM

## 2021-01-01 DIAGNOSIS — R53.83 OTHER MALAISE: ICD-10-CM

## 2021-01-01 LAB
25(OH)D3 SERPL-MCNC: 45.7 NG/ML
ALBUMIN SERPL ELPH-MCNC: 3.6 G/DL — SIGNIFICANT CHANGE UP (ref 3.3–5)
ALBUMIN SERPL ELPH-MCNC: 3.9 G/DL — SIGNIFICANT CHANGE UP (ref 3.3–5)
ALBUMIN SERPL ELPH-MCNC: 4.2 G/DL
ALBUMIN SERPL ELPH-MCNC: 4.3 G/DL
ALBUMIN SERPL ELPH-MCNC: 4.4 G/DL
ALP BLD-CCNC: 102 U/L
ALP BLD-CCNC: 61 U/L
ALP BLD-CCNC: 75 U/L
ALP SERPL-CCNC: 92 U/L — SIGNIFICANT CHANGE UP (ref 40–120)
ALP SERPL-CCNC: 99 U/L — SIGNIFICANT CHANGE UP (ref 40–120)
ALT FLD-CCNC: 163 U/L — HIGH (ref 10–45)
ALT FLD-CCNC: 73 U/L — HIGH (ref 10–45)
ALT SERPL-CCNC: 15 U/L
ALT SERPL-CCNC: 229 U/L
ALT SERPL-CCNC: 9 U/L
ANION GAP SERPL CALC-SCNC: 14 MMOL/L — SIGNIFICANT CHANGE UP (ref 5–17)
ANION GAP SERPL CALC-SCNC: 14 MMOL/L — SIGNIFICANT CHANGE UP (ref 5–17)
ANION GAP SERPL CALC-SCNC: 15 MMOL/L — SIGNIFICANT CHANGE UP (ref 5–17)
ANION GAP SERPL CALC-SCNC: 15 MMOL/L — SIGNIFICANT CHANGE UP (ref 5–17)
ANION GAP SERPL CALC-SCNC: 17 MMOL/L
ANION GAP SERPL CALC-SCNC: 17 MMOL/L — SIGNIFICANT CHANGE UP (ref 5–17)
ANION GAP SERPL CALC-SCNC: 19 MMOL/L
ANION GAP SERPL CALC-SCNC: 20 MMOL/L
AST SERPL-CCNC: 149 U/L
AST SERPL-CCNC: 21 U/L — SIGNIFICANT CHANGE UP (ref 10–40)
AST SERPL-CCNC: 22 U/L
AST SERPL-CCNC: 22 U/L
AST SERPL-CCNC: 67 U/L — HIGH (ref 10–40)
BASE EXCESS BLDV CALC-SCNC: 2 MMOL/L — SIGNIFICANT CHANGE UP (ref -2–2)
BASOPHILS # BLD AUTO: 0.02 K/UL
BASOPHILS # BLD AUTO: 0.03 K/UL — SIGNIFICANT CHANGE UP (ref 0–0.2)
BASOPHILS # BLD AUTO: 0.05 K/UL — SIGNIFICANT CHANGE UP (ref 0–0.2)
BASOPHILS # BLD AUTO: 0.08 K/UL
BASOPHILS # BLD AUTO: 0.09 K/UL
BASOPHILS NFR BLD AUTO: 0.2 %
BASOPHILS NFR BLD AUTO: 0.3 % — SIGNIFICANT CHANGE UP (ref 0–2)
BASOPHILS NFR BLD AUTO: 0.6 % — SIGNIFICANT CHANGE UP (ref 0–2)
BASOPHILS NFR BLD AUTO: 1 %
BASOPHILS NFR BLD AUTO: 1.2 %
BILIRUB SERPL-MCNC: 0.4 MG/DL
BILIRUB SERPL-MCNC: 0.5 MG/DL
BILIRUB SERPL-MCNC: 0.5 MG/DL
BILIRUB SERPL-MCNC: 0.7 MG/DL — SIGNIFICANT CHANGE UP (ref 0.2–1.2)
BILIRUB SERPL-MCNC: 1 MG/DL — SIGNIFICANT CHANGE UP (ref 0.2–1.2)
BUN SERPL-MCNC: 24 MG/DL — HIGH (ref 7–23)
BUN SERPL-MCNC: 24 MG/DL — HIGH (ref 7–23)
BUN SERPL-MCNC: 25 MG/DL
BUN SERPL-MCNC: 26 MG/DL — HIGH (ref 7–23)
BUN SERPL-MCNC: 28 MG/DL
BUN SERPL-MCNC: 28 MG/DL — HIGH (ref 7–23)
BUN SERPL-MCNC: 32 MG/DL — HIGH (ref 7–23)
BUN SERPL-MCNC: 37 MG/DL
CA-I SERPL-SCNC: 1.12 MMOL/L — LOW (ref 1.15–1.33)
CALCIUM SERPL-MCNC: 8.8 MG/DL
CALCIUM SERPL-MCNC: 8.9 MG/DL — SIGNIFICANT CHANGE UP (ref 8.4–10.5)
CALCIUM SERPL-MCNC: 9.1 MG/DL — SIGNIFICANT CHANGE UP (ref 8.4–10.5)
CALCIUM SERPL-MCNC: 9.4 MG/DL — SIGNIFICANT CHANGE UP (ref 8.4–10.5)
CALCIUM SERPL-MCNC: 9.4 MG/DL — SIGNIFICANT CHANGE UP (ref 8.4–10.5)
CALCIUM SERPL-MCNC: 9.5 MG/DL — SIGNIFICANT CHANGE UP (ref 8.4–10.5)
CALCIUM SERPL-MCNC: 9.7 MG/DL
CALCIUM SERPL-MCNC: 9.8 MG/DL
CHLORIDE BLDV-SCNC: 99 MMOL/L — SIGNIFICANT CHANGE UP (ref 96–108)
CHLORIDE SERPL-SCNC: 101 MMOL/L — SIGNIFICANT CHANGE UP (ref 96–108)
CHLORIDE SERPL-SCNC: 101 MMOL/L — SIGNIFICANT CHANGE UP (ref 96–108)
CHLORIDE SERPL-SCNC: 102 MMOL/L — SIGNIFICANT CHANGE UP (ref 96–108)
CHLORIDE SERPL-SCNC: 105 MMOL/L — SIGNIFICANT CHANGE UP (ref 96–108)
CHLORIDE SERPL-SCNC: 98 MMOL/L
CHLORIDE SERPL-SCNC: 99 MMOL/L
CHLORIDE SERPL-SCNC: 99 MMOL/L
CHLORIDE SERPL-SCNC: 99 MMOL/L — SIGNIFICANT CHANGE UP (ref 96–108)
CHOLEST SERPL-MCNC: 165 MG/DL
CO2 BLDV-SCNC: 27 MMOL/L — HIGH (ref 22–26)
CO2 SERPL-SCNC: 20 MMOL/L
CO2 SERPL-SCNC: 20 MMOL/L — LOW (ref 22–31)
CO2 SERPL-SCNC: 21 MMOL/L
CO2 SERPL-SCNC: 22 MMOL/L
CO2 SERPL-SCNC: 22 MMOL/L — SIGNIFICANT CHANGE UP (ref 22–31)
CO2 SERPL-SCNC: 22 MMOL/L — SIGNIFICANT CHANGE UP (ref 22–31)
CO2 SERPL-SCNC: 23 MMOL/L — SIGNIFICANT CHANGE UP (ref 22–31)
CO2 SERPL-SCNC: 25 MMOL/L — SIGNIFICANT CHANGE UP (ref 22–31)
COVID-19 SPIKE DOMAIN AB INTERP: POSITIVE
COVID-19 SPIKE DOMAIN ANTIBODY RESULT: 195 U/ML — HIGH
CREAT SERPL-MCNC: 0.85 MG/DL — SIGNIFICANT CHANGE UP (ref 0.5–1.3)
CREAT SERPL-MCNC: 0.86 MG/DL — SIGNIFICANT CHANGE UP (ref 0.5–1.3)
CREAT SERPL-MCNC: 0.9 MG/DL — SIGNIFICANT CHANGE UP (ref 0.5–1.3)
CREAT SERPL-MCNC: 0.9 MG/DL — SIGNIFICANT CHANGE UP (ref 0.5–1.3)
CREAT SERPL-MCNC: 0.95 MG/DL — SIGNIFICANT CHANGE UP (ref 0.5–1.3)
CREAT SERPL-MCNC: 1.05 MG/DL
CREAT SERPL-MCNC: 1.08 MG/DL
CREAT SERPL-MCNC: 1.08 MG/DL
EOSINOPHIL # BLD AUTO: 0.01 K/UL
EOSINOPHIL # BLD AUTO: 0.06 K/UL — SIGNIFICANT CHANGE UP (ref 0–0.5)
EOSINOPHIL # BLD AUTO: 0.28 K/UL
EOSINOPHIL # BLD AUTO: 0.28 K/UL — SIGNIFICANT CHANGE UP (ref 0–0.5)
EOSINOPHIL # BLD AUTO: 0.31 K/UL
EOSINOPHIL NFR BLD AUTO: 0.1 %
EOSINOPHIL NFR BLD AUTO: 0.6 % — SIGNIFICANT CHANGE UP (ref 0–6)
EOSINOPHIL NFR BLD AUTO: 3.2 %
EOSINOPHIL NFR BLD AUTO: 3.3 % — SIGNIFICANT CHANGE UP (ref 0–6)
EOSINOPHIL NFR BLD AUTO: 4.8 %
GAS PNL BLDV: 135 MMOL/L — LOW (ref 136–145)
GAS PNL BLDV: SIGNIFICANT CHANGE UP
GLUCOSE BLDV-MCNC: 155 MG/DL — HIGH (ref 70–99)
GLUCOSE SERPL-MCNC: 101 MG/DL — HIGH (ref 70–99)
GLUCOSE SERPL-MCNC: 139 MG/DL
GLUCOSE SERPL-MCNC: 147 MG/DL — HIGH (ref 70–99)
GLUCOSE SERPL-MCNC: 76 MG/DL
GLUCOSE SERPL-MCNC: 87 MG/DL — SIGNIFICANT CHANGE UP (ref 70–99)
GLUCOSE SERPL-MCNC: 92 MG/DL
GLUCOSE SERPL-MCNC: 95 MG/DL — SIGNIFICANT CHANGE UP (ref 70–99)
GLUCOSE SERPL-MCNC: 96 MG/DL — SIGNIFICANT CHANGE UP (ref 70–99)
HCO3 BLDV-SCNC: 26 MMOL/L — SIGNIFICANT CHANGE UP (ref 22–29)
HCT VFR BLD CALC: 40.1 % — SIGNIFICANT CHANGE UP (ref 34.5–45)
HCT VFR BLD CALC: 40.5 %
HCT VFR BLD CALC: 40.5 % — SIGNIFICANT CHANGE UP (ref 34.5–45)
HCT VFR BLD CALC: 41 %
HCT VFR BLD CALC: 43.7 % — SIGNIFICANT CHANGE UP (ref 34.5–45)
HCT VFR BLD CALC: 46.7 %
HCT VFR BLDA CALC: 41 % — SIGNIFICANT CHANGE UP (ref 34.5–46.5)
HDLC SERPL-MCNC: 40 MG/DL
HGB BLD CALC-MCNC: 13.5 G/DL — SIGNIFICANT CHANGE UP (ref 11.7–16.1)
HGB BLD-MCNC: 12.6 G/DL
HGB BLD-MCNC: 13 G/DL — SIGNIFICANT CHANGE UP (ref 11.5–15.5)
HGB BLD-MCNC: 13.1 G/DL — SIGNIFICANT CHANGE UP (ref 11.5–15.5)
HGB BLD-MCNC: 13.2 G/DL
HGB BLD-MCNC: 14.4 G/DL — SIGNIFICANT CHANGE UP (ref 11.5–15.5)
HGB BLD-MCNC: 14.7 G/DL
IMM GRANULOCYTES NFR BLD AUTO: 0.1 %
IMM GRANULOCYTES NFR BLD AUTO: 0.2 %
IMM GRANULOCYTES NFR BLD AUTO: 0.2 % — SIGNIFICANT CHANGE UP (ref 0–1.5)
IMM GRANULOCYTES NFR BLD AUTO: 0.5 %
IMM GRANULOCYTES NFR BLD AUTO: 0.9 % — SIGNIFICANT CHANGE UP (ref 0–1.5)
LACTATE BLDV-MCNC: 2.4 MMOL/L — HIGH (ref 0.7–2)
LDLC SERPL CALC-MCNC: 92 MG/DL
LYMPHOCYTES # BLD AUTO: 2.08 K/UL
LYMPHOCYTES # BLD AUTO: 2.37 K/UL
LYMPHOCYTES # BLD AUTO: 3.08 K/UL
LYMPHOCYTES # BLD AUTO: 3.17 K/UL — SIGNIFICANT CHANGE UP (ref 1–3.3)
LYMPHOCYTES # BLD AUTO: 3.36 K/UL — HIGH (ref 1–3.3)
LYMPHOCYTES # BLD AUTO: 31.1 % — SIGNIFICANT CHANGE UP (ref 13–44)
LYMPHOCYTES # BLD AUTO: 39.7 % — SIGNIFICANT CHANGE UP (ref 13–44)
LYMPHOCYTES NFR BLD AUTO: 23.8 %
LYMPHOCYTES NFR BLD AUTO: 32.1 %
LYMPHOCYTES NFR BLD AUTO: 35.7 %
MAGNESIUM SERPL-MCNC: 2.2 MG/DL — SIGNIFICANT CHANGE UP (ref 1.6–2.6)
MAGNESIUM SERPL-MCNC: 2.4 MG/DL — SIGNIFICANT CHANGE UP (ref 1.6–2.6)
MAGNESIUM SERPL-MCNC: 2.4 MG/DL — SIGNIFICANT CHANGE UP (ref 1.6–2.6)
MAN DIFF?: NORMAL
MCHC RBC-ENTMCNC: 28.9 PG — SIGNIFICANT CHANGE UP (ref 27–34)
MCHC RBC-ENTMCNC: 29.5 PG — SIGNIFICANT CHANGE UP (ref 27–34)
MCHC RBC-ENTMCNC: 29.6 PG — SIGNIFICANT CHANGE UP (ref 27–34)
MCHC RBC-ENTMCNC: 29.7 PG
MCHC RBC-ENTMCNC: 29.9 PG
MCHC RBC-ENTMCNC: 30.3 PG
MCHC RBC-ENTMCNC: 31.1 GM/DL
MCHC RBC-ENTMCNC: 31.5 GM/DL
MCHC RBC-ENTMCNC: 32.2 GM/DL
MCHC RBC-ENTMCNC: 32.3 GM/DL — SIGNIFICANT CHANGE UP (ref 32–36)
MCHC RBC-ENTMCNC: 32.4 GM/DL — SIGNIFICANT CHANGE UP (ref 32–36)
MCHC RBC-ENTMCNC: 33 GM/DL — SIGNIFICANT CHANGE UP (ref 32–36)
MCV RBC AUTO: 89.4 FL — SIGNIFICANT CHANGE UP (ref 80–100)
MCV RBC AUTO: 89.9 FL — SIGNIFICANT CHANGE UP (ref 80–100)
MCV RBC AUTO: 91.1 FL — SIGNIFICANT CHANGE UP (ref 80–100)
MCV RBC AUTO: 94 FL
MCV RBC AUTO: 95.1 FL
MCV RBC AUTO: 95.5 FL
MONOCYTES # BLD AUTO: 0.56 K/UL
MONOCYTES # BLD AUTO: 0.81 K/UL
MONOCYTES # BLD AUTO: 0.85 K/UL
MONOCYTES # BLD AUTO: 0.85 K/UL — SIGNIFICANT CHANGE UP (ref 0–0.9)
MONOCYTES # BLD AUTO: 0.98 K/UL — HIGH (ref 0–0.9)
MONOCYTES NFR BLD AUTO: 10 % — SIGNIFICANT CHANGE UP (ref 2–14)
MONOCYTES NFR BLD AUTO: 8.5 %
MONOCYTES NFR BLD AUTO: 8.6 %
MONOCYTES NFR BLD AUTO: 9.4 %
MONOCYTES NFR BLD AUTO: 9.6 % — SIGNIFICANT CHANGE UP (ref 2–14)
NEUTROPHILS # BLD AUTO: 3.44 K/UL
NEUTROPHILS # BLD AUTO: 3.9 K/UL — SIGNIFICANT CHANGE UP (ref 1.8–7.4)
NEUTROPHILS # BLD AUTO: 4.36 K/UL
NEUTROPHILS # BLD AUTO: 5.87 K/UL — SIGNIFICANT CHANGE UP (ref 1.8–7.4)
NEUTROPHILS # BLD AUTO: 6.65 K/UL
NEUTROPHILS NFR BLD AUTO: 46.2 % — SIGNIFICANT CHANGE UP (ref 43–77)
NEUTROPHILS NFR BLD AUTO: 50.6 %
NEUTROPHILS NFR BLD AUTO: 53.1 %
NEUTROPHILS NFR BLD AUTO: 57.5 % — SIGNIFICANT CHANGE UP (ref 43–77)
NEUTROPHILS NFR BLD AUTO: 66.9 %
NONHDLC SERPL-MCNC: 125 MG/DL
NRBC # BLD: 0 /100 WBCS — SIGNIFICANT CHANGE UP (ref 0–0)
NT-PROBNP SERPL-MCNC: 5365 PG/ML
NT-PROBNP SERPL-MCNC: 6313 PG/ML
NT-PROBNP SERPL-MCNC: ABNORMAL PG/ML
NT-PROBNP SERPL-SCNC: 5474 PG/ML — HIGH (ref 0–300)
NT-PROBNP SERPL-SCNC: HIGH PG/ML (ref 0–300)
PCO2 BLDV: 36 MMHG — LOW (ref 39–42)
PH BLDV: 7.46 — HIGH (ref 7.32–7.43)
PHOSPHATE SERPL-MCNC: 3.1 MG/DL — SIGNIFICANT CHANGE UP (ref 2.5–4.5)
PHOSPHATE SERPL-MCNC: 3.4 MG/DL — SIGNIFICANT CHANGE UP (ref 2.5–4.5)
PLATELET # BLD AUTO: 240 K/UL
PLATELET # BLD AUTO: 253 K/UL
PLATELET # BLD AUTO: 255 K/UL
PLATELET # BLD AUTO: 269 K/UL — SIGNIFICANT CHANGE UP (ref 150–400)
PLATELET # BLD AUTO: 282 K/UL — SIGNIFICANT CHANGE UP (ref 150–400)
PLATELET # BLD AUTO: 296 K/UL — SIGNIFICANT CHANGE UP (ref 150–400)
PO2 BLDV: 69 MMHG — HIGH (ref 25–45)
POTASSIUM BLDV-SCNC: 3.6 MMOL/L — SIGNIFICANT CHANGE UP (ref 3.5–5.1)
POTASSIUM SERPL-MCNC: 3.2 MMOL/L — LOW (ref 3.5–5.3)
POTASSIUM SERPL-MCNC: 3.3 MMOL/L — LOW (ref 3.5–5.3)
POTASSIUM SERPL-MCNC: 3.7 MMOL/L — SIGNIFICANT CHANGE UP (ref 3.5–5.3)
POTASSIUM SERPL-SCNC: 3.2 MMOL/L — LOW (ref 3.5–5.3)
POTASSIUM SERPL-SCNC: 3.3 MMOL/L — LOW (ref 3.5–5.3)
POTASSIUM SERPL-SCNC: 3.7 MMOL/L — SIGNIFICANT CHANGE UP (ref 3.5–5.3)
POTASSIUM SERPL-SCNC: 3.8 MMOL/L
POTASSIUM SERPL-SCNC: 4.2 MMOL/L
POTASSIUM SERPL-SCNC: 4.3 MMOL/L
PROT SERPL-MCNC: 6.2 G/DL — SIGNIFICANT CHANGE UP (ref 6–8.3)
PROT SERPL-MCNC: 6.6 G/DL — SIGNIFICANT CHANGE UP (ref 6–8.3)
PROT SERPL-MCNC: 6.7 G/DL
PROT SERPL-MCNC: 7.1 G/DL
PROT SERPL-MCNC: 7.4 G/DL
RAPID RVP RESULT: SIGNIFICANT CHANGE UP
RBC # BLD: 4.24 M/UL
RBC # BLD: 4.36 M/UL
RBC # BLD: 4.4 M/UL — SIGNIFICANT CHANGE UP (ref 3.8–5.2)
RBC # BLD: 4.53 M/UL — SIGNIFICANT CHANGE UP (ref 3.8–5.2)
RBC # BLD: 4.86 M/UL — SIGNIFICANT CHANGE UP (ref 3.8–5.2)
RBC # BLD: 4.91 M/UL
RBC # FLD: 14.7 %
RBC # FLD: 15.6 % — HIGH (ref 10.3–14.5)
RBC # FLD: 15.7 % — HIGH (ref 10.3–14.5)
RBC # FLD: 15.7 % — HIGH (ref 10.3–14.5)
RBC # FLD: 16.5 %
RBC # FLD: 16.6 %
SAO2 % BLDV: 95.4 % — HIGH (ref 67–88)
SARS-COV-2 IGG+IGM SERPL QL IA: 195 U/ML — HIGH
SARS-COV-2 IGG+IGM SERPL QL IA: POSITIVE
SARS-COV-2 RNA SPEC QL NAA+PROBE: SIGNIFICANT CHANGE UP
SODIUM SERPL-SCNC: 136 MMOL/L — SIGNIFICANT CHANGE UP (ref 135–145)
SODIUM SERPL-SCNC: 137 MMOL/L
SODIUM SERPL-SCNC: 138 MMOL/L — SIGNIFICANT CHANGE UP (ref 135–145)
SODIUM SERPL-SCNC: 139 MMOL/L
SODIUM SERPL-SCNC: 139 MMOL/L
SODIUM SERPL-SCNC: 139 MMOL/L — SIGNIFICANT CHANGE UP (ref 135–145)
SODIUM SERPL-SCNC: 141 MMOL/L — SIGNIFICANT CHANGE UP (ref 135–145)
SODIUM SERPL-SCNC: 141 MMOL/L — SIGNIFICANT CHANGE UP (ref 135–145)
TRIGL SERPL-MCNC: 163 MG/DL
TROPONIN T, HIGH SENSITIVITY RESULT: 47 NG/L — SIGNIFICANT CHANGE UP (ref 0–51)
TSH SERPL-ACNC: 2.11 UIU/ML
WBC # BLD: 10.2 K/UL — SIGNIFICANT CHANGE UP (ref 3.8–10.5)
WBC # BLD: 8.46 K/UL — SIGNIFICANT CHANGE UP (ref 3.8–10.5)
WBC # BLD: 9.4 K/UL — SIGNIFICANT CHANGE UP (ref 3.8–10.5)
WBC # FLD AUTO: 10.2 K/UL — SIGNIFICANT CHANGE UP (ref 3.8–10.5)
WBC # FLD AUTO: 6.48 K/UL
WBC # FLD AUTO: 8.46 K/UL — SIGNIFICANT CHANGE UP (ref 3.8–10.5)
WBC # FLD AUTO: 8.63 K/UL
WBC # FLD AUTO: 9.4 K/UL — SIGNIFICANT CHANGE UP (ref 3.8–10.5)
WBC # FLD AUTO: 9.95 K/UL

## 2021-01-01 PROCEDURE — 99233 SBSQ HOSP IP/OBS HIGH 50: CPT

## 2021-01-01 PROCEDURE — 85025 COMPLETE CBC W/AUTO DIFF WBC: CPT

## 2021-01-01 PROCEDURE — 99214 OFFICE O/P EST MOD 30 MIN: CPT | Mod: 25

## 2021-01-01 PROCEDURE — 99285 EMERGENCY DEPT VISIT HI MDM: CPT

## 2021-01-01 PROCEDURE — 85014 HEMATOCRIT: CPT

## 2021-01-01 PROCEDURE — 84100 ASSAY OF PHOSPHORUS: CPT

## 2021-01-01 PROCEDURE — 93306 TTE W/DOPPLER COMPLETE: CPT | Mod: 26

## 2021-01-01 PROCEDURE — 0225U NFCT DS DNA&RNA 21 SARSCOV2: CPT

## 2021-01-01 PROCEDURE — 82803 BLOOD GASES ANY COMBINATION: CPT

## 2021-01-01 PROCEDURE — 90662 IIV NO PRSV INCREASED AG IM: CPT

## 2021-01-01 PROCEDURE — 84484 ASSAY OF TROPONIN QUANT: CPT

## 2021-01-01 PROCEDURE — 97161 PT EVAL LOW COMPLEX 20 MIN: CPT

## 2021-01-01 PROCEDURE — 83735 ASSAY OF MAGNESIUM: CPT

## 2021-01-01 PROCEDURE — 80053 COMPREHEN METABOLIC PANEL: CPT

## 2021-01-01 PROCEDURE — 93010 ELECTROCARDIOGRAM REPORT: CPT

## 2021-01-01 PROCEDURE — 85018 HEMOGLOBIN: CPT

## 2021-01-01 PROCEDURE — 99232 SBSQ HOSP IP/OBS MODERATE 35: CPT | Mod: GC

## 2021-01-01 PROCEDURE — 99214 OFFICE O/P EST MOD 30 MIN: CPT

## 2021-01-01 PROCEDURE — 99215 OFFICE O/P EST HI 40 MIN: CPT | Mod: 25

## 2021-01-01 PROCEDURE — 93000 ELECTROCARDIOGRAM COMPLETE: CPT

## 2021-01-01 PROCEDURE — 71045 X-RAY EXAM CHEST 1 VIEW: CPT

## 2021-01-01 PROCEDURE — 82947 ASSAY GLUCOSE BLOOD QUANT: CPT

## 2021-01-01 PROCEDURE — 83605 ASSAY OF LACTIC ACID: CPT

## 2021-01-01 PROCEDURE — 36415 COLL VENOUS BLD VENIPUNCTURE: CPT

## 2021-01-01 PROCEDURE — 99239 HOSP IP/OBS DSCHRG MGMT >30: CPT

## 2021-01-01 PROCEDURE — 99232 SBSQ HOSP IP/OBS MODERATE 35: CPT

## 2021-01-01 PROCEDURE — 93005 ELECTROCARDIOGRAM TRACING: CPT

## 2021-01-01 PROCEDURE — 71045 X-RAY EXAM CHEST 1 VIEW: CPT | Mod: 26

## 2021-01-01 PROCEDURE — 93296 REM INTERROG EVL PM/IDS: CPT

## 2021-01-01 PROCEDURE — 84295 ASSAY OF SERUM SODIUM: CPT

## 2021-01-01 PROCEDURE — 84132 ASSAY OF SERUM POTASSIUM: CPT

## 2021-01-01 PROCEDURE — 99221 1ST HOSP IP/OBS SF/LOW 40: CPT | Mod: GC

## 2021-01-01 PROCEDURE — G0008: CPT

## 2021-01-01 PROCEDURE — 86769 SARS-COV-2 COVID-19 ANTIBODY: CPT

## 2021-01-01 PROCEDURE — 83880 ASSAY OF NATRIURETIC PEPTIDE: CPT

## 2021-01-01 PROCEDURE — 80048 BASIC METABOLIC PNL TOTAL CA: CPT

## 2021-01-01 PROCEDURE — 82435 ASSAY OF BLOOD CHLORIDE: CPT

## 2021-01-01 PROCEDURE — 82330 ASSAY OF CALCIUM: CPT

## 2021-01-01 PROCEDURE — 97116 GAIT TRAINING THERAPY: CPT

## 2021-01-01 PROCEDURE — 97530 THERAPEUTIC ACTIVITIES: CPT

## 2021-01-01 PROCEDURE — 93306 TTE W/DOPPLER COMPLETE: CPT

## 2021-01-01 PROCEDURE — 93294 REM INTERROG EVL PM/LDLS PM: CPT

## 2021-01-01 PROCEDURE — 99223 1ST HOSP IP/OBS HIGH 75: CPT

## 2021-01-01 PROCEDURE — 99285 EMERGENCY DEPT VISIT HI MDM: CPT | Mod: 25

## 2021-01-01 RX ORDER — TRIAMCINOLONE ACETONIDE 5 MG/G
0.5 CREAM TOPICAL TWICE DAILY
Qty: 1 | Refills: 1 | Status: ACTIVE | COMMUNITY
Start: 2021-01-01 | End: 1900-01-01

## 2021-01-01 RX ORDER — HALOPERIDOL DECANOATE 100 MG/ML
0.5 INJECTION INTRAMUSCULAR ONCE
Refills: 0 | Status: COMPLETED | OUTPATIENT
Start: 2021-01-01 | End: 2021-01-01

## 2021-01-01 RX ORDER — SIMVASTATIN 20 MG/1
20 TABLET, FILM COATED ORAL AT BEDTIME
Refills: 0 | Status: DISCONTINUED | OUTPATIENT
Start: 2021-01-01 | End: 2021-01-01

## 2021-01-01 RX ORDER — POTASSIUM CHLORIDE 20 MEQ
40 PACKET (EA) ORAL EVERY 4 HOURS
Refills: 0 | Status: COMPLETED | OUTPATIENT
Start: 2021-01-01 | End: 2021-01-01

## 2021-01-01 RX ORDER — BUMETANIDE 0.25 MG/ML
2 INJECTION INTRAMUSCULAR; INTRAVENOUS
Refills: 0 | Status: DISCONTINUED | OUTPATIENT
Start: 2021-01-01 | End: 2021-01-01

## 2021-01-01 RX ORDER — APIXABAN 2.5 MG/1
2.5 TABLET, FILM COATED ORAL
Refills: 0 | Status: DISCONTINUED | OUTPATIENT
Start: 2021-01-01 | End: 2021-01-01

## 2021-01-01 RX ORDER — METOPROLOL TARTRATE 50 MG
25 TABLET ORAL DAILY
Refills: 0 | Status: DISCONTINUED | OUTPATIENT
Start: 2021-01-01 | End: 2021-01-01

## 2021-01-01 RX ORDER — METHYLPREDNISOLONE 4 MG/1
4 TABLET ORAL
Qty: 1 | Refills: 0 | Status: ACTIVE | COMMUNITY
Start: 2021-01-01 | End: 1900-01-01

## 2021-01-01 RX ORDER — FUROSEMIDE 40 MG
40 TABLET ORAL ONCE
Refills: 0 | Status: COMPLETED | OUTPATIENT
Start: 2021-01-01 | End: 2021-01-01

## 2021-01-01 RX ORDER — APIXABAN 2.5 MG/1
1 TABLET, FILM COATED ORAL
Qty: 0 | Refills: 0 | DISCHARGE
Start: 2021-01-01

## 2021-01-01 RX ORDER — FUROSEMIDE 40 MG/1
40 TABLET ORAL
Qty: 60 | Refills: 0 | Status: DISCONTINUED | COMMUNITY
Start: 2021-01-10 | End: 2021-01-01

## 2021-01-01 RX ORDER — METOPROLOL SUCCINATE 25 MG/1
25 TABLET, EXTENDED RELEASE ORAL
Qty: 90 | Refills: 1 | Status: ACTIVE | COMMUNITY
Start: 2019-02-04 | End: 1900-01-01

## 2021-01-01 RX ORDER — FUROSEMIDE 40 MG
60 TABLET ORAL
Refills: 0 | Status: DISCONTINUED | OUTPATIENT
Start: 2021-01-01 | End: 2021-01-01

## 2021-01-01 RX ORDER — HALOPERIDOL DECANOATE 100 MG/ML
0.5 INJECTION INTRAMUSCULAR ONCE
Refills: 0 | Status: DISCONTINUED | OUTPATIENT
Start: 2021-01-01 | End: 2021-01-01

## 2021-01-01 RX ORDER — FUROSEMIDE 40 MG
40 TABLET ORAL
Refills: 0 | Status: DISCONTINUED | OUTPATIENT
Start: 2021-01-01 | End: 2021-01-01

## 2021-01-01 RX ORDER — ACETAMINOPHEN 500 MG
650 TABLET ORAL EVERY 6 HOURS
Refills: 0 | Status: DISCONTINUED | OUTPATIENT
Start: 2021-01-01 | End: 2021-01-01

## 2021-01-01 RX ORDER — METOPROLOL TARTRATE 50 MG
1 TABLET ORAL
Qty: 0 | Refills: 0 | DISCHARGE
Start: 2021-01-01

## 2021-01-01 RX ORDER — BUMETANIDE 0.25 MG/ML
1 INJECTION INTRAMUSCULAR; INTRAVENOUS
Qty: 60 | Refills: 0
Start: 2021-01-01 | End: 2021-01-01

## 2021-01-01 RX ORDER — POTASSIUM CHLORIDE 20 MEQ
40 PACKET (EA) ORAL ONCE
Refills: 0 | Status: COMPLETED | OUTPATIENT
Start: 2021-01-01 | End: 2021-01-01

## 2021-01-01 RX ORDER — MEMANTINE HYDROCHLORIDE 10 MG/1
5 TABLET ORAL DAILY
Refills: 0 | Status: DISCONTINUED | OUTPATIENT
Start: 2021-01-01 | End: 2021-01-01

## 2021-01-01 RX ORDER — AMOXICILLIN AND CLAVULANATE POTASSIUM 500; 125 MG/1; MG/1
500-125 TABLET, FILM COATED ORAL
Qty: 14 | Refills: 0 | Status: ACTIVE | COMMUNITY
Start: 2021-01-01 | End: 1900-01-01

## 2021-01-01 RX ADMIN — Medication 40 MILLIEQUIVALENT(S): at 18:13

## 2021-01-01 RX ADMIN — HALOPERIDOL DECANOATE 0.5 MILLIGRAM(S): 100 INJECTION INTRAMUSCULAR at 06:13

## 2021-01-01 RX ADMIN — Medication 40 MILLIGRAM(S): at 17:09

## 2021-01-01 RX ADMIN — Medication 40 MILLIEQUIVALENT(S): at 09:49

## 2021-01-01 RX ADMIN — Medication 25 MILLIGRAM(S): at 06:32

## 2021-01-01 RX ADMIN — APIXABAN 2.5 MILLIGRAM(S): 2.5 TABLET, FILM COATED ORAL at 17:09

## 2021-01-01 RX ADMIN — Medication 2.5 MILLIGRAM(S): at 05:37

## 2021-01-01 RX ADMIN — APIXABAN 2.5 MILLIGRAM(S): 2.5 TABLET, FILM COATED ORAL at 06:32

## 2021-01-01 RX ADMIN — Medication 60 MILLIGRAM(S): at 17:09

## 2021-01-01 RX ADMIN — MEMANTINE HYDROCHLORIDE 5 MILLIGRAM(S): 10 TABLET ORAL at 11:19

## 2021-01-01 RX ADMIN — Medication 60 MILLIGRAM(S): at 06:32

## 2021-01-01 RX ADMIN — Medication 2.5 MILLIGRAM(S): at 06:32

## 2021-01-01 RX ADMIN — Medication 40 MILLIEQUIVALENT(S): at 13:11

## 2021-01-01 RX ADMIN — Medication 40 MILLIGRAM(S): at 08:43

## 2021-01-01 RX ADMIN — SIMVASTATIN 20 MILLIGRAM(S): 20 TABLET, FILM COATED ORAL at 21:46

## 2021-01-01 RX ADMIN — HALOPERIDOL DECANOATE 0.5 MILLIGRAM(S): 100 INJECTION INTRAMUSCULAR at 19:32

## 2021-01-01 RX ADMIN — Medication 2.5 MILLIGRAM(S): at 06:51

## 2021-01-01 RX ADMIN — APIXABAN 2.5 MILLIGRAM(S): 2.5 TABLET, FILM COATED ORAL at 05:37

## 2021-01-01 RX ADMIN — APIXABAN 2.5 MILLIGRAM(S): 2.5 TABLET, FILM COATED ORAL at 17:07

## 2021-01-01 RX ADMIN — MEMANTINE HYDROCHLORIDE 5 MILLIGRAM(S): 10 TABLET ORAL at 11:05

## 2021-01-01 RX ADMIN — Medication 25 MILLIGRAM(S): at 05:37

## 2021-01-01 RX ADMIN — Medication 40 MILLIGRAM(S): at 05:33

## 2021-01-01 RX ADMIN — Medication 25 MILLIGRAM(S): at 06:51

## 2021-01-01 RX ADMIN — Medication 25 MILLIGRAM(S): at 05:33

## 2021-01-01 RX ADMIN — SIMVASTATIN 20 MILLIGRAM(S): 20 TABLET, FILM COATED ORAL at 21:47

## 2021-01-01 RX ADMIN — SIMVASTATIN 20 MILLIGRAM(S): 20 TABLET, FILM COATED ORAL at 20:37

## 2021-01-01 RX ADMIN — APIXABAN 2.5 MILLIGRAM(S): 2.5 TABLET, FILM COATED ORAL at 18:13

## 2021-01-01 RX ADMIN — SIMVASTATIN 20 MILLIGRAM(S): 20 TABLET, FILM COATED ORAL at 21:08

## 2021-01-01 RX ADMIN — Medication 60 MILLIGRAM(S): at 06:31

## 2021-01-01 RX ADMIN — Medication 2.5 MILLIGRAM(S): at 05:33

## 2021-01-01 RX ADMIN — MEMANTINE HYDROCHLORIDE 5 MILLIGRAM(S): 10 TABLET ORAL at 11:04

## 2021-01-01 RX ADMIN — Medication 40 MILLIGRAM(S): at 16:28

## 2021-01-01 RX ADMIN — APIXABAN 2.5 MILLIGRAM(S): 2.5 TABLET, FILM COATED ORAL at 05:33

## 2021-01-01 RX ADMIN — Medication 60 MILLIGRAM(S): at 18:12

## 2021-01-01 RX ADMIN — APIXABAN 2.5 MILLIGRAM(S): 2.5 TABLET, FILM COATED ORAL at 06:51

## 2021-01-06 ENCOUNTER — APPOINTMENT (OUTPATIENT)
Dept: INTERNAL MEDICINE | Facility: CLINIC | Age: 86
End: 2021-01-06
Payer: MEDICARE

## 2021-01-06 VITALS
RESPIRATION RATE: 12 BRPM | OXYGEN SATURATION: 97 % | DIASTOLIC BLOOD PRESSURE: 74 MMHG | TEMPERATURE: 96.6 F | HEIGHT: 62 IN | SYSTOLIC BLOOD PRESSURE: 125 MMHG | HEART RATE: 67 BPM

## 2021-01-06 DIAGNOSIS — Z95.0 PRESENCE OF CARDIAC PACEMAKER: ICD-10-CM

## 2021-01-06 DIAGNOSIS — R06.00 DYSPNEA, UNSPECIFIED: ICD-10-CM

## 2021-01-06 DIAGNOSIS — R00.1 BRADYCARDIA, UNSPECIFIED: ICD-10-CM

## 2021-01-06 PROCEDURE — 99072 ADDL SUPL MATRL&STAF TM PHE: CPT

## 2021-01-06 PROCEDURE — 99214 OFFICE O/P EST MOD 30 MIN: CPT

## 2021-01-09 NOTE — HISTORY OF PRESENT ILLNESS
[de-identified] : Ms. AMY CHACON is a 89 year old female, , accompannied by her daughter, with h/o Hypertension/ Hyperlipidemia/ / CAD/ Osteoporosis, subdural hematoma in [place] 2018, s/p Lolita holes, b/l DVT, PE on eliquis presents for post hospitalization follow up visit\par Daughter states that on Dec 28th pt was hospitalized at [facility] for bradycardia. She had a pacemaker placed the next day\par She was d/c'd home on Dec 31st She is doing well. \par Denies SOB, chest pain, abdominal pain, N/V/D, leg swelling, headache, dizziness \par Offers no complaints\par \par \par

## 2021-01-09 NOTE — ASSESSMENT
[FreeTextEntry1] : Bradycardia, s/p hospitalization, s/p pacemaker placement\par f/u with cardiology

## 2021-01-09 NOTE — PHYSICAL EXAM
[Normal Sclera/Conjunctiva] : normal sclera/conjunctiva [PERRL] : pupils equal round and reactive to light [EOMI] : extraocular movements intact [Normal Outer Ear/Nose] : the outer ears and nose were normal in appearance [Normal Oropharynx] : the oropharynx was normal [No JVD] : no jugular venous distention [No Lymphadenopathy] : no lymphadenopathy [Supple] : supple [Thyroid Normal, No Nodules] : the thyroid was normal and there were no nodules present [No Respiratory Distress] : no respiratory distress  [No Accessory Muscle Use] : no accessory muscle use [Clear to Auscultation] : lungs were clear to auscultation bilaterally [Normal Rate] : normal rate  [Regular Rhythm] : with a regular rhythm [Normal S1, S2] : normal S1 and S2 [No Edema] : there was no peripheral edema [No Extremity Clubbing/Cyanosis] : no extremity clubbing/cyanosis [Soft] : abdomen soft [Non Tender] : non-tender [Non-distended] : non-distended [Normal Bowel Sounds] : normal bowel sounds [Normal Posterior Cervical Nodes] : no posterior cervical lymphadenopathy [Normal Anterior Cervical Nodes] : no anterior cervical lymphadenopathy [No CVA Tenderness] : no CVA  tenderness [No Spinal Tenderness] : no spinal tenderness [No Joint Swelling] : no joint swelling [Grossly Normal Strength/Tone] : grossly normal strength/tone [No Rash] : no rash [Coordination Grossly Intact] : coordination grossly intact [No Focal Deficits] : no focal deficits [Normal Gait] : normal gait [Speech Grossly Normal] : speech grossly normal [Normal Affect] : the affect was normal [Alert and Oriented x3] : oriented to person, place, and time [de-identified] : elderly female in no acute distress [de-identified] : +

## 2021-01-10 ENCOUNTER — NON-APPOINTMENT (OUTPATIENT)
Age: 86
End: 2021-01-10

## 2021-01-10 PROBLEM — R06.00 DYSPNEA: Status: ACTIVE | Noted: 2021-01-10

## 2021-01-11 NOTE — DISCHARGE NOTE ADULT - HOSPITAL COURSE
lvm to schedule pus & ov, per [facility]. FU from last appt on 1.08.   88F hx of dementia, HTN, HLD, osteoporosis, sent from [facility] rehab for worsening abdominal pain of 5 day duration. Over the summer pt. had a fall in [place] c/b SDH requiring 2 evacuations. Pt. returned from [facility] on 11/1 and was sent to ED by PMD for decreased functional status, admitted to [facility] 11/12 - 11/16 when she was treated for a UTI. Repeat imaging showed stable SDH, and patient was then discharged to [facility] rehab where she had been for the past two weeks. At [facility], she had been ambulating with a walker and slowly regaining her strength until the past few days when she began feeling unwell. Over the past few days pt. has been complaining of diffuse abdominal pain and has had decreased PO intake. She has also been constipated, so she was given an enema after which she had multiple bowel movements. However, pain persistent so family decided to bring her to the ED. Upon arrival to the ED, she was found to be hypoxic to 80, CT chest with IV contrast revealed b/l PEs in R and L main PAs. TTE showed new RV dysfunction with RV strain. Pt was also hypotensive in the ED to 80s/50s which responded to IV fluid boluses and high rate maintenance IVF. Pt was started on heparin gtt and vascular cardiology was consulted. Catheter-directed thrombolytics were not given 2/2 high risk of bleeding with recent SDH. Pt's blood pressure improved and oxygen requirements decreased. Pt was transitioned to Eliquis 5mg BID (10mg initial dosing was not given 2/2 risk of bleeding). 88F hx of dementia, HTN, HLD, osteoporosis, sent from [facility] rehab for worsening abdominal pain of 5 day duration. Over the summer pt. had a fall in [place] c/b SDH requiring 2 evacuations. Pt. returned from [facility] on 11/1 and was sent to ED by PMD for decreased functional status, admitted to [facility] 11/12 - 11/16 when she was treated for a UTI. Repeat imaging showed stable SDH, and patient was then discharged to [facility] rehab where she had been for the past two weeks. At [facility], she had been ambulating with a walker and slowly regaining her strength until the past few days when she began feeling unwell. Over the past few days pt. has been complaining of diffuse abdominal pain and has had decreased PO intake. She has also been constipated, so she was given an enema after which she had multiple bowel movements. However, pain persistent so family decided to bring her to the ED. Upon arrival to the ED, she was found to be hypoxic to 80, CT chest with IV contrast revealed b/l PEs in R and L main PAs. TTE showed new RV dysfunction with RV strain. Pt was also hypotensive in the ED to 80s/50s which responded to IV fluid boluses and high rate maintenance IVF. Pt was started on heparin gtt and vascular cardiology was consulted. Catheter-directed thrombolytics were not given 2/2 high risk of bleeding with recent SDH. Pt's blood pressure improved and oxygen requirements decreased. Pt was transitioned to Eliquis 5mg BID (10mg initial dosing was not given 2/2 risk of bleeding).   Pt was also treated with 5 day course of iv cipro and iv flagyl for colitis noted on imaging.    Problems addressed this hospitalization: acute hypoxic respiratory failure, massive pulmonary embolism, sepsis 2/2 gastroenteritis, chronic systolic heart failure, subdural hematoma, hypertension  AT [facility], PT SHOULD CONTINUE TO BE WEANED OFF OXYGEN, TO ROOM AIR AS TOLERATED. 88F hx of dementia, HTN, HLD, osteoporosis, sent from [facility] rehab for worsening abdominal pain of 5 day duration. Over the summer pt. had a fall in [place] c/b SDH requiring 2 evacuations. Pt. returned from [facility] on 11/1 and was sent to ED by PMD for decreased functional status, admitted to [facility] 11/12 - 11/16 when she was treated for a UTI. Repeat imaging showed stable SDH, and patient was then discharged to [facility] rehab where she had been for the past two weeks. At [facility], she had been ambulating with a walker and slowly regaining her strength until the past few days when she began feeling unwell. Over the past few days pt. has been complaining of diffuse abdominal pain and has had decreased PO intake. She has also been constipated, so she was given an enema after which she had multiple bowel movements. However, pain persistent so family decided to bring her to the ED. Upon arrival to the ED, she was found to be hypoxic to 80, CT chest with IV contrast revealed b/l PEs in R and L main PAs. TTE showed new RV dysfunction with RV strain. Pt was also hypotensive in the ED to 80s/50s which responded to IV fluid boluses and high rate maintenance IVF. Pt was started on heparin gtt and vascular cardiology was consulted. Catheter-directed thrombolytics were not given 2/2 high risk of bleeding with recent SDH. Pt's blood pressure improved and oxygen requirements decreased. Pt was transitioned to Eliquis 5mg BID (10mg initial dosing was not given 2/2 risk of bleeding). Toprol was discontinued 2/2 soft blood pressures.  Pt was also treated with 5 day course of iv cipro and iv flagyl for colitis noted on imaging.    Problems addressed this hospitalization: acute hypoxic respiratory failure, massive pulmonary embolism, sepsis 2/2 gastroenteritis, chronic systolic heart failure, subdural hematoma, hypertension  AT [facility], PT SHOULD CONTINUE TO BE WEANED OFF OXYGEN, TO ROOM AIR AS TOLERATED.

## 2021-01-13 ENCOUNTER — APPOINTMENT (OUTPATIENT)
Dept: ELECTROPHYSIOLOGY | Facility: CLINIC | Age: 86
End: 2021-01-13
Payer: MEDICARE

## 2021-01-13 ENCOUNTER — NON-APPOINTMENT (OUTPATIENT)
Age: 86
End: 2021-01-13

## 2021-01-13 VITALS
OXYGEN SATURATION: 97 % | HEIGHT: 62 IN | DIASTOLIC BLOOD PRESSURE: 79 MMHG | SYSTOLIC BLOOD PRESSURE: 125 MMHG | HEART RATE: 82 BPM

## 2021-01-13 PROCEDURE — 99212 OFFICE O/P EST SF 10 MIN: CPT

## 2021-01-13 PROCEDURE — 99024 POSTOP FOLLOW-UP VISIT: CPT

## 2021-01-14 ENCOUNTER — NON-APPOINTMENT (OUTPATIENT)
Age: 86
End: 2021-01-14

## 2021-01-14 ENCOUNTER — APPOINTMENT (OUTPATIENT)
Dept: CARDIOLOGY | Facility: CLINIC | Age: 86
End: 2021-01-14
Payer: MEDICARE

## 2021-01-14 VITALS
HEIGHT: 62 IN | BODY MASS INDEX: 29.81 KG/M2 | OXYGEN SATURATION: 94 % | RESPIRATION RATE: 12 BRPM | DIASTOLIC BLOOD PRESSURE: 75 MMHG | HEART RATE: 88 BPM | WEIGHT: 162 LBS | SYSTOLIC BLOOD PRESSURE: 110 MMHG | TEMPERATURE: 97.1 F

## 2021-01-14 PROCEDURE — 93000 ELECTROCARDIOGRAM COMPLETE: CPT

## 2021-01-14 PROCEDURE — 99072 ADDL SUPL MATRL&STAF TM PHE: CPT

## 2021-01-14 PROCEDURE — 99215 OFFICE O/P EST HI 40 MIN: CPT | Mod: 25

## 2021-01-16 LAB
ANION GAP SERPL CALC-SCNC: 18 MMOL/L
BUN SERPL-MCNC: 25 MG/DL
CALCIUM SERPL-MCNC: 9.3 MG/DL
CHLORIDE SERPL-SCNC: 104 MMOL/L
CO2 SERPL-SCNC: 20 MMOL/L
CREAT SERPL-MCNC: 1.04 MG/DL
GLUCOSE SERPL-MCNC: 112 MG/DL
NT-PROBNP SERPL-MCNC: ABNORMAL PG/ML
POTASSIUM SERPL-SCNC: 3.8 MMOL/L
SODIUM SERPL-SCNC: 142 MMOL/L

## 2021-01-17 ENCOUNTER — INPATIENT (INPATIENT)
Facility: HOSPITAL | Age: 86
LOS: 5 days | Discharge: HOME HEALTH | DRG: 291 | End: 2021-01-23
Attending: HOSPITALIST | Admitting: HOSPITALIST
Payer: COMMERCIAL

## 2021-01-17 ENCOUNTER — NON-APPOINTMENT (OUTPATIENT)
Age: 86
End: 2021-01-17

## 2021-01-17 VITALS
HEIGHT: 63 IN | TEMPERATURE: 97 F | RESPIRATION RATE: 20 BRPM | SYSTOLIC BLOOD PRESSURE: 117 MMHG | OXYGEN SATURATION: 100 % | WEIGHT: 149.91 LBS | DIASTOLIC BLOOD PRESSURE: 85 MMHG | HEART RATE: 97 BPM

## 2021-01-17 DIAGNOSIS — F03.90 UNSPECIFIED DEMENTIA, UNSPECIFIED SEVERITY, WITHOUT BEHAVIORAL DISTURBANCE, PSYCHOTIC DISTURBANCE, MOOD DISTURBANCE, AND ANXIETY: ICD-10-CM

## 2021-01-17 DIAGNOSIS — I26.99 OTHER PULMONARY EMBOLISM WITHOUT ACUTE COR PULMONALE: ICD-10-CM

## 2021-01-17 DIAGNOSIS — I50.23 ACUTE ON CHRONIC SYSTOLIC (CONGESTIVE) HEART FAILURE: ICD-10-CM

## 2021-01-17 DIAGNOSIS — S06.5X9A TRAUMATIC SUBDURAL HEMORRHAGE WITH LOSS OF CONSCIOUSNESS OF UNSPECIFIED DURATION, INITIAL ENCOUNTER: Chronic | ICD-10-CM

## 2021-01-17 DIAGNOSIS — I50.9 HEART FAILURE, UNSPECIFIED: ICD-10-CM

## 2021-01-17 LAB
ALBUMIN SERPL ELPH-MCNC: 3.4 G/DL — SIGNIFICANT CHANGE UP (ref 3.3–5)
ALBUMIN SERPL ELPH-MCNC: 3.8 G/DL — SIGNIFICANT CHANGE UP (ref 3.3–5)
ALP SERPL-CCNC: 110 U/L — SIGNIFICANT CHANGE UP (ref 40–120)
ALP SERPL-CCNC: 129 U/L — HIGH (ref 40–120)
ALT FLD-CCNC: 108 U/L — HIGH (ref 10–45)
ALT FLD-CCNC: 124 U/L — HIGH (ref 10–45)
ANION GAP SERPL CALC-SCNC: 12 MMOL/L — SIGNIFICANT CHANGE UP (ref 5–17)
ANION GAP SERPL CALC-SCNC: 14 MMOL/L — SIGNIFICANT CHANGE UP (ref 5–17)
APTT BLD: 29.6 SEC — SIGNIFICANT CHANGE UP (ref 27.5–35.5)
AST SERPL-CCNC: 212 U/L — HIGH (ref 10–40)
AST SERPL-CCNC: 90 U/L — HIGH (ref 10–40)
BASOPHILS # BLD AUTO: 0.07 K/UL — SIGNIFICANT CHANGE UP (ref 0–0.2)
BASOPHILS NFR BLD AUTO: 0.8 % — SIGNIFICANT CHANGE UP (ref 0–2)
BILIRUB SERPL-MCNC: 0.8 MG/DL — SIGNIFICANT CHANGE UP (ref 0.2–1.2)
BILIRUB SERPL-MCNC: 0.9 MG/DL — SIGNIFICANT CHANGE UP (ref 0.2–1.2)
BUN SERPL-MCNC: 27 MG/DL — HIGH (ref 7–23)
BUN SERPL-MCNC: 27 MG/DL — HIGH (ref 7–23)
CALCIUM SERPL-MCNC: 8.7 MG/DL — SIGNIFICANT CHANGE UP (ref 8.4–10.5)
CALCIUM SERPL-MCNC: 8.9 MG/DL — SIGNIFICANT CHANGE UP (ref 8.4–10.5)
CHLORIDE SERPL-SCNC: 100 MMOL/L — SIGNIFICANT CHANGE UP (ref 96–108)
CHLORIDE SERPL-SCNC: 101 MMOL/L — SIGNIFICANT CHANGE UP (ref 96–108)
CO2 SERPL-SCNC: 20 MMOL/L — LOW (ref 22–31)
CO2 SERPL-SCNC: 24 MMOL/L — SIGNIFICANT CHANGE UP (ref 22–31)
CREAT SERPL-MCNC: 0.91 MG/DL — SIGNIFICANT CHANGE UP (ref 0.5–1.3)
CREAT SERPL-MCNC: 0.97 MG/DL — SIGNIFICANT CHANGE UP (ref 0.5–1.3)
EOSINOPHIL # BLD AUTO: 0.09 K/UL — SIGNIFICANT CHANGE UP (ref 0–0.5)
EOSINOPHIL NFR BLD AUTO: 1.1 % — SIGNIFICANT CHANGE UP (ref 0–6)
GLUCOSE SERPL-MCNC: 122 MG/DL — HIGH (ref 70–99)
GLUCOSE SERPL-MCNC: 148 MG/DL — HIGH (ref 70–99)
HCT VFR BLD CALC: 39.8 % — SIGNIFICANT CHANGE UP (ref 34.5–45)
HGB BLD-MCNC: 12.6 G/DL — SIGNIFICANT CHANGE UP (ref 11.5–15.5)
IMM GRANULOCYTES NFR BLD AUTO: 0.2 % — SIGNIFICANT CHANGE UP (ref 0–1.5)
INR BLD: 2.08 RATIO — HIGH (ref 0.88–1.16)
LYMPHOCYTES # BLD AUTO: 3.03 K/UL — SIGNIFICANT CHANGE UP (ref 1–3.3)
LYMPHOCYTES # BLD AUTO: 36.7 % — SIGNIFICANT CHANGE UP (ref 13–44)
MAGNESIUM SERPL-MCNC: 2.3 MG/DL — SIGNIFICANT CHANGE UP (ref 1.6–2.6)
MCHC RBC-ENTMCNC: 27.5 PG — SIGNIFICANT CHANGE UP (ref 27–34)
MCHC RBC-ENTMCNC: 31.7 GM/DL — LOW (ref 32–36)
MCV RBC AUTO: 86.9 FL — SIGNIFICANT CHANGE UP (ref 80–100)
MONOCYTES # BLD AUTO: 0.98 K/UL — HIGH (ref 0–0.9)
MONOCYTES NFR BLD AUTO: 11.9 % — SIGNIFICANT CHANGE UP (ref 2–14)
NEUTROPHILS # BLD AUTO: 4.06 K/UL — SIGNIFICANT CHANGE UP (ref 1.8–7.4)
NEUTROPHILS NFR BLD AUTO: 49.3 % — SIGNIFICANT CHANGE UP (ref 43–77)
NRBC # BLD: 0 /100 WBCS — SIGNIFICANT CHANGE UP (ref 0–0)
NT-PROBNP SERPL-SCNC: HIGH PG/ML (ref 0–300)
PHOSPHATE SERPL-MCNC: 4.6 MG/DL — HIGH (ref 2.5–4.5)
PLATELET # BLD AUTO: 366 K/UL — SIGNIFICANT CHANGE UP (ref 150–400)
POTASSIUM SERPL-MCNC: 3.2 MMOL/L — LOW (ref 3.5–5.3)
POTASSIUM SERPL-MCNC: >9 MMOL/L — CRITICAL HIGH (ref 3.5–5.3)
POTASSIUM SERPL-SCNC: 3.2 MMOL/L — LOW (ref 3.5–5.3)
POTASSIUM SERPL-SCNC: >9 MMOL/L — CRITICAL HIGH (ref 3.5–5.3)
PROT SERPL-MCNC: 6.3 G/DL — SIGNIFICANT CHANGE UP (ref 6–8.3)
PROT SERPL-MCNC: 7.1 G/DL — SIGNIFICANT CHANGE UP (ref 6–8.3)
PROTHROM AB SERPL-ACNC: 24.1 SEC — HIGH (ref 10.6–13.6)
RBC # BLD: 4.58 M/UL — SIGNIFICANT CHANGE UP (ref 3.8–5.2)
RBC # FLD: 15.9 % — HIGH (ref 10.3–14.5)
SARS-COV-2 RNA SPEC QL NAA+PROBE: SIGNIFICANT CHANGE UP
SODIUM SERPL-SCNC: 132 MMOL/L — LOW (ref 135–145)
SODIUM SERPL-SCNC: 139 MMOL/L — SIGNIFICANT CHANGE UP (ref 135–145)
TROPONIN T, HIGH SENSITIVITY RESULT: 22 NG/L — SIGNIFICANT CHANGE UP (ref 0–51)
TROPONIN T, HIGH SENSITIVITY RESULT: 28 NG/L — SIGNIFICANT CHANGE UP (ref 0–51)
WBC # BLD: 8.25 K/UL — SIGNIFICANT CHANGE UP (ref 3.8–10.5)
WBC # FLD AUTO: 8.25 K/UL — SIGNIFICANT CHANGE UP (ref 3.8–10.5)

## 2021-01-17 PROCEDURE — 93010 ELECTROCARDIOGRAM REPORT: CPT

## 2021-01-17 PROCEDURE — 99285 EMERGENCY DEPT VISIT HI MDM: CPT

## 2021-01-17 PROCEDURE — 99223 1ST HOSP IP/OBS HIGH 75: CPT

## 2021-01-17 PROCEDURE — 71045 X-RAY EXAM CHEST 1 VIEW: CPT | Mod: 26

## 2021-01-17 RX ORDER — FUROSEMIDE 40 MG
40 TABLET ORAL ONCE
Refills: 0 | Status: COMPLETED | OUTPATIENT
Start: 2021-01-17 | End: 2021-01-17

## 2021-01-17 RX ORDER — METOPROLOL TARTRATE 50 MG
12.5 TABLET ORAL
Refills: 0 | Status: DISCONTINUED | OUTPATIENT
Start: 2021-01-17 | End: 2021-01-22

## 2021-01-17 RX ORDER — APIXABAN 2.5 MG/1
2.5 TABLET, FILM COATED ORAL EVERY 12 HOURS
Refills: 0 | Status: DISCONTINUED | OUTPATIENT
Start: 2021-01-17 | End: 2021-01-23

## 2021-01-17 RX ORDER — FUROSEMIDE 40 MG
40 TABLET ORAL
Refills: 0 | Status: DISCONTINUED | OUTPATIENT
Start: 2021-01-18 | End: 2021-01-22

## 2021-01-17 RX ORDER — METOPROLOL TARTRATE 50 MG
0.5 TABLET ORAL
Qty: 0 | Refills: 0 | DISCHARGE

## 2021-01-17 RX ORDER — POTASSIUM CHLORIDE 20 MEQ
40 PACKET (EA) ORAL ONCE
Refills: 0 | Status: COMPLETED | OUTPATIENT
Start: 2021-01-17 | End: 2021-01-17

## 2021-01-17 RX ORDER — ALENDRONATE SODIUM 70 MG/1
1 TABLET ORAL
Qty: 0 | Refills: 0 | DISCHARGE

## 2021-01-17 RX ORDER — SIMVASTATIN 20 MG/1
20 TABLET, FILM COATED ORAL AT BEDTIME
Refills: 0 | Status: DISCONTINUED | OUTPATIENT
Start: 2021-01-17 | End: 2021-01-23

## 2021-01-17 RX ORDER — MEMANTINE HYDROCHLORIDE 10 MG/1
5 TABLET ORAL DAILY
Refills: 0 | Status: DISCONTINUED | OUTPATIENT
Start: 2021-01-17 | End: 2021-01-23

## 2021-01-17 RX ADMIN — Medication 40 MILLIEQUIVALENT(S): at 18:02

## 2021-01-17 RX ADMIN — SIMVASTATIN 20 MILLIGRAM(S): 20 TABLET, FILM COATED ORAL at 23:30

## 2021-01-17 RX ADMIN — Medication 40 MILLIGRAM(S): at 16:30

## 2021-01-17 NOTE — ED PROVIDER NOTE - PHYSICAL EXAMINATION
PHYSICAL EXAM:  GENERAL: non-toxic appearing; in no respiratory distress  HEAD Atraumatic, Normocephalic  NECK: No JVD; FROM  EYES: PERRL, EOMs intact b/l w/out deficits  CHEST/LUNG: Crackles at bases  HEART: RRR no murmur/gallops/rubs  ABDOMEN: +BS, soft, NT, ND  EXTREMITIES: 1+ pitting LE edema, +2 radial pulses b/l, +2 DP/PT pulses b/l  MUSCULOSKELETAL: FROM of all 4 extremities;   NERVOUS SYSTEM:  A&Ox2, No motor deficits or sensory deficits; CNII-XII intact; no focal neurologic deficits  SKIN:  No new rashes

## 2021-01-17 NOTE — H&P ADULT - NSHPLABSRESULTS_GEN_ALL_CORE
Labs reviewed: low potassium, proBNP elevated     CXR personally reviewed: small bilateral pleural effusion, pulmonary-vascular congestion

## 2021-01-17 NOTE — HISTORY OF PRESENT ILLNESS
[FreeTextEntry1] : Patient is a 89 year old woman with a history of HTN, hyperlipidemia, nonobstructive coronary artery disease, mitral regurgitation, LV dysfunction, subdural hematoma in [place] status post Lolita holes, osteoporosis, bilateral PE/DVTs currently on Eliquis, rectal cancer status post resection, and status post recent hospitalization for dyspnea and complete heart block where a pacemaker was placed who presents today for evaluation of dyspnea. Patient's hospitalization was complicated by VFib arrest requiring one shock and intubation and Micra pacemaker placement. She has been getting progressively more dyspneic since her hospitalization and I had a telephone visit with the daughter on Sunday where I spent about 20 minutes and we discussed her symptoms. She had indicated that her mom did not want to go to the hospital again at which time I prescribed Lasix 20 mg daily as well as an Albuterol inhaler. She states that her breathing initially improved a lot with the Lasix and the inhaler. She states that her breathing is better than it was, but not as good as it was on Monday. She states that she was told by EP to increase her Metoprolol to 25 mg daily given her elevated heart rates, which she has not done yet. She otherwise denies any chest pain, palpitations, and headaches.

## 2021-01-17 NOTE — H&P ADULT - ASSESSMENT
89yo Female nonobstructive CAD ([facility] 2012), SDH s/p ronny hole, dementia (AO x1-2 baseline), DVT/PE on eliquis, and s/p Nikky for rectal adenocarcinoma, recent complete heart block and V.fib arrest s/p micra placement in December 2020, presents from home with complains of SOB. Found to have acute on chronic systolic CHF.  89yo Female nonobstructive CAD ([facility] 2012), SDH s/p ronny hole, dementia (AO x1-2 baseline), DVT/PE on eliquis, and s/p Nikky for rectal adenocarcinoma, recent complete heart block and V.fib arrest s/p micra placement in December 2020, presents from home with complains of SOB. Found to have acute on chronic systolic CHF.     GOC addressed with the patient's  - FULL CODE.

## 2021-01-17 NOTE — ED PROVIDER NOTE - ATTENDING CONTRIBUTION TO CARE
89F, pmh cad, sdh, s/p ronny hole, dementia, dvt/pe on eliquis, s/p ppm, presents with worsening edema, sob, SMITH, orthopnea, s/p d/c from [facility] after admission for bradycardia requiring ppm. Per daughter, pt with persistent sob since d/c ,but worse today. Spoke to cardiologist karen, who recently increased lasix from 20 daily to 20 bid 3 days ago, then spoke again to him today due to no resolution of sx an he referred her ot ED. denies cp, abd pain, n/v/d, cough, congestion, f/c.    PE: NAD, NCAT, MMM, Trachea midline, Normal conjunctiva, lungs diminished at bases with rales, S1/S2 RRR, Normal perfusion, 2+ radial pulses bilat, Abdomen Soft, NTND, No rebound/guarding, 1+ bilat pitting LE edema, No deformity of extremities, No rashes,  No focal motor or sensory deficits.    Most c/w worsening heart failure. Also consider worsening renal impairment. Does not suggest DVT, infectious etiology. Check labs. CXR. Diuresis. Plan to admit. - Kulwinder Noble MD

## 2021-01-17 NOTE — H&P ADULT - HISTORY OF PRESENT ILLNESS
89yo Female nonobstructive CAD ([facility] 2012), SDH s/p ronny hole, dementia (AO x1-2 baseline), DVT/PE, and s/p Nikky for rectal adenocarcinoma, recent complete heart block and V.fib arrest s/p micra placement in December 2020, presents from home with complains of SOB. The patient is a very poor historian- could not contribute much beyond the chief complaint. The patient's  Diego ([phone]) and daughter Taisha ([phone]) added that the patient has been having worsening SOB since the discharge on 12/31/2020. She has been compliant with lasix 40mg daily, but still c/o SOB. Dyspnea does worsen with laying down and on exertion. Dr. Jauregui (cardiologist) increased lasix to BID, but it didn't help her symptoms much. Vitals in ED: T 96.7, HR 95, /85, RR 20, 100% RA. CXR with small bilateral pleural effusions and pulmonary vascular congestion. proBNP ~29,000. Given lasix 40mg IV and admitted to medicine for further care.  89yo Female nonobstructive CAD ([facility] 2012), SDH s/p ronny hole, dementia (AO x1-2 baseline), DVT/PE on eliquis, and s/p Nikky for rectal adenocarcinoma, recent complete heart block and V.fib arrest s/p micra placement in December 2020, presents from home with complains of SOB. The patient is a very poor historian- could not contribute much beyond the chief complaint. The patient's  Diego ([phone]) and daughter Taisha ([phone]) added that the patient has been having worsening SOB since the discharge on 12/31/2020. She has been compliant with lasix 40mg daily, but still c/o SOB. Dyspnea does worsen with laying down and on exertion. Dr. Jauregui (cardiologist) increased lasix to BID, but it didn't help her symptoms much. Vitals in ED: T 96.7, HR 95, /85, RR 20, 100% RA. CXR with small bilateral pleural effusions and pulmonary vascular congestion. proBNP ~29,000. Given lasix 40mg IV and admitted to medicine for further care.

## 2021-01-17 NOTE — H&P ADULT - PROBLEM SELECTOR PLAN 1
TTE- LV EF 35%, severe MR, moderate AI  Consult Cardiology consult in AM  Start Lasix 40mg IV BID  Daily weights (standing)  Strict I/Os  Monitor electrolytes

## 2021-01-17 NOTE — ED PROVIDER NOTE - CHILD ABUSE FACILITY
Pt is a 51 y/o F with a PMHx of DM, Gout, Htn, renal stones kidney stones and PSHx of  presents to ED c/o intermittent chest pain x 4 days.Pt describes pain as dull, pressure like, 7/10 intensity, no radiation. Pt denies any previous history od similar pain, sob, palpitation, burning sensation, cough or leg swelling. Pt also has h/o diabetes currently on oral medications, was recently started on blood pressure medications by PCP that were later discontinued. Pt was also diagnosed with Gout 1 year ago and was started on alopurinol. Pt has one episiode of renal colic in march and was found to have UPJ stone 2 mm on left side with hydroureter. Pt recently had URTI and sinusitus and was started on antibiotics on  for 10 days. Pt denies any sinus pain or headache for now.
[facility]

## 2021-01-17 NOTE — ED ADULT NURSE NOTE - OBJECTIVE STATEMENT
89y female brought in by EMS c/o sob. A&Ox1 (baseline). Hx of HTN, HLD, CHF, PE/DVT and dementia. As per EMS, pt had PPM placed 12/31/20 and was discharged with known rales at that time and instructed to take "water pill" to help resolve it. Pt reports increasing sob since and denies any improvement. Denies fever/cough. Upon exam, pt A&Ox1. P is tachypneic with labored respirations, SpO2 95% on room air, lung sounds clear, JVD noted. Abdomen soft, nontender. +1 b/l lower extremity edema. 89y female brought in by EMS c/o sob. A&Ox1 (baseline). Hx of HTN, HLD, CHF, PE/DVT and dementia. As per EMS, pt had PPM placed 12/31/20 and was discharged with known rales at that time and instructed to take "water pill" to help resolve it. Pt reports increasing sob since and denies any improvement. Denies fever/cough. Upon exam, pt A&Ox1. P is tachypneic with labored respirations, SpO2 95% on room air, lung sounds clear, JVD noted. Abdomen soft, nontender, ostomy present. +1 b/l lower extremity edema.

## 2021-01-17 NOTE — ED PROVIDER NOTE - OBJECTIVE STATEMENT
89 yo F PMHx CAD, SDH s/p Lolita hole, dementia A&Ox1-2 at baseline, DVT/PE on eliquis, rectal adenocarcinoma s/p Blake, Dec 2020 s/p medtronic PPM for complete heartblock and VFib arrest, presents for worsening SOB, worse w/ exertion and when laying flat. Per daughter, started since she was discharged in December but worsened today. Saw her cardiologist Dr Jauregui, who inreased her Lasix from 20 qd to 20 BID about 3 days ago. Pt denies fever, cough, cp, n/v/d, abd pain, urinary sx.

## 2021-01-17 NOTE — ED ADULT NURSE REASSESSMENT NOTE - NS ED NURSE REASSESS COMMENT FT1
report received from BARBER Cruz, pt resting comfortably in bed in NAD, VSS awaiting bed assignment.

## 2021-01-17 NOTE — ED PROVIDER NOTE - PROGRESS NOTE DETAILS
Sam Mccarthy MD. spoke w/ pt's cardiologist, who states if requiring admission, to hospitalist Sam Mccarthy MD. spoke w/ hospitalist, admitted for chf exacerbation

## 2021-01-17 NOTE — H&P ADULT - NSHPSOCIALHISTORY_GEN_ALL_CORE
, lives with spouse.   Independent with ADLs. Doesn't use a walker or cane to ambulate.   Denies h/o smoking, alcohol, or drug use.

## 2021-01-17 NOTE — PHYSICAL EXAM
[General Appearance - Well Developed] : well developed [Normal Appearance] : normal appearance [Well Groomed] : well groomed [General Appearance - Well Nourished] : well nourished [No Deformities] : no deformities [Normal Conjunctiva] : the conjunctiva exhibited no abnormalities [General Appearance - In No Acute Distress] : no acute distress [Normal Jugular Venous A Waves Present] : normal jugular venous A waves present [Normal Jugular Venous V Waves Present] : normal jugular venous V waves present [Respiration, Rhythm And Depth] : normal respiratory rhythm and effort [Exaggerated Use Of Accessory Muscles For Inspiration] : no accessory muscle use [Normal Rate] : normal [Rhythm Regular] : regular [Normal S1] : normal S1 [Normal S2] : normal S2 [I] : a grade 1 [No Pitting Edema] : no pitting edema present [Bowel Sounds] : normal bowel sounds [Abdomen Soft] : soft [Abdomen Tenderness] : non-tender [Nail Clubbing] : no clubbing of the fingernails [Cyanosis, Localized] : no localized cyanosis [Skin Color & Pigmentation] : normal skin color and pigmentation [Petechial Hemorrhages (___cm)] : no petechial hemorrhages [] : no rash [No Skin Ulcers] : no skin ulcer [Affect] : the affect was normal [No Anxiety] : not feeling anxious [Mood] : the mood was normal [S3] : no S3 [Right Carotid Bruit] : no bruit heard over the right carotid [Bruit] : no bruit heard [Left Carotid Bruit] : no bruit heard over the left carotid [FreeTextEntry1] : Her gait is slow.

## 2021-01-17 NOTE — ED CLERICAL - NS ED CLERK NOTE PRE-ARRIVAL INFORMATION; ADDITIONAL PRE-ARRIVAL INFORMATION
CC/Reason For referral: heart failure, and shortness of breath   Preferred Consultant(if applicable):  Who admits for you (if needed):  Do you have documents you would like to fax over?  Would you still like to speak to an ED attending? yes please call after patient have been seen.

## 2021-01-17 NOTE — H&P ADULT - COMMENTS
Denies any pain. Besides SOB, unable to obtain further accurate ROS from the patient due to dementia.

## 2021-01-17 NOTE — ED ADULT NURSE REASSESSMENT NOTE - NS ED NURSE REASSESS COMMENT FT1
Bloodwork clotted. Labs redrawn. COVID swab sent to lab. Lasix administered. Pt resting comfortably. Respirations improved.

## 2021-01-17 NOTE — DISCUSSION/SUMMARY
[FreeTextEntry1] : IMPRESSION: Mrs. Lundberg is a 89 year old woman with a history of HTN, hyperlipidemia, nonobstructive CAD, mitral regurgitation, LV dysfunction, anemia, subdural hematoma in [place] s/p [place] holes, osteoporosis, bilateral PEs/DVT currently on Eliquis, rectal adenocarcinoma, and status post recent hospitalization for complete heart block requiring Micra pacemaker placement.\par \par PLAN:\par 1. Her blood pressure is well controlled, thus she will continue on Toprol XL. Will try to increase her Toprol XL to 12.5mg twice daily to suppress her ectopy. Her discharge paperwork states that she will also be taking Enalapril 2.5 mg daily.  Will need to re-evaluate this based on her blood pressure. \par 2. She states that her breathing somewhat improved on Lasix. I will be checking a BMP today as well as a pro-BNP as I feel that she may benefit from increasing the dose of her Lasix given her heart failure. She will need to follow her weights at home. I am concerned given her LV dysfunction and severe mitral regurgitation. \par 3. She will continue on Simvastatin 20mg daily for her cholesterol. Her most recent LDL was above goal. \par 4. She will continue on Eliquis 2.5mg twice daily for her bilateral PE and DVTs. She will also follow up with Vascular Cardiology.  \par 5. I spent approximately 30 minutes with the patient and her daughter and another 20 minutes with the daughter on the phone on Sunday. \par 6. She will follow up with me in 2 weeks or sooner if she develops any new or worsening symptoms in the interim.

## 2021-01-18 LAB
ANION GAP SERPL CALC-SCNC: 14 MMOL/L — SIGNIFICANT CHANGE UP (ref 5–17)
BUN SERPL-MCNC: 26 MG/DL — HIGH (ref 7–23)
CALCIUM SERPL-MCNC: 9.2 MG/DL — SIGNIFICANT CHANGE UP (ref 8.4–10.5)
CHLORIDE SERPL-SCNC: 102 MMOL/L — SIGNIFICANT CHANGE UP (ref 96–108)
CO2 SERPL-SCNC: 25 MMOL/L — SIGNIFICANT CHANGE UP (ref 22–31)
CREAT SERPL-MCNC: 1.02 MG/DL — SIGNIFICANT CHANGE UP (ref 0.5–1.3)
GLUCOSE SERPL-MCNC: 109 MG/DL — HIGH (ref 70–99)
POTASSIUM SERPL-MCNC: 3.6 MMOL/L — SIGNIFICANT CHANGE UP (ref 3.5–5.3)
POTASSIUM SERPL-SCNC: 3.6 MMOL/L — SIGNIFICANT CHANGE UP (ref 3.5–5.3)
SARS-COV-2 IGG SERPL QL IA: NEGATIVE — SIGNIFICANT CHANGE UP
SARS-COV-2 IGM SERPL IA-ACNC: 0.07 INDEX — SIGNIFICANT CHANGE UP
SODIUM SERPL-SCNC: 141 MMOL/L — SIGNIFICANT CHANGE UP (ref 135–145)

## 2021-01-18 PROCEDURE — 99233 SBSQ HOSP IP/OBS HIGH 50: CPT

## 2021-01-18 RX ADMIN — Medication 12.5 MILLIGRAM(S): at 06:24

## 2021-01-18 RX ADMIN — APIXABAN 2.5 MILLIGRAM(S): 2.5 TABLET, FILM COATED ORAL at 06:24

## 2021-01-18 RX ADMIN — Medication 12.5 MILLIGRAM(S): at 17:48

## 2021-01-18 RX ADMIN — SIMVASTATIN 20 MILLIGRAM(S): 20 TABLET, FILM COATED ORAL at 22:17

## 2021-01-18 RX ADMIN — MEMANTINE HYDROCHLORIDE 5 MILLIGRAM(S): 10 TABLET ORAL at 22:17

## 2021-01-18 RX ADMIN — Medication 40 MILLIGRAM(S): at 06:24

## 2021-01-18 RX ADMIN — Medication 40 MILLIGRAM(S): at 17:48

## 2021-01-18 RX ADMIN — APIXABAN 2.5 MILLIGRAM(S): 2.5 TABLET, FILM COATED ORAL at 17:48

## 2021-01-19 ENCOUNTER — TRANSCRIPTION ENCOUNTER (OUTPATIENT)
Age: 86
End: 2021-01-19

## 2021-01-19 LAB
ANION GAP SERPL CALC-SCNC: 13 MMOL/L — SIGNIFICANT CHANGE UP (ref 5–17)
BUN SERPL-MCNC: 24 MG/DL — HIGH (ref 7–23)
CALCIUM SERPL-MCNC: 8.6 MG/DL — SIGNIFICANT CHANGE UP (ref 8.4–10.5)
CHLORIDE SERPL-SCNC: 100 MMOL/L — SIGNIFICANT CHANGE UP (ref 96–108)
CO2 SERPL-SCNC: 28 MMOL/L — SIGNIFICANT CHANGE UP (ref 22–31)
CREAT SERPL-MCNC: 0.92 MG/DL — SIGNIFICANT CHANGE UP (ref 0.5–1.3)
GLUCOSE SERPL-MCNC: 75 MG/DL — SIGNIFICANT CHANGE UP (ref 70–99)
HCT VFR BLD CALC: 40.3 % — SIGNIFICANT CHANGE UP (ref 34.5–45)
HGB BLD-MCNC: 12.6 G/DL — SIGNIFICANT CHANGE UP (ref 11.5–15.5)
MAGNESIUM SERPL-MCNC: 2.1 MG/DL — SIGNIFICANT CHANGE UP (ref 1.6–2.6)
MCHC RBC-ENTMCNC: 27.8 PG — SIGNIFICANT CHANGE UP (ref 27–34)
MCHC RBC-ENTMCNC: 31.3 GM/DL — LOW (ref 32–36)
MCV RBC AUTO: 88.8 FL — SIGNIFICANT CHANGE UP (ref 80–100)
NRBC # BLD: 0 /100 WBCS — SIGNIFICANT CHANGE UP (ref 0–0)
PHOSPHATE 24H UR-MCNC: 25.8 MG/DL — SIGNIFICANT CHANGE UP
PHOSPHATE SERPL-MCNC: 3.3 MG/DL — SIGNIFICANT CHANGE UP (ref 2.5–4.5)
PLATELET # BLD AUTO: 339 K/UL — SIGNIFICANT CHANGE UP (ref 150–400)
POTASSIUM SERPL-MCNC: 3 MMOL/L — LOW (ref 3.5–5.3)
POTASSIUM SERPL-SCNC: 3 MMOL/L — LOW (ref 3.5–5.3)
RBC # BLD: 4.54 M/UL — SIGNIFICANT CHANGE UP (ref 3.8–5.2)
RBC # FLD: 15.8 % — HIGH (ref 10.3–14.5)
SODIUM SERPL-SCNC: 141 MMOL/L — SIGNIFICANT CHANGE UP (ref 135–145)
WBC # BLD: 7.66 K/UL — SIGNIFICANT CHANGE UP (ref 3.8–10.5)
WBC # FLD AUTO: 7.66 K/UL — SIGNIFICANT CHANGE UP (ref 3.8–10.5)

## 2021-01-19 PROCEDURE — 99233 SBSQ HOSP IP/OBS HIGH 50: CPT

## 2021-01-19 RX ORDER — POTASSIUM CHLORIDE 20 MEQ
40 PACKET (EA) ORAL ONCE
Refills: 0 | Status: COMPLETED | OUTPATIENT
Start: 2021-01-19 | End: 2021-01-19

## 2021-01-19 RX ORDER — POTASSIUM CHLORIDE 20 MEQ
10 PACKET (EA) ORAL
Refills: 0 | Status: COMPLETED | OUTPATIENT
Start: 2021-01-19 | End: 2021-01-19

## 2021-01-19 RX ADMIN — Medication 40 MILLIEQUIVALENT(S): at 17:56

## 2021-01-19 RX ADMIN — SIMVASTATIN 20 MILLIGRAM(S): 20 TABLET, FILM COATED ORAL at 21:38

## 2021-01-19 RX ADMIN — Medication 100 MILLIEQUIVALENT(S): at 09:28

## 2021-01-19 RX ADMIN — Medication 40 MILLIGRAM(S): at 17:25

## 2021-01-19 RX ADMIN — Medication 12.5 MILLIGRAM(S): at 05:32

## 2021-01-19 RX ADMIN — APIXABAN 2.5 MILLIGRAM(S): 2.5 TABLET, FILM COATED ORAL at 05:32

## 2021-01-19 RX ADMIN — Medication 100 MILLIEQUIVALENT(S): at 13:27

## 2021-01-19 RX ADMIN — MEMANTINE HYDROCHLORIDE 5 MILLIGRAM(S): 10 TABLET ORAL at 21:38

## 2021-01-19 RX ADMIN — Medication 100 MILLIEQUIVALENT(S): at 11:28

## 2021-01-19 RX ADMIN — APIXABAN 2.5 MILLIGRAM(S): 2.5 TABLET, FILM COATED ORAL at 17:25

## 2021-01-19 RX ADMIN — Medication 40 MILLIGRAM(S): at 05:32

## 2021-01-19 RX ADMIN — Medication 12.5 MILLIGRAM(S): at 17:25

## 2021-01-19 NOTE — DISCHARGE NOTE NURSING/CASE MANAGEMENT/SOCIAL WORK - NSDCPEPTCAREGIVEDUMATLIST _GEN_ALL_CORE
Heart Failure/Stroke/Influenza Vaccination/Apixaban/Eliquis Heart Failure/Stroke/Influenza Vaccination

## 2021-01-19 NOTE — DISCHARGE NOTE NURSING/CASE MANAGEMENT/SOCIAL WORK - PATIENT PORTAL LINK FT
You can access the FollowMyHealth Patient Portal offered by [facility] by registering at the following website: [email or web address]. By joining [facility]’s FollowMyHealth portal, you will also be able to view your health information using other applications (apps) compatible with our system.

## 2021-01-19 NOTE — DISCHARGE NOTE NURSING/CASE MANAGEMENT/SOCIAL WORK - NSDCPEWEB_GEN_ALL_CORE
[facility] for Tobacco [facility] website --- [email or web address] website --- [email or web address]

## 2021-01-20 LAB
ALBUMIN SERPL ELPH-MCNC: 3.4 G/DL — SIGNIFICANT CHANGE UP (ref 3.3–5)
ALP SERPL-CCNC: 120 U/L — SIGNIFICANT CHANGE UP (ref 40–120)
ALT FLD-CCNC: 86 U/L — HIGH (ref 10–45)
ANION GAP SERPL CALC-SCNC: 13 MMOL/L — SIGNIFICANT CHANGE UP (ref 5–17)
AST SERPL-CCNC: 47 U/L — HIGH (ref 10–40)
BASOPHILS # BLD AUTO: 0.06 K/UL — SIGNIFICANT CHANGE UP (ref 0–0.2)
BASOPHILS NFR BLD AUTO: 0.8 % — SIGNIFICANT CHANGE UP (ref 0–2)
BILIRUB SERPL-MCNC: 1 MG/DL — SIGNIFICANT CHANGE UP (ref 0.2–1.2)
BUN SERPL-MCNC: 23 MG/DL — SIGNIFICANT CHANGE UP (ref 7–23)
CALCIUM SERPL-MCNC: 8.9 MG/DL — SIGNIFICANT CHANGE UP (ref 8.4–10.5)
CHLORIDE SERPL-SCNC: 101 MMOL/L — SIGNIFICANT CHANGE UP (ref 96–108)
CO2 SERPL-SCNC: 28 MMOL/L — SIGNIFICANT CHANGE UP (ref 22–31)
CREAT SERPL-MCNC: 0.93 MG/DL — SIGNIFICANT CHANGE UP (ref 0.5–1.3)
EOSINOPHIL # BLD AUTO: 0.14 K/UL — SIGNIFICANT CHANGE UP (ref 0–0.5)
EOSINOPHIL NFR BLD AUTO: 1.8 % — SIGNIFICANT CHANGE UP (ref 0–6)
GLUCOSE SERPL-MCNC: 85 MG/DL — SIGNIFICANT CHANGE UP (ref 70–99)
HCT VFR BLD CALC: 43.7 % — SIGNIFICANT CHANGE UP (ref 34.5–45)
HGB BLD-MCNC: 13.6 G/DL — SIGNIFICANT CHANGE UP (ref 11.5–15.5)
IMM GRANULOCYTES NFR BLD AUTO: 0.3 % — SIGNIFICANT CHANGE UP (ref 0–1.5)
LYMPHOCYTES # BLD AUTO: 2.44 K/UL — SIGNIFICANT CHANGE UP (ref 1–3.3)
LYMPHOCYTES # BLD AUTO: 30.7 % — SIGNIFICANT CHANGE UP (ref 13–44)
MAGNESIUM SERPL-MCNC: 2.1 MG/DL — SIGNIFICANT CHANGE UP (ref 1.6–2.6)
MCHC RBC-ENTMCNC: 27.2 PG — SIGNIFICANT CHANGE UP (ref 27–34)
MCHC RBC-ENTMCNC: 31.1 GM/DL — LOW (ref 32–36)
MCV RBC AUTO: 87.4 FL — SIGNIFICANT CHANGE UP (ref 80–100)
MONOCYTES # BLD AUTO: 0.88 K/UL — SIGNIFICANT CHANGE UP (ref 0–0.9)
MONOCYTES NFR BLD AUTO: 11.1 % — SIGNIFICANT CHANGE UP (ref 2–14)
NEUTROPHILS # BLD AUTO: 4.42 K/UL — SIGNIFICANT CHANGE UP (ref 1.8–7.4)
NEUTROPHILS NFR BLD AUTO: 55.3 % — SIGNIFICANT CHANGE UP (ref 43–77)
NRBC # BLD: 0 /100 WBCS — SIGNIFICANT CHANGE UP (ref 0–0)
NT-PROBNP SERPL-SCNC: HIGH PG/ML (ref 0–300)
PLATELET # BLD AUTO: 375 K/UL — SIGNIFICANT CHANGE UP (ref 150–400)
POTASSIUM SERPL-MCNC: 3.2 MMOL/L — LOW (ref 3.5–5.3)
POTASSIUM SERPL-SCNC: 3.2 MMOL/L — LOW (ref 3.5–5.3)
PROT SERPL-MCNC: 6.4 G/DL — SIGNIFICANT CHANGE UP (ref 6–8.3)
RBC # BLD: 5 M/UL — SIGNIFICANT CHANGE UP (ref 3.8–5.2)
RBC # FLD: 15.7 % — HIGH (ref 10.3–14.5)
SODIUM SERPL-SCNC: 142 MMOL/L — SIGNIFICANT CHANGE UP (ref 135–145)
WBC # BLD: 7.96 K/UL — SIGNIFICANT CHANGE UP (ref 3.8–10.5)
WBC # FLD AUTO: 7.96 K/UL — SIGNIFICANT CHANGE UP (ref 3.8–10.5)

## 2021-01-20 PROCEDURE — 99232 SBSQ HOSP IP/OBS MODERATE 35: CPT

## 2021-01-20 RX ORDER — POTASSIUM CHLORIDE 20 MEQ
40 PACKET (EA) ORAL ONCE
Refills: 0 | Status: COMPLETED | OUTPATIENT
Start: 2021-01-20 | End: 2021-01-20

## 2021-01-20 RX ORDER — SENNA PLUS 8.6 MG/1
2 TABLET ORAL AT BEDTIME
Refills: 0 | Status: DISCONTINUED | OUTPATIENT
Start: 2021-01-20 | End: 2021-01-23

## 2021-01-20 RX ORDER — POLYETHYLENE GLYCOL 3350 17 G/17G
17 POWDER, FOR SOLUTION ORAL DAILY
Refills: 0 | Status: DISCONTINUED | OUTPATIENT
Start: 2021-01-20 | End: 2021-01-23

## 2021-01-20 RX ADMIN — MEMANTINE HYDROCHLORIDE 5 MILLIGRAM(S): 10 TABLET ORAL at 21:05

## 2021-01-20 RX ADMIN — Medication 40 MILLIGRAM(S): at 17:21

## 2021-01-20 RX ADMIN — APIXABAN 2.5 MILLIGRAM(S): 2.5 TABLET, FILM COATED ORAL at 05:47

## 2021-01-20 RX ADMIN — Medication 12.5 MILLIGRAM(S): at 05:47

## 2021-01-20 RX ADMIN — Medication 40 MILLIGRAM(S): at 05:46

## 2021-01-20 RX ADMIN — SENNA PLUS 2 TABLET(S): 8.6 TABLET ORAL at 21:05

## 2021-01-20 RX ADMIN — Medication 40 MILLIEQUIVALENT(S): at 12:12

## 2021-01-20 RX ADMIN — APIXABAN 2.5 MILLIGRAM(S): 2.5 TABLET, FILM COATED ORAL at 17:21

## 2021-01-20 RX ADMIN — Medication 12.5 MILLIGRAM(S): at 17:21

## 2021-01-20 RX ADMIN — POLYETHYLENE GLYCOL 3350 17 GRAM(S): 17 POWDER, FOR SOLUTION ORAL at 12:12

## 2021-01-20 RX ADMIN — SIMVASTATIN 20 MILLIGRAM(S): 20 TABLET, FILM COATED ORAL at 21:05

## 2021-01-20 NOTE — PHYSICAL THERAPY INITIAL EVALUATION ADULT - PERTINENT HX OF CURRENT PROBLEM, REHAB EVAL
89F nonobstructive CAD ([facility] 2012), SDH s/p ronny hole, dementia (AO x1-2 baseline), DVT/PE on eliquis, and s/p Nikky for rectal adenocarcinoma, recent complete heart block and VFib arrest s/p micra placement in Dec 2020, p/w SOB. Pt is a very poor historian- could not contribute much beyond the chief complaint. Pt's  Diego ([phone]) and dtr Taisha ([phone]) added that the pt has been having worsening SOB since the discharge on 12/31/2020. CONT'D BELOW:

## 2021-01-20 NOTE — PHYSICAL THERAPY INITIAL EVALUATION ADULT - ACTIVE RANGE OF MOTION EXAMINATION, REHAB EVAL
john. upper extremity Active ROM was WNL (within normal limits)/bilateral lower extremity Active ROM was WNL (within normal limits)

## 2021-01-20 NOTE — PHYSICAL THERAPY INITIAL EVALUATION ADULT - STRENGTHENING, PT EVAL
GOAL: Pt will improve bilateral LE strength by 1/2 grade, for increased limb stability and to improve gait in 2 weeks.

## 2021-01-20 NOTE — PHYSICAL THERAPY INITIAL EVALUATION ADULT - PRECAUTIONS/LIMITATIONS, REHAB EVAL
She has been compliant with lasix 40mg daily, but still c/o SOB. Dyspnea does worsen with laying down and on exertion. Dr. Jauregui (cardiologist) increased lasix to BID, but it didn't help her symptoms much. CXR 1/17: Bilateral small pleural effusions with adjacent atelectasis. Pulmonary vascular congestion

## 2021-01-20 NOTE — PHYSICAL THERAPY INITIAL EVALUATION ADULT - DISCHARGE DISPOSITION, PT EVAL
Home with Home PT for dec'd strength, balance and gait for functional mobility. Owns RW. JULITO Lopez aware./home w/ home PT

## 2021-01-20 NOTE — PROGRESS NOTE ADULT - PROBLEM SELECTOR PLAN 3
continue namenda    dispo: pending wean off O2, transition to po diuretics in the next 1-2 days, PT alissa.

## 2021-01-20 NOTE — CONSULT NOTE ADULT - SUBJECTIVE AND OBJECTIVE BOX
MRN-[id number]    CHIEF COMPLAINT:  Patient is a 89y old  Female who presents with a chief complaint of shortness of breath (20 Jan 2021 12:12)      HISTORY OF PRESENT ILLNESS:  AMY CHACON is a 89y Female patient with past medical history of *** presenting with ***.     Allergies    No Known Allergies    Intolerances    	    PAST MEDICAL & SURGICAL HISTORY:  Deep vein thrombosis    Pulmonary embolus    Generalized Osteoarthritis    PVD (Peripheral Vascular Disease)    History of Osteoporosis    Asthma    Hyperlipemia    Hypertension    SDH (subdural hematoma)  7/2018 With evacuatinon in [place]    S/P Cataract Surgery  left eye        FAMILY HISTORY:  Family history of breast cancer in sister (Sibling)        SOCIAL HISTORY:    [ ] Non-smoker  [ ] Smoker  [ ] Alcohol    REVIEW OF SYSTEMS:  CONSTITUTIONAL: No fever, weight loss, or fatigue  EYES: No eye pain, visual disturbances, or discharge  ENMT:  No difficulty hearing, tinnitus, vertigo; No sinus or throat pain  NECK: No pain or stiffness  RESPIRATORY: No cough, wheezing, chills or hemoptysis; No Shortness of Breath  CARDIOVASCULAR: No chest pain, palpitations, passing out, dizziness, or leg swelling  GASTROINTESTINAL: No abdominal or epigastric pain. No nausea, vomiting, or hematemesis; No diarrhea or constipation. No melena or hematochezia.  GENITOURINARY: No dysuria, frequency, hematuria, or incontinence  NEUROLOGICAL: No headaches, memory loss, loss of strength, numbness, or tremors  SKIN: No itching, burning, rashes, or lesions   LYMPH Nodes: No enlarged glands  ENDOCRINE: No heat or cold intolerance; No hair loss  MUSCULOSKELETAL: No joint pain or swelling; No muscle, back, or extremity pain  PSYCHIATRIC: No depression, anxiety, mood swings, or difficulty sleeping  HEME/LYMPH: No easy bruising, or bleeding gums  ALLERY AND IMMUNOLOGIC: No hives or eczema	    [ ] All others negative	  [ ] Unable to obtain    I&O's Summary    19 Jan 2021 07:01  -  20 Jan 2021 07:00  --------------------------------------------------------  IN: 600 mL / OUT: 1000 mL / NET: -400 mL    20 Jan 2021 07:01  -  20 Jan 2021 18:05  --------------------------------------------------------  IN: 240 mL / OUT: 0 mL / NET: 240 mL        PHYSICAL EXAM:  Vital Signs Last 24 Hrs  T(C): 36.6 (20 Jan 2021 14:11), Max: 36.8 (20 Jan 2021 04:57)  T(F): 97.9 (20 Jan 2021 14:11), Max: 98.3 (20 Jan 2021 04:57)  HR: 95 (20 Jan 2021 17:20) (72 - 95)  BP: 115/75 (20 Jan 2021 17:20) (102/67 - 115/75)  BP(mean): --  RR: 17 (20 Jan 2021 14:11) (17 - 18)  SpO2: 97% (20 Jan 2021 14:11) (94% - 98%)  Appearance: Normal	  HEENT:   Normal oral mucosa, PERRL, EOMI	  Lymphatic: No lymphadenopathy  Cardiovascular: Normal S1 S2, No JVD, No murmurs, No edema  Respiratory: Lungs clear to auscultation	  Psychiatry: A & O x 3, Mood & affect appropriate  Gastrointestinal:  Soft, Non-tender, + BS	  Skin: No rashes, No ecchymoses, No cyanosis	  Neurologic: Non-focal  Extremities: Normal range of motion, No clubbing, cyanosis or edema  Vascular: Peripheral pulses palpable 2+ bilaterally    MEDICATIONS:  MEDICATIONS  (STANDING):  apixaban 2.5 milliGRAM(s) Oral every 12 hours  furosemide   Injectable 40 milliGRAM(s) IV Push two times a day  memantine 5 milliGRAM(s) Oral daily  metoprolol tartrate 12.5 milliGRAM(s) Oral two times a day  polyethylene glycol 3350 17 Gram(s) Oral daily  senna 2 Tablet(s) Oral at bedtime  simvastatin 20 milliGRAM(s) Oral at bedtime    MEDICATIONS  (PRN):      Home Medications:  apixaban 5 mg oral tablet: 0.5 tab(s) orally 2 times a day (17 Jan 2021 18:55)  Lasix 20 mg oral tablet: 1 tab(s) orally once a day (17 Jan 2021 18:55)  memantine 5 mg oral tablet: 1 tab(s) orally once a day (17 Jan 2021 18:55)  Metoprolol Tartrate 25 mg oral tablet: 0.5 tab(s) orally 2 times a day (17 Jan 2021 18:55)  simvastatin 20 mg oral tablet: 1 tab(s) orally once a day (at bedtime) (17 Jan 2021 18:55)      LABS:	 	  CBC Full  -  ( 20 Jan 2021 07:09 )  WBC Count : 7.96 K/uL  Hemoglobin : 13.6 g/dL  Hematocrit : 43.7 %  Platelet Count - Automated : 375 K/uL  Mean Cell Volume : 87.4 fl  Mean Cell Hemoglobin : 27.2 pg  Mean Cell Hemoglobin Concentration : 31.1 gm/dL  Auto Neutrophil # : 4.42 K/uL  Auto Lymphocyte # : 2.44 K/uL  Auto Monocyte # : 0.88 K/uL  Auto Eosinophil # : 0.14 K/uL  Auto Basophil # : 0.06 K/uL  Auto Neutrophil % : 55.3 %  Auto Lymphocyte % : 30.7 %  Auto Monocyte % : 11.1 %  Auto Eosinophil % : 1.8 %  Auto Basophil % : 0.8 %    01-20    142  |  101  |  23  ----------------------------<  85  3.2<L>   |  28  |  0.93  01-19    141  |  100  |  24<H>  ----------------------------<  75  3.0<L>   |  28  |  0.92    Ca    8.9      20 Jan 2021 07:06  Ca    8.6      19 Jan 2021 07:25  Phos  3.3     01-19  Mg     2.1     01-20  Mg     2.1     01-19    TPro  6.4  /  Alb  3.4  /  TBili  1.0  /  DBili  x   /  AST  47<H>  /  ALT  86<H>  /  AlkPhos  120  01-20            proBNP:   Lipid Profile:   HgA1c:   TSH:     TELEMETRY: 	    ECG:  	  RADIOLOGY:  OTHER: 	    CARDIAC TESTING/STUDIES:    [ ] Echocardiogram:  [ ]  Catheterization:  [ ] Stress Test:  	  	  ASSESSMENT/PLAN: 	      Satinder Rajan MD, MPH, MARY BETH  Cardiovascular Specialist Attending  [facility]  C: [phone] (text preferred and/or call)  E: [email or web address]

## 2021-01-20 NOTE — PHYSICAL THERAPY INITIAL EVALUATION ADULT - GAIT DEVIATIONS NOTED, PT EVAL
narrow PAN, forward trunk lean/decreased charisse/increased time in double stance/decreased step length

## 2021-01-20 NOTE — PHYSICAL THERAPY INITIAL EVALUATION ADULT - BALANCE TRAINING, PT EVAL
GOAL: Pt will demonstrate improved static/dynamic standing balance by 1/2 grade, in order to improve stability, decrease fall risk and increase independence with ADLs within 2 weeks.

## 2021-01-21 LAB
ALBUMIN SERPL ELPH-MCNC: 3.4 G/DL — SIGNIFICANT CHANGE UP (ref 3.3–5)
ALP SERPL-CCNC: 116 U/L — SIGNIFICANT CHANGE UP (ref 40–120)
ALT FLD-CCNC: 66 U/L — HIGH (ref 10–45)
ANION GAP SERPL CALC-SCNC: 13 MMOL/L — SIGNIFICANT CHANGE UP (ref 5–17)
AST SERPL-CCNC: 34 U/L — SIGNIFICANT CHANGE UP (ref 10–40)
BILIRUB SERPL-MCNC: 1 MG/DL — SIGNIFICANT CHANGE UP (ref 0.2–1.2)
BUN SERPL-MCNC: 23 MG/DL — SIGNIFICANT CHANGE UP (ref 7–23)
CALCIUM SERPL-MCNC: 9.2 MG/DL — SIGNIFICANT CHANGE UP (ref 8.4–10.5)
CHLORIDE SERPL-SCNC: 97 MMOL/L — SIGNIFICANT CHANGE UP (ref 96–108)
CO2 SERPL-SCNC: 28 MMOL/L — SIGNIFICANT CHANGE UP (ref 22–31)
CREAT SERPL-MCNC: 0.93 MG/DL — SIGNIFICANT CHANGE UP (ref 0.5–1.3)
GLUCOSE SERPL-MCNC: 95 MG/DL — SIGNIFICANT CHANGE UP (ref 70–99)
POTASSIUM SERPL-MCNC: 3.4 MMOL/L — LOW (ref 3.5–5.3)
POTASSIUM SERPL-SCNC: 3.4 MMOL/L — LOW (ref 3.5–5.3)
PROT SERPL-MCNC: 6.7 G/DL — SIGNIFICANT CHANGE UP (ref 6–8.3)
SODIUM SERPL-SCNC: 138 MMOL/L — SIGNIFICANT CHANGE UP (ref 135–145)

## 2021-01-21 PROCEDURE — 99232 SBSQ HOSP IP/OBS MODERATE 35: CPT

## 2021-01-21 RX ORDER — POTASSIUM CHLORIDE 20 MEQ
40 PACKET (EA) ORAL ONCE
Refills: 0 | Status: COMPLETED | OUTPATIENT
Start: 2021-01-21 | End: 2021-01-21

## 2021-01-21 RX ADMIN — MEMANTINE HYDROCHLORIDE 5 MILLIGRAM(S): 10 TABLET ORAL at 22:28

## 2021-01-21 RX ADMIN — SIMVASTATIN 20 MILLIGRAM(S): 20 TABLET, FILM COATED ORAL at 22:28

## 2021-01-21 RX ADMIN — POLYETHYLENE GLYCOL 3350 17 GRAM(S): 17 POWDER, FOR SOLUTION ORAL at 11:16

## 2021-01-21 RX ADMIN — Medication 40 MILLIGRAM(S): at 05:08

## 2021-01-21 RX ADMIN — Medication 12.5 MILLIGRAM(S): at 17:37

## 2021-01-21 RX ADMIN — Medication 12.5 MILLIGRAM(S): at 05:08

## 2021-01-21 RX ADMIN — APIXABAN 2.5 MILLIGRAM(S): 2.5 TABLET, FILM COATED ORAL at 17:37

## 2021-01-21 RX ADMIN — APIXABAN 2.5 MILLIGRAM(S): 2.5 TABLET, FILM COATED ORAL at 05:08

## 2021-01-21 RX ADMIN — Medication 40 MILLIGRAM(S): at 17:37

## 2021-01-21 RX ADMIN — Medication 40 MILLIEQUIVALENT(S): at 14:52

## 2021-01-22 LAB
ANION GAP SERPL CALC-SCNC: 16 MMOL/L — SIGNIFICANT CHANGE UP (ref 5–17)
BUN SERPL-MCNC: 26 MG/DL — HIGH (ref 7–23)
CALCIUM SERPL-MCNC: 9.6 MG/DL — SIGNIFICANT CHANGE UP (ref 8.4–10.5)
CHLORIDE SERPL-SCNC: 99 MMOL/L — SIGNIFICANT CHANGE UP (ref 96–108)
CO2 SERPL-SCNC: 27 MMOL/L — SIGNIFICANT CHANGE UP (ref 22–31)
CREAT SERPL-MCNC: 0.95 MG/DL — SIGNIFICANT CHANGE UP (ref 0.5–1.3)
GLUCOSE SERPL-MCNC: 98 MG/DL — SIGNIFICANT CHANGE UP (ref 70–99)
MAGNESIUM SERPL-MCNC: 2.1 MG/DL — SIGNIFICANT CHANGE UP (ref 1.6–2.6)
POTASSIUM SERPL-MCNC: 3.5 MMOL/L — SIGNIFICANT CHANGE UP (ref 3.5–5.3)
POTASSIUM SERPL-SCNC: 3.5 MMOL/L — SIGNIFICANT CHANGE UP (ref 3.5–5.3)
SODIUM SERPL-SCNC: 142 MMOL/L — SIGNIFICANT CHANGE UP (ref 135–145)

## 2021-01-22 PROCEDURE — 99232 SBSQ HOSP IP/OBS MODERATE 35: CPT

## 2021-01-22 RX ORDER — FUROSEMIDE 40 MG
40 TABLET ORAL
Refills: 0 | Status: DISCONTINUED | OUTPATIENT
Start: 2021-01-22 | End: 2021-01-23

## 2021-01-22 RX ORDER — FUROSEMIDE 40 MG
1 TABLET ORAL
Qty: 60 | Refills: 0
Start: 2021-01-22 | End: 2021-02-20

## 2021-01-22 RX ORDER — FUROSEMIDE 40 MG
1 TABLET ORAL
Qty: 0 | Refills: 0 | DISCHARGE

## 2021-01-22 RX ORDER — APIXABAN 2.5 MG/1
0.5 TABLET, FILM COATED ORAL
Qty: 30 | Refills: 0
Start: 2021-01-22 | End: 2021-02-20

## 2021-01-22 RX ORDER — METOPROLOL TARTRATE 50 MG
12.5 TABLET ORAL ONCE
Refills: 0 | Status: COMPLETED | OUTPATIENT
Start: 2021-01-22 | End: 2021-01-22

## 2021-01-22 RX ORDER — METOPROLOL TARTRATE 50 MG
25 TABLET ORAL DAILY
Refills: 0 | Status: DISCONTINUED | OUTPATIENT
Start: 2021-01-23 | End: 2021-01-23

## 2021-01-22 RX ADMIN — Medication 12.5 MILLIGRAM(S): at 05:37

## 2021-01-22 RX ADMIN — MEMANTINE HYDROCHLORIDE 5 MILLIGRAM(S): 10 TABLET ORAL at 21:34

## 2021-01-22 RX ADMIN — Medication 2.5 MILLIGRAM(S): at 15:26

## 2021-01-22 RX ADMIN — APIXABAN 2.5 MILLIGRAM(S): 2.5 TABLET, FILM COATED ORAL at 05:37

## 2021-01-22 RX ADMIN — Medication 12.5 MILLIGRAM(S): at 21:33

## 2021-01-22 RX ADMIN — POLYETHYLENE GLYCOL 3350 17 GRAM(S): 17 POWDER, FOR SOLUTION ORAL at 11:59

## 2021-01-22 RX ADMIN — SIMVASTATIN 20 MILLIGRAM(S): 20 TABLET, FILM COATED ORAL at 21:35

## 2021-01-22 RX ADMIN — Medication 40 MILLIGRAM(S): at 18:00

## 2021-01-22 RX ADMIN — Medication 40 MILLIGRAM(S): at 05:37

## 2021-01-22 RX ADMIN — SENNA PLUS 2 TABLET(S): 8.6 TABLET ORAL at 21:34

## 2021-01-22 RX ADMIN — APIXABAN 2.5 MILLIGRAM(S): 2.5 TABLET, FILM COATED ORAL at 18:00

## 2021-01-22 NOTE — PROGRESS NOTE ADULT - PROBLEM SELECTOR PLAN 3
continue namenda    dispo: left VM for daughter Taisha to discuss plan, awaiting response re: decision for home hospice. continue namenda    dispo: discussed with daughter fox- family would like to hold off on home hospice for now and have patient come home. Will d/w cm and plan for dc latrice with home PT, likely this wknd. Emailed Dr. Jauregui cardiology. continue namenda    dispo: discussed with daughter fox- family would like to hold off on home hospice for now and have patient come home. Will d/w cm and plan for dc latrice with home PT, likely this wknd. Emailed Dr. Jauregui cardiology- who will continue discussing home hospice and consider OP HF referral as needed pending clinical status.    daughter to pickup patient 1/23 3pm unless any clinical changes.

## 2021-01-23 ENCOUNTER — TRANSCRIPTION ENCOUNTER (OUTPATIENT)
Age: 86
End: 2021-01-23

## 2021-01-23 VITALS
OXYGEN SATURATION: 96 % | DIASTOLIC BLOOD PRESSURE: 72 MMHG | TEMPERATURE: 98 F | HEART RATE: 97 BPM | RESPIRATION RATE: 18 BRPM | SYSTOLIC BLOOD PRESSURE: 108 MMHG

## 2021-01-23 PROCEDURE — 99285 EMERGENCY DEPT VISIT HI MDM: CPT | Mod: 25

## 2021-01-23 PROCEDURE — 84484 ASSAY OF TROPONIN QUANT: CPT

## 2021-01-23 PROCEDURE — 83735 ASSAY OF MAGNESIUM: CPT

## 2021-01-23 PROCEDURE — 97110 THERAPEUTIC EXERCISES: CPT

## 2021-01-23 PROCEDURE — 84105 ASSAY OF URINE PHOSPHORUS: CPT

## 2021-01-23 PROCEDURE — 99239 HOSP IP/OBS DSCHRG MGMT >30: CPT

## 2021-01-23 PROCEDURE — 71045 X-RAY EXAM CHEST 1 VIEW: CPT

## 2021-01-23 PROCEDURE — 97161 PT EVAL LOW COMPLEX 20 MIN: CPT

## 2021-01-23 PROCEDURE — 86769 SARS-COV-2 COVID-19 ANTIBODY: CPT

## 2021-01-23 PROCEDURE — 85730 THROMBOPLASTIN TIME PARTIAL: CPT

## 2021-01-23 PROCEDURE — 85025 COMPLETE CBC W/AUTO DIFF WBC: CPT

## 2021-01-23 PROCEDURE — 97530 THERAPEUTIC ACTIVITIES: CPT

## 2021-01-23 PROCEDURE — 93005 ELECTROCARDIOGRAM TRACING: CPT

## 2021-01-23 PROCEDURE — U0003: CPT

## 2021-01-23 PROCEDURE — 84100 ASSAY OF PHOSPHORUS: CPT

## 2021-01-23 PROCEDURE — 80048 BASIC METABOLIC PNL TOTAL CA: CPT

## 2021-01-23 PROCEDURE — U0005: CPT

## 2021-01-23 PROCEDURE — 80053 COMPREHEN METABOLIC PANEL: CPT

## 2021-01-23 PROCEDURE — 85610 PROTHROMBIN TIME: CPT

## 2021-01-23 PROCEDURE — 85027 COMPLETE CBC AUTOMATED: CPT

## 2021-01-23 PROCEDURE — 96374 THER/PROPH/DIAG INJ IV PUSH: CPT

## 2021-01-23 PROCEDURE — 83880 ASSAY OF NATRIURETIC PEPTIDE: CPT

## 2021-01-23 RX ORDER — FUROSEMIDE 40 MG
1 TABLET ORAL
Qty: 60 | Refills: 0
Start: 2021-01-23 | End: 2021-02-21

## 2021-01-23 RX ORDER — METOPROLOL TARTRATE 50 MG
1 TABLET ORAL
Qty: 30 | Refills: 0
Start: 2021-01-23 | End: 2021-02-21

## 2021-01-23 RX ORDER — METOPROLOL TARTRATE 50 MG
0.5 TABLET ORAL
Qty: 0 | Refills: 0 | DISCHARGE

## 2021-01-23 RX ADMIN — POLYETHYLENE GLYCOL 3350 17 GRAM(S): 17 POWDER, FOR SOLUTION ORAL at 11:18

## 2021-01-23 RX ADMIN — APIXABAN 2.5 MILLIGRAM(S): 2.5 TABLET, FILM COATED ORAL at 05:54

## 2021-01-23 RX ADMIN — Medication 40 MILLIGRAM(S): at 05:54

## 2021-01-23 RX ADMIN — Medication 25 MILLIGRAM(S): at 05:54

## 2021-01-23 NOTE — PROGRESS NOTE ADULT - ASSESSMENT
89yo Female nonobstructive CAD ([facility] 2012), SDH s/p ronny hole, dementia (AO x1-2 baseline), DVT/PE on eliquis, and s/p Nikky for rectal adenocarcinoma, recent complete heart block and V.fib arrest s/p micra placement in December 2020, presents from home with complains of SOB. Found to have ADHF, now improved on IV lasix, transitioned to oral meds euvolemic, planning for dc home pending daughter's decision on home hospice.    pr DNR/DNI per daughter, MOLST filled out.
89yo Female nonobstructive CAD ([facility] 2012), SDH s/p ronny hole, dementia (AO x1-2 baseline), DVT/PE on eliquis, and s/p Nikky for rectal adenocarcinoma, recent complete heart block and V.fib arrest s/p micra placement in December 2020, presents from home with complains of SOB. Found to have acute on chronic systolic CHF.     GOC addressed with the patient's  - FULL CODE. 
89yo Female nonobstructive CAD ([facility] 2012), SDH s/p ronny hole, dementia (AO x1-2 baseline), DVT/PE on eliquis, and s/p Nikky for rectal adenocarcinoma, recent complete heart block and V.fib arrest s/p micra placement in December 2020, presents from home with complains of SOB. Found to have acute on chronic systolic CHF.     GOC addressed with the patient's  - FULL CODE. 
89yo Female nonobstructive CAD ([facility] 2012), SDH s/p ronny hole, dementia (AO x1-2 baseline), DVT/PE on eliquis, and s/p Nikky for rectal adenocarcinoma, recent complete heart block and V.fib arrest s/p micra placement in December 2020, presents from home with complains of SOB. Found to have acute on chronic systolic CHF.     GOC addressed with the patient's  - FULL CODE. 
89yo Female nonobstructive CAD ([facility] 2012), SDH s/p ronny hole, dementia (AO x1-2 baseline), DVT/PE on eliquis, and s/p Nikky for rectal adenocarcinoma, recent complete heart block and V.fib arrest s/p micra placement in December 2020, presents from home with complains of SOB. Found to have acute on chronic systolic CHF.     GOC addressed with the patient's daughter- patient was previously deemed DNR/DNI by . Will fill out MOLST form. Daughter also interested in home hospice but would like to confer with family first as patient's quality of life and comfort are becoming more important. 
89yo Female nonobstructive CAD ([facility] 2012), SDH s/p ronny hole, dementia (AO x1-2 baseline), DVT/PE on eliquis, and s/p Nikky for rectal adenocarcinoma, recent complete heart block and V.fib arrest s/p micra placement in December 2020, presents from home with complains of SOB. Found to have ADHF, now improved on IV lasix, transitioned to oral meds euvolemic, planning for dc home pending daughter's decision on home hospice.    pr DNR/DNI per daughter, MOLST filled out. Warm compresses to left eye.

## 2021-01-23 NOTE — PROGRESS NOTE ADULT - PROBLEM SELECTOR PLAN 3
continue namenda    dispo: was discussed with daughter fox- family would like to hold off on home hospice for now and have patient come home. Emailed Dr. Jauregui cardiology- who will continue discussing home hospice and consider OP HF referral as needed pending clinical status.    daughter to pickup patient 1/23 3pm unless any clinical changes. -stable for DC. -35 minutes spent on dc process. continue namenda    dispo: was discussed with daughter fox- family would like to hold off on home hospice for now and have patient come home. Emailed Dr. Jauregui cardiology- who will continue discussing home hospice and consider OP HF referral as needed pending clinical status.    daughter to pickup patient 1/23 3pm unless any clinical changes. -stable for DC on oral meds. -35 minutes spent on dc process.

## 2021-01-23 NOTE — PROGRESS NOTE ADULT - SUBJECTIVE AND OBJECTIVE BOX
Alo Lew MD  Division of Hospital Medicine  Pager: [phone]  If no response or off-hours, page [phone]  -------------------------------------    Patient is a 89y old  Female who presents with a chief complaint of shortness of breath (21 Jan 2021 14:16)      SUBJECTIVE / OVERNIGHT EVENTS: none acute  ADDITIONAL REVIEW OF SYSTEMS: pt sad today, wants to go home, breathing comfortably. Otherwise ros unremarkable. Per discussion with daughter, she is still discussing possible home hospice but wants to know what new baseline her mother will have beforehand.    MEDICATIONS  (STANDING):  apixaban 2.5 milliGRAM(s) Oral every 12 hours  enalapril 2.5 milliGRAM(s) Oral daily  furosemide    Tablet 40 milliGRAM(s) Oral two times a day  memantine 5 milliGRAM(s) Oral daily  metoprolol tartrate 12.5 milliGRAM(s) Oral once  polyethylene glycol 3350 17 Gram(s) Oral daily  senna 2 Tablet(s) Oral at bedtime  simvastatin 20 milliGRAM(s) Oral at bedtime    MEDICATIONS  (PRN):      CAPILLARY BLOOD GLUCOSE        I&O's Summary    21 Jan 2021 07:01  -  22 Jan 2021 07:00  --------------------------------------------------------  IN: 240 mL / OUT: 1400 mL / NET: -1160 mL    22 Jan 2021 07:01  -  22 Jan 2021 13:31  --------------------------------------------------------  IN: 120 mL / OUT: 0 mL / NET: 120 mL        PHYSICAL EXAM:  Vital Signs Last 24 Hrs  T(C): 36.6 (22 Jan 2021 04:39), Max: 36.7 (21 Jan 2021 20:09)  T(F): 97.8 (22 Jan 2021 04:39), Max: 98 (21 Jan 2021 20:09)  HR: 94 (22 Jan 2021 11:05) (89 - 94)  BP: 96/55 (22 Jan 2021 11:05) (96/55 - 111/72)  BP(mean): --  RR: 18 (22 Jan 2021 04:39) (17 - 18)  SpO2: 94% (22 Jan 2021 11:05) (94% - 96%)  CONSTITUTIONAL: NAD, well-developed, well-groomed  EYES: PERRLA; conjunctiva and sclera clear, +mild redness, no exudates.   ENMT: Moist oral mucosa, no pharyngeal injection or exudates; normal dentition  NECK: Supple, no palpable masses; no thyromegaly  RESPIRATORY: Normal respiratory effort; lungs are clear to auscultation bilaterally  CARDIOVASCULAR: Regular rate and rhythm, normal S1 and S2, no murmur/rub/gallop; No lower extremity edema; Peripheral pulses are 2+ bilaterally  ABDOMEN: Nontender to palpation, normoactive bowel sounds, no rebound/guarding; No hepatosplenomegaly, +colostomy  MUSCLOSKELETAL:  Normal gait; no clubbing or cyanosis of digits; no joint swelling or tenderness to palpation  PSYCH: A+O to person, place, affect depressed.   NEUROLOGY: CN 2-12 are intact and symmetric; no gross sensory deficits;   SKIN: No rashes; no palpable lesions    LABS:    01-22    142  |  99  |  26<H>  ----------------------------<  98  3.5   |  27  |  0.95    Ca    9.6      22 Jan 2021 06:46  Mg     2.1     01-22    TPro  6.7  /  Alb  3.4  /  TBili  1.0  /  DBili  x   /  AST  34  /  ALT  66<H>  /  AlkPhos  116  01-21                RADIOLOGY & ADDITIONAL TESTS:  Results Reviewed:   Imaging Personally Reviewed:  Electrocardiogram Personally Reviewed:    COORDINATION OF CARE:  Care Discussed with Consultants/Other Providers [Y/N]: cardiology  Prior or Outpatient Records Reviewed [Y/N]:  
Patient is a 89y old  Female who presents with a chief complaint of shortness of breath (23 Jan 2021 11:46)        SUBJECTIVE / OVERNIGHT EVENTS: Patient denies any complaints.       MEDICATIONS  (STANDING):  apixaban 2.5 milliGRAM(s) Oral every 12 hours  enalapril 2.5 milliGRAM(s) Oral daily  furosemide    Tablet 40 milliGRAM(s) Oral two times a day  memantine 5 milliGRAM(s) Oral daily  metoprolol succinate ER 25 milliGRAM(s) Oral daily  polyethylene glycol 3350 17 Gram(s) Oral daily  senna 2 Tablet(s) Oral at bedtime  simvastatin 20 milliGRAM(s) Oral at bedtime    MEDICATIONS  (PRN):      Vital Signs Last 24 Hrs  T(C): 36.4 (23 Jan 2021 14:06), Max: 36.6 (23 Jan 2021 10:36)  T(F): 97.6 (23 Jan 2021 14:06), Max: 97.9 (23 Jan 2021 10:36)  HR: 97 (23 Jan 2021 14:06) (72 - 97)  BP: 108/72 (23 Jan 2021 14:06) (90/60 - 110/66)  BP(mean): --  RR: 18 (23 Jan 2021 14:06) (18 - 18)  SpO2: 96% (23 Jan 2021 14:06) (94% - 97%)  CAPILLARY BLOOD GLUCOSE        I&O's Summary    22 Jan 2021 07:01  -  23 Jan 2021 07:00  --------------------------------------------------------  IN: 360 mL / OUT: 900 mL / NET: -540 mL    23 Jan 2021 07:01  -  23 Jan 2021 22:33  --------------------------------------------------------  IN: 360 mL / OUT: 801 mL / NET: -441 mL          PHYSICAL EXAM:   GENERAL: NAD, well-developed  HEAD:  Atraumatic, Normocephalic  EYES: left eye slightly watery with mild erythema.   NECK: Supple, No JVD  CHEST/LUNG: Clear to auscultation bilaterally; No wheeze  HEART: S1S2 normal. Regular rate and rhythm; No murmurs, rubs, or gallops  ABDOMEN: Soft, Nontender, Nondistended; Bowel sounds present  EXTREMITIES:  trace le edema  PSYCH/Neuro: Awake with dementia.   SKIN: No rashes or lesions      LABS:    01-22    142  |  99  |  26<H>  ----------------------------<  98  3.5   |  27  |  0.95    Ca    9.6      22 Jan 2021 06:46  Mg     2.1     01-22                  RADIOLOGY & ADDITIONAL TESTS:    Imaging Personally Reviewed:  Consultant(s) Notes Reviewed:    Care Discussed with Consultants/Other Providers:  
Alo Lew MD  Division of Hospital Medicine  Pager: [phone]  If no response or off-hours, page [phone]  -------------------------------------    Patient is a 89y old  Female who presents with a chief complaint of shortness of breath (19 Jan 2021 15:49)      SUBJECTIVE / OVERNIGHT EVENTS: none acute  ADDITIONAL REVIEW OF SYSTEMS: pt reports some ongoing SOB but no CP/palpitations, no fevers/chills. otherwise ROS negative.     MEDICATIONS  (STANDING):  apixaban 2.5 milliGRAM(s) Oral every 12 hours  furosemide   Injectable 40 milliGRAM(s) IV Push two times a day  memantine 5 milliGRAM(s) Oral daily  metoprolol tartrate 12.5 milliGRAM(s) Oral two times a day  polyethylene glycol 3350 17 Gram(s) Oral daily  potassium chloride   Powder 40 milliEquivalent(s) Oral once  senna 2 Tablet(s) Oral at bedtime  simvastatin 20 milliGRAM(s) Oral at bedtime    MEDICATIONS  (PRN):      CAPILLARY BLOOD GLUCOSE        I&O's Summary    19 Jan 2021 07:01  -  20 Jan 2021 07:00  --------------------------------------------------------  IN: 600 mL / OUT: 1000 mL / NET: -400 mL        PHYSICAL EXAM:  Vital Signs Last 24 Hrs  T(C): 36.8 (20 Jan 2021 04:57), Max: 36.8 (20 Jan 2021 04:57)  T(F): 98.3 (20 Jan 2021 04:57), Max: 98.3 (20 Jan 2021 04:57)  HR: 74 (20 Jan 2021 04:57) (72 - 85)  BP: 102/67 (20 Jan 2021 04:57) (102/67 - 114/62)  BP(mean): --  RR: 17 (20 Jan 2021 04:57) (17 - 18)  SpO2: 94% (20 Jan 2021 04:57) (94% - 98%)  CONSTITUTIONAL: NAD, well-developed, well-groomed  EYES: PERRLA; conjunctiva and sclera clear  ENMT: Moist oral mucosa, no pharyngeal injection or exudates; normal dentition  NECK: Supple, no palpable masses; no thyromegaly  RESPIRATORY: faint bibasilar crackles.  CARDIOVASCULAR: Regular rate and rhythm, normal S1 and S2, no murmur/rub/gallop; No lower extremity edema; Peripheral pulses are 2+ bilaterally  ABDOMEN: Nontender to palpation, normoactive bowel sounds, no rebound/guarding; No hepatosplenomegaly  MUSCLOSKELETAL:  Normal gait; no clubbing or cyanosis of digits; no joint swelling or tenderness to palpation  PSYCH: A+O to person, place, and time; affect appropriate  NEUROLOGY: CN 2-12 are intact and symmetric; no gross sensory deficits;   SKIN: No rashes; no palpable lesions    LABS:                        13.6   7.96  )-----------( 375      ( 20 Jan 2021 07:09 )             43.7     01-20    142  |  101  |  23  ----------------------------<  85  3.2<L>   |  28  |  0.93    Ca    8.9      20 Jan 2021 07:06  Phos  3.3     01-19  Mg     2.1     01-20    TPro  6.4  /  Alb  3.4  /  TBili  1.0  /  DBili  x   /  AST  47<H>  /  ALT  86<H>  /  AlkPhos  120  01-20                RADIOLOGY & ADDITIONAL TESTS:  Results Reviewed:   Imaging Personally Reviewed:  Electrocardiogram Personally Reviewed:    COORDINATION OF CARE:  Care Discussed with Consultants/Other Providers [Y/N]:  Prior or Outpatient Records Reviewed [Y/N]:  
Alo Lew MD  Division of Hospital Medicine  Pager: [phone]  If no response or off-hours, page [phone]  -------------------------------------    Patient is a 89y old  Female who presents with a chief complaint of shortness of breath (20 Jan 2021 18:05)      SUBJECTIVE / OVERNIGHT EVENTS: none acute  ADDITIONAL REVIEW OF SYSTEMS: pt reports still feeling mild sob and overall doesn't feel too well. Denies any cp/palpitations, no n/v, no urinary symptoms, eating/drinking well, colostomy functioning well, no fevers/chills. Cannot elucidate nature of her symptoms any further. ros otherwise negative.     MEDICATIONS  (STANDING):  apixaban 2.5 milliGRAM(s) Oral every 12 hours  furosemide   Injectable 40 milliGRAM(s) IV Push two times a day  memantine 5 milliGRAM(s) Oral daily  metoprolol tartrate 12.5 milliGRAM(s) Oral two times a day  polyethylene glycol 3350 17 Gram(s) Oral daily  potassium chloride   Powder 40 milliEquivalent(s) Oral once  senna 2 Tablet(s) Oral at bedtime  simvastatin 20 milliGRAM(s) Oral at bedtime    MEDICATIONS  (PRN):      CAPILLARY BLOOD GLUCOSE        I&O's Summary    20 Jan 2021 07:01  -  21 Jan 2021 07:00  --------------------------------------------------------  IN: 720 mL / OUT: 60 mL / NET: 660 mL    21 Jan 2021 07:01  -  21 Jan 2021 14:16  --------------------------------------------------------  IN: 120 mL / OUT: 0 mL / NET: 120 mL        PHYSICAL EXAM:  Vital Signs Last 24 Hrs  T(C): 36.4 (21 Jan 2021 13:18), Max: 36.4 (20 Jan 2021 20:21)  T(F): 97.6 (21 Jan 2021 13:18), Max: 97.6 (21 Jan 2021 13:18)  HR: 96 (21 Jan 2021 13:18) (89 - 96)  BP: 97/66 (21 Jan 2021 13:18) (97/66 - 116/74)  BP(mean): --  RR: 17 (21 Jan 2021 13:18) (16 - 17)  SpO2: 94% (21 Jan 2021 13:18) (93% - 97%)  CONSTITUTIONAL: NAD, well-developed, well-groomed  EYES: PERRLA; conjunctiva and sclera clear  ENMT: Moist oral mucosa, no pharyngeal injection or exudates; normal dentition  NECK: Supple, no palpable masses; no thyromegaly  RESPIRATORY: faint bibasilar crackles  CARDIOVASCULAR: Regular rate and rhythm, normal S1 and S2, no murmur/rub/gallop; No lower extremity edema; Peripheral pulses are 2+ bilaterally  ABDOMEN: Nontender to palpation, normoactive bowel sounds, no rebound/guarding; No hepatosplenomegaly, +colostomy  MUSCLOSKELETAL:  Normal gait; no clubbing or cyanosis of digits; no joint swelling or tenderness to palpation  PSYCH: A+O to person, place, affect somewhat depressed  NEUROLOGY: CN 2-12 are intact and symmetric; no gross sensory deficits;   SKIN: No rashes; no palpable lesions    LABS:                        13.6   7.96  )-----------( 375      ( 20 Jan 2021 07:09 )             43.7     01-21    138  |  97  |  23  ----------------------------<  95  3.4<L>   |  28  |  0.93    Ca    9.2      21 Jan 2021 07:01  Mg     2.1     01-20    TPro  6.7  /  Alb  3.4  /  TBili  1.0  /  DBili  x   /  AST  34  /  ALT  66<H>  /  AlkPhos  116  01-21                RADIOLOGY & ADDITIONAL TESTS:  Results Reviewed:   Imaging Personally Reviewed:  Electrocardiogram Personally Reviewed:    COORDINATION OF CARE:  Care Discussed with Consultants/Other Providers [Y/N]:  Prior or Outpatient Records Reviewed [Y/N]:  
Alo Lew MD  Division of Hospital Medicine  Pager: [phone]  If no response or off-hours, page [phone]  -------------------------------------    Patient is a 89y old  Female who presents with a chief complaint of shortness of breath (18 Jan 2021 12:48)      SUBJECTIVE / OVERNIGHT EVENTS: none acute  ADDITIONAL REVIEW OF SYSTEMS: pt reports improved breathing but still feels tired and listless. No cough, no cp/palpitations, no fevers/chills. ros otherise negative.     MEDICATIONS  (STANDING):  apixaban 2.5 milliGRAM(s) Oral every 12 hours  furosemide   Injectable 40 milliGRAM(s) IV Push two times a day  memantine 5 milliGRAM(s) Oral daily  metoprolol tartrate 12.5 milliGRAM(s) Oral two times a day  potassium chloride   Powder 40 milliEquivalent(s) Oral once  simvastatin 20 milliGRAM(s) Oral at bedtime    MEDICATIONS  (PRN):      CAPILLARY BLOOD GLUCOSE        I&O's Summary    18 Jan 2021 07:01  -  19 Jan 2021 07:00  --------------------------------------------------------  IN: 300 mL / OUT: 900 mL / NET: -600 mL    19 Jan 2021 07:01  -  19 Jan 2021 15:50  --------------------------------------------------------  IN: 240 mL / OUT: 700 mL / NET: -460 mL        PHYSICAL EXAM:  Vital Signs Last 24 Hrs  T(C): 36.2 (19 Jan 2021 11:51), Max: 36.3 (18 Jan 2021 20:13)  T(F): 97.2 (19 Jan 2021 11:51), Max: 97.3 (18 Jan 2021 20:13)  HR: 93 (19 Jan 2021 11:51) (91 - 93)  BP: 103/70 (19 Jan 2021 11:51) (103/70 - 115/63)  BP(mean): --  RR: 18 (19 Jan 2021 11:51) (18 - 20)  SpO2: 98% (19 Jan 2021 11:51) (98% - 99%)  CONSTITUTIONAL: NAD, well-developed, well-groomed  EYES: PERRLA; conjunctiva and sclera clear  ENMT: Moist oral mucosa, no pharyngeal injection or exudates; normal dentition  NECK: Supple, no palpable masses; no thyromegaly  RESPIRATORY: +bibasilar crackles  CARDIOVASCULAR: no jvd, Regular rate and rhythm, normal S1 and S2, no murmur/rub/gallop; +trace edema, Peripheral pulses are 2+ bilaterally  ABDOMEN: Nontender to palpation, normoactive bowel sounds, no rebound/guarding; No hepatosplenomegaly  MUSCLOSKELETAL:  Normal gait; no clubbing or cyanosis of digits; no joint swelling or tenderness to palpation  PSYCH: A+O to person, place, and time; affect appropriate  NEUROLOGY: CN 2-12 are intact and symmetric; no gross sensory deficits;   SKIN: No rashes; no palpable lesions    LABS:                        12.6   7.66  )-----------( 339      ( 19 Jan 2021 07:34 )             40.3     01-19    141  |  100  |  24<H>  ----------------------------<  75  3.0<L>   |  28  |  0.92    Ca    8.6      19 Jan 2021 07:25  Phos  3.3     01-19  Mg     2.1     01-19    TPro  6.3  /  Alb  3.4  /  TBili  0.8  /  DBili  x   /  AST  90<H>  /  ALT  108<H>  /  AlkPhos  129<H>  01-17                RADIOLOGY & ADDITIONAL TESTS:  Results Reviewed:   Imaging Personally Reviewed:  Electrocardiogram Personally Reviewed:    COORDINATION OF CARE:  Care Discussed with Consultants/Other Providers [Y/N]:  Prior or Outpatient Records Reviewed [Y/N]:  
Patient is a 89y old  Female who presents with a chief complaint of shortness of breath (17 Jan 2021 18:41)      SUBJECTIVE / OVERNIGHT EVENTS: no events overnight, still feels overall weak, SOB    MEDICATIONS  (STANDING):  apixaban 2.5 milliGRAM(s) Oral every 12 hours  furosemide   Injectable 40 milliGRAM(s) IV Push two times a day  memantine 5 milliGRAM(s) Oral daily  metoprolol tartrate 12.5 milliGRAM(s) Oral two times a day  simvastatin 20 milliGRAM(s) Oral at bedtime    MEDICATIONS  (PRN):      T(C): 37 (01-18-21 @ 05:40), Max: 37 (01-18-21 @ 05:40)  HR: 89 (01-18-21 @ 05:40) (74 - 97)  BP: 104/76 (01-18-21 @ 05:40) (104/76 - 130/91)  RR: 20 (01-18-21 @ 05:40) (20 - 30)  SpO2: 94% (01-18-21 @ 05:40) (94% - 100%)  CAPILLARY BLOOD GLUCOSE        I&O's Summary    17 Jan 2021 07:01  -  18 Jan 2021 07:00  --------------------------------------------------------  IN: 120 mL / OUT: 300 mL / NET: -180 mL        PHYSICAL EXAM:  GENERAL: NAD  HEAD:  Atraumatic, Normocephalic  EYES: EOMI, PERRLA, conjunctiva and sclera clear  NECK: Supple, + HJR  CHEST/LUNG: diminished BS at bases b/l  HEART: Regular rate and rhythm; No murmurs, rubs, or gallops  ABDOMEN: Soft, Nontender, Nondistended; Bowel sounds present  EXTREMITIES:  2+ Peripheral Pulses, mild b/l pitting edema of LE  NEUROLOGY: non-focal  SKIN: No rashes or lesions    LABS:                        12.6   8.25  )-----------( 366      ( 17 Jan 2021 15:08 )             39.8     01-17    139  |  101  |  27<H>  ----------------------------<  122<H>  3.2<L>   |  24  |  0.97    Ca    8.7      17 Jan 2021 16:38  Phos  4.6     01-17  Mg     2.3     01-17    TPro  6.3  /  Alb  3.4  /  TBili  0.8  /  DBili  x   /  AST  90<H>  /  ALT  108<H>  /  AlkPhos  129<H>  01-17    PT/INR - ( 17 Jan 2021 15:08 )   PT: 24.1 sec;   INR: 2.08 ratio         PTT - ( 17 Jan 2021 15:08 )  PTT:29.6 sec          RADIOLOGY & ADDITIONAL TESTS:    Imaging Personally Reviewed:    Consultant(s) Notes Reviewed:      Care Discussed with Consultants/Other Providers:

## 2021-01-23 NOTE — DISCHARGE NOTE PROVIDER - NSDCCPCAREPLAN_GEN_ALL_CORE_FT
PRINCIPAL DISCHARGE DIAGNOSIS  Diagnosis: CHF exacerbation  Assessment and Plan of Treatment: Weigh yourself daily.  If you gain 3lbs in 3 days, or 5lbs in a week call your Health Care Provider.  Do not eat or drink foods containing more than 2000mg of salt (sodium) in your diet every day.  Call your Health Care Provider if you have any swelling or increased swelling in your feet, ankles, and/or stomach.  Take all of your medication as directed.  If you become dizzy call your Health Care Provider.      SECONDARY DISCHARGE DIAGNOSES  Diagnosis: Dementia without behavioral disturbance, unspecified dementia type  Assessment and Plan of Treatment: Dementia causes memory problems and make it hard to think clear. The symptoms of dementia often start off very mild and get worse slowly. Symptoms are forgetfulness, confusion, trouble with language, getting lost in familiar places. As dementia gets worse, it can cause anger or aggression, see things that aren't there, decreased ability to eat, bathe, dress, or do other everyday tasks, or cause people to lose bladder and bowel control. Alzheimer disease, there are medicines that might help some. If you have vascular dementia, your doctor will focus on keeping your blood pressure and cholesterol as normal as possible. Sadly, there really aren't good treatments for most types of dementia.  Prevent falls and accidents by securing loose rugs or use non-skid rugs, loose wires or electrical cords. Wear sturdy, comfortable shoes, keep walkways well lit. There are no proven ways to prevent dementia. But encourage physical activity, healthy diet and social interaction to help keep the brain healthy. Keep medications, sharp knives, lighters, matches, power tools, and guns out of reach. Lower the temperature on water heaters. Keep familiar objects and people around. Use reminders, notes, or directions for daily activities or tasks. Keep a simple, consistent routine for waking, meals, bathing, dressing, and bedtime. Display emergency numbers and home address near all telephones.   SEEK MEDICAL CARE IF: New behavioral problems start such as moodiness, aggressiveness, or seeing things that are not there (hallucinations). Any new problem with brain function happens. This includes problems with balance, speech, swallowing difficulty or falling a lot. Small changes or worsening in any aspect of brain function can be a sign that the illness is getting worse or a sign of infection. Seeing a caregiver right away is important.   SEEK IMMEDIATE MEDICAL CARE IF: New or worsened confusion, sleepiness, or inability to sleep develops.

## 2021-01-23 NOTE — DISCHARGE NOTE PROVIDER - CARE PROVIDER_API CALL
Wyatt Jauregui)  Cardiovascular Disease; Nuclear Cardiology  [id number] [place] [address], [address]  [place] [address]  Phone: [phone]  Fax: [phone]  Established Patient  Scheduled Appointment: 02/03/2021 10:00 AM

## 2021-01-23 NOTE — DISCHARGE NOTE PROVIDER - HOSPITAL COURSE
89yo Female nonobstructive CAD ([facility] 2012), SDH s/p ronny hole, dementia (AO x1-2 baseline), DVT/PE on eliquis, and s/p Nikky for rectal adenocarcinoma, recent complete heart block and V.fib arrest s/p micra placement in December 2020, presents from home with complains of SOB. Found to have ADHF, now improved on IV lasix, transitioned to oral meds euvolemic, planning for dc home pending daughter's decision on home hospice.    pr DNR/DNI per daughter, MOLST filled out.    Acute on chronic systolic (congestive) heart failure.  Plan: TTE- LV EF 35%, severe MR, moderate AI. Findings similar to TTE from dec 2020, which were in turn new compared to 6/2020. Pt was treated for heart block in dec and discharged clinically euvolemic. Dr. Kramer noted developing overload and elevated BNP as OP and started her on lasix, which did not prevent her current ADHF admission.   - unclear etiology of HF, Pt likely without viable myocardium amenable to PCI and furthermore, daughter states pt would not want any further invasive interventions  - per cardiology, further invasive testing of no utility, would continue present goal directed medical management  - transitioned lasix to 40mg po bid  - convert bb to metop succ 25mg po daily  - start low dose ace enalapril 2.5mg po daily  - all with hold parameters  Daily weights (standing)  Strict I/Os  OOB to chair  Monitor electrolytes replete PRN  discussed with daughter high risk of future readmissions, and daughter stated that she would be interested in home hospice but wishes to discuss with rest of family first. She states patient had previously been made DNR/DNI and would like to honor that at this time. Will fill out MOLST.     Pulmonary embolism, unspecified chronicity, unspecified pulmonary embolism type, unspecified whether acute cor pulmonale present.  Plan: continue Eliquis 2.5mg BID.     Dementia without behavioral disturbance, unspecified dementia type.  Plan: continue namenda    dispo: discussed with daughter fox- family would like to hold off on home hospice for now and have patient come home. Emailed Dr. Jauregui cardiology- who will continue discussing home hospice and consider OP HF referral as needed pending clinical status.

## 2021-01-23 NOTE — PROGRESS NOTE ADULT - PROBLEM SELECTOR PLAN 1
TTE- LV EF 35%, severe MR, moderate AI  Continue Lasix 40mg IV BID  Daily weights (standing)  Strict I/Os  Monitor electrolytes
TTE- LV EF 35%, severe MR, moderate AI  Continue Lasix 40mg IV BID  Daily weights (standing)  Strict I/Os  Monitor electrolytes  consult Dr. Kramer (pt's cardiologist)
TTE- LV EF 35%, severe MR, moderate AI. Findings similar to TTE from dec 2020, which were in turn new compared to 6/2020. Pt was treated for heart block in dec and discharged clinically euvolemic. Dr. Kramer noted developing overload and elevated BNP as OP and started her on lasix, which did not prevent her current ADHF admission.   - unclear etiology of HF, Pt likely without viable myocardium amenable to PCI and furthermore, daughter states pt would not want any further invasive interventions  - per cardiology, further invasive testing of no utility, would continue present goal directed medical management  - transitioned lasix to 40mg po bid  - convert bb to metop succ 25mg po daily  - start low dose ace enalapril 2.5mg po daily  - all with hold parameters  Daily weights (standing)  Strict I/Os  OOB to chair  Monitor electrolytes replete PRN  discussed with daughter high risk of future readmissions, and daughter stated that she would be interested in home hospice but wishes to discuss with rest of family first. She states patient had previously been made DNR/DNI and would like to honor that at this time. Will fill out MOLST
TTE- LV EF 35%, severe MR, moderate AI  Continue Lasix 40mg IV BID  Daily weights (standing)  Strict I/Os  Monitor electrolytes
TTE- LV EF 35%, severe MR, moderate AI. Findings similar to TTE from dec 2020, which were in turn new compared to 6/2020. Pt was treated for heart block in dec and discharged clinically euvolemic. Dr. Kramer noted developing overload and elevated BNP as OP and started her on lasix, which did not prevent her current ADHF admission.   - unclear etiology of HF, although given regional WMA and prior VF arrest a silent MI is a possibility. Pt likely without viable myocardium amenable to PCI and furthermore, daughter states pt would not want any further invasive interventions  - management likely to be medical at this point- will f/u cards eval and recs  - Continue Lasix 40mg IV BID for now  Daily weights (standing)  Strict I/Os  Monitor electrolytes replete PRN  discussed with daughter high risk of future readmissions, and daughter stated that she would be interested in home hospice but wishes to discuss with rest of family first. She states patient had previously been made DNR/DNI and would like to honor that at this time. Will fill out MOLST
TTE- LV EF 35%, severe MR, moderate AI. Findings similar to TTE from dec 2020, which were in turn new compared to 6/2020. Pt was treated for heart block in dec and discharged clinically euvolemic. Dr. Kramer noted developing overload and elevated BNP as OP and started her on lasix, which did not prevent her current ADHF admission.   - unclear etiology of HF, Pt likely without viable myocardium amenable to PCI and furthermore, daughter states pt would not want any further invasive interventions  - per cardiology, further invasive testing of no utility, would continue present goal directed medical management  - transitioned lasix to 40mg po bid  - convert bb to metop succ 25mg po daily  - start low dose ace enalapril 2.5mg po daily  - all with hold parameters  Daily weights (standing)  Strict I/Os  OOB to chair  Monitor electrolytes replete PRN  discussed with daughter high risk of future readmissions, and daughter stated that she would be interested in home hospice but wishes to discuss with rest of family first. She states patient had previously been made DNR/DNI and would like to honor that at this time. Will fill out MOLST

## 2021-01-23 NOTE — PROGRESS NOTE ADULT - ATTENDING COMMENTS
Robin Mcintyre MD  Division of [facility] Medicine  Cell: [phone]  Pager: [phone]  Office: [phone]

## 2021-01-23 NOTE — PROGRESS NOTE ADULT - PROBLEM SELECTOR PROBLEM 2
Pulmonary embolism, unspecified chronicity, unspecified pulmonary embolism type, unspecified whether acute cor pulmonale present

## 2021-01-23 NOTE — PROGRESS NOTE ADULT - PROBLEM SELECTOR PROBLEM 1
Acute on chronic systolic (congestive) heart failure

## 2021-01-23 NOTE — DISCHARGE NOTE PROVIDER - NSDCMRMEDTOKEN_GEN_ALL_CORE_FT
apixaban 5 mg oral tablet: 0.5 tab(s) orally 2 times a day  enalapril 2.5 mg oral tablet: 1 tab(s) orally once a day  furosemide 40 mg oral tablet: 1 tab(s) orally 2 times a day  memantine 5 mg oral tablet: 1 tab(s) orally once a day  Metoprolol Tartrate 25 mg oral tablet: 0.5 tab(s) orally 2 times a day  simvastatin 20 mg oral tablet: 1 tab(s) orally once a day (at bedtime)   apixaban 5 mg oral tablet: 0.5 tab(s) orally 2 times a day  enalapril 2.5 mg oral tablet: 1 tab(s) orally once a day  furosemide 40 mg oral tablet: 1 tab(s) orally 2 times a day  memantine 5 mg oral tablet: 1 tab(s) orally once a day  metoprolol succinate 25 mg oral tablet, extended release: 1 tab(s) orally once a day  simvastatin 20 mg oral tablet: 1 tab(s) orally once a day (at bedtime)

## 2021-01-23 NOTE — DISCHARGE NOTE PROVIDER - PROVIDER TOKENS
PROVIDER:[TOKEN:[[id number]],SCHEDULEDAPPT:[02/03/2021],SCHEDULEDAPPTTIME:[10:00 AM],ESTABLISHEDPATIENT:[T]]

## 2021-01-23 NOTE — DISCHARGE NOTE PROVIDER - NSDCFUSCHEDAPPT_GEN_ALL_CORE_FT
AMY CHACON ; 02/03/2021 ; [facility] [address]  AMY CAHCON ; 02/05/2021 ; [facility] [place] [facility]  AMY CHACON ; 04/15/2021 ; [facility] [address]  AMY CHACON ; 09/08/2021 ; [facility] [address]

## 2021-01-23 NOTE — PROGRESS NOTE ADULT - PROBLEM SELECTOR PLAN 2
continue Eliquis 2.5mg BID

## 2021-01-28 ENCOUNTER — APPOINTMENT (OUTPATIENT)
Dept: INTERNAL MEDICINE | Facility: CLINIC | Age: 86
End: 2021-01-28
Payer: MEDICARE

## 2021-01-28 VITALS
HEART RATE: 89 BPM | WEIGHT: 150 LBS | BODY MASS INDEX: 27.6 KG/M2 | RESPIRATION RATE: 12 BRPM | HEIGHT: 62 IN | TEMPERATURE: 98 F | OXYGEN SATURATION: 90 %

## 2021-01-28 VITALS — DIASTOLIC BLOOD PRESSURE: 64 MMHG | SYSTOLIC BLOOD PRESSURE: 96 MMHG

## 2021-01-28 PROCEDURE — 99072 ADDL SUPL MATRL&STAF TM PHE: CPT

## 2021-01-28 PROCEDURE — 99214 OFFICE O/P EST MOD 30 MIN: CPT

## 2021-02-01 ENCOUNTER — RX RENEWAL (OUTPATIENT)
Age: 86
End: 2021-02-01

## 2021-02-01 ENCOUNTER — RX CHANGE (OUTPATIENT)
Age: 86
End: 2021-02-01

## 2021-02-03 ENCOUNTER — NON-APPOINTMENT (OUTPATIENT)
Age: 86
End: 2021-02-03

## 2021-02-03 ENCOUNTER — APPOINTMENT (OUTPATIENT)
Dept: CARDIOLOGY | Facility: CLINIC | Age: 86
End: 2021-02-03
Payer: MEDICARE

## 2021-02-03 VITALS
SYSTOLIC BLOOD PRESSURE: 112 MMHG | BODY MASS INDEX: 27.6 KG/M2 | HEART RATE: 94 BPM | DIASTOLIC BLOOD PRESSURE: 73 MMHG | WEIGHT: 150 LBS | HEIGHT: 62 IN | OXYGEN SATURATION: 94 % | RESPIRATION RATE: 12 BRPM | TEMPERATURE: 96.9 F

## 2021-02-03 PROCEDURE — 99072 ADDL SUPL MATRL&STAF TM PHE: CPT

## 2021-02-03 PROCEDURE — 93000 ELECTROCARDIOGRAM COMPLETE: CPT

## 2021-02-03 PROCEDURE — 99214 OFFICE O/P EST MOD 30 MIN: CPT | Mod: 25

## 2021-02-05 ENCOUNTER — APPOINTMENT (OUTPATIENT)
Dept: COLORECTAL SURGERY | Facility: CLINIC | Age: 86
End: 2021-02-05

## 2021-02-07 RX ORDER — ALBUTEROL SULFATE 90 UG/1
108 (90 BASE) INHALANT RESPIRATORY (INHALATION) EVERY 6 HOURS
Qty: 1 | Refills: 0 | Status: ACTIVE | COMMUNITY
Start: 2021-01-10 | End: 1900-01-01

## 2021-02-08 NOTE — HISTORY OF PRESENT ILLNESS
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 During the hospitalization her Lasix was increased to 40 mg twice daily and her Metoprolol was increased to 25 mg daily. Since the hospitalization she has been feeling well. Her daughter states that her breathing is much better, and her weights are stable at home. She denies any chest pain, palpitations, and headaches.

## 2021-02-08 NOTE — PHYSICAL EXAM
[General Appearance - Well Developed] : well developed [Normal Appearance] : normal appearance [Well Groomed] : well groomed [General Appearance - Well Nourished] : well nourished [No Deformities] : no deformities [Normal Conjunctiva] : the conjunctiva exhibited no abnormalities [Normal Jugular Venous A Waves Present] : normal jugular venous A waves present [Normal Jugular Venous V Waves Present] : normal jugular venous V waves present [Respiration, Rhythm And Depth] : normal respiratory rhythm and effort [Exaggerated Use Of Accessory Muscles For Inspiration] : no accessory muscle use [Normal Rate] : normal [Rhythm Regular] : regular [Normal S1] : normal S1 [Normal S2] : normal S2 [No Pitting Edema] : no pitting edema present [Abdomen Soft] : soft [Abdomen Tenderness] : non-tender [Nail Clubbing] : no clubbing of the fingernails [Cyanosis, Localized] : no localized cyanosis [Petechial Hemorrhages (___cm)] : no petechial hemorrhages [Skin Color & Pigmentation] : normal skin color and pigmentation [] : no rash [Mood] : the mood was normal [Affect] : the affect was normal [No Anxiety] : not feeling anxious [General Appearance - In No Acute Distress] : no acute distress [S3] : no S3 [I] : a grade 1 [Right Carotid Bruit] : no bruit heard over the right carotid [Left Carotid Bruit] : no bruit heard over the left carotid [Bruit] : no bruit heard [Bowel Sounds] : normal bowel sounds [FreeTextEntry1] : Her gait is slow. [No Skin Ulcers] : no skin ulcer

## 2021-02-08 NOTE — DISCUSSION/SUMMARY
[FreeTextEntry1] : IMPRESSION: Mrs. 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 \par \par PLAN:\par 1. Her blood pressure is well controlled, thus she will continue on Toprol XL 25 mg daily and Enalapril 2.5mg daily. \par 2. Her breathing has improved, thus she will continue on Lasix 40 mg twice daily for heart failure. Her most recent BNP checked about a week ago was much improved. She will continue to follow her weights at home and on a low sodium diet. Her potassium was on the lower side, thus she will start on Potassium supplements 10 meq daily. She should have a repeat BMP in about 2 weeks.\par 3. She will continue on Simvastatin 20mg daily for her cholesterol. Her most recent LDL was above goal. \par 4. She will continue on Eliquis 2.5mg twice daily for her bilateral PE and DVTs. She will also follow up with Vascular Cardiology.  \par 5. She will follow up with me in one month or sooner if she develops any new or worsening symptoms in the interim.

## 2021-02-15 LAB
ALBUMIN SERPL ELPH-MCNC: 4.2 G/DL
ALP BLD-CCNC: 106 U/L
ALT SERPL-CCNC: 28 U/L
ANION GAP SERPL CALC-SCNC: 20 MMOL/L
AST SERPL-CCNC: 35 U/L
BILIRUB SERPL-MCNC: 0.9 MG/DL
BUN SERPL-MCNC: 26 MG/DL
CALCIUM SERPL-MCNC: 9.5 MG/DL
CHLORIDE SERPL-SCNC: 92 MMOL/L
CHOLEST SERPL-MCNC: 182 MG/DL
CO2 SERPL-SCNC: 26 MMOL/L
CREAT SERPL-MCNC: 1.07 MG/DL
GLUCOSE SERPL-MCNC: 137 MG/DL
HDLC SERPL-MCNC: 42 MG/DL
LDLC SERPL CALC-MCNC: 112 MG/DL
NONHDLC SERPL-MCNC: 140 MG/DL
NT-PROBNP SERPL-MCNC: 3608 PG/ML
POTASSIUM SERPL-SCNC: 3.6 MMOL/L
PROT SERPL-MCNC: 7.2 G/DL
SODIUM SERPL-SCNC: 137 MMOL/L
TRIGL SERPL-MCNC: 139 MG/DL

## 2021-02-15 NOTE — HISTORY OF PRESENT ILLNESS
[de-identified] : Ms. AMY CHACON is a 89 year old female, , accompannied by her daughter, with h/o Hypertension/ Hyperlipidemia/ / CAD/ Osteoporosis, subdural hematoma in [place] 2018, s/p Lol[place] holes, b/l DVT, PE on eliquis presents for post hospitalization follow up visit\par Daughter states that pt was hospitalized at [facility] for CHF on January 17th and was d/c'd on jan 23rd\par Lasix was increased to 40mg BID and was started on Enalapril 2.5mg daily\par Daughter says pt is doing better. \par Pt says her breathing is better, denies SOB, chest pain, abdominal pain, N/V/D, headache, dizziness \par \par \par \par

## 2021-02-15 NOTE — ASSESSMENT
[FreeTextEntry1] : CHF--s/p hospitalization\par cont current meds\par blood work ordered\par f/u with cardiology as scheduled for next week\par \par follow up in one week for lab results\par

## 2021-02-15 NOTE — PHYSICAL EXAM
[No Acute Distress] : no acute distress [Normal Outer Ear/Nose] : the outer ears and nose were normal in appearance [Normal Sclera/Conjunctiva] : normal sclera/conjunctiva [No JVD] : no jugular venous distention [No Lymphadenopathy] : no lymphadenopathy [No Respiratory Distress] : no respiratory distress  [Normal] : normal rate, regular rhythm, normal S1 and S2 and no murmur heard [Non Tender] : non-tender [Soft] : abdomen soft [Non-distended] : non-distended [Normal Bowel Sounds] : normal bowel sounds [Normal Anterior Cervical Nodes] : no anterior cervical lymphadenopathy [No CVA Tenderness] : no CVA  tenderness [No Joint Swelling] : no joint swelling [No Rash] : no rash [Speech Grossly Normal] : speech grossly normal [de-identified] : thin elderly female [de-identified] : decreased breath sounds b/l bases [de-identified] : awake and alert, poor insight and judjement

## 2021-02-22 ENCOUNTER — LABORATORY RESULT (OUTPATIENT)
Age: 86
End: 2021-02-22

## 2021-03-16 PROCEDURE — 84484 ASSAY OF TROPONIN QUANT: CPT

## 2021-03-16 PROCEDURE — 94002 VENT MGMT INPAT INIT DAY: CPT

## 2021-03-16 PROCEDURE — 97530 THERAPEUTIC ACTIVITIES: CPT

## 2021-03-16 PROCEDURE — 82553 CREATINE MB FRACTION: CPT

## 2021-03-16 PROCEDURE — C1769: CPT

## 2021-03-16 PROCEDURE — 97116 GAIT TRAINING THERAPY: CPT

## 2021-03-16 PROCEDURE — 86900 BLOOD TYPING SEROLOGIC ABO: CPT

## 2021-03-16 PROCEDURE — 87635 SARS-COV-2 COVID-19 AMP PRB: CPT

## 2021-03-16 PROCEDURE — 80061 LIPID PANEL: CPT

## 2021-03-16 PROCEDURE — C8929: CPT

## 2021-03-16 PROCEDURE — C1786: CPT

## 2021-03-16 PROCEDURE — 84100 ASSAY OF PHOSPHORUS: CPT

## 2021-03-16 PROCEDURE — 86769 SARS-COV-2 COVID-19 ANTIBODY: CPT

## 2021-03-16 PROCEDURE — 93005 ELECTROCARDIOGRAM TRACING: CPT

## 2021-03-16 PROCEDURE — 83036 HEMOGLOBIN GLYCOSYLATED A1C: CPT

## 2021-03-16 PROCEDURE — 83735 ASSAY OF MAGNESIUM: CPT

## 2021-03-16 PROCEDURE — 82947 ASSAY GLUCOSE BLOOD QUANT: CPT

## 2021-03-16 PROCEDURE — 84295 ASSAY OF SERUM SODIUM: CPT

## 2021-03-16 PROCEDURE — 84443 ASSAY THYROID STIM HORMONE: CPT

## 2021-03-16 PROCEDURE — 33274 TCAT INSJ/RPL PERM LDLS PM: CPT | Mod: Q0

## 2021-03-16 PROCEDURE — 85730 THROMBOPLASTIN TIME PARTIAL: CPT

## 2021-03-16 PROCEDURE — 85610 PROTHROMBIN TIME: CPT

## 2021-03-16 PROCEDURE — 85014 HEMATOCRIT: CPT

## 2021-03-16 PROCEDURE — 85025 COMPLETE CBC W/AUTO DIFF WBC: CPT

## 2021-03-16 PROCEDURE — 80053 COMPREHEN METABOLIC PANEL: CPT

## 2021-03-16 PROCEDURE — 71045 X-RAY EXAM CHEST 1 VIEW: CPT

## 2021-03-16 PROCEDURE — 99285 EMERGENCY DEPT VISIT HI MDM: CPT | Mod: 25

## 2021-03-16 PROCEDURE — 82803 BLOOD GASES ANY COMBINATION: CPT

## 2021-03-16 PROCEDURE — 82550 ASSAY OF CK (CPK): CPT

## 2021-03-16 PROCEDURE — 84132 ASSAY OF SERUM POTASSIUM: CPT

## 2021-03-16 PROCEDURE — 82435 ASSAY OF BLOOD CHLORIDE: CPT

## 2021-03-16 PROCEDURE — 83605 ASSAY OF LACTIC ACID: CPT

## 2021-03-16 PROCEDURE — 86901 BLOOD TYPING SEROLOGIC RH(D): CPT

## 2021-03-16 PROCEDURE — 97161 PT EVAL LOW COMPLEX 20 MIN: CPT

## 2021-03-16 PROCEDURE — 85027 COMPLETE CBC AUTOMATED: CPT

## 2021-03-16 PROCEDURE — 85018 HEMOGLOBIN: CPT

## 2021-03-16 PROCEDURE — 86850 RBC ANTIBODY SCREEN: CPT

## 2021-03-16 PROCEDURE — 82330 ASSAY OF CALCIUM: CPT

## 2021-03-24 ENCOUNTER — APPOINTMENT (OUTPATIENT)
Dept: CARDIOLOGY | Facility: CLINIC | Age: 86
End: 2021-03-24
Payer: MEDICARE

## 2021-03-24 ENCOUNTER — LABORATORY RESULT (OUTPATIENT)
Age: 86
End: 2021-03-24

## 2021-03-24 ENCOUNTER — NON-APPOINTMENT (OUTPATIENT)
Age: 86
End: 2021-03-24

## 2021-03-24 VITALS
SYSTOLIC BLOOD PRESSURE: 115 MMHG | RESPIRATION RATE: 14 BRPM | HEART RATE: 93 BPM | DIASTOLIC BLOOD PRESSURE: 68 MMHG | TEMPERATURE: 97.3 F | OXYGEN SATURATION: 98 % | BODY MASS INDEX: 27.6 KG/M2 | WEIGHT: 150 LBS | HEIGHT: 62 IN

## 2021-03-24 DIAGNOSIS — I25.10 ATHEROSCLEROTIC HEART DISEASE OF NATIVE CORONARY ARTERY W/OUT ANGINA PECTORIS: ICD-10-CM

## 2021-03-24 PROCEDURE — 93000 ELECTROCARDIOGRAM COMPLETE: CPT

## 2021-03-24 PROCEDURE — 99072 ADDL SUPL MATRL&STAF TM PHE: CPT

## 2021-03-24 PROCEDURE — 99214 OFFICE O/P EST MOD 30 MIN: CPT | Mod: 25

## 2021-03-25 LAB
BASOPHILS # BLD AUTO: 0.1 K/UL
BASOPHILS NFR BLD AUTO: 1.2 %
EOSINOPHIL # BLD AUTO: 0.37 K/UL
EOSINOPHIL NFR BLD AUTO: 4.5 %
HCT VFR BLD CALC: 41.8 %
HGB BLD-MCNC: 13.3 G/DL
IMM GRANULOCYTES NFR BLD AUTO: 0.1 %
LYMPHOCYTES # BLD AUTO: 3.54 K/UL
LYMPHOCYTES NFR BLD AUTO: 43.2 %
MAN DIFF?: NORMAL
MCHC RBC-ENTMCNC: 27.5 PG
MCHC RBC-ENTMCNC: 31.8 GM/DL
MCV RBC AUTO: 86.5 FL
MONOCYTES # BLD AUTO: 0.74 K/UL
MONOCYTES NFR BLD AUTO: 9 %
NEUTROPHILS # BLD AUTO: 3.43 K/UL
NEUTROPHILS NFR BLD AUTO: 42 %
PLATELET # BLD AUTO: 267 K/UL
RBC # BLD: 4.83 M/UL
RBC # FLD: 21.6 %
WBC # FLD AUTO: 8.19 K/UL

## 2021-03-28 NOTE — DISCUSSION/SUMMARY
[FreeTextEntry1] : IMPRESSION: Mrs. 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 \par \par PLAN:\par 1. Her blood pressure is well controlled, thus she will continue on Toprol XL 25 mg daily, Lasix, and Enalapril 2.5mg daily. \par 2. Her breathing is stable as is her weight. She will continue on Lasix 40 mg twice daily for heart failure in addition to potassium supplementation. I will be checking a CMP and pro-BNP today. She will continue to follow her weights at home. \par 3. She will continue on Simvastatin 20mg daily for her cholesterol. Her most recent LDL was above goal. \par 4. She will continue on Eliquis 2.5mg twice daily for her bilateral PE and DVTs. She will also follow up with Vascular Cardiology.  I will be checking a CBC as well. \par 5. She will follow up with me in 2 months or sooner if she develops any new or worsening symptoms in the interim.\par 6. She will continue to follow with EP for her device interrogations. She had ectopy on her ECG today for which she will continue on Metoprolol. \par 7. We discussed possible intervention for her mitral regurgitation, however, she does not want any invasive procedures at this time.

## 2021-03-28 NOTE — HISTORY OF PRESENT ILLNESS
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 Her daughter states that she has been feeling well and has not offered any new complain. She does experience dyspnea with exertion, which is unchanged.  Her daughter states that her weights have been stable at home. She otherwise denies any chest pain, dyspnea at rest, palpitations, and headaches.

## 2021-03-28 NOTE — PHYSICAL EXAM
[General Appearance - Well Developed] : well developed [Normal Appearance] : normal appearance [Well Groomed] : well groomed [General Appearance - Well Nourished] : well nourished [No Deformities] : no deformities [General Appearance - In No Acute Distress] : no acute distress [Normal Conjunctiva] : the conjunctiva exhibited no abnormalities [Normal Jugular Venous A Waves Present] : normal jugular venous A waves present [Normal Jugular Venous V Waves Present] : normal jugular venous V waves present [Respiration, Rhythm And Depth] : normal respiratory rhythm and effort [Exaggerated Use Of Accessory Muscles For Inspiration] : no accessory muscle use [Bowel Sounds] : normal bowel sounds [Abdomen Soft] : soft [Abdomen Tenderness] : non-tender [Nail Clubbing] : no clubbing of the fingernails [Cyanosis, Localized] : no localized cyanosis [Petechial Hemorrhages (___cm)] : no petechial hemorrhages [Skin Color & Pigmentation] : normal skin color and pigmentation [] : no rash [No Skin Ulcers] : no skin ulcer [Affect] : the affect was normal [Mood] : the mood was normal [No Anxiety] : not feeling anxious [Normal Rate] : normal [Normal S1] : normal S1 [Normal S2] : normal S2 [I] : a grade 1 [No Pitting Edema] : no pitting edema present [FreeTextEntry1] : Her gait is slow. [Premature Beats] : regular with premature beats [Right Carotid Bruit] : no bruit heard over the right carotid [Left Carotid Bruit] : no bruit heard over the left carotid [Bruit] : no bruit heard

## 2021-04-15 ENCOUNTER — APPOINTMENT (OUTPATIENT)
Dept: ELECTROPHYSIOLOGY | Facility: CLINIC | Age: 86
End: 2021-04-15
Payer: MEDICARE

## 2021-04-15 VITALS — HEART RATE: 92 BPM | SYSTOLIC BLOOD PRESSURE: 106 MMHG | OXYGEN SATURATION: 100 % | DIASTOLIC BLOOD PRESSURE: 73 MMHG

## 2021-04-15 PROCEDURE — 99072 ADDL SUPL MATRL&STAF TM PHE: CPT

## 2021-04-15 PROCEDURE — 93279 PRGRMG DEV EVAL PM/LDLS PM: CPT

## 2021-04-24 ENCOUNTER — RX RENEWAL (OUTPATIENT)
Age: 86
End: 2021-04-24

## 2021-05-24 ENCOUNTER — NON-APPOINTMENT (OUTPATIENT)
Age: 86
End: 2021-05-24

## 2021-05-24 ENCOUNTER — APPOINTMENT (OUTPATIENT)
Dept: CARDIOLOGY | Facility: CLINIC | Age: 86
End: 2021-05-24
Payer: MEDICARE

## 2021-05-24 VITALS
HEART RATE: 93 BPM | OXYGEN SATURATION: 100 % | BODY MASS INDEX: 28.16 KG/M2 | TEMPERATURE: 96.9 F | HEIGHT: 62 IN | SYSTOLIC BLOOD PRESSURE: 96 MMHG | WEIGHT: 153 LBS | RESPIRATION RATE: 14 BRPM | DIASTOLIC BLOOD PRESSURE: 66 MMHG

## 2021-05-24 VITALS — SYSTOLIC BLOOD PRESSURE: 98 MMHG | DIASTOLIC BLOOD PRESSURE: 62 MMHG

## 2021-05-24 LAB
ALBUMIN SERPL ELPH-MCNC: 4.3 G/DL
ALP BLD-CCNC: 66 U/L
ALT SERPL-CCNC: 11 U/L
ANION GAP SERPL CALC-SCNC: 19 MMOL/L
AST SERPL-CCNC: 20 U/L
BILIRUB SERPL-MCNC: 0.4 MG/DL
BUN SERPL-MCNC: 23 MG/DL
CALCIUM SERPL-MCNC: 10.1 MG/DL
CHLORIDE SERPL-SCNC: 101 MMOL/L
CO2 SERPL-SCNC: 22 MMOL/L
CREAT SERPL-MCNC: 1 MG/DL
GLUCOSE SERPL-MCNC: 92 MG/DL
POTASSIUM SERPL-SCNC: 3.9 MMOL/L
PROT SERPL-MCNC: 7.2 G/DL
SODIUM SERPL-SCNC: 143 MMOL/L
TSH SERPL-ACNC: 1.95 UIU/ML

## 2021-05-24 PROCEDURE — 99214 OFFICE O/P EST MOD 30 MIN: CPT | Mod: 25

## 2021-05-24 PROCEDURE — 93000 ELECTROCARDIOGRAM COMPLETE: CPT

## 2021-05-25 NOTE — PHYSICAL EXAM
[General Appearance - Well Developed] : well developed [Normal Appearance] : normal appearance [Well Groomed] : well groomed [General Appearance - Well Nourished] : well nourished [No Deformities] : no deformities [General Appearance - In No Acute Distress] : no acute distress [Normal Conjunctiva] : the conjunctiva exhibited no abnormalities [Normal Jugular Venous A Waves Present] : normal jugular venous A waves present [Normal Jugular Venous V Waves Present] : normal jugular venous V waves present [Respiration, Rhythm And Depth] : normal respiratory rhythm and effort [Exaggerated Use Of Accessory Muscles For Inspiration] : no accessory muscle use [Bowel Sounds] : normal bowel sounds [Abdomen Soft] : soft [Abdomen Tenderness] : non-tender [Nail Clubbing] : no clubbing of the fingernails [Cyanosis, Localized] : no localized cyanosis [Petechial Hemorrhages (___cm)] : no petechial hemorrhages [Skin Color & Pigmentation] : normal skin color and pigmentation [] : no rash [No Skin Ulcers] : no skin ulcer [Affect] : the affect was normal [Mood] : the mood was normal [No Anxiety] : not feeling anxious [Normal Rate] : normal [Normal S1] : normal S1 [Normal S2] : normal S2 [I] : a grade 1 [No Pitting Edema] : no pitting edema present [FreeTextEntry1] : Her gait is slow. [Irregularly Irregular] : irregularly irregular [Right Carotid Bruit] : no bruit heard over the right carotid [Left Carotid Bruit] : no bruit heard over the left carotid [Bruit] : no bruit heard

## 2021-05-25 NOTE — CARDIOLOGY SUMMARY
[___] : [unfilled] [LVEF ___%] : LVEF [unfilled]% [Mild] : mild pulmonary hypertension [de-identified] : 5/24/2021: Atrial fibrillation at 94 bpm with a left bundle branch block

## 2021-05-25 NOTE — HISTORY OF PRESENT ILLNESS
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 Her daughter states that she has been feeling well and has not offered any new complaints. She experiences dyspnea with exertion, which is unchanged.  Her daughter states that her weights have been stable at home, about 149-150 lbs. She otherwise denies any chest pain, dyspnea at rest, palpitations, and headaches.

## 2021-05-25 NOTE — REVIEW OF SYSTEMS
[Negative] : Heme/Lymph [Dyspnea on exertion] : dyspnea during exertion [Chest Discomfort] : no chest discomfort

## 2021-05-25 NOTE — DISCUSSION/SUMMARY
[FreeTextEntry1] : IMPRESSION: Mrs. Lundberg is a 89 year old woman with a history of HTN, hyperlipidemia, nonobstructive coronary artery disease, mitral regurgitation, LV dysfunction, subdural hematoma in [place] status post [place] holes, osteoporosis, bilateral PE/DVTs currently on Eliquis, rectal cancer status post resection, complete heart block, VFib arrest requiring one shock and intubation and Micra pacemaker placement, and CHF who presents today for follow up of heart failure. \par \par PLAN:\par 1. Her blood pressure is well controlled, if not on the lower side. She does not complain of any dizziness or weakness, thus she will continue on Toprol XL 25 mg daily, Lasix, and Enalapril 2.5mg daily. \par 2. Her breathing is stable as is her weight, thus she will continue on Lasix 40 mg twice daily for heart failure in addition to potassium supplementation.She will have a CMP and pro-BNP checked with her next blood test. She will continue to follow her weights at home.  \par 3. She will continue on Simvastatin 20 mg daily for her cholesterol. Her most recent LDL was above goal. \par 4. She will continue on Eliquis 2.5 mg twice daily for her bilateral PE and DVTs. She will also follow up with Vascular Cardiology.  \par 5. She will follow up with me in 2-3 months or sooner if she develops any new or worsening symptoms in the interim.\par 6. She will continue to follow with EP for her device interrogations. She had ectopy on her ECG today versus atrial fibrillation for which she will continue on Metoprolol as well as on Eliquis. \par 7. We have previously discussed possible intervention for her mitral regurgitation, however, she does not want any invasive procedures at this time.

## 2021-06-04 PROBLEM — I26.99 PULMONARY EMBOLISM: Status: ACTIVE | Noted: 2019-01-07

## 2021-06-04 NOTE — PHYSICAL EXAM
[General Appearance - Well Developed] : well developed [Normal Appearance] : normal appearance [Well Groomed] : well groomed [General Appearance - Well Nourished] : well nourished [No Deformities] : no deformities [General Appearance - In No Acute Distress] : no acute distress [Normal Conjunctiva] : the conjunctiva exhibited no abnormalities [Eyelids - No Xanthelasma] : the eyelids demonstrated no xanthelasmas [Normal Oral Mucosa] : normal oral mucosa [No Oral Pallor] : no oral pallor [No Oral Cyanosis] : no oral cyanosis [Normal Jugular Venous A Waves Present] : normal jugular venous A waves present [Normal Jugular Venous V Waves Present] : normal jugular venous V waves present [No Jugular Venous Montes[place] A Waves] : no jugular venous montesinos A waves [Respiration, Rhythm And Depth] : normal respiratory rhythm and effort [Auscultation Breath Sounds / Voice Sounds] : lungs were clear to auscultation bilaterally [Exaggerated Use Of Accessory Muscles For Inspiration] : no accessory muscle use [Heart Rate And Rhythm] : heart rate and rhythm were normal [Heart Sounds] : normal S1 and S2 [Murmurs] : no murmurs present [Abdomen Soft] : soft [Abdomen Tenderness] : non-tender [Abdomen Mass (___ Cm)] : no abdominal mass palpated [Abnormal Walk] : normal gait [Gait - Sufficient For Exercise Testing] : the gait was sufficient for exercise testing [Nail Clubbing] : no clubbing of the fingernails [Cyanosis, Localized] : no localized cyanosis [Petechial Hemorrhages (___cm)] : no petechial hemorrhages [] : no rash [Skin Color & Pigmentation] : normal skin color and pigmentation [No Venous Stasis] : no venous stasis [Skin Lesions] : no skin lesions [No Skin Ulcers] : no skin ulcer [No Xanthoma] : no  xanthoma was observed [Oriented To Time, Place, And Person] : oriented to person, place, and time [Affect] : the affect was normal [Mood] : the mood was normal [No Anxiety] : not feeling anxious

## 2021-06-04 NOTE — HISTORY OF PRESENT ILLNESS
[FreeTextEntry1] : 6/4/2021\par Here for followup visit.  \par s/p sx for advanced rectal CA.  LN negative.  \par Hemoglobin In march 2021 was 13.3\par Venous duplex May 2019 - neg for DVT\par Echo march 2020:  Severe concentric LVH, mod-severe MR.\par ECG may 2021 - Afib\par \par \par 5/22/2020\par She underwent colorectal sx march 19th.  At [facility]. \par Went home March 23rd. \par Eliquis was resumed post operatively.\par No issues with eliquis.\par No bleeding or bruising\par No leg pains\par \par Plans to see Dr. Jauregui next month.\par No chest pain or shortness of breath\par \par No blood in the stool\par She has an ostomy.  no issues.  Daughter takes care of the ostomy.  \par \par Medication\par Eliquis 2.5mg BID\par Simvastatin\par Namenda\par Metoprolol \par Vitamin D. \par \par no aspirin.

## 2021-06-04 NOTE — ASSESSMENT
[FreeTextEntry1] : Assessment:\par 1.  Provoked PE and DVT\par - prolonged illness\par 2.  ICH s/p ronny hole\par - stable repeat head CT at [facility]\par 3.  HTN\par 4.  HLD\par \par Plan\par 1.  Continue with eliquis 2.5mg BID- the bleeding risk is that of placebo and will reduce risk of recurrence, furthermore she has Afib, which warrents continued A/C\par 2.   Repeat venous duplex for surveillance\par 3.  Labwork today\par 4.  Return in 1 year.

## 2021-06-04 NOTE — REASON FOR VISIT
[Follow-Up - Clinic] : a clinic follow-up of [FreeTextEntry1] : 88F hx of dementia, HTN, HLD, osteoporosis, ICH s/p ronny hole, returned from [facility], hosptial stay for UTI, rehab, then returned to [facility] found to have PE and DVT.

## 2021-07-07 NOTE — DISCHARGE NOTE ADULT - NS AS DC AMI YN
TITLE OF PROCEDURE:  Lumbar Radiofrequency Denervation of the Medial Branch (and primary dorsal ramus) Nerves under fluoroscopic guidance                                                    LEVELS TREATED:  L4-L5 and L5-S1, associated nerves L3, L4 and L5     SIDE: right    PREOPERATIVE DIAGNOSES: right Lumbar Spondylosis     POSTOPERATIVE DIAGNOSES:  Same    INDICATION: Karen A Sell pain is facet joint (z-joint) mediated disease that is  aggravated by hyperextension, rotation or lateral bending.  This was confirmed by provocative testing and greater than 80% pain relief was obtained by the diagnostic block of these nerves    SURGEON: Brittny Dunham MD    ANESTHESIA:  Local w/ moderate sedation, >15 minutes with trained observer    DESCRIPTION OF OPERATIVE PROCEDURE:  Informed consent was obtained. IV was placed.  The patient was brought to the Procedure Room and they  positioned themselves to their comfort and optimal positioning for procedure.  Time-out was conducted with all members of the care team.  Standard ASA monitors were placed. Standard prep and drape were performed.    Prone position - The appropriate level of the Lumbar spine was identified under ipsilateral oblique fluoroscopy. 2 ml of 1% lidocaine was injected with a 25 gauge needle in the subcutaneous tissue to provide superficial anesthesia. Following this, a 100mm, 18 gauge, curved radiofrequency needle with a 10 mm active tip was guided to the superior junction of the transverse processes (or sacral ala) and superior articular processes of each level.  Needle location was verified in AP/LAT projections.  Motor stimulation at 2 Hz was performed with no evidence of distal muscle contraction at each level, but excellent multifidus contraction at >3x sensory threshold.  Prior to lesioning 1 mL of 2% lidocaine was injected at each level.  The patient then received one 90 second lesioning cycle at 80 degrees centigrade at each level. The probe was then  allowed to cool prior to withdrawing.   Then, 2cc of 1% lidocaine was then injected as the needle was withdrawn to provide deep and superficial anesthesia.  The surgical site preparation was washed off of the patient. Band-Aids were applied. The patient was brought to the recovery area.      The patient did very well and the procedure results were discussed.  Standard discharge instructions were given to the patient.  The patient knows how to contact the clinic should they have any questions or problems.  Our clinic nurse will call the patient for follow-up tomorrow.    Preprocedural pain score was  4/10  Post procedural pain score was 1/10    COMPLICATIONS:  None    EBL: Minimal    URINE OUTPUT: Not monitored    FLUIDS: None      PLAN: 1 month post-procedure call   no

## 2021-09-03 PROBLEM — R21 RASH: Status: ACTIVE | Noted: 2021-01-01

## 2021-09-08 PROBLEM — R53.81 MALAISE AND FATIGUE: Status: ACTIVE | Noted: 2021-01-01

## 2021-09-10 NOTE — ED PROVIDER NOTE - CLINICAL SUMMARY MEDICAL DECISION MAKING FREE TEXT BOX
89 yo F PMHx CAD, SDH s/p [place] hole, dementia A&Ox1-2 at baseline, DVT/PE on eliquis, rectal adenocarcinoma s/p Blake, Dec 2020 s/p medtronic PPM for complete heartblock and VFib arrest, presents for worsening SOB; likely secondary CHF vs worsening renal failure.   Plan: labs + xray + lasix + PCP consult

## 2021-09-10 NOTE — ED PROVIDER NOTE - PHYSICAL EXAMINATION
GENERAL: well appearing in no acute distress  HEAD: normocephalic, atraumatic  HENT: airway intact, neck supple  EYES: normal conjunctiva  CARDIAC: regular rate and rhythm, normal S1S2, no appreciable murmurs, 2+ pulses in UE/LE b/l  PULM: normal breath sounds, clear to ascultation bilaterally, crackles b/l at the bases  GI: abdomen nondistended, soft, nontender, no guarding, rebound tenderness  : no CVA tenderness b/l, no suprapubic tenderness  NEURO: no focal motor or sensory deficits  MSK: b/l LE peripheral edema, no calf tenderness b/l  SKIN: well-perfused, extremities warm, no visible rashes  PSYCH: appropriate mood and affect

## 2021-09-10 NOTE — H&P ADULT - PROBLEM SELECTOR PLAN 1
Continue Lasix 40mg IVP BID  Monitor BUN/Cr while on IV diuresis  Cardiology following  Continue Enalapril 2.5mg PO daily  No Asa as she is on full dose AC for recurrent DVT/PE  Continue Simvastatin 20mg po qhs  Daily weight, 1500mL fluid restriction  Intake and output monitoring

## 2021-09-10 NOTE — H&P ADULT - PROBLEM SELECTOR PLAN 5
as above    Dementia  continue Memantine 5mg po daily    DVT ppx: on AC  Diet: DASH with 1500mL fluid restriction  Fall precautions

## 2021-09-10 NOTE — ED PROVIDER NOTE - ATTENDING CONTRIBUTION TO CARE
I, Anselmo Mejia, performed a history and physical exam of the patient and discussed their management with the resident and /or advanced care provider. I reviewed the resident and /or ACP's note and agree with the documented findings and plan of care. I was present and available for all procedures.  Patient sent to ED by PCP for diuresis and further evaluation. Will evaluate labs and diurese and will attempt to discharge back home.

## 2021-09-10 NOTE — ED ADULT NURSE NOTE - OBJECTIVE STATEMENT
89y Female PMHx CAD, dementia, DVT/PE on Eliquis, and heartblock & VFib arrest BIBEMS from home for worsening SOB and abnormal labs. Pt daughter states pt was SOB starting weds, worse on exertion. Pt went to PCP where labs showed fluid overload and abnormal liver tests. PCP recommended pt come in for IV lasix. IV placed, labs drawn, seen and evaluated by MD.

## 2021-09-10 NOTE — H&P ADULT - NSHPSOCIALHISTORY_GEN_ALL_CORE
Denies cigarettes, alcohol or illicit drug use. She lives with her  who is 15 years younger than her.

## 2021-09-10 NOTE — H&P ADULT - HISTORY OF PRESENT ILLNESS
89 yo F PMH of CAD, SDH s/p [place] hole, dementia A&Ox1-2 at baseline, DVT/PE on eliquis, rectal adenocarcinoma s/p Blake, Dec 2020 s/p medtronic PPM for complete heartblock and VFib arrest, presents for worsening SOB, worse w/ exertion and when laying flat. Patient was seen in PCP today and had abnormal lab results that indicated high fluid and abnormal liver tests. Per patient's daughter, patient was sent for IV lasix. The patient herself said that she has trouble breathing but is unable to fully articulate her symptoms. She says her legs are swollen as well but is unsure for how long. The patient's daughter is highly involved in her care and said that her Cardiologist (Shania) wanted her admitted to adjust her meds and will see her tomorrow. She denies chest pain, lightheadedness or blurry vision. She does have SOB, SMITH and orthopnea.

## 2021-09-10 NOTE — H&P ADULT - NSHPLABSRESULTS_GEN_ALL_CORE
No leukocytosis, BMP unremarkable, electrolytes wnl  BNP elevated [id number]  HST negative    CXR personally reviewed: wireless pacemaker, cardiomegaly, prominent pulmonary vascular markings with some congestion    Prior medical records reviewed: TTE shows EF 35% from 12/2020

## 2021-09-10 NOTE — ED PROVIDER NOTE - OBJECTIVE STATEMENT
89 yo F PMHx CAD, SDH s/p [place] hole, dementia A&Ox1-2 at baseline, DVT/PE on eliquis, rectal adenocarcinoma s/p Blake, Dec 2020 s/p medtronic PPM for complete heartblock and VFib arrest, presents for worsening SOB, worse w/ exertion and when laying flat. Patient was seen in PCP office two days ago and had blood results today that 89 yo F PMHx CAD, SDH s/p [place] hole, dementia A&Ox1-2 at baseline, DVT/PE on eliquis, rectal adenocarcinoma s/p Blake, Dec 2020 s/p medtronic PPM for complete heartblock and VFib arrest, presents for worsening SOB, worse w/ exertion and when laying flat. Patient was seen in PCP office two days ago and had abnormal lab results that indicated high fluid and abnormal liver tests. Per patient's daughter, patient was sent for IV lasix. Patient otherwise has been in her usual state of health.

## 2021-09-10 NOTE — ED PROVIDER NOTE - NSICDXPASTSURGICALHX_GEN_ALL_CORE_FT
PAST SURGICAL HISTORY:  S/P Cataract Surgery left eye    SDH (subdural hematoma) 7/2018 With evacuatinon in [place]     Statement Selected

## 2021-09-10 NOTE — ED PROVIDER NOTE - NS ED ROS FT
General: denies fever, chills  HENT: denies nasal congestion, rhinorrhea  Eyes: denies visual changes, blurred vision  CV: denies chest pain, palpitations  Resp: + difficulty breathing, no cough  Abdominal: denies nausea, vomiting, diarrhea, abdominal pain  : denies urinary pain or discharge  MSK: + leg swelling  Neuro: denies headaches, numbness, tingling  Skin: denies rashes, bruises

## 2021-09-10 NOTE — H&P ADULT - EJECTION FRACTION >40 NO
68 y/o male well-appearing in NAD. VS nml. Afebrile. No abdominal ttp. Slightly distended and soft. CT abdomen large amount of stool. Given bowel regimen - successful. No urinary symptoms. UA clear. Advised PMD follow-up.
Ejection Fraction...

## 2021-09-10 NOTE — H&P ADULT - ASSESSMENT
89 yo F PMHx CAD, SDH s/p [place] hole, dementia A&Ox1-2 at baseline, DVT/PE on eliquis, rectal adenocarcinoma s/p Blake, Dec 2020 s/p medtronic PPM for complete heartblock and VFib arrest, presents for worsening SOB found to be in Acute Heart Failure.

## 2021-09-10 NOTE — ED CLERICAL - NS ED CLERK NOTE PRE-ARRIVAL INFORMATION; ADDITIONAL PRE-ARRIVAL INFORMATION
CC/Reason For referral: heart failure  Preferred Consultant(if applicable):   Who admits for you (if needed): hospitalist  Do you have documents you would like to fax over? NO  Would you still like to speak to an ED attending? YES

## 2021-09-11 NOTE — PROGRESS NOTE ADULT - PROBLEM SELECTOR PLAN 1
- Continue Lasix 40mg IVP BID  - Monitor BUN/Cr while on IV diuresis  - Cardiology aware of admission. Will see today.   - Continue Enalapril 2.5mg PO daily  - No Asa as she is on full dose AC for recurrent DVT/PE  - Continue Simvastatin 20mg po qhs  - Daily weight, 1500mL fluid restriction  - Intake and output monitoring  - 20 beats of wide complex this AM. Asymptomatic. Potassium being repleated.   - Await cards recs.

## 2021-09-11 NOTE — PROVIDER CONTACT NOTE (OTHER) - ACTION/TREATMENT ORDERED:
EKG obtained and ordered, STAT labs drawn. Will continue to monitor.
No new orders now.  As per NP cardiology consult is already  called. will continue to monitor the pt.

## 2021-09-11 NOTE — PROGRESS NOTE ADULT - ASSESSMENT
89 yo F PMHx CAD, SDH s/p [place] hole, dementia A&Ox1-2 at baseline, DVT/PE on eliquis, rectal adenocarcinoma s/p Blake, Dec 2020 s/p medtronic PPM for complete heartblock and VFib arrest, presents for worsening SOB found to be in Acute Heart Failure.

## 2021-09-11 NOTE — PROGRESS NOTE ADULT - SUBJECTIVE AND OBJECTIVE BOX
Patient is a 89y old  Female who presents with a chief complaint of SOB (10 Sep 2021 18:30)      SUBJECTIVE / OVERNIGHT EVENTS:  Feels better than on admission, but still not at baseline.  Breathing has improved.  Patient on RA.   20 beats of WC in AM.   Potassium being repeated     MEDICATIONS  (STANDING):  apixaban 2.5 milliGRAM(s) Oral two times a day  enalapril 2.5 milliGRAM(s) Oral daily  furosemide   Injectable 40 milliGRAM(s) IV Push two times a day  memantine 5 milliGRAM(s) Oral daily  metoprolol succinate ER 25 milliGRAM(s) Oral daily  simvastatin 20 milliGRAM(s) Oral at bedtime    MEDICATIONS  (PRN):  acetaminophen   Tablet .. 650 milliGRAM(s) Oral every 6 hours PRN Temp greater or equal to 38.5C (101.3F), Mild Pain (1 - 3)      CAPILLARY BLOOD GLUCOSE        I&O's Summary      PHYSICAL EXAM:  Vital Signs Last 24 Hrs  T(C): 36.6 (11 Sep 2021 05:15), Max: 37 (11 Sep 2021 04:57)  T(F): 97.9 (11 Sep 2021 05:15), Max: 98.6 (11 Sep 2021 04:57)  HR: 73 (11 Sep 2021 05:15) (73 - 99)  BP: 113/70 (11 Sep 2021 05:15) (110/68 - 123/78)  BP(mean): 86 (10 Sep 2021 18:30) (86 - 86)  RR: 18 (11 Sep 2021 05:15) (15 - 26)  SpO2: 94% (11 Sep 2021 05:15) (94% - 99%)  GENERAL: NAD, well-developed  HEAD:  Atraumatic, Normocephalic  EYES: EOMI, PERRLA, conjunctiva and sclera clear  NECK: Supple, No JVD  CHEST/LUNG: Decreased breath sounds bilaterally.  HEART: Regular rate and rhythm; No murmurs, rubs, or gallops  ABDOMEN: Soft, Nontender, Nondistended; Bowel sounds present  EXTREMITIES:  2+ Peripheral Pulses, No clubbing, cyanosis, or edema  PSYCH: AAOx3  NEUROLOGY: non-focal  SKIN: No rashes or lesions    LABS:                        13.1   9.40  )-----------( 269      ( 11 Sep 2021 07:22 )             40.5     09-11    141  |  102  |  24<H>  ----------------------------<  87  3.2<L>   |  25  |  0.86    Ca    8.9      11 Sep 2021 07:14  Phos  3.1     09-11  Mg     2.4     09-11    TPro  6.6  /  Alb  3.9  /  TBili  0.7  /  DBili  x   /  AST  67<H>  /  ALT  163<H>  /  AlkPhos  99  09-10              RADIOLOGY & ADDITIONAL TESTS:    Imaging Personally Reviewed:    Consultant(s) Notes Reviewed:      Care Discussed with Consultants/Other Providers:

## 2021-09-11 NOTE — CONSULT NOTE ADULT - SUBJECTIVE AND OBJECTIVE BOX
Patient seen and evaluated at bedside    Chief Complaint:    HPI:  89 yo F PMH of CAD, SDH s/p [place] hole, dementia A&Ox1-2 at baseline, DVT/PE on eliquis, rectal adenocarcinoma s/p Blake, Dec 2020 s/p medtronic PPM for complete heartblock and VFib arrest, presents for worsening SOB, worse w/ exertion and when laying flat. Patient was seen in PCP today and had abnormal lab results that indicated high fluid and abnormal liver tests. Per patient's daughter, patient was sent for IV lasix. The patient herself said that she has trouble breathing but is unable to fully articulate her symptoms. She says her legs are swollen as well but is unsure for how long. The patient's daughter is highly involved in her care and said that her Cardiologist (Shania) wanted her admitted to adjust her meds and will see her tomorrow. She denies chest pain, lightheadedness or blurry vision. She does have SOB, SMITH and orthopnea. (10 Sep 2021 18:30)    In the ED, VS T: , P: , BP: , RR: , O2: . Patient received ______. Patient currently denying CP, dyspnea, palpitations, presyncope, syncope, HA, ab pain.    PMHx:   Hypertension    Hyperlipemia    Asthma    History of Osteoporosis    PVD (Peripheral Vascular Disease)    Generalized Osteoarthritis    Pulmonary embolus    Deep vein thrombosis      PSHx:   S/P Cataract Surgery    SDH (subdural hematoma)      FAMILY HISTORY:  Family history of breast cancer in sister (Sibling)      Allergies:  No Known Allergies    Home Medications:  memantine 5 mg oral tablet: 1 tab(s) orally once a day (2021 18:55)  simvastatin 20 mg oral tablet: 1 tab(s) orally once a day (at bedtime) (2021 18:55)    Current Medications:   acetaminophen   Tablet .. 650 milliGRAM(s) Oral every 6 hours PRN  apixaban 2.5 milliGRAM(s) Oral two times a day  enalapril 2.5 milliGRAM(s) Oral daily  furosemide   Injectable 40 milliGRAM(s) IV Push two times a day  memantine 5 milliGRAM(s) Oral daily  metoprolol succinate ER 25 milliGRAM(s) Oral daily  simvastatin 20 milliGRAM(s) Oral at bedtime    Social History  Smoking History: denies  Alcohol Use: denies  Drug Use: denies    REVIEW OF SYSTEMS:  Constitutional:     [X] negative [ ] fevers [ ] chills [ ] weight loss [ ] weight gain  HEENT:                  [X] negative [ ] dry eyes [ ] eye irritation [ ] postnasal drip [ ] nasal congestion  CV:                         [X] negative  [ ] chest pain [ ] orthopnea [ ] palpitations [ ] murmur  Resp:                     [X] negative [ ] cough [ ] shortness of breath [ ] wheezing [ ] sputum [ ] hemoptysis  GI:                          [X] negative [ ] nausea [ ] vomiting [ ] diarrhea [ ] constipation [ ] abd pain [ ] dysphagia   :                        [X] negative [ ] dysuria [ ] nocturia [ ] hematuria [ ] increased urinary frequency  MSK:                      [X] negative [ ] back pain [ ] myalgias [ ] arthralgias [ ] fracture  Skin:                       [X] negative [ ] rash [ ] itch  Neuro:                   [X] negative [ ] headache [ ] dizziness [ ] syncope [ ] weakness [ ] numbness  Psych:                    [X] negative [ ] anxiety [ ] depression  Endo:                     [X] negative [ ] diabetes [ ] thyroid problem  Heme/Lymph:      [X] negative [ ] anemia [ ] bleeding problem  Allergic/Immune: [X] negative [ ] itchy eyes [ ] nasal discharge [ ] hives [ ] angioedema    [X] All other systems negative or otherwise described above.  [ ] Unable to assess ROS because ________.    ICU Vital Signs Last 24 Hrs  T(C): 36.7 (11 Sep 2021 11:34), Max: 37 (11 Sep 2021 04:57)  T(F): 98 (11 Sep 2021 11:34), Max: 98.6 (11 Sep 2021 04:57)  HR: 81 (11 Sep 2021 11:34) (73 - 99)  BP: 118/76 (11 Sep 2021 11:34) (110/68 - 123/78)  BP(mean): 86 (10 Sep 2021 18:30) (86 - 86)  ABP: --  ABP(mean): --  RR: 18 (11 Sep 2021 11:34) (15 - 26)  SpO2: 95% (11 Sep 2021 11:34) (94% - 99%)    Daily Height in cm: 160.02 (10 Sep 2021 14:04)    Daily Weight in k.4 (11 Sep 2021 08:20)    Physical Exam:  GENERAL: No acute distress, well-developed  HEAD:  Atraumatic, Normocephalic  ENT: EOMI, conjunctiva and sclera clear, Neck supple, No JVD, moist mucosa  CHEST/LUNG: Clear to auscultation bilaterally; No wheeze, equal breath sounds bilaterally   BACK: No spinal tenderness  HEART: Regular rate and rhythm; No murmurs, rubs, or gallops, radial and DP 2+ b/l, euvolemic  ABDOMEN: Soft, Nontender, Nondistended  EXTREMITIES:  No clubbing, cyanosis, or edema  PSYCH: Nl behavior, nl affect  NEUROLOGY: AAOx3, non-focal  SKIN: Normal color, No rashes or lesions  LINES: no central lines present    Cardiovascular Diagnostic Testing    ECG: Personally reviewed    Echo: Personally reviewed    Stress Testing: none    Cath: none    Imaging: none    CXR: Personally reviewed    Labs: Personally reviewed                        13.1   9.40  )-----------( 269      ( 11 Sep 2021 07:22 )             40.5         141  |  102  |  24<H>  ----------------------------<  87  3.2<L>   |  25  |  0.86    Ca    8.9      11 Sep 2021 07:14  Phos  3.1       Mg     2.4         TPro  6.6  /  Alb  3.9  /  TBili  0.7  /  DBili  x   /  AST  67<H>  /  ALT  163<H>  /  AlkPhos  99  09-10      Serum Pro-Brain Natriuretic Peptide: 82837 pg/mL (09-10 @ 15:22)         Patient seen and evaluated at bedside    HPI:  89 yo F PMH of CAD, SDH s/p [place] hole, dementia A&Ox1-2 at baseline, DVT/PE on eliquis, rectal adenocarcinoma s/p Blake, Dec 2020 s/p medtronic PPM for complete heartblock and VFib arrest, presents for worsening SOB, worse w/ exertion and when laying flat concerning for CHF exacerbation.    PMHx:   Hypertension    Hyperlipemia    Asthma    History of Osteoporosis    PVD (Peripheral Vascular Disease)    Generalized Osteoarthritis    Pulmonary embolus    Deep vein thrombosis      PSHx:   S/P Cataract Surgery    SDH (subdural hematoma)      FAMILY HISTORY:  Family history of breast cancer in sister (Sibling)      Allergies:  No Known Allergies    Home Medications:  memantine 5 mg oral tablet: 1 tab(s) orally once a day (2021 18:55)  simvastatin 20 mg oral tablet: 1 tab(s) orally once a day (at bedtime) (2021 18:55)    Current Medications:   acetaminophen   Tablet .. 650 milliGRAM(s) Oral every 6 hours PRN  apixaban 2.5 milliGRAM(s) Oral two times a day  enalapril 2.5 milliGRAM(s) Oral daily  furosemide   Injectable 40 milliGRAM(s) IV Push two times a day  memantine 5 milliGRAM(s) Oral daily  metoprolol succinate ER 25 milliGRAM(s) Oral daily  simvastatin 20 milliGRAM(s) Oral at bedtime    Social History  Smoking History: denies  Alcohol Use: denies  Drug Use: denies    REVIEW OF SYSTEMS:  Constitutional:     [X] negative [ ] fevers [ ] chills [ ] weight loss [ ] weight gain  HEENT:                  [X] negative [ ] dry eyes [ ] eye irritation [ ] postnasal drip [ ] nasal congestion  CV:                         [ ] negative  [ ] chest pain [X] orthopnea [ ] palpitations [ ] murmur  Resp:                     [ ] negative [ ] cough [X] shortness of breath [ ] wheezing [ ] sputum [ ] hemoptysis  GI:                          [X] negative [ ] nausea [ ] vomiting [ ] diarrhea [ ] constipation [ ] abd pain [ ] dysphagia   :                        [X] negative [ ] dysuria [ ] nocturia [ ] hematuria [ ] increased urinary frequency  MSK:                      [X] negative [ ] back pain [ ] myalgias [ ] arthralgias [ ] fracture  Skin:                       [X] negative [ ] rash [ ] itch  Neuro:                   [X] negative [ ] headache [ ] dizziness [ ] syncope [ ] weakness [ ] numbness  Psych:                    [X] negative [ ] anxiety [ ] depression  Endo:                     [X] negative [ ] diabetes [ ] thyroid problem  Heme/Lymph:      [X] negative [ ] anemia [ ] bleeding problem  Allergic/Immune: [X] negative [ ] itchy eyes [ ] nasal discharge [ ] hives [ ] angioedema    [X] All other systems negative or otherwise described above.  [ ] Unable to assess ROS because ________.    ICU Vital Signs Last 24 Hrs  T(C): 36.7 (11 Sep 2021 11:34), Max: 37 (11 Sep 2021 04:57)  T(F): 98 (11 Sep 2021 11:34), Max: 98.6 (11 Sep 2021 04:57)  HR: 81 (11 Sep 2021 11:34) (73 - 99)  BP: 118/76 (11 Sep 2021 11:34) (110/68 - 123/78)  BP(mean): 86 (10 Sep 2021 18:30) (86 - 86)  ABP: --  ABP(mean): --  RR: 18 (11 Sep 2021 11:34) (15 - 26)  SpO2: 95% (11 Sep 2021 11:34) (94% - 99%)    Daily Height in cm: 160.02 (10 Sep 2021 14:04)    Daily Weight in k.4 (11 Sep 2021 08:20)    Physical Exam:  GENERAL: No acute distress, well-developed  HEAD:  Atraumatic, Normocephalic  ENT: EOMI, conjunctiva and sclera clear, Neck supple, JVD+, moist mucosa  CHEST/LUNG: Crackles b/l   BACK: No spinal tenderness  HEART: Regular rate and rhythm; No murmurs, rubs, or gallops, radial and DP 2+ b/l, hypervolemic  ABDOMEN: Soft, Nontender, Nondistended  EXTREMITIES:  No clubbing or cyanosis  NEUROLOGY: non-focal  SKIN: Normal color, No rashes or lesions  LINES: no central lines present    Cardiovascular Diagnostic Testing    ECG: Personally reviewed    Echo: Personally reviewed  < from: TTE with Doppler (w/Cont) (20 @ 06:52) >  ------------------------------------------------------------------------  Dimensions:    Normal Values:  LA:     5.2    2.0 - 4.0 cm  Ao:     3.2    2.0 - 3.8 cm  SEPTUM: 0.9    0.6 - 1.2 cm  PWT:    0.9    0.6 - 1.1 cm  LVIDd:  6.1    3.0 - 5.6 cm  LVIDs:  4.9    1.8 - 4.0 cm  Derived variables:  LVMI: 128 g/m2  RWT: 0.29  EF (Visual Estimate): 35 %  Doppler Peak Velocity (m/sec): AoV=1.6 TV=2.7  ------------------------------------------------------------------------  Observations:  Mitral Valve: Mitral annular calcification.  Severe functional mitral regurgitation directed  posterolaterally.  Aortic Valve/Aorta: Calcified aortic valve with normal  opening. Moderate aortic regurgitation.  Normal aortic root size.  Left Atrium: Severe left atrial enlargement.  Left Ventricle: Endocardial visualization enhanced with  intravenous injection of Ultrasonic Enhancing Agent  (Definity).  Severe left ventricular enlargement. Estimated ejection  fraction 35%.  Akinesis of the anterolateral wall, anterior wall, and the  inferolateral wall. The basal inferoseptum and basal  inferior wall appear akinetic.  Right Heart: Normal right atrium. Normal right ventricular  size and function.  Normal tricuspid valve. Mild-moderate tricuspid  regurgitation.  Normal pulmonic valve.  Pericardium/Pleura: Small pericardial effusion.  Bilateral pleural effusions.  Hemodynamic: Estimated right atrial pressure is moderately  elevated.  Mild pulmonary hypertension. Estiamted PASP 40 mmHg.  No PFO seen with color Doppler.  ------------------------------------------------------------------------  Conclusions:  Endocardial visualization enhanced with intravenous  injection of Ultrasonic Enhancing Agent (Definity).  Estimated ejection fraction 35%, regional abnormalities as  described.  Severe functional mitral regurgitation directed  posterolaterally.  Moderate aortic regurgitation.  Mild pulmonary hypertension.  ------------------------------------------------------------------------  Confirmed on  2020 - 09:42:50 by Rinku Beth MD, FASE  ------------------------------------------------------------------------    < end of copied text >      Stress Testing: none    Cath:   < from: Cardiac Cath Lab (12 @ 09:59) >  Valves: Mitral valve: The mitral valve exhibited mild (1+/4+)  regurgitation.  Coronary vessels: The coronary circulation is right dominant.  LM:      LM: Normal.  LAD:      Proximal LAD: There was a 25 % stenosis.  Mid LAD: Normal.  Distal LAD: Normal.  D1: Normal.  CX:      Proximal circumflex: There was a discrete 40 % stenosis.  OM1: Normal.  OM2: Normal.  RCA:      RCA: Normal.  RPDA: Normal.  RPLS: Normal.  Complications: There were no complications.  Summary:  Summary: Normal rightheart pressures. Low [place] LV function with subtle  posterobasal hypokinesis. Mild MR. Mild CAD.  Recommendations:  Aggressive risk factor modification and BP control.  Prepared and signed by  Whintey Greenberg M.D.  Signed 2012 07:09:51  Hemodynamic tables  Pressures:  Baseline/ Room Air  Pressures:  - HR: 64  Pressures:  - Rhythm:  Pressures:  -- Aortic Pressure (S/D/M): 131/70/91  Pressures:  -- Left Ventricle (s/edp): 149/18/--  Pressures:  -- Pulmonary Artery (S/D/M): 26/6/15  Pressures:-- Pulmonary Capillary Wedge: 11/10/7  Pressures:  -- Right Atrium (a/v/M): 18/4/3  Pressures:  -- Right Ventricle (s/edp): 25/3/--  Pressures:  Post Angio  Pressures:  - HR: 69  Pressures:  - Rhythm:  Pressures:  -- Aortic Pressure (S/D/M): 142/71/102  O2 Sats:  Baseline/ Room Air  O2 Sats:  - HR: 64  O2 Sats:  - Rhythm:  O2 Sats:  -- AO: 12.3/97.8/16.36  O2 Sats:  -- PA: 12.3/72.2/12.08  Outputs:  Baseline/ Room Air  Outputs:  -- CALCULATIONS[age]  Outputs:  -- CALCULATIONS: Body Surface Area: 1.66  Outputs:  -- CALCULATIONS: Height in cm: 160.00  Outputs:  -- CALCULATIONS: Sex: Female  Outputs:  -- CALCULATIONS: Weight in k.00  Outputs:  -- OUTPUTS: Blood Oxygen Difference: 4.28  Outputs:  -- OUTPUTS: CO by Omar: 5.19  Outputs:  -- OUTPUTS: Omar cardiac index: 3.13  Outputs:  -- OUTPUTS: O2 consumption: 222.24  Outputs:  -- OUTPUTS: Vo2 Indexed: 134.20  Outputs:  -- RESISTANCES: Left ventricular stroke work: 92.64  Outputs:  -- RESISTANCES: Left Ventricular Stroke Work index: 55.94  Outputs:  -- RESISTANCES: Pulmonary vascular index (dsc): 204.18  Outputs:  -- RESISTANCES: Pulmonary vascular index (Wood Units): 2.55  Outputs:  -- RESISTANCES: Pulmonary vascular resistance (dsc): 123.29  Outputs:  -- RESISTANCES: Pulmonary vascular resistance (Wood Units): 1.54  Outputs:  -- RESISTANCES: PVR_SVR Ratio: 0.09  Outputs:  -- RESISTANCES: Right ventricular stroke work: 13.66  Outputs:  -- RESISTANCES: Right ventricular stroke work index: 8.25  Outputs:  -- RESISTANCES: Systemic vascular index (dsc): 2245.94  Outputs:  -- RESISTANCES: Systemic vascular index (Wood Units): 28.08  Outputs:  -- RESISTANCES: Systemic vascular resistance (dsc): 1356.21  Outputs:  -- RESISTANCES: Systemic vascular resistance (Wood Units): 16.96  Outputs:  -- RESISTANCES: Total pulmonary index (dsc): 382.83  Outputs:  -- RESISTANCES: Total pulmonary index (Wood Units): 4.79  Outputs:  -- RESISTANCES: Total pulmonary resistance (dsc): 231.17  Outputs:  -- RESISTANCES: Total pulmonary resistance (Wood Units): 2.89  Outputs:  -- RESISTANCES: Total vascular index (Wood Units): 29.04  Outputs:  -- RESISTANCES: Total vascular resistance (dsc): 1402.44  Outputs:  -- RESISTANCES: Total vascular resistance (Wood Units): 17.53  Outputs:  -- RESISTANCES: Total vascular resistance index (dsc): 2322.51  Outputs:  -- RESISTANCES: TPR_TVR Ratio: 0.16  Outputs:  -- SHUNTS: Qs Indexed: 3.13  Outputs:  -- SHUNTS: Systemic flow: 5.19    < end of copied text >      Imaging: none    CXR: Personally reviewed  < from: Xray Chest 1 View- PORTABLE-Urgent (Xray Chest 1 View- PORTABLE-Urgent .) (09.10.21 @ 14:48) >  IMPRESSION:  Bilateral lower lung linear atelectasis.    --- End of Report ---    < end of copied text >      Labs: Personally reviewed                        13.1   9.40  )-----------( 269      ( 11 Sep 2021 07:22 )             40.5         141  |  102  |  24<H>  ----------------------------<  87  3.2<L>   |  25  |  0.86    Ca    8.9      11 Sep 2021 07:14  Phos  3.1       Mg     2.4         TPro  6.6  /  Alb  3.9  /  TBili  0.7  /  DBili  x   /  AST  67<H>  /  ALT  163<H>  /  AlkPhos  99  09-10      Serum Pro-Brain Natriuretic Peptide: 07724 pg/mL (09-10 @ 15:22)

## 2021-09-11 NOTE — CHART NOTE - NSCHARTNOTEFT_GEN_A_CORE
Patient had a 20 bts of WCT around 5 am today during last shift. Now called by RN and said that as per tele tech on duty now, says it look like AICD fired. Patient was seen by cardiology today. Will continue to monitor on Tele. F/u by cardiology  Leila dersa NP  [phone]

## 2021-09-11 NOTE — PROVIDER CONTACT NOTE (OTHER) - ASSESSMENT
Patient is A&Ox2 at baseline. VSS. She is asymptomatic.
Patient A & O x2. Patient asymptomatic. Denies chest pain. palpitations. Patient on eliquis.

## 2021-09-11 NOTE — PROVIDER CONTACT NOTE (OTHER) - SITUATION
Patient noted with conversion to  frequent Afib and sinus rhythm on tele monitor.
20 WCT on telemetry

## 2021-09-11 NOTE — PROVIDER CONTACT NOTE (OTHER) - BACKGROUND
admitted for HF, hx CAD, SDH, dementia, DVT/PE, complete heart block and Vfib with PPM.
Admitted for CHF.  Hx Vifib arrest and medronic PPM in place. Pt also pacing on tele .

## 2021-09-11 NOTE — CONSULT NOTE ADULT - ASSESSMENT
89 yo F PMH of CAD, SDH s/p [place] hole, dementia A&Ox1-2 at baseline, DVT/PE on eliquis, rectal adenocarcinoma s/p Blake, Dec 2020 s/p medtronic PPM for complete heart block and VFib arrest, presents for worsening SOB, worse w/ exertion and when laying flat concerning for CHF exacerbation.    Plan  #CHF  -c/w IV lasix 40mg BID; goal net neg 1-2L  -strict I/Os, 1.5L fluid restriction, DASH diet, monitor BMP BID; replete lytes prn  -c/w enalapril 2.5mg qd, toprol 25mg qd  -obtain repeat TTE    #DVT/PE  -c/w eliquis 2.5mg BID    #CAD  -c/w statin 89 yo F PMH of CAD, SDH s/p [place] hole, dementia A&Ox1-2 at baseline, DVT/PE on eliquis, rectal adenocarcinoma s/p Blake, Dec 2020 s/p medtronic PPM for complete heart block and VFib arrest, presents for worsening SOB, worse w/ exertion and when laying flat concerning for CHF exacerbation.    Plan  #CHF  -c/w IV lasix 40mg BID; goal net neg 1-2L  -strict I/Os, 1.5L fluid restriction, DASH diet, monitor BMP BID; replete lytes prn  -c/w enalapril 2.5mg qd, toprol 25mg qd  -obtain repeat TTE    #DVT/PE  -c/w eliquis 2.5mg BID    #CAD  -c/w statin    # 89 yo F PMH of CAD, SDH s/p [place] hole, dementia A&Ox1-2 at baseline, DVT/PE on eliquis, rectal adenocarcinoma s/p Blake, Dec 2020 s/p medtronic PPM for complete heart block and VFib arrest, presents for worsening SOB, worse w/ exertion and when laying flat concerning for CHF exacerbation.    Plan  #CHF  -c/w IV lasix 40mg BID; goal net neg 1-2L  -strict I/Os, 1.5L fluid restriction, DASH diet, monitor BMP BID; replete lytes prn  -c/w enalapril 2.5mg qd, toprol 25mg qd  -obtain repeat TTE    #DVT/PE  -c/w eliquis 2.5mg BID    #CAD  -c/w statin    #pAF  -runs of AF on tele; rate-controlled  -CHADSVASC: 6  -c/w eliquis, BB

## 2021-09-11 NOTE — CONSULT NOTE ADULT - ATTENDING COMMENTS
89 y/o woman with CAD, SDH s/p [place] hole, DVT/PE on eliquis, CHB s/p PPM admitted with worsening SOB, worse w/ exertion and when laying flat concerning for CHF exacerbation (HFrEF).  --HF-TTE December 2020 with LVEF 35% with akinesis of the lateral and anterior walls and akinesis of the basal inferoseptal and basal inferior walls as well as severe mitral regurgitation.   --Patient reports significant symptomatic improvement with IV diuresis--would continue with IV diuresis for now with eventual switch to PO.  --BNP ~42494 on admission.  --Periods of rate-controlled AF on telemetry--c/w toprol and eliquis.  --Strict Is and Os and daily weights; replete electrolytes as needed.  --Check repeat TTE.  --Discussed with patient's outpatient Cardiologist.  --Will follow.

## 2021-09-12 NOTE — PROGRESS NOTE ADULT - ASSESSMENT
89 yo F PMHx CAD, SDH s/p [place] hole, dementia A&Ox1-2 at baseline, DVT/PE on eliquis, rectal adenocarcinoma s/p Blake, Dec 2020 s/p medtronic PPM for complete heartblock and VFib arrest, presents for worsening SOB found to be in Acute Heart Failure.

## 2021-09-12 NOTE — PHYSICAL THERAPY INITIAL EVALUATION ADULT - PERTINENT HX OF CURRENT PROBLEM, REHAB EVAL
89 yo F PMH of CAD, SDH s/p [place] hole, dementia A&Ox1-2 at baseline, DVT/PE, rectal adenocarcinoma s/p [place], Dec 2020 s/p medtronic PPM for CHB/VFib arrest, adm with worsening SOB, SMITH, and orthopnea. Adm for diuresis;

## 2021-09-12 NOTE — CARDIOLOGY SUMMARY
[___] : [unfilled] [LVEF ___%] : LVEF [unfilled]% [Mild] : mild pulmonary hypertension [de-identified] : 9/8/2021: Accelerated Junctional Rhythm at 94 bpm with nonspecific intraventricular conduction defect, old anterolateral infarct and nonspecific ST-T wave abnormality\par

## 2021-09-12 NOTE — HISTORY OF PRESENT ILLNESS
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 \par Her daughter states she has gained weight because her appetite has been better and she has been eating more. Her daughter states that she has been feeling tired. She experiences dyspnea with exertion, which is unchanged. She also feels like she can't breath when she is wearing her mask. She was given steroids and cream after developing a rash, which she thinks she got after picking tomatoes in her garden.  She otherwise denies any chest pain, dyspnea at rest, palpitations, and headaches.

## 2021-09-12 NOTE — DISCUSSION/SUMMARY
[FreeTextEntry1] : IMPRESSION: Mrs. Lundberg is a 89 year old woman with a history of HTN, hyperlipidemia, nonobstructive coronary artery disease, mitral regurgitation, LV dysfunction, subdural hematoma in [place] status post [place] holes, osteoporosis, bilateral PE/DVTs currently on Eliquis, rectal cancer status post resection, complete heart block, VFib arrest requiring one shock and intubation and Micra pacemaker placement, and CHF who presents today for follow up of heart failure. \par \par PLAN:\par 1. Her blood pressure is on the lower side. She has been feeling fatigued. She will continue on Toprol XL 25 mg daily, Lasix, and Enalapril 2.5 mg daily. \par 2. Her breathing is stable and her weight has slightly fluctuated. She will continue on Lasix 40 mg twice daily for heart failure in addition to potassium supplementation.She will have a CMP and pro-BNP checked today. Her daughter will continue to follow her weights at home.  \par 3. She will continue on Simvastatin 20 mg daily for her cholesterol. Her most recent LDL was above goal. I will be checking a lipid panel and CMP today.\par 4. She will continue on Eliquis 2.5 mg twice daily for her bilateral PE and DVTs. She will also follow up with Vascular Cardiology.  \par 5. She will follow up with me in 2-3 months or sooner if she develops any new or worsening symptoms in the interim of should there be any significant abnormalities.\par 6. She will continue to follow with EP for her device interrogations. She was in an accelerated junctional rhythm on her ECG today. \par 7. We have previously discussed possible intervention for her mitral regurgitation, however, she does not want any invasive procedures at this time.

## 2021-09-12 NOTE — REVIEW OF SYSTEMS
[Dyspnea on exertion] : dyspnea during exertion [Negative] : Heme/Lymph [Rash] : rash [Feeling Fatigued] : feeling fatigued

## 2021-09-12 NOTE — PROGRESS NOTE ADULT - SUBJECTIVE AND OBJECTIVE BOX
Patient seen and examined at bedside.    Overnight Events: Patient feels her breathing is better. No CP or palpitations.     Current Meds:  acetaminophen   Tablet .. 650 milliGRAM(s) Oral every 6 hours PRN  apixaban 2.5 milliGRAM(s) Oral two times a day  enalapril 2.5 milliGRAM(s) Oral daily  furosemide   Injectable 40 milliGRAM(s) IV Push two times a day  memantine 5 milliGRAM(s) Oral daily  metoprolol succinate ER 25 milliGRAM(s) Oral daily  simvastatin 20 milliGRAM(s) Oral at bedtime    Vitals:  T(F): 97.7 (09-12), Max: 98.4 (09-11)  HR: 73 (09-12) (73 - 99)  BP: 99/65 (09-12) (99/65 - 119/74)  RR: 18 (09-12)  SpO2: 99% (09-12)  I&O's Summary    11 Sep 2021 07:01  -  12 Sep 2021 07:00  --------------------------------------------------------  IN: 360 mL / OUT: 700 mL / NET: -340 mL    Physical Exam:  Appearance: No acute distress; well appearing  Eyes:  EOMI, pink conjunctiva  HENT: Normal oral mucosa  Cardiovascular: RRR, S1, S2, no murmurs, rubs, or gallops; no edema  Respiratory: Clear to auscultation bilaterally  Gastrointestinal: soft, non-tender, non-distended with normal bowel sounds  Musculoskeletal: No clubbing; no joint deformity   Neurologic: Non-focal  Lymphatic: No lymphadenopathy  Psychiatry: AAOx3, mood & affect appropriate  Skin: No rashes                          13.1   9.40  )-----------( 269      ( 11 Sep 2021 07:22 )             40.5     09-12    141  |  105  |  24<H>  ----------------------------<  95  3.7   |  22  |  0.95    Ca    9.4      12 Sep 2021 06:58  Phos  3.4     09-12  Mg     2.4     09-12    TPro  6.6  /  Alb  3.9  /  TBili  0.7  /  DBili  x   /  AST  67<H>  /  ALT  163<H>  /  AlkPhos  99  09-10    CARDIAC MARKERS ( 10 Sep 2021 15:22 )  47 ng/L / x     / x     / x     / x     / x        Serum Pro-Brain Natriuretic Peptide: 28658 pg/mL (09-10 @ 15:22)    New ECG(s): Personally reviewed    Echo:  < from: TTE with Doppler (w/Cont) (12.29.20 @ 06:52) >  Conclusions:  Endocardial visualization enhanced with intravenous  injection of Ultrasonic Enhancing Agent (Definity).  Estimated ejection fraction 35%, regional abnormalities as  described.  Severe functional mitral regurgitation directed  posterolaterally.  Moderate aortic regurgitation.  Mild pulmonary hypertension.  ------------------------------------------------------------------------  Confirmed on  12/29/2020 - 09:42:50 by Rinku Beth MD, FASE  ------------------------------------------------------------------------    < end of copied text >      Stress Testing:     Cath:  < from: Cardiac Cath Lab (06.19.12 @ 09:59) >  Coronary vessels: The coronary circulation is right dominant.  LM:      LM: Normal.  LAD:      Proximal LAD: There was a 25 % stenosis.  Mid LAD: Normal.  Distal LAD: Normal.  D1: Normal.  CX:      Proximal circumflex: There was a discrete 40 % stenosis.  OM1: Normal.  OM2: Normal.  RCA:      RCA: Normal.  RPDA: Normal.  RPLS: Normal.  Complications: There were no complications.  Summary:  Summary: Normal rightheart pressures. Low [place] LV function with subtle  posterobasal hypokinesis. Mild MR. Mild CAD.  Recommendations:  Aggressive risk factor modification and BP control.  Prepared and signed by  Whitney Greenberg M.D.  Signed 06/20/2012 07:09:51    < end of copied text >      Imaging:    Interpretation of Telemetry: sinus and afib, intermittent V paced

## 2021-09-12 NOTE — PROGRESS NOTE ADULT - PROBLEM SELECTOR PLAN 1
- Lasix increased to 60mg IVP BID  - Monitor BUN/Cr while on IV diuresis  - Cardiology input appreciated.   - Continue Enalapril 2.5mg PO daily  - No Asa as she is on full dose AC for recurrent DVT/PE  - Continue Simvastatin 20mg po qhs  - Daily weight, 1500mL fluid restriction  - Intake and output monitoring  - No further episodes of wide complex.

## 2021-09-12 NOTE — PROGRESS NOTE ADULT - PROBLEM SELECTOR PLAN 5
as above    Dementia  continue Memantine 5mg po daily    DVT ppx: on AC  Diet: DASH with 1500mL fluid restriction  Fall precautions  Daughter updated today.

## 2021-09-12 NOTE — PROGRESS NOTE ADULT - ATTENDING COMMENTS
89 y/o woman with CAD, SDH s/p [place] hole, DVT/PE on eliquis, CHB s/p PPM admitted with worsening SOB, worse w/ exertion and when laying flat concerning for CHF exacerbation (HFrEF).  --Symptomatically much improved; comfortable on room air.  --HF-TTE December 2020 with LVEF 35% with akinesis of the lateral and anterior walls and akinesis of the basal inferoseptal and basal inferior walls as well as severe mitral regurgitation.--await repeat TTE.  --Keep O>I, titrate furosemide as needed.  --Periods of rate-controlled AF on telemetry--c/w toprol and eliquis.  --Strict Is and Os and daily weights; replete electrolytes as needed.  --Will follow.

## 2021-09-12 NOTE — PROGRESS NOTE ADULT - SUBJECTIVE AND OBJECTIVE BOX
Patient is a 89y old  Female who presents with a chief complaint of SOB (10 Sep 2021 18:30)      SUBJECTIVE / OVERNIGHT EVENTS:  Respiratory rate much improved.   Diuresing well.  Lasix increased to 60mg IV BID.  Had episode of agitation this morning.  Resolved.     MEDICATIONS  (STANDING):  apixaban 2.5 milliGRAM(s) Oral two times a day  enalapril 2.5 milliGRAM(s) Oral daily  furosemide   Injectable 40 milliGRAM(s) IV Push two times a day  memantine 5 milliGRAM(s) Oral daily  metoprolol succinate ER 25 milliGRAM(s) Oral daily  simvastatin 20 milliGRAM(s) Oral at bedtime    MEDICATIONS  (PRN):  acetaminophen   Tablet .. 650 milliGRAM(s) Oral every 6 hours PRN Temp greater or equal to 38.5C (101.3F), Mild Pain (1 - 3)      CAPILLARY BLOOD GLUCOSE        I&O's Summary      PHYSICAL EXAM:  Vital Signs Last 24 Hrs  T(C): 36.6 (11 Sep 2021 05:15), Max: 37 (11 Sep 2021 04:57)  T(F): 97.9 (11 Sep 2021 05:15), Max: 98.6 (11 Sep 2021 04:57)  HR: 73 (11 Sep 2021 05:15) (73 - 99)  BP: 113/70 (11 Sep 2021 05:15) (110/68 - 123/78)  BP(mean): 86 (10 Sep 2021 18:30) (86 - 86)  RR: 18 (11 Sep 2021 05:15) (15 - 26)  SpO2: 94% (11 Sep 2021 05:15) (94% - 99%)  GENERAL: NAD, frail  HEAD:  Atraumatic, Normocephalic  EYES: EOMI, PERRLA, conjunctiva and sclera clear  NECK: Supple, No JVD  CHEST/LUNG: Decreased breath sounds bilaterally.  HEART: Regular rate and rhythm; No murmurs, rubs, or gallops  ABDOMEN: Soft, Nontender, Nondistended; Bowel sounds present  EXTREMITIES:  2+ Peripheral Pulses, No clubbing, cyanosis, or edema  PSYCH: AAOx2  NEUROLOGY: non-focal  SKIN: No rashes or lesions    LABS:                        13.1   9.40  )-----------( 269      ( 11 Sep 2021 07:22 )             40.5     09-11    141  |  102  |  24<H>  ----------------------------<  87  3.2<L>   |  25  |  0.86    Ca    8.9      11 Sep 2021 07:14  Phos  3.1     09-11  Mg     2.4     09-11    TPro  6.6  /  Alb  3.9  /  TBili  0.7  /  DBili  x   /  AST  67<H>  /  ALT  163<H>  /  AlkPhos  99  09-10              RADIOLOGY & ADDITIONAL TESTS:    Imaging Personally Reviewed:    Consultant(s) Notes Reviewed:      Care Discussed with Consultants/Other Providers:

## 2021-09-12 NOTE — PROGRESS NOTE ADULT - ASSESSMENT
89 yo F PMH of CAD, SDH s/p [place] hole, dementia A&Ox1-2 at baseline, DVT/PE on eliquis, rectal adenocarcinoma s/p Blake, Dec 2020 s/p medtronic PPM for complete heart block and VFib arrest, presents for worsening SOB, worse w/ exertion and when laying flat concerning for CHF exacerbation.    Acute on chronic HFrEF: Last echo 12/29/20 with LVEF 35%, severe functional MR, moderate AI, mild PH.   - consider increasing diuresis from lasix 40mg IV BID to 60 IV BID as patient has not reached goal; goal net neg 1-2L  - strict I/Os, 1.5L fluid restriction, DASH diet, monitor BMP BID; replete lytes prn, goal K > 4, Mg > 2  - c/w enalapril 2.5mg qd, Toprol 25mg qd  - echo pending    CAD: Last cath with some disease in LAD and LCx back in 2012, opted for medical management at the time  - continue simvastatin 20 QD    pAF: CHADSVASC: 6 (CHF, HTN, agex2, vasc, female)  - runs of AF on tele; rate-controlled  - c/w eliquis 2.5 BID,  - continue BB as above    DVT/PE:  -c/w eliquis 2.5mg BID    Elizabeth Orozco MD  Cardiology Fellow, PGY-5  For all other Cardiology service contact information, go to [email or web address] and use "[email or web address]" to login.

## 2021-09-12 NOTE — PHYSICAL THERAPY INITIAL EVALUATION ADULT - TRANSFER SKILLS, REHAB EVAL
independent
Pt presents with deficits in strength (LE>UE), functional balance, activity tolerance, resulting in decreased ADLs/functional mobility.

## 2021-09-12 NOTE — PHYSICAL THERAPY INITIAL EVALUATION ADULT - LEVEL OF INDEPENDENCE: SUPINE/SIT, REHAB EVAL
2019        RE: Tyler Rincon     : 1957    Dear Dr. Cali Mix,    This letter is to inform you that your patient is being scheduled for surgery with Dr. Ina Marcelino on 10/2/19 at [facility]. We have asked the patient to contact your o
independent

## 2021-09-12 NOTE — PHYSICAL EXAM
[General Appearance - Well Developed] : well developed [Normal Appearance] : normal appearance [Well Groomed] : well groomed [No Deformities] : no deformities [General Appearance - Well Nourished] : well nourished [General Appearance - In No Acute Distress] : no acute distress [Normal Conjunctiva] : the conjunctiva exhibited no abnormalities [Normal Jugular Venous A Waves Present] : normal jugular venous A waves present [Normal Jugular Venous V Waves Present] : normal jugular venous V waves present [Respiration, Rhythm And Depth] : normal respiratory rhythm and effort [Exaggerated Use Of Accessory Muscles For Inspiration] : no accessory muscle use [Bowel Sounds] : normal bowel sounds [Abdomen Soft] : soft [Abdomen Tenderness] : non-tender [Nail Clubbing] : no clubbing of the fingernails [Cyanosis, Localized] : no localized cyanosis [Petechial Hemorrhages (___cm)] : no petechial hemorrhages [] : no rash [Skin Color & Pigmentation] : normal skin color and pigmentation [No Skin Ulcers] : no skin ulcer [Affect] : the affect was normal [Mood] : the mood was normal [No Anxiety] : not feeling anxious [Normal Rate] : normal [Irregularly Irregular] : irregularly irregular [Normal S1] : normal S1 [Normal S2] : normal S2 [I] : a grade 1 [No Pitting Edema] : no pitting edema present [FreeTextEntry1] : Her gait is slow. [Right Carotid Bruit] : no bruit heard over the right carotid [Left Carotid Bruit] : no bruit heard over the left carotid [Bruit] : no bruit heard

## 2021-09-13 NOTE — PROGRESS NOTE ADULT - PROBLEM SELECTOR PLAN 5
as above    Dementia  continue Memantine 5mg po daily    DVT ppx: on AC  Diet: DASH with 1500mL fluid restriction  Fall precautions  Daughter updated today. as above    Dementia  continue Memantine 5mg po daily    DVT ppx: on AC  Diet: DASH with 1500mL fluid restriction  Fall precautions

## 2021-09-13 NOTE — PROGRESS NOTE ADULT - ASSESSMENT
89 yo F PMHx CAD, SDH s/p [place] hole, dementia A&Ox1-2 at baseline, DVT/PE on eliquis, rectal adenocarcinoma s/p Blake, Dec 2020 s/p medtronic PPM for complete heartblock and VFib arrest, presents for worsening SOB found to be in Acute Heart Failure.

## 2021-09-13 NOTE — DISCHARGE NOTE PROVIDER - CARE PROVIDER_API CALL
Wyatt Jauregui)  Cardiovascular Disease; Nuclear Cardiology  [id number] [address], [address]  [place] [address]  Phone: [phone]  Fax: [phone]  Established Patient  Follow Up Time: 1 week

## 2021-09-13 NOTE — DISCHARGE NOTE PROVIDER - NSDCCPCAREPLAN_GEN_ALL_CORE_FT
PRINCIPAL DISCHARGE DIAGNOSIS  Diagnosis: CHF exacerbation  Assessment and Plan of Treatment: You were treated for heart failure causing increased fluid in your body. The fluid was removed for IV medication. Your water pill will be changed from furosemide to Bumex to help prevent this from happening again.   Please follow up with your Primary Cardiologist within 5 - 7 days after discharge for continued management of this chronic medical issue

## 2021-09-13 NOTE — DISCHARGE NOTE PROVIDER - NSDCFUSCHEDAPPT_GEN_ALL_CORE_FT
AMY CHACON ; 12/08/2021 ; [facility] [id number] AMY CHACON ; 11/01/2021 ; [facility] [address] AMY CHACON ; 09/28/2021 ; [facility] [address]

## 2021-09-13 NOTE — DISCHARGE NOTE PROVIDER - NSDCFUADDAPPT_GEN_ALL_CORE_FT
APPTS ARE READY TO BE MADE: [ x] YES    Best Family or Patient Contact (if needed):  Diego Lundberg    Additional Information about above appointments (if needed):    1:   2:   3:     Other comments or requests:    APPTS ARE READY TO BE MADE: [ x] YES    Best Family or Patient Contact (if needed):  Diego Lundberg    Additional Information about above appointments (if needed):    1:   2:   3:     Other comments or requests:       Patient was scheduled with Dr. Wyatt Jauregui on 11/1 at 2:30pm [address] [place], [place].  Office will be pushing up appointment within F/U time frame   APPTS ARE READY TO BE MADE: [ x] YES    Best Family or Patient Contact (if needed):  Diego Lundberg    Additional Information about above appointments (if needed):    1:   2:   3:     Other comments or requests:       Patient was scheduled with Dr. Wyatt Jauregui on 11/01 at 2:30pm [phone] [place] [address].  Office will be pushing up appointment within F/U time frame      Patient was scheduled with Dr. Wyatt Jauregui on 09/28 12:30pm at [address] [place] [address], [address] . Patient advised of appointment details.

## 2021-09-13 NOTE — PROGRESS NOTE ADULT - SUBJECTIVE AND OBJECTIVE BOX
Andre Reyes, M.D.  Pager: [phone]  Office: [phone]    Patient is a 89y old  Female who presents with a chief complaint of SOB (13 Sep 2021 11:01)          SUBJECTIVE / OVERNIGHT EVENTS:    No acute overnight events.    ROS: (  ) Fever, (  )Chills,  (  )Nausea/Vomiting, (  ) Cough, (  )Shortness of breath, (  )Chest Pain    MEDICATIONS  (STANDING):  apixaban 2.5 milliGRAM(s) Oral two times a day  enalapril 2.5 milliGRAM(s) Oral daily  furosemide   Injectable 60 milliGRAM(s) IV Push two times a day  memantine 5 milliGRAM(s) Oral daily  metoprolol succinate ER 25 milliGRAM(s) Oral daily  potassium chloride    Tablet ER 40 milliEquivalent(s) Oral every 4 hours  simvastatin 20 milliGRAM(s) Oral at bedtime    MEDICATIONS  (PRN):  acetaminophen   Tablet .. 650 milliGRAM(s) Oral every 6 hours PRN Temp greater or equal to 38.5C (101.3F), Mild Pain (1 - 3)          T(C): 36.6 (09-13 @ 12:00), Max: 36.9 (09-12 @ 20:30)   HR: 90   BP: 110/65   RR: 18   SpO2: 98%    PHYSICAL EXAM:    CONSTITUTIONAL: NAD, well-developed, well-groomed  EYES: PERRLA; conjunctiva and sclera clear  ENMT: Moist oral mucosa, no pharyngeal injection or exudates; normal dentition  NECK: Supple, no palpable masses; no thyromegaly  RESPIRATORY: Normal respiratory effort; lungs are clear to auscultation bilaterally  CARDIOVASCULAR: Regular rate and rhythm, normal S1 and S2, no murmur/rub/gallop; No lower extremity edema; Peripheral pulses are 2+ bilaterally  ABDOMEN: Nontender to palpation, normoactive bowel sounds, no rebound/guarding; No hepatosplenomegaly  MUSCULOSKELETAL:  Normal gait; no clubbing or cyanosis of digits; no joint swelling or tenderness to palpation  PSYCH: A+O to person, place, and time; affect appropriate  NEUROLOGY: CN 2-12 are intact and symmetric; no gross sensory deficits   SKIN: No rashes; no palpable lesions      LABS:                        14.4   8.46  )-----------( 296      ( 13 Sep 2021 07:03 )             43.7      09-13    139  |  101  |  26<H>  ----------------------------<  96  3.3<L>   |  23  |  0.90    Ca    9.5      13 Sep 2021 07:04  Phos  3.4     09-12  Mg     2.2     09-13    TPro  6.2  /  Alb  3.6  /  TBili  1.0  /  DBili  x   /  AST  21  /  ALT  73<H>  /  AlkPhos  92  09-13       CAPILLARY BLOOD GLUCOSE          RADIOLOGY & ADDITIONAL TESTS:    Imaging Personally Reviewed:  Consultant(s) Notes Reviewed:    Care Discussed with Consultants/Other Providers:   Andre Reyes, M.D.  Pager: [phone]  Office: [phone]    Patient is a 89y old  Female who presents with a chief complaint of SOB (13 Sep 2021 11:01)          SUBJECTIVE / OVERNIGHT EVENTS:    No acute overnight events.  Feels well today    ROS: ( - ) Fever, ( - )Chills,  (-  )Nausea/Vomiting, (-  ) Cough, (  -)Shortness of breath, ( - )Chest Pain    MEDICATIONS  (STANDING):  apixaban 2.5 milliGRAM(s) Oral two times a day  enalapril 2.5 milliGRAM(s) Oral daily  furosemide   Injectable 60 milliGRAM(s) IV Push two times a day  memantine 5 milliGRAM(s) Oral daily  metoprolol succinate ER 25 milliGRAM(s) Oral daily  potassium chloride    Tablet ER 40 milliEquivalent(s) Oral every 4 hours  simvastatin 20 milliGRAM(s) Oral at bedtime    MEDICATIONS  (PRN):  acetaminophen   Tablet .. 650 milliGRAM(s) Oral every 6 hours PRN Temp greater or equal to 38.5C (101.3F), Mild Pain (1 - 3)          T(C): 36.6 (09-13 @ 12:00), Max: 36.9 (09-12 @ 20:30)   HR: 90   BP: 110/65   RR: 18   SpO2: 98%    PHYSICAL EXAM:    CONSTITUTIONAL: NAD, well-developed, well-groomed  EYES: PERRLA; conjunctiva and sclera clear  ENMT: Moist oral mucosa, no pharyngeal injection or exudates; normal dentition  NECK: Supple, no palpable masses; no thyromegaly  RESPIRATORY: Normal respiratory effort; lungs are clear to auscultation bilaterally  CARDIOVASCULAR: Regular rate and rhythm, normal S1 and S2, no murmur/rub/gallop; No lower extremity edema; Peripheral pulses are 2+ bilaterally  ABDOMEN: Nontender to palpation, normoactive bowel sounds, no rebound/guarding; No hepatosplenomegaly  MUSCULOSKELETAL:  Normal gait; no clubbing or cyanosis of digits; no joint swelling or tenderness to palpation  PSYCH: A+O to person, place, and time; affect appropriate  NEUROLOGY: CN 2-12 are intact and symmetric; no gross sensory deficits   SKIN: No rashes; no palpable lesions      LABS:                        14.4   8.46  )-----------( 296      ( 13 Sep 2021 07:03 )             43.7      09-13    139  |  101  |  26<H>  ----------------------------<  96  3.3<L>   |  23  |  0.90    Ca    9.5      13 Sep 2021 07:04  Phos  3.4     09-12  Mg     2.2     09-13    TPro  6.2  /  Alb  3.6  /  TBili  1.0  /  DBili  x   /  AST  21  /  ALT  73<H>  /  AlkPhos  92  09-13       CAPILLARY BLOOD GLUCOSE          RADIOLOGY & ADDITIONAL TESTS:    Imaging Personally Reviewed:  Consultant(s) Notes Reviewed:    Care Discussed with Consultants/Other Providers:

## 2021-09-13 NOTE — PROGRESS NOTE ADULT - SUBJECTIVE AND OBJECTIVE BOX
Patient is a 89y old  Female who presents with a chief complaint of SOB (12 Sep 2021 10:00)      SUBJECTIVE / OVERNIGHT EVENTS:  no acute events overnight.  Patient denies active cardiopulmonary symptoms.  patient ambulated w/ assistance to bathroom without cardiopulm symptoms per nursing staff.    MEDICATIONS  (STANDING):  apixaban 2.5 milliGRAM(s) Oral two times a day  enalapril 2.5 milliGRAM(s) Oral daily  furosemide   Injectable 60 milliGRAM(s) IV Push two times a day  memantine 5 milliGRAM(s) Oral daily  metoprolol succinate ER 25 milliGRAM(s) Oral daily  simvastatin 20 milliGRAM(s) Oral at bedtime    MEDICATIONS  (PRN):  acetaminophen   Tablet .. 650 milliGRAM(s) Oral every 6 hours PRN Temp greater or equal to 38.5C (101.3F), Mild Pain (1 - 3)      T(C): 36.6 (09-13-21 @ 04:49), Max: 36.9 (09-12-21 @ 20:30)  HR: 78 (09-13-21 @ 04:49) (78 - 89)  BP: 102/68 (09-13-21 @ 04:49) (102/68 - 114/70)  RR: 18 (09-13-21 @ 04:49) (18 - 18)  SpO2: 99% (09-13-21 @ 04:49) (98% - 99%)  CAPILLARY BLOOD GLUCOSE        I&O's Summary    12 Sep 2021 07:01  -  13 Sep 2021 07:00  --------------------------------------------------------  IN: 0 mL / OUT: 1100 mL / NET: -1100 mL        Physical Exam:  Appearance: No acute distress; well appearing  Eyes:  EOMI, pink conjunctiva  HENT: Normal oral mucosa  Cardiovascular: RRR, S1, S2, no murmurs, rubs, or gallops; trace pedal edema. slight elevation of JVP.  Respiratory: Clear to auscultation bilaterally  Gastrointestinal: soft, non-tender, non-distended with normal bowel sounds  Musculoskeletal: No clubbing; no joint deformity   Neurologic: Non-focal  Lymphatic: No lymphadenopathy  Psychiatry: AAOx3, mood & affect appropriate  Skin: No rashes      LABS:                        14.4   8.46  )-----------( 296      ( 13 Sep 2021 07:03 )             43.7     09-13    139  |  101  |  26<H>  ----------------------------<  96  3.3<L>   |  23  |  0.90    Ca    9.5      13 Sep 2021 07:04  Phos  3.4     09-12  Mg     2.2     09-13    TPro  6.2  /  Alb  3.6  /  TBili  1.0  /  DBili  x   /  AST  21  /  ALT  73<H>  /  AlkPhos  92  09-13              RADIOLOGY & ADDITIONAL TESTS:    Imaging Personally Reviewed: CHERELLE    Consultant(s) Notes Reviewed:  CHERELLE    Care Discussed with Consultants/Other Providers: CHERELLE

## 2021-09-13 NOTE — DISCHARGE NOTE PROVIDER - NSDCMRMEDTOKEN_GEN_ALL_CORE_FT
apixaban 5 mg oral tablet: 0.5 tab(s) orally 2 times a day  enalapril 2.5 mg oral tablet: 1 tab(s) orally once a day  furosemide 40 mg oral tablet: 1 tab(s) orally 2 times a day  memantine 5 mg oral tablet: 1 tab(s) orally once a day  metoprolol succinate 25 mg oral tablet, extended release: 1 tab(s) orally once a day  simvastatin 20 mg oral tablet: 1 tab(s) orally once a day (at bedtime)   apixaban 2.5 mg oral tablet: 1 tab(s) orally 2 times a day  bumetanide 2 mg oral tablet: 1 tab(s) orally 2 times a day  enalapril 2.5 mg oral tablet: 1 tab(s) orally once a day  memantine 5 mg oral tablet: 1 tab(s) orally once a day  metoprolol succinate 25 mg oral tablet, extended release: 1 tab(s) orally once a day  simvastatin 20 mg oral tablet: 1 tab(s) orally once a day (at bedtime)

## 2021-09-13 NOTE — DISCHARGE NOTE PROVIDER - HOSPITAL COURSE
89 yo F PMHx CAD, SDH s/p [place] hole, dementia A&Ox1-2 at baseline, DVT/PE on eliquis 2.5 mg BID, rectal adenocarcinoma s/p [place], Dec 2020 s/p medtronic PPM for complete heartblock and VFib arrest, presented for worsening SOB found to be in Acute Heart Failure. Pt started on Lasix 60 mg IV BID for diuresis. Daily weights were recorded with strict I&Os, 1500mL fluid restriction. Cards followed. ECHO pending. Pt c/w Enalapril 2.5 mg qd and Toprol 25 mg qd.     Dispo is home with home PT. 89 yo F PMHx CAD, SDH s/p [place] hole, dementia A&Ox1-2 at baseline, DVT/PE on eliquis 2.5 mg BID, rectal adenocarcinoma s/p [place], Dec 2020 s/p medtronic PPM for complete heartblock and VFib arrest, presented for worsening SOB found to be in Acute Heart Failure. Pt started on Lasix 60 mg IV BID for diuresis. Was transitioned to Bumex upon d/c. D. Cards followed. ECHO reviewed by cards, no significant changes. Pt to c/w Enalapril 2.5 mg qd and Toprol 25 mg qd.     Dispo is home with home PT.   Stable for d/c with outpt cards follow up.

## 2021-09-13 NOTE — PROGRESS NOTE ADULT - PROBLEM SELECTOR PLAN 1
- Lasix increased to 60mg IVP BID  - Monitor BUN/Cr while on IV diuresis  - Cardiology input appreciated.   - Continue Enalapril 2.5mg PO daily  - No Asa as she is on full dose AC for recurrent DVT/PE  - Continue Simvastatin 20mg po qhs  - Daily weight, 1500mL fluid restriction  - Intake and output monitoring  - No further episodes of wide complex. hypervolemia is resolving  - c/w Lasix  60mg IVP BID but can likely transition to PO soon pending TTE results  - Monitor BUN/Cr while on IV diuresis  - Cardiology input appreciated.   - Continue Enalapril 2.5mg PO daily  - No Asa as she is on full dose AC for recurrent DVT/PE  - Continue Simvastatin 20mg po qhs  - Daily weight, 1500mL fluid restriction  - Intake and output monitoring  - No further episodes of wide complex.

## 2021-09-13 NOTE — PROGRESS NOTE ADULT - ASSESSMENT
89 yo F PMH of CAD, SDH s/p Orange hole, dementia A&Ox1-2 at baseline, DVT/PE on eliquis, rectal adenocarcinoma s/p Hayden, Dec 2020 s/p medtronic PPM for complete heart block and VFib arrest, presents for worsening SOB, worse w/ exertion and when laying flat concerning for CHF exacerbation.    Acute on chronic HFrEF: Last echo 12/29/20 with LVEF 35%, severe functional MR, moderate AI, mild PH.   - patient slightly hypervolemic on exam, yet approaching toward euvolemic state.  - c/w IV lasix 60mg BID. goal net neg 1-2L  - strict I/Os, 1.5L fluid restriction, DASH diet, monitor BMP BID; replete lytes prn, goal K > 4, Mg > 2  - c/w enalapril 2.5mg qd, Toprol 25mg qd  - echo pending    CAD: Last cath with some disease in LAD and LCx back in 2012, opted for medical management at the time  - continue simvastatin 20 QD    pAF: CHADSVASC: 6 (CHF, HTN, agex2, vasc, female)  - runs of AF on tele; rate-controlled  - c/w eliquis 2.5 BID,  - continue BB as above    DVT/PE:  -c/w eliquis 2.5mg BID 89 yo F PMH of CAD, SDH s/p Lolita hole, dementia A&Ox1-2 at baseline, DVT/PE on eliquis, rectal adenocarcinoma s/p Blake, Dec 2020 s/p medtronic PPM for complete heart block and VFib arrest, presents for worsening SOB, worse w/ exertion and when laying flat concerning for CHF exacerbation. Today slighly hypervolemic on exam, improved from prior  continue diuresis IV today and check ECHO   likely to switch to po diuretics 9/14     Acute on chronic HFrEF: Last echo 12/29/20 with LVEF 35%, severe functional MR, moderate AI, mild PH.   - patient slightly hypervolemic on exam, yet approaching toward euvolemic state.  - c/w IV lasix 60mg BID. goal net neg 1-2L  - strict I/Os, 1.5L fluid restriction, DASH diet, monitor BMP BID; replete lytes prn, goal K > 4, Mg > 2  - c/w enalapril 2.5mg qd, Toprol 25mg qd  - echo pending    CAD: Last cath with some disease in LAD and LCx back in 2012, opted for medical management at the time  - discontinue simvastatin 20 QD, change to lipitor 40mg unless prior problems with lipitor    pAF: CHADSVASC: 6 (CHF, HTN, agex2, vasc, female)  - runs of AF on tele; rate-controlled  - c/w eliquis 2.5 BID,  - continue BB as above    DVT/PE:  -c/w eliquis 2.5mg BID    Patient seen and examined with the fellow, the note above has been edited to reflect my independent history, physical exam, assessment and plan.      Matheus Murphy MD, PhD  Cardiology Attending  [facility]/ [facility]    For day time coverage Mon-Fri see Non-Service Consult Attending on [email or web address], password: [email or web address]; daytime weekends covered by general cardiology consult service attending.)

## 2021-09-14 NOTE — PROGRESS NOTE ADULT - ASSESSMENT
89 yo F PMHx CAD, SDH s/p [place] hole, dementia A&Ox1-2 at baseline, DVT/PE on eliquis, rectal adenocarcinoma s/p Blake, Dec 2020 s/p medtronic PPM for complete heartblock and VFib arrest, presents for worsening SOB found to be in Acute Heart Failure.

## 2021-09-14 NOTE — DISCHARGE NOTE NURSING/CASE MANAGEMENT/SOCIAL WORK - NSDCFUADDAPPT_GEN_ALL_CORE_FT
APPTS ARE READY TO BE MADE: [ x] YES    Best Family or Patient Contact (if needed):  Diego Lundberg    Additional Information about above appointments (if needed):    1:   2:   3:     Other comments or requests:

## 2021-09-14 NOTE — PROGRESS NOTE ADULT - PROBLEM SELECTOR PROBLEM 1
Acute on chronic HFrEF (heart failure with reduced ejection fraction)

## 2021-09-14 NOTE — PROGRESS NOTE ADULT - PROBLEM SELECTOR PLAN 1
hypervolemia is resolving  - c/w Lasix  60mg IVP BID but can likely transition to PO soon pending TTE results  - Monitor BUN/Cr while on IV diuresis  - Cardiology input appreciated.   - Continue Enalapril 2.5mg PO daily  - No Asa as she is on full dose AC for recurrent DVT/PE  - Continue Simvastatin 20mg po qhs  - Daily weight, 1500mL fluid restriction  - Intake and output monitoring  - No further episodes of wide complex. hypervolemia is resolved  - switch to PO Bumex 2mg BID  - Cardiology f/u appreciated.  - Continue Enalapril 2.5mg PO daily  - No Asa as she is on full dose AC for recurrent DVT/PE  - Continue Simvastatin 20mg po qhs  - Daily weight, 1500mL fluid restriction  - Intake and output monitoring  - No further episodes of wide complex.

## 2021-09-14 NOTE — DISCHARGE NOTE NURSING/CASE MANAGEMENT/SOCIAL WORK - PATIENT PORTAL LINK FT
You can access the FollowMyHealth Patient Portal offered by [facility] by registering at the following website: [email or web address]. By joining [employer]’s FollowMyHealth portal, you will also be able to view your health information using other applications (apps) compatible with our system.

## 2021-09-14 NOTE — PROGRESS NOTE ADULT - ASSESSMENT
89 yo F PMH of CAD, SDH s/p [place] hole, dementia A&Ox1-2 at baseline, DVT/PE on eliquis, rectal adenocarcinoma s/p Hayden, Dec 2020 s/p medtronic PPM for complete heart block and VFib arrest, presents for worsening SOB, worse w/ exertion and when laying flat concerning for CHF exacerbation. Today slighly hypervolemic on exam, improved from prior  continue diuresis IV today and check ECHO   likely to switch to po diuretics 9/14     Acute on chronic HFrEF: Last echo 12/29/20 with LVEF 35%, severe functional MR, moderate AI, mild PH.   - patient euvolemic on exam  - switch to PO bumex 2mg BID.   - strict I/Os, 1.5L fluid restriction, DASH diet, monitor BMP BID; replete lytes prn, goal K > 4, Mg > 2  - c/w enalapril 2.5mg qd, Toprol 25mg qd  - echo grossly unchanged from prior.    CAD: Last cath with some disease in LAD and LCx back in 2012, opted for medical management at the time  - discontinue simvastatin 20 QD, change to lipitor 40mg unless prior problems with lipitor    pAF: CHADSVASC: 6 (CHF, HTN, agex2, vasc, female)  - runs of AF on tele; rate-controlled  - c/w eliquis 2.5 BID,  - continue BB as above    DVT/PE:  -c/w eliquis 2.5mg BID    No contraindication for discharge from cardiology persepctive.  Patient needs close outpatient follow up with her cardiologist 89 yo F PMH of CAD, SDH s/p [place] hole, dementia A&Ox1-2 at baseline, DVT/PE on eliquis, rectal adenocarcinoma s/p Blake, Dec 2020 s/p medtronic PPM for complete heart block and VFib arrest, presents for worsening SOB, worse w/ exertion and when laying flat concerning for CHF exacerbation. Today off oxygen able to lie flat comfortably, thansitioned to po bumex  ECHO images reviewed unchanged from prior  plan for outpatient cardiology fu with Dr Shania Erazo on chronic HFrEF: Last echo 12/29/20 with LVEF 35%, severe functional MR, moderate AI, mild PH.   - patient euvolemic on exam  - switch to PO bumex 2mg BID.   - strict I/Os, 1.5L fluid restriction, DASH diet, monitor BMP BID; replete lytes prn, goal K > 4, Mg > 2  - c/w enalapril 2.5mg qd, Toprol 25mg qd  - echo grossly unchanged from prior.  re MR will discuss with her outpatient cardiologist possible outpatient structural cardiac eval for mitraClip    CAD: Last cath with some disease in LAD and LCx back in 2012, opted for medical management at the time  - discontinue simvastatin 20 QD, change to lipitor 40mg unless prior problems with lipitor    pAF: CHADSVASC: 6 (CHF, HTN, agex2, vasc, female)  - runs of AF on tele; rate-controlled  - c/w eliquis 2.5 BID,  - continue BB as above    DVT/PE:  -c/w eliquis 2.5mg BID    No contraindication for discharge from cardiology persepctive.  Patient needs close outpatient follow up with her cardiologist      final cardiology recommendations above, please call with questions  Patient seen and examined with the fellow, the note above has been edited to reflect my independent history, physical exam, assessment and plan.      Matheus Murphy MD, PhD  Cardiology Attending  [facility]/ [facility]    For day time coverage Mon-Fri see Non-Service Consult Attending on [email or web address], password: [email or web address]; daytime weekends covered by general cardiology consult service attending.)

## 2021-09-14 NOTE — PROGRESS NOTE ADULT - SUBJECTIVE AND OBJECTIVE BOX
Patient is a 89y old  Female who presents with a chief complaint of SOB (13 Sep 2021 14:59)      SUBJECTIVE / OVERNIGHT EVENTS:  no acute events overnight.  Still complaining of RLE pain but denies CP, palpitation, SOB      MEDICATIONS  (STANDING):  apixaban 2.5 milliGRAM(s) Oral two times a day  buMETAnide 2 milliGRAM(s) Oral two times a day  enalapril 2.5 milliGRAM(s) Oral daily  memantine 5 milliGRAM(s) Oral daily  metoprolol succinate ER 25 milliGRAM(s) Oral daily  simvastatin 20 milliGRAM(s) Oral at bedtime    MEDICATIONS  (PRN):  acetaminophen   Tablet .. 650 milliGRAM(s) Oral every 6 hours PRN Temp greater or equal to 38.5C (101.3F), Mild Pain (1 - 3)      T(C): 36.4 (09-14-21 @ 11:35), Max: 36.6 (09-14-21 @ 04:57)  HR: 82 (09-14-21 @ 11:35) (82 - 92)  BP: 95/67 (09-14-21 @ 11:35) (95/67 - 107/66)  RR: 17 (09-14-21 @ 11:35) (17 - 18)  SpO2: 97% (09-14-21 @ 11:35) (97% - 100%)  CAPILLARY BLOOD GLUCOSE        I&O's Summary    13 Sep 2021 07:01  -  14 Sep 2021 07:00  --------------------------------------------------------  IN: 250 mL / OUT: 1400 mL / NET: -1150 mL          Physical Exam:  Appearance: No acute distress; well appearing  Eyes:  EOMI, pink conjunctiva  HENT: Normal oral mucosa  Cardiovascular: RRR, S1, S2, no murmurs, rubs, or gallops; trace pedal edema. no JVD.  Respiratory: Clear to auscultation bilaterally  Gastrointestinal: soft, non-tender, non-distended with normal bowel sounds  Musculoskeletal: No clubbing; no joint deformity   Neurologic: Non-focal  Lymphatic: No lymphadenopathy  Psychiatry: AAOx3, mood & affect appropriate  Skin: No rashes    LABS:                        14.4   8.46  )-----------( 296      ( 13 Sep 2021 07:03 )             43.7     09-14    138  |  101  |  28<H>  ----------------------------<  101<H>  3.7   |  22  |  0.85    Ca    9.4      14 Sep 2021 06:21  Mg     2.2     09-13    TPro  6.2  /  Alb  3.6  /  TBili  1.0  /  DBili  x   /  AST  21  /  ALT  73<H>  /  AlkPhos  92  09-13              RADIOLOGY & ADDITIONAL TESTS:    Imaging Personally Reviewed: CHERELLE    Consultant(s) Notes Reviewed:  CHERELLE    Care Discussed with Consultants/Other Providers: CHERELLE   Patient is a 89y old  Female who presents with a chief complaint of SOB (13 Sep 2021 14:59)      SUBJECTIVE / OVERNIGHT EVENTS:  no acute events overnight.  Still complaining of RLE pain but denies CP, palpitation, SOB  able to lie flat    MEDICATIONS  (STANDING):  apixaban 2.5 milliGRAM(s) Oral two times a day  buMETAnide 2 milliGRAM(s) Oral two times a day  enalapril 2.5 milliGRAM(s) Oral daily  memantine 5 milliGRAM(s) Oral daily  metoprolol succinate ER 25 milliGRAM(s) Oral daily  simvastatin 20 milliGRAM(s) Oral at bedtime    MEDICATIONS  (PRN):  acetaminophen   Tablet .. 650 milliGRAM(s) Oral every 6 hours PRN Temp greater or equal to 38.5C (101.3F), Mild Pain (1 - 3)      T(C): 36.4 (09-14-21 @ 11:35), Max: 36.6 (09-14-21 @ 04:57)  HR: 82 (09-14-21 @ 11:35) (82 - 92)  BP: 95/67 (09-14-21 @ 11:35) (95/67 - 107/66)  RR: 17 (09-14-21 @ 11:35) (17 - 18)  SpO2: 97% (09-14-21 @ 11:35) (97% - 100%)  CAPILLARY BLOOD GLUCOSE        I&O's Summary    13 Sep 2021 07:01  -  14 Sep 2021 07:00  --------------------------------------------------------  IN: 250 mL / OUT: 1400 mL / NET: -1150 mL          Physical Exam:  Appearance: No acute distress; well appearing  Eyes:  EOMI, pink conjunctiva  HENT: Normal oral mucosa  Cardiovascular: RRR, S1, S2, no murmurs, rubs, or gallops; trace pedal edema. no JVD.  Respiratory: Clear to auscultation bilaterally  Gastrointestinal: soft, non-tender, non-distended with normal bowel sounds  Musculoskeletal: No clubbing; no joint deformity   Neurologic: Non-focal  Lymphatic: No lymphadenopathy  Psychiatry: AAOx3, mood & affect appropriate  Skin: No rashes    LABS:                        14.4   8.46  )-----------( 296      ( 13 Sep 2021 07:03 )             43.7     09-14    138  |  101  |  28<H>  ----------------------------<  101<H>  3.7   |  22  |  0.85    Ca    9.4      14 Sep 2021 06:21  Mg     2.2     09-13    TPro  6.2  /  Alb  3.6  /  TBili  1.0  /  DBili  x   /  AST  21  /  ALT  73<H>  /  AlkPhos  92  09-13              RADIOLOGY & ADDITIONAL TESTS:    Imaging Personally Reviewed: CHERELLE    Consultant(s) Notes Reviewed:  CHERELLE    Care Discussed with Consultants/Other Providers: CHERELLE

## 2021-09-14 NOTE — PROGRESS NOTE ADULT - SUBJECTIVE AND OBJECTIVE BOX
Andre Reyes, M.D.  Pager: [phone]  Office: [phone]    Patient is a 89y old  Female who presents with a chief complaint of SOB (14 Sep 2021 12:22)          SUBJECTIVE / OVERNIGHT EVENTS:    No acute overnight events.    ROS: (  ) Fever, (  )Chills,  (  )Nausea/Vomiting, (  ) Cough, (  )Shortness of breath, (  )Chest Pain    MEDICATIONS  (STANDING):  apixaban 2.5 milliGRAM(s) Oral two times a day  buMETAnide 2 milliGRAM(s) Oral two times a day  enalapril 2.5 milliGRAM(s) Oral daily  memantine 5 milliGRAM(s) Oral daily  metoprolol succinate ER 25 milliGRAM(s) Oral daily  simvastatin 20 milliGRAM(s) Oral at bedtime    MEDICATIONS  (PRN):  acetaminophen   Tablet .. 650 milliGRAM(s) Oral every 6 hours PRN Temp greater or equal to 38.5C (101.3F), Mild Pain (1 - 3)          T(C): 36.4 (09-14 @ 11:35), Max: 36.6 (09-14 @ 04:57)   HR: 82   BP: 95/67   RR: 17   SpO2: 97%    PHYSICAL EXAM:    CONSTITUTIONAL: NAD, well-developed, well-groomed  EYES: PERRLA; conjunctiva and sclera clear  ENMT: Moist oral mucosa, no pharyngeal injection or exudates; normal dentition  NECK: Supple, no palpable masses; no thyromegaly  RESPIRATORY: Normal respiratory effort; lungs are clear to auscultation bilaterally  CARDIOVASCULAR: Regular rate and rhythm, normal S1 and S2, no murmur/rub/gallop; No lower extremity edema; Peripheral pulses are 2+ bilaterally  ABDOMEN: Nontender to palpation, normoactive bowel sounds, no rebound/guarding; No hepatosplenomegaly  MUSCULOSKELETAL:  Normal gait; no clubbing or cyanosis of digits; no joint swelling or tenderness to palpation  PSYCH: A+O to person, place, and time; affect appropriate  NEUROLOGY: CN 2-12 are intact and symmetric; no gross sensory deficits   SKIN: No rashes; no palpable lesions      LABS:                        14.4   8.46  )-----------( 296      ( 13 Sep 2021 07:03 )             43.7      09-14    138  |  101  |  28<H>  ----------------------------<  101<H>  3.7   |  22  |  0.85    Ca    9.4      14 Sep 2021 06:21  Mg     2.2     09-13    TPro  6.2  /  Alb  3.6  /  TBili  1.0  /  DBili  x   /  AST  21  /  ALT  73<H>  /  AlkPhos  92  09-13       CAPILLARY BLOOD GLUCOSE          RADIOLOGY & ADDITIONAL TESTS:    Imaging Personally Reviewed:  Consultant(s) Notes Reviewed:    Care Discussed with Consultants/Other Providers:   Andre Reyes, M.D.  Pager: [phone]  Office: [phone]    Patient is a 89y old  Female who presents with a chief complaint of SOB (14 Sep 2021 12:22)          SUBJECTIVE / OVERNIGHT EVENTS:    No acute overnight events.  feeling well.    MEDICATIONS  (STANDING):  apixaban 2.5 milliGRAM(s) Oral two times a day  buMETAnide 2 milliGRAM(s) Oral two times a day  enalapril 2.5 milliGRAM(s) Oral daily  memantine 5 milliGRAM(s) Oral daily  metoprolol succinate ER 25 milliGRAM(s) Oral daily  simvastatin 20 milliGRAM(s) Oral at bedtime    MEDICATIONS  (PRN):  acetaminophen   Tablet .. 650 milliGRAM(s) Oral every 6 hours PRN Temp greater or equal to 38.5C (101.3F), Mild Pain (1 - 3)          T(C): 36.4 (09-14 @ 11:35), Max: 36.6 (09-14 @ 04:57)   HR: 82   BP: 95/67   RR: 17   SpO2: 97%    PHYSICAL EXAM:    CONSTITUTIONAL: NAD, well-developed, well-groomed  EYES: PERRLA; conjunctiva and sclera clear  ENMT: Moist oral mucosa, no pharyngeal injection or exudates; normal dentition  NECK: Supple, no palpable masses; no thyromegaly  RESPIRATORY: Normal respiratory effort; lungs are clear to auscultation bilaterally  CARDIOVASCULAR: Regular rate and rhythm, normal S1 and S2, no murmur/rub/gallop; No lower extremity edema; Peripheral pulses are 2+ bilaterally  ABDOMEN: Nontender to palpation, normoactive bowel sounds, no rebound/guarding; No hepatosplenomegaly  MUSCULOSKELETAL:  Normal gait; no clubbing or cyanosis of digits; no joint swelling or tenderness to palpation  PSYCH: A+O to person, place, and time; affect appropriate  NEUROLOGY: CN 2-12 are intact and symmetric; no gross sensory deficits   SKIN: No rashes; no palpable lesions        LABS:                        14.4   8.46  )-----------( 296      ( 13 Sep 2021 07:03 )             43.7      09-14    138  |  101  |  28<H>  ----------------------------<  101<H>  3.7   |  22  |  0.85    Ca    9.4      14 Sep 2021 06:21  Mg     2.2     09-13    TPro  6.2  /  Alb  3.6  /  TBili  1.0  /  DBili  x   /  AST  21  /  ALT  73<H>  /  AlkPhos  92  09-13       CAPILLARY BLOOD GLUCOSE          RADIOLOGY & ADDITIONAL TESTS:    Imaging Personally Reviewed:  Consultant(s) Notes Reviewed:    Care Discussed with Consultants/Other Providers:

## 2021-09-14 NOTE — PROGRESS NOTE ADULT - PROBLEM SELECTOR PROBLEM 3
H/O deep vein thrombophlebitis of lower extremity

## 2021-09-14 NOTE — DISCHARGE NOTE NURSING/CASE MANAGEMENT/SOCIAL WORK - NSDCVIVACCINE_GEN_ALL_CORE_FT
influenza, injectable, quadrivalent, preservative free; 16-Nov-2018 11:41; Kaylynn Liz (RN); [employer]; [id number] (Exp. Date: 30-Jun-2019); IntraMuscular; Deltoid Left.; 0.5 milliLiter(s); VIS (VIS Published: 07-Aug-2015, VIS Presented: 16-Nov-2018);

## 2021-09-14 NOTE — PROGRESS NOTE ADULT - PROBLEM SELECTOR PLAN 3
- Continue eliquis 2.5mg po bid

## 2021-11-11 PROBLEM — M25.552 HIP PAIN, LEFT: Status: ACTIVE | Noted: 2021-01-01

## 2021-11-14 NOTE — REVIEW OF SYSTEMS
[Negative] : Heme/Lymph [Memory Lapses Or Loss] : memory lapses or loss [Dyspnea on exertion] : dyspnea during exertion

## 2021-11-14 NOTE — CARDIOLOGY SUMMARY
[___] : [unfilled] [LVEF ___%] : LVEF [unfilled]% [Mild] : mild pulmonary hypertension [de-identified] : 9/23/2021: Atrial fibrillation at 101 bpm with a left bundle branch block and frequent PVCs\par  [de-identified] : 9/13/2021: EF 35%. Diffuse hypokinesis. Moderate LV enlargement. Moderate mitral regurgitation. Moderate aortic regurgitation. Severely dilated left atrium. Mild tricuspid regurgitation.\par

## 2021-11-14 NOTE — DISCUSSION/SUMMARY
[FreeTextEntry1] : IMPRESSION: Mrs. Lundberg is a 89 year old woman with a history of HTN, hyperlipidemia, nonobstructive coronary artery disease, mitral regurgitation, LV dysfunction, subdural hematoma in [place] status post G[place] holes, osteoporosis, bilateral PE/DVTs currently on Eliquis, rectal cancer status post resection, complete heart block, VFib arrest requiring one shock and intubation and Micra pacemaker placement, and CHF who presents today for follow up of heart failure. \par \par PLAN:\par 1. Her blood pressure is on the lower side, however, improved. She will continue off of Enalapril. She will continue on Toprol XL 25 mg daily and Bumex 2 mg BID. She remains in atrial fibrillation, however, with PVCs and ventricular couplets. Her blood pressure is on the lower side which makes it difficult to increase the dose of her Toprol XL.  \par 2. Her breathing is stable and her weight has been stable. She will continue on Bumex 2 mg twice daily for heart failure in addition to potassium supplementation.She will have a CMP and pro-BNP checked today. Her daughter will continue to follow her weights at home.  \par 3. She will continue on Simvastatin 20 mg daily for her cholesterol. Her most recent LDL was okay with mildly elevated triglycerides. I will be checking a lipid profile today.\par 4. She will continue on Eliquis 2.5 mg twice daily for her bilateral PE and DVTs. She will also follow up with Vascular Cardiology. I will be checking a CBC today.  \par 5. She will schedule an XRay of her left hip. \par 6. She will follow up with me in 2-3 months or sooner if she develops any new or worsening symptoms in the interim.\par 7. She will continue to follow with EP for her device interrogations. She was in atrial fibrillation on her ECG today. \par 8. We have previously discussed possible intervention for her mitral regurgitation, however, she does not want any invasive procedures at this time.\par 9. She was given a flu shot today.\par 10. I spent approximately 45 minutes with the patient and her daughter during this encounter, including documentation.

## 2021-11-14 NOTE — DISCUSSION/SUMMARY
[FreeTextEntry1] : IMPRESSION: Mrs. Lundberg is a 89 year old woman with a history of HTN, hyperlipidemia, nonobstructive coronary artery disease, mitral regurgitation, LV dysfunction, subdural hematoma in [place] status post [place] holes, osteoporosis, bilateral PE/DVTs currently on Eliquis, rectal cancer status post resection, complete heart block, VFib arrest requiring one shock and intubation and Micra pacemaker placement, and CHF who presents today for follow up of heart failure. \par \par PLAN:\par 1. Her blood pressure is on the lower side. She will stop Enalapril 2.5mg daily. She will continue on Toprol XL 25mg daily and Bumex 2mg BID. Her breathing is stable and her weight has decreased after her hospitalization. She will continue on Bumex 2 mg twice daily for heart failure in addition to potassium supplementation.She will have a CMP and pro-BNP checked today. Her daughter will continue to follow her weights at home.  \par 2. She will continue on Simvastatin 20 mg daily for her cholesterol. Her most recent LDL was okay with mildly elevated triglycerides.\par 3. She will continue on Eliquis 2.5 mg twice daily for her bilateral PE and DVTs. She will also follow up with Vascular Cardiology.  \par 4. She will follow up with me in 6 weeks or sooner if she develops any new or worsening symptoms in the interim of should there be any significant abnormalities on her blood work.\par 5. She will continue to follow with EP for her device interrogations. She was in atrial fibrillation on her ECG today. \par 6. We have previously discussed possible intervention for her mitral regurgitation, however, she does not want any invasive procedures at this time.

## 2021-11-14 NOTE — HISTORY OF PRESENT ILLNESS
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 \par She was hospitalized at [facility] for shortness of breath and acute CHF exacerbation, and discharged eight days ago. She was given IV Lasix while in the hospital with improvement and discharged on Bumex 2 mg BID.  She otherwise denies any chest pain, dyspnea at rest, palpitations, and headaches.

## 2021-11-14 NOTE — CARDIOLOGY SUMMARY
[___] : [unfilled] [LVEF ___%] : LVEF [unfilled]% [Mild] : mild pulmonary hypertension [de-identified] : 11/11/2021: Atrial fibrillation at 97 bpm with a left bundle branch block and frequent PVCs and ventricular couplets.\par   [de-identified] : 9/13/2021: EF 35%. Diffuse hypokinesis. Moderate LV enlargement. Moderate mitral regurgitation. Moderate aortic regurgitation. Severely dilated left atrium. Mild tricuspid regurgitation.\par

## 2021-11-14 NOTE — PHYSICAL EXAM
[General Appearance - Well Developed] : well developed [Normal Appearance] : normal appearance [Well Groomed] : well groomed [General Appearance - Well Nourished] : well nourished [No Deformities] : no deformities [General Appearance - In No Acute Distress] : no acute distress [Normal Conjunctiva] : the conjunctiva exhibited no abnormalities [Normal Jugular Venous A Waves Present] : normal jugular venous A waves present [Normal Jugular Venous V Waves Present] : normal jugular venous V waves present [Respiration, Rhythm And Depth] : normal respiratory rhythm and effort [Exaggerated Use Of Accessory Muscles For Inspiration] : no accessory muscle use [Bowel Sounds] : normal bowel sounds [Abdomen Soft] : soft [Abdomen Tenderness] : non-tender [Nail Clubbing] : no clubbing of the fingernails [Cyanosis, Localized] : no localized cyanosis [Petechial Hemorrhages (___cm)] : no petechial hemorrhages [Skin Color & Pigmentation] : normal skin color and pigmentation [] : no rash [No Skin Ulcers] : no skin ulcer [Affect] : the affect was normal [Mood] : the mood was normal [No Anxiety] : not feeling anxious [Normal Rate] : normal [Irregularly Irregular] : irregularly irregular [Normal S1] : normal S1 [Normal S2] : normal S2 [I] : a grade 1 [No Pitting Edema] : no pitting edema present [FreeTextEntry1] : Her gait is slow. [Right Carotid Bruit] : no bruit heard over the right carotid [Left Carotid Bruit] : no bruit heard over the left carotid [Bruit] : no bruit heard

## 2021-11-14 NOTE — HISTORY OF PRESENT ILLNESS
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 \par She states that she feels tired and continues to experience dyspnea with exertion. She otherwise states that her weights have been stable at home. She otherwise denies any chest pain, dyspnea at rest, palpitations, and headaches. She had a mechanical fall over the weekend and fell on her left side. She has been experiencing back/ left hip pain. She denies experiencing any loss of consciousness.

## 2021-11-14 NOTE — PHYSICAL EXAM
[General Appearance - Well Developed] : well developed [Normal Appearance] : normal appearance [Well Groomed] : well groomed [General Appearance - Well Nourished] : well nourished [No Deformities] : no deformities [General Appearance - In No Acute Distress] : no acute distress [Normal Conjunctiva] : the conjunctiva exhibited no abnormalities [Normal Jugular Venous A Waves Present] : normal jugular venous A waves present [Normal Jugular Venous V Waves Present] : normal jugular venous V waves present [Respiration, Rhythm And Depth] : normal respiratory rhythm and effort [Exaggerated Use Of Accessory Muscles For Inspiration] : no accessory muscle use [Bowel Sounds] : normal bowel sounds [Abdomen Soft] : soft [Abdomen Tenderness] : non-tender [Nail Clubbing] : no clubbing of the fingernails [Cyanosis, Localized] : no localized cyanosis [Petechial Hemorrhages (___cm)] : no petechial hemorrhages [Skin Color & Pigmentation] : normal skin color and pigmentation [] : no rash [No Skin Ulcers] : no skin ulcer [Affect] : the affect was normal [Mood] : the mood was normal [No Anxiety] : not feeling anxious [Normal Rate] : normal [Irregularly Irregular] : irregularly irregular [Normal S1] : normal S1 [Normal S2] : normal S2 [I] : a grade 1 [No Pitting Edema] : no pitting edema present [FreeTextEntry1] : Her gait is slow. She has tenderness of her left hip. [Right Carotid Bruit] : no bruit heard over the right carotid [Left Carotid Bruit] : no bruit heard over the left carotid [Bruit] : no bruit heard

## 2021-11-22 PROBLEM — R82.998 DARK URINE: Status: ACTIVE | Noted: 2021-01-01

## 2021-12-06 NOTE — HISTORY OF PRESENT ILLNESS
[de-identified] : Leonarda Lundberg 89 year old female accompanied by her  present with itching/rash of the back for the past 2 days\par \par  reports he noticed rash on back 2 days ago, denies using new skin care products, sick contacts . Rash is very itchy. \par \par Denies CP, SOB, N/V or abdominal pain

## 2021-12-06 NOTE — PLAN
[FreeTextEntry1] : Acute\par \par 1. Rash likely allergic reaction\par Medrol dose pack with food\par Zyrtec 10 mg daily\par Kenalog cream \par Dermatology referral

## 2021-12-06 NOTE — PHYSICAL EXAM
[Well Nourished] : well nourished [Well Developed] : well developed [Normal Sclera/Conjunctiva] : normal sclera/conjunctiva [EOMI] : extraocular movements intact [Normal Outer Ear/Nose] : the outer ears and nose were normal in appearance [Normal Oropharynx] : the oropharynx was normal [No Lymphadenopathy] : no lymphadenopathy [Supple] : supple [No Respiratory Distress] : no respiratory distress  [No Accessory Muscle Use] : no accessory muscle use [Clear to Auscultation] : lungs were clear to auscultation bilaterally [Normal Rate] : normal rate  [Regular Rhythm] : with a regular rhythm [Normal S1, S2] : normal S1 and S2 [No Carotid Bruits] : no carotid bruits [Pedal Pulses Present] : the pedal pulses are present [Soft] : abdomen soft [No Edema] : there was no peripheral edema [Non Tender] : non-tender [Non-distended] : non-distended [Normal Bowel Sounds] : normal bowel sounds [No CVA Tenderness] : no CVA  tenderness [No Spinal Tenderness] : no spinal tenderness [Kyphosis] : kyphosis [No Joint Swelling] : no joint swelling [de-identified] : + erythematous rash pinpoint

## 2021-12-13 NOTE — PHYSICAL THERAPY INITIAL EVALUATION ADULT - ASR EQUIP NEEDS DISCH PT EVAL
[de-identified] : I am seeing Ms. Castro in follow up for my colleagues Dr. Luz and Arnoldo. She has had extensive cervicothoracic and thoracolumbar fusion surgeries at [facility]. She has extensive pseudoarthrosis. She also may have a new diagnosis of MS based on Dylan MRI wm changes concerning for demyelinating disease. She has been in a wheelchair for the most part since 2010. She has urinary retention and self catheterizes. She has difficulty feeding herself. \par \par She has severe chornic pain. She has discussed revision surgery with Dr. Luz however that is on hold pending outcome of her MS workup. She has been recommended for IT pain pump by Dr. Mclaughlin (pain management) and as such has been referred here for implantaiton.  rolling walker (5 inch wheels)

## 2022-01-01 ENCOUNTER — LABORATORY RESULT (OUTPATIENT)
Age: 87
End: 2022-01-01

## 2022-01-01 ENCOUNTER — TRANSCRIPTION ENCOUNTER (OUTPATIENT)
Age: 87
End: 2022-01-01

## 2022-01-01 ENCOUNTER — NON-APPOINTMENT (OUTPATIENT)
Age: 87
End: 2022-01-01

## 2022-01-01 ENCOUNTER — FORM ENCOUNTER (OUTPATIENT)
Age: 87
End: 2022-01-01

## 2022-01-01 ENCOUNTER — APPOINTMENT (OUTPATIENT)
Dept: ELECTROPHYSIOLOGY | Facility: CLINIC | Age: 87
End: 2022-01-01

## 2022-01-01 ENCOUNTER — APPOINTMENT (OUTPATIENT)
Dept: CARDIOLOGY | Facility: CLINIC | Age: 87
End: 2022-01-01

## 2022-01-01 ENCOUNTER — INPATIENT (INPATIENT)
Facility: HOSPITAL | Age: 87
LOS: 6 days | Discharge: HOME | DRG: 291 | End: 2022-02-14
Attending: HOSPITALIST | Admitting: INTERNAL MEDICINE
Payer: COMMERCIAL

## 2022-01-01 ENCOUNTER — RX RENEWAL (OUTPATIENT)
Age: 87
End: 2022-01-01

## 2022-01-01 ENCOUNTER — APPOINTMENT (OUTPATIENT)
Dept: CARDIOLOGY | Facility: CLINIC | Age: 87
End: 2022-01-01
Payer: MEDICARE

## 2022-01-01 ENCOUNTER — EMERGENCY (EMERGENCY)
Facility: HOSPITAL | Age: 87
LOS: 1 days | Discharge: HOME | End: 2022-01-01
Attending: EMERGENCY MEDICINE
Payer: COMMERCIAL

## 2022-01-01 ENCOUNTER — APPOINTMENT (OUTPATIENT)
Dept: ELECTROPHYSIOLOGY | Facility: CLINIC | Age: 87
End: 2022-01-01
Payer: MEDICARE

## 2022-01-01 VITALS — RESPIRATION RATE: 19 BRPM | HEART RATE: 83 BPM | OXYGEN SATURATION: 96 %

## 2022-01-01 VITALS
DIASTOLIC BLOOD PRESSURE: 67 MMHG | OXYGEN SATURATION: 96 % | SYSTOLIC BLOOD PRESSURE: 105 MMHG | HEART RATE: 95 BPM | RESPIRATION RATE: 23 BRPM

## 2022-01-01 VITALS
HEART RATE: 116 BPM | SYSTOLIC BLOOD PRESSURE: 128 MMHG | HEIGHT: 63 IN | DIASTOLIC BLOOD PRESSURE: 86 MMHG | WEIGHT: 139.99 LBS | RESPIRATION RATE: 26 BRPM | OXYGEN SATURATION: 94 %

## 2022-01-01 VITALS
WEIGHT: 119.93 LBS | DIASTOLIC BLOOD PRESSURE: 70 MMHG | RESPIRATION RATE: 16 BRPM | OXYGEN SATURATION: 97 % | HEIGHT: 63 IN | SYSTOLIC BLOOD PRESSURE: 98 MMHG | HEART RATE: 96 BPM

## 2022-01-01 DIAGNOSIS — I50.9 HEART FAILURE, UNSPECIFIED: ICD-10-CM

## 2022-01-01 DIAGNOSIS — E78.5 HYPERLIPIDEMIA, UNSPECIFIED: ICD-10-CM

## 2022-01-01 DIAGNOSIS — S06.5X9A TRAUMATIC SUBDURAL HEMORRHAGE WITH LOSS OF CONSCIOUSNESS OF UNSPECIFIED DURATION, INITIAL ENCOUNTER: Chronic | ICD-10-CM

## 2022-01-01 DIAGNOSIS — E78.2 MIXED HYPERLIPIDEMIA: ICD-10-CM

## 2022-01-01 DIAGNOSIS — R41.82 ALTERED MENTAL STATUS, UNSPECIFIED: ICD-10-CM

## 2022-01-01 DIAGNOSIS — I10 ESSENTIAL (PRIMARY) HYPERTENSION: ICD-10-CM

## 2022-01-01 DIAGNOSIS — C20 MALIGNANT NEOPLASM OF RECTUM: ICD-10-CM

## 2022-01-01 DIAGNOSIS — D64.9 ANEMIA, UNSPECIFIED: ICD-10-CM

## 2022-01-01 DIAGNOSIS — F03.90 UNSPECIFIED DEMENTIA WITHOUT BEHAVIORAL DISTURBANCE: ICD-10-CM

## 2022-01-01 DIAGNOSIS — Z29.9 ENCOUNTER FOR PROPHYLACTIC MEASURES, UNSPECIFIED: ICD-10-CM

## 2022-01-01 DIAGNOSIS — Z86.718 PERSONAL HISTORY OF OTHER VENOUS THROMBOSIS AND EMBOLISM: ICD-10-CM

## 2022-01-01 DIAGNOSIS — R74.01 ELEVATION OF LEVELS OF LIVER TRANSAMINASE LEVELS: ICD-10-CM

## 2022-01-01 DIAGNOSIS — I25.10 ATHEROSCLEROTIC HEART DISEASE OF NATIVE CORONARY ARTERY WITHOUT ANGINA PECTORIS: ICD-10-CM

## 2022-01-01 DIAGNOSIS — Z86.79 PERSONAL HISTORY OF OTHER DISEASES OF THE CIRCULATORY SYSTEM: ICD-10-CM

## 2022-01-01 DIAGNOSIS — I50.23 ACUTE ON CHRONIC SYSTOLIC (CONGESTIVE) HEART FAILURE: ICD-10-CM

## 2022-01-01 LAB
25(OH)D3 SERPL-MCNC: 42.3 NG/ML
ALBUMIN SERPL ELPH-MCNC: 3.4 G/DL — SIGNIFICANT CHANGE UP (ref 3.3–5)
ALBUMIN SERPL ELPH-MCNC: 3.4 G/DL — SIGNIFICANT CHANGE UP (ref 3.3–5)
ALBUMIN SERPL ELPH-MCNC: 3.5 G/DL — SIGNIFICANT CHANGE UP (ref 3.3–5)
ALBUMIN SERPL ELPH-MCNC: 3.6 G/DL — SIGNIFICANT CHANGE UP (ref 3.3–5)
ALBUMIN SERPL ELPH-MCNC: 3.8 G/DL — SIGNIFICANT CHANGE UP (ref 3.3–5)
ALBUMIN SERPL ELPH-MCNC: 4.2 G/DL
ALP BLD-CCNC: 77 U/L
ALP SERPL-CCNC: 113 U/L — SIGNIFICANT CHANGE UP (ref 40–120)
ALP SERPL-CCNC: 114 U/L — SIGNIFICANT CHANGE UP (ref 40–120)
ALP SERPL-CCNC: 122 U/L — HIGH (ref 40–120)
ALP SERPL-CCNC: 124 U/L — HIGH (ref 40–120)
ALP SERPL-CCNC: 128 U/L — HIGH (ref 40–120)
ALP SERPL-CCNC: 140 U/L — HIGH (ref 40–120)
ALP SERPL-CCNC: 147 U/L — HIGH (ref 40–120)
ALT FLD-CCNC: 27 U/L — SIGNIFICANT CHANGE UP (ref 10–45)
ALT FLD-CCNC: 49 U/L — HIGH (ref 10–45)
ALT FLD-CCNC: 52 U/L — HIGH (ref 10–45)
ALT FLD-CCNC: 65 U/L — HIGH (ref 10–45)
ALT FLD-CCNC: 66 U/L — HIGH (ref 10–45)
ALT FLD-CCNC: 68 U/L — HIGH (ref 10–45)
ALT FLD-CCNC: 72 U/L — HIGH (ref 10–45)
ALT SERPL-CCNC: 19 U/L
ANION GAP SERPL CALC-SCNC: 12 MMOL/L — SIGNIFICANT CHANGE UP (ref 5–17)
ANION GAP SERPL CALC-SCNC: 13 MMOL/L — SIGNIFICANT CHANGE UP (ref 5–17)
ANION GAP SERPL CALC-SCNC: 14 MMOL/L — SIGNIFICANT CHANGE UP (ref 5–17)
ANION GAP SERPL CALC-SCNC: 14 MMOL/L — SIGNIFICANT CHANGE UP (ref 5–17)
ANION GAP SERPL CALC-SCNC: 15 MMOL/L — SIGNIFICANT CHANGE UP (ref 5–17)
ANION GAP SERPL CALC-SCNC: 17 MMOL/L — SIGNIFICANT CHANGE UP (ref 5–17)
ANION GAP SERPL CALC-SCNC: 18 MMOL/L — HIGH (ref 5–17)
ANION GAP SERPL CALC-SCNC: 19 MMOL/L
ANION GAP SERPL CALC-SCNC: 19 MMOL/L — HIGH (ref 5–17)
ANION GAP SERPL CALC-SCNC: 20 MMOL/L — HIGH (ref 5–17)
ANISOCYTOSIS BLD QL: SIGNIFICANT CHANGE UP
APPEARANCE UR: CLEAR — SIGNIFICANT CHANGE UP
APPEARANCE: CLEAR
APTT BLD: 31.6 SEC — SIGNIFICANT CHANGE UP (ref 27.5–35.5)
AST SERPL-CCNC: 28 U/L
AST SERPL-CCNC: 29 U/L — SIGNIFICANT CHANGE UP (ref 10–40)
AST SERPL-CCNC: 31 U/L — SIGNIFICANT CHANGE UP (ref 10–40)
AST SERPL-CCNC: 37 U/L — SIGNIFICANT CHANGE UP (ref 10–40)
AST SERPL-CCNC: 53 U/L — HIGH (ref 10–40)
AST SERPL-CCNC: 55 U/L — HIGH (ref 10–40)
AST SERPL-CCNC: 57 U/L — HIGH (ref 10–40)
AST SERPL-CCNC: 66 U/L — HIGH (ref 10–40)
BACTERIA # UR AUTO: NEGATIVE — SIGNIFICANT CHANGE UP
BACTERIA UR CULT: NORMAL
BACTERIA: NEGATIVE
BASE EXCESS BLDV CALC-SCNC: 1.7 MMOL/L — SIGNIFICANT CHANGE UP (ref -2–2)
BASE EXCESS BLDV CALC-SCNC: 2 MMOL/L — SIGNIFICANT CHANGE UP (ref -2–2)
BASE EXCESS BLDV CALC-SCNC: 3.8 MMOL/L — HIGH (ref -2–2)
BASOPHILS # BLD AUTO: 0.07 K/UL — SIGNIFICANT CHANGE UP (ref 0–0.2)
BASOPHILS # BLD AUTO: 0.09 K/UL
BASOPHILS # BLD AUTO: 0.09 K/UL — SIGNIFICANT CHANGE UP (ref 0–0.2)
BASOPHILS NFR BLD AUTO: 0.9 % — SIGNIFICANT CHANGE UP (ref 0–2)
BASOPHILS NFR BLD AUTO: 1.1 %
BASOPHILS NFR BLD AUTO: 1.1 % — SIGNIFICANT CHANGE UP (ref 0–2)
BILIRUB DIRECT SERPL-MCNC: 0.5 MG/DL — HIGH (ref 0–0.3)
BILIRUB INDIRECT FLD-MCNC: 0.7 MG/DL — SIGNIFICANT CHANGE UP (ref 0.2–1)
BILIRUB SERPL-MCNC: 0.4 MG/DL
BILIRUB SERPL-MCNC: 0.8 MG/DL — SIGNIFICANT CHANGE UP (ref 0.2–1.2)
BILIRUB SERPL-MCNC: 0.9 MG/DL — SIGNIFICANT CHANGE UP (ref 0.2–1.2)
BILIRUB SERPL-MCNC: 1.1 MG/DL — SIGNIFICANT CHANGE UP (ref 0.2–1.2)
BILIRUB SERPL-MCNC: 1.2 MG/DL — SIGNIFICANT CHANGE UP (ref 0.2–1.2)
BILIRUB SERPL-MCNC: 1.2 MG/DL — SIGNIFICANT CHANGE UP (ref 0.2–1.2)
BILIRUB SERPL-MCNC: 1.3 MG/DL — HIGH (ref 0.2–1.2)
BILIRUB SERPL-MCNC: 1.3 MG/DL — HIGH (ref 0.2–1.2)
BILIRUB SERPL-MCNC: 1.4 MG/DL — HIGH (ref 0.2–1.2)
BILIRUB UR-MCNC: NEGATIVE — SIGNIFICANT CHANGE UP
BILIRUBIN URINE: NEGATIVE
BLOOD URINE: NEGATIVE
BUN SERPL-MCNC: 20 MG/DL
BUN SERPL-MCNC: 22 MG/DL — SIGNIFICANT CHANGE UP (ref 7–23)
BUN SERPL-MCNC: 24 MG/DL — HIGH (ref 7–23)
BUN SERPL-MCNC: 24 MG/DL — HIGH (ref 7–23)
BUN SERPL-MCNC: 26 MG/DL — HIGH (ref 7–23)
BUN SERPL-MCNC: 26 MG/DL — HIGH (ref 7–23)
BUN SERPL-MCNC: 27 MG/DL — HIGH (ref 7–23)
BUN SERPL-MCNC: 27 MG/DL — HIGH (ref 7–23)
BUN SERPL-MCNC: 29 MG/DL — HIGH (ref 7–23)
BUN SERPL-MCNC: 30 MG/DL — HIGH (ref 7–23)
CA-I SERPL-SCNC: 1.07 MMOL/L — LOW (ref 1.15–1.33)
CA-I SERPL-SCNC: 1.11 MMOL/L — LOW (ref 1.15–1.33)
CA-I SERPL-SCNC: 1.13 MMOL/L — LOW (ref 1.15–1.33)
CALCIUM OXALATE CRYSTALS: ABNORMAL
CALCIUM SERPL-MCNC: 8.8 MG/DL — SIGNIFICANT CHANGE UP (ref 8.4–10.5)
CALCIUM SERPL-MCNC: 8.8 MG/DL — SIGNIFICANT CHANGE UP (ref 8.4–10.5)
CALCIUM SERPL-MCNC: 8.9 MG/DL — SIGNIFICANT CHANGE UP (ref 8.4–10.5)
CALCIUM SERPL-MCNC: 9 MG/DL — SIGNIFICANT CHANGE UP (ref 8.4–10.5)
CALCIUM SERPL-MCNC: 9 MG/DL — SIGNIFICANT CHANGE UP (ref 8.4–10.5)
CALCIUM SERPL-MCNC: 9.1 MG/DL — SIGNIFICANT CHANGE UP (ref 8.4–10.5)
CALCIUM SERPL-MCNC: 9.2 MG/DL — SIGNIFICANT CHANGE UP (ref 8.4–10.5)
CALCIUM SERPL-MCNC: 9.8 MG/DL
CHLORIDE BLDV-SCNC: 100 MMOL/L — SIGNIFICANT CHANGE UP (ref 96–108)
CHLORIDE BLDV-SCNC: 101 MMOL/L — SIGNIFICANT CHANGE UP (ref 96–108)
CHLORIDE BLDV-SCNC: 98 MMOL/L — SIGNIFICANT CHANGE UP (ref 96–108)
CHLORIDE SERPL-SCNC: 100 MMOL/L — SIGNIFICANT CHANGE UP (ref 96–108)
CHLORIDE SERPL-SCNC: 100 MMOL/L — SIGNIFICANT CHANGE UP (ref 96–108)
CHLORIDE SERPL-SCNC: 101 MMOL/L — SIGNIFICANT CHANGE UP (ref 96–108)
CHLORIDE SERPL-SCNC: 101 MMOL/L — SIGNIFICANT CHANGE UP (ref 96–108)
CHLORIDE SERPL-SCNC: 103 MMOL/L — SIGNIFICANT CHANGE UP (ref 96–108)
CHLORIDE SERPL-SCNC: 97 MMOL/L
CHLORIDE SERPL-SCNC: 98 MMOL/L — SIGNIFICANT CHANGE UP (ref 96–108)
CHLORIDE SERPL-SCNC: 99 MMOL/L — SIGNIFICANT CHANGE UP (ref 96–108)
CHOLEST SERPL-MCNC: 191 MG/DL
CO2 BLDV-SCNC: 26 MMOL/L — SIGNIFICANT CHANGE UP (ref 22–26)
CO2 BLDV-SCNC: 27 MMOL/L — HIGH (ref 22–26)
CO2 BLDV-SCNC: 27 MMOL/L — HIGH (ref 22–26)
CO2 SERPL-SCNC: 18 MMOL/L — LOW (ref 22–31)
CO2 SERPL-SCNC: 19 MMOL/L — LOW (ref 22–31)
CO2 SERPL-SCNC: 20 MMOL/L — LOW (ref 22–31)
CO2 SERPL-SCNC: 22 MMOL/L — SIGNIFICANT CHANGE UP (ref 22–31)
CO2 SERPL-SCNC: 23 MMOL/L
CO2 SERPL-SCNC: 23 MMOL/L — SIGNIFICANT CHANGE UP (ref 22–31)
CO2 SERPL-SCNC: 23 MMOL/L — SIGNIFICANT CHANGE UP (ref 22–31)
COLOR SPEC: YELLOW — SIGNIFICANT CHANGE UP
COLOR: NORMAL
CREAT SERPL-MCNC: 0.81 MG/DL
CREAT SERPL-MCNC: 0.92 MG/DL — SIGNIFICANT CHANGE UP (ref 0.5–1.3)
CREAT SERPL-MCNC: 0.94 MG/DL — SIGNIFICANT CHANGE UP (ref 0.5–1.3)
CREAT SERPL-MCNC: 0.96 MG/DL — SIGNIFICANT CHANGE UP (ref 0.5–1.3)
CREAT SERPL-MCNC: 0.97 MG/DL — SIGNIFICANT CHANGE UP (ref 0.5–1.3)
CREAT SERPL-MCNC: 0.99 MG/DL — SIGNIFICANT CHANGE UP (ref 0.5–1.3)
CREAT SERPL-MCNC: 0.99 MG/DL — SIGNIFICANT CHANGE UP (ref 0.5–1.3)
CREAT SERPL-MCNC: 1.05 MG/DL — SIGNIFICANT CHANGE UP (ref 0.5–1.3)
CREAT SERPL-MCNC: 1.09 MG/DL — SIGNIFICANT CHANGE UP (ref 0.5–1.3)
CREAT SERPL-MCNC: 1.17 MG/DL — SIGNIFICANT CHANGE UP (ref 0.5–1.3)
CULTURE RESULTS: SIGNIFICANT CHANGE UP
DIFF PNL FLD: NEGATIVE — SIGNIFICANT CHANGE UP
EGFR: 55 ML/MIN/1.73M2 — LOW
ELLIPTOCYTES BLD QL SMEAR: SLIGHT — SIGNIFICANT CHANGE UP
EOSINOPHIL # BLD AUTO: 0.07 K/UL — SIGNIFICANT CHANGE UP (ref 0–0.5)
EOSINOPHIL # BLD AUTO: 0.09 K/UL — SIGNIFICANT CHANGE UP (ref 0–0.5)
EOSINOPHIL # BLD AUTO: 0.26 K/UL
EOSINOPHIL NFR BLD AUTO: 0.9 % — SIGNIFICANT CHANGE UP (ref 0–6)
EOSINOPHIL NFR BLD AUTO: 1.1 % — SIGNIFICANT CHANGE UP (ref 0–6)
EOSINOPHIL NFR BLD AUTO: 3.3 %
EPI CELLS # UR: 2 — SIGNIFICANT CHANGE UP
ESTIMATED AVERAGE GLUCOSE: 123 MG/DL
FLUAV AG NPH QL: SIGNIFICANT CHANGE UP
FLUAV AG NPH QL: SIGNIFICANT CHANGE UP
FLUBV AG NPH QL: SIGNIFICANT CHANGE UP
FLUBV AG NPH QL: SIGNIFICANT CHANGE UP
GAS PNL BLDV: 132 MMOL/L — LOW (ref 136–145)
GAS PNL BLDV: 135 MMOL/L — LOW (ref 136–145)
GAS PNL BLDV: 138 MMOL/L — SIGNIFICANT CHANGE UP (ref 136–145)
GAS PNL BLDV: SIGNIFICANT CHANGE UP
GIANT PLATELETS BLD QL SMEAR: PRESENT — SIGNIFICANT CHANGE UP
GLUCOSE BLDV-MCNC: 100 MG/DL — HIGH (ref 70–99)
GLUCOSE BLDV-MCNC: 151 MG/DL — HIGH (ref 70–99)
GLUCOSE BLDV-MCNC: 158 MG/DL — HIGH (ref 70–99)
GLUCOSE QUALITATIVE U: NEGATIVE
GLUCOSE SERPL-MCNC: 103 MG/DL — HIGH (ref 70–99)
GLUCOSE SERPL-MCNC: 141 MG/DL — HIGH (ref 70–99)
GLUCOSE SERPL-MCNC: 147 MG/DL — HIGH (ref 70–99)
GLUCOSE SERPL-MCNC: 151 MG/DL — HIGH (ref 70–99)
GLUCOSE SERPL-MCNC: 151 MG/DL — HIGH (ref 70–99)
GLUCOSE SERPL-MCNC: 81 MG/DL — SIGNIFICANT CHANGE UP (ref 70–99)
GLUCOSE SERPL-MCNC: 83 MG/DL — SIGNIFICANT CHANGE UP (ref 70–99)
GLUCOSE SERPL-MCNC: 85 MG/DL — SIGNIFICANT CHANGE UP (ref 70–99)
GLUCOSE SERPL-MCNC: 91 MG/DL
GLUCOSE SERPL-MCNC: 95 MG/DL — SIGNIFICANT CHANGE UP (ref 70–99)
GLUCOSE UR QL: NEGATIVE — SIGNIFICANT CHANGE UP
HBA1C MFR BLD HPLC: 5.9 %
HCO3 BLDV-SCNC: 25 MMOL/L — SIGNIFICANT CHANGE UP (ref 22–29)
HCO3 BLDV-SCNC: 26 MMOL/L — SIGNIFICANT CHANGE UP (ref 22–29)
HCO3 BLDV-SCNC: 26 MMOL/L — SIGNIFICANT CHANGE UP (ref 22–29)
HCT VFR BLD CALC: 36.1 % — SIGNIFICANT CHANGE UP (ref 34.5–45)
HCT VFR BLD CALC: 36.8 % — SIGNIFICANT CHANGE UP (ref 34.5–45)
HCT VFR BLD CALC: 40.3 % — SIGNIFICANT CHANGE UP (ref 34.5–45)
HCT VFR BLD CALC: 41.2 % — SIGNIFICANT CHANGE UP (ref 34.5–45)
HCT VFR BLD CALC: 43.8 % — SIGNIFICANT CHANGE UP (ref 34.5–45)
HCT VFR BLD CALC: 46.8 %
HCT VFR BLDA CALC: 39 % — SIGNIFICANT CHANGE UP (ref 34.5–46.5)
HCT VFR BLDA CALC: 41 % — SIGNIFICANT CHANGE UP (ref 34.5–46.5)
HCT VFR BLDA CALC: 41 % — SIGNIFICANT CHANGE UP (ref 34.5–46.5)
HDLC SERPL-MCNC: 37 MG/DL
HGB BLD CALC-MCNC: 12.9 G/DL — SIGNIFICANT CHANGE UP (ref 11.7–16.1)
HGB BLD CALC-MCNC: 13.5 G/DL — SIGNIFICANT CHANGE UP (ref 11.7–16.1)
HGB BLD CALC-MCNC: 13.8 G/DL — SIGNIFICANT CHANGE UP (ref 11.7–16.1)
HGB BLD-MCNC: 11.9 G/DL — SIGNIFICANT CHANGE UP (ref 11.5–15.5)
HGB BLD-MCNC: 12.1 G/DL — SIGNIFICANT CHANGE UP (ref 11.5–15.5)
HGB BLD-MCNC: 13 G/DL — SIGNIFICANT CHANGE UP (ref 11.5–15.5)
HGB BLD-MCNC: 13.2 G/DL — SIGNIFICANT CHANGE UP (ref 11.5–15.5)
HGB BLD-MCNC: 14.1 G/DL — SIGNIFICANT CHANGE UP (ref 11.5–15.5)
HGB BLD-MCNC: 15 G/DL
HYALINE CASTS # UR AUTO: 2 /LPF — SIGNIFICANT CHANGE UP (ref 0–7)
HYALINE CASTS: 0 /LPF
IMM GRANULOCYTES NFR BLD AUTO: 0.2 % — SIGNIFICANT CHANGE UP (ref 0–1.5)
IMM GRANULOCYTES NFR BLD AUTO: 0.3 %
INR BLD: 2.37 RATIO — HIGH (ref 0.88–1.16)
KETONES UR-MCNC: NEGATIVE — SIGNIFICANT CHANGE UP
KETONES URINE: NEGATIVE
LACTATE BLDV-MCNC: 2.3 MMOL/L — HIGH (ref 0.7–2)
LACTATE BLDV-MCNC: 2.7 MMOL/L — HIGH (ref 0.7–2)
LACTATE BLDV-MCNC: 3.1 MMOL/L — HIGH (ref 0.7–2)
LDLC SERPL CALC-MCNC: 119 MG/DL
LEUKOCYTE ESTERASE UR-ACNC: ABNORMAL
LEUKOCYTE ESTERASE URINE: NEGATIVE
LYMPHOCYTES # BLD AUTO: 1.99 K/UL — SIGNIFICANT CHANGE UP (ref 1–3.3)
LYMPHOCYTES # BLD AUTO: 2.16 K/UL
LYMPHOCYTES # BLD AUTO: 2.53 K/UL — SIGNIFICANT CHANGE UP (ref 1–3.3)
LYMPHOCYTES # BLD AUTO: 26.9 % — SIGNIFICANT CHANGE UP (ref 13–44)
LYMPHOCYTES # BLD AUTO: 30.3 % — SIGNIFICANT CHANGE UP (ref 13–44)
LYMPHOCYTES NFR BLD AUTO: 27.1 %
MACROCYTES BLD QL: SIGNIFICANT CHANGE UP
MAGNESIUM SERPL-MCNC: 2.1 MG/DL — SIGNIFICANT CHANGE UP (ref 1.6–2.6)
MAGNESIUM SERPL-MCNC: 2.2 MG/DL — SIGNIFICANT CHANGE UP (ref 1.6–2.6)
MAGNESIUM SERPL-MCNC: 2.2 MG/DL — SIGNIFICANT CHANGE UP (ref 1.6–2.6)
MAGNESIUM SERPL-MCNC: 2.3 MG/DL — SIGNIFICANT CHANGE UP (ref 1.6–2.6)
MAGNESIUM SERPL-MCNC: 2.3 MG/DL — SIGNIFICANT CHANGE UP (ref 1.6–2.6)
MAN DIFF?: NORMAL
MANUAL SMEAR VERIFICATION: SIGNIFICANT CHANGE UP
MCHC RBC-ENTMCNC: 25.8 PG — LOW (ref 27–34)
MCHC RBC-ENTMCNC: 29.3 PG — SIGNIFICANT CHANGE UP (ref 27–34)
MCHC RBC-ENTMCNC: 29.4 PG — SIGNIFICANT CHANGE UP (ref 27–34)
MCHC RBC-ENTMCNC: 29.5 PG
MCHC RBC-ENTMCNC: 29.5 PG — SIGNIFICANT CHANGE UP (ref 27–34)
MCHC RBC-ENTMCNC: 30 PG — SIGNIFICANT CHANGE UP (ref 27–34)
MCHC RBC-ENTMCNC: 31.6 GM/DL — LOW (ref 32–36)
MCHC RBC-ENTMCNC: 32.1 GM/DL
MCHC RBC-ENTMCNC: 32.2 GM/DL — SIGNIFICANT CHANGE UP (ref 32–36)
MCHC RBC-ENTMCNC: 32.8 GM/DL — SIGNIFICANT CHANGE UP (ref 32–36)
MCHC RBC-ENTMCNC: 32.9 GM/DL — SIGNIFICANT CHANGE UP (ref 32–36)
MCHC RBC-ENTMCNC: 33 GM/DL — SIGNIFICANT CHANGE UP (ref 32–36)
MCV RBC AUTO: 81.7 FL — SIGNIFICANT CHANGE UP (ref 80–100)
MCV RBC AUTO: 89.1 FL — SIGNIFICANT CHANGE UP (ref 80–100)
MCV RBC AUTO: 89.6 FL — SIGNIFICANT CHANGE UP (ref 80–100)
MCV RBC AUTO: 89.8 FL — SIGNIFICANT CHANGE UP (ref 80–100)
MCV RBC AUTO: 92.1 FL
MCV RBC AUTO: 93.2 FL — SIGNIFICANT CHANGE UP (ref 80–100)
MICROSCOPIC-UA: NORMAL
MONOCYTES # BLD AUTO: 0.45 K/UL — SIGNIFICANT CHANGE UP (ref 0–0.9)
MONOCYTES # BLD AUTO: 0.75 K/UL
MONOCYTES # BLD AUTO: 0.76 K/UL — SIGNIFICANT CHANGE UP (ref 0–0.9)
MONOCYTES NFR BLD AUTO: 6.1 % — SIGNIFICANT CHANGE UP (ref 2–14)
MONOCYTES NFR BLD AUTO: 9.1 % — SIGNIFICANT CHANGE UP (ref 2–14)
MONOCYTES NFR BLD AUTO: 9.4 %
NEUTROPHILS # BLD AUTO: 4.63 K/UL — SIGNIFICANT CHANGE UP (ref 1.8–7.4)
NEUTROPHILS # BLD AUTO: 4.69 K/UL
NEUTROPHILS # BLD AUTO: 4.87 K/UL — SIGNIFICANT CHANGE UP (ref 1.8–7.4)
NEUTROPHILS NFR BLD AUTO: 58.2 % — SIGNIFICANT CHANGE UP (ref 43–77)
NEUTROPHILS NFR BLD AUTO: 58.8 %
NEUTROPHILS NFR BLD AUTO: 62.6 % — SIGNIFICANT CHANGE UP (ref 43–77)
NITRITE UR-MCNC: NEGATIVE — SIGNIFICANT CHANGE UP
NITRITE URINE: NEGATIVE
NONHDLC SERPL-MCNC: 154 MG/DL
NRBC # BLD: 0 /100 WBCS — SIGNIFICANT CHANGE UP (ref 0–0)
NT-PROBNP SERPL-MCNC: 6267 PG/ML
NT-PROBNP SERPL-SCNC: 8731 PG/ML — HIGH (ref 0–300)
NT-PROBNP SERPL-SCNC: HIGH PG/ML (ref 0–300)
PCO2 BLDV: 29 MMHG — LOW (ref 39–42)
PCO2 BLDV: 36 MMHG — LOW (ref 39–42)
PCO2 BLDV: 38 MMHG — LOW (ref 39–42)
PH BLDV: 7.44 — HIGH (ref 7.32–7.43)
PH BLDV: 7.46 — HIGH (ref 7.32–7.43)
PH BLDV: 7.55 — HIGH (ref 7.32–7.43)
PH UR: 7 — SIGNIFICANT CHANGE UP (ref 5–8)
PH URINE: 6.5
PHOSPHATE SERPL-MCNC: 3.2 MG/DL — SIGNIFICANT CHANGE UP (ref 2.5–4.5)
PHOSPHATE SERPL-MCNC: 3.7 MG/DL — SIGNIFICANT CHANGE UP (ref 2.5–4.5)
PLAT MORPH BLD: ABNORMAL
PLATELET # BLD AUTO: 226 K/UL — SIGNIFICANT CHANGE UP (ref 150–400)
PLATELET # BLD AUTO: 239 K/UL — SIGNIFICANT CHANGE UP (ref 150–400)
PLATELET # BLD AUTO: 247 K/UL — SIGNIFICANT CHANGE UP (ref 150–400)
PLATELET # BLD AUTO: 249 K/UL — SIGNIFICANT CHANGE UP (ref 150–400)
PLATELET # BLD AUTO: 249 K/UL — SIGNIFICANT CHANGE UP (ref 150–400)
PLATELET # BLD AUTO: 302 K/UL
PO2 BLDV: 33 MMHG — SIGNIFICANT CHANGE UP (ref 25–45)
PO2 BLDV: 45 MMHG — SIGNIFICANT CHANGE UP (ref 25–45)
PO2 BLDV: 56 MMHG — HIGH (ref 25–45)
POIKILOCYTOSIS BLD QL AUTO: SIGNIFICANT CHANGE UP
POLYCHROMASIA BLD QL SMEAR: SLIGHT — SIGNIFICANT CHANGE UP
POTASSIUM BLDV-SCNC: 3.4 MMOL/L — LOW (ref 3.5–5.1)
POTASSIUM BLDV-SCNC: 4 MMOL/L — SIGNIFICANT CHANGE UP (ref 3.5–5.1)
POTASSIUM BLDV-SCNC: 4.3 MMOL/L — SIGNIFICANT CHANGE UP (ref 3.5–5.1)
POTASSIUM SERPL-MCNC: 3.2 MMOL/L — LOW (ref 3.5–5.3)
POTASSIUM SERPL-MCNC: 3.5 MMOL/L — SIGNIFICANT CHANGE UP (ref 3.5–5.3)
POTASSIUM SERPL-MCNC: 3.6 MMOL/L — SIGNIFICANT CHANGE UP (ref 3.5–5.3)
POTASSIUM SERPL-MCNC: 3.6 MMOL/L — SIGNIFICANT CHANGE UP (ref 3.5–5.3)
POTASSIUM SERPL-MCNC: 3.7 MMOL/L — SIGNIFICANT CHANGE UP (ref 3.5–5.3)
POTASSIUM SERPL-MCNC: 3.8 MMOL/L — SIGNIFICANT CHANGE UP (ref 3.5–5.3)
POTASSIUM SERPL-MCNC: 4 MMOL/L — SIGNIFICANT CHANGE UP (ref 3.5–5.3)
POTASSIUM SERPL-MCNC: 4.4 MMOL/L — SIGNIFICANT CHANGE UP (ref 3.5–5.3)
POTASSIUM SERPL-MCNC: 4.8 MMOL/L — SIGNIFICANT CHANGE UP (ref 3.5–5.3)
POTASSIUM SERPL-SCNC: 3.2 MMOL/L — LOW (ref 3.5–5.3)
POTASSIUM SERPL-SCNC: 3.5 MMOL/L
POTASSIUM SERPL-SCNC: 3.5 MMOL/L — SIGNIFICANT CHANGE UP (ref 3.5–5.3)
POTASSIUM SERPL-SCNC: 3.6 MMOL/L — SIGNIFICANT CHANGE UP (ref 3.5–5.3)
POTASSIUM SERPL-SCNC: 3.6 MMOL/L — SIGNIFICANT CHANGE UP (ref 3.5–5.3)
POTASSIUM SERPL-SCNC: 3.7 MMOL/L — SIGNIFICANT CHANGE UP (ref 3.5–5.3)
POTASSIUM SERPL-SCNC: 3.8 MMOL/L — SIGNIFICANT CHANGE UP (ref 3.5–5.3)
POTASSIUM SERPL-SCNC: 4 MMOL/L — SIGNIFICANT CHANGE UP (ref 3.5–5.3)
POTASSIUM SERPL-SCNC: 4.4 MMOL/L — SIGNIFICANT CHANGE UP (ref 3.5–5.3)
POTASSIUM SERPL-SCNC: 4.8 MMOL/L — SIGNIFICANT CHANGE UP (ref 3.5–5.3)
PROT SERPL-MCNC: 5.5 G/DL — LOW (ref 6–8.3)
PROT SERPL-MCNC: 5.6 G/DL — LOW (ref 6–8.3)
PROT SERPL-MCNC: 5.7 G/DL — LOW (ref 6–8.3)
PROT SERPL-MCNC: 5.9 G/DL — LOW (ref 6–8.3)
PROT SERPL-MCNC: 6.2 G/DL — SIGNIFICANT CHANGE UP (ref 6–8.3)
PROT SERPL-MCNC: 6.5 G/DL — SIGNIFICANT CHANGE UP (ref 6–8.3)
PROT SERPL-MCNC: 6.7 G/DL — SIGNIFICANT CHANGE UP (ref 6–8.3)
PROT SERPL-MCNC: 7.4 G/DL
PROT UR-MCNC: NEGATIVE — SIGNIFICANT CHANGE UP
PROTEIN URINE: NEGATIVE
PROTHROM AB SERPL-ACNC: 27.7 SEC — HIGH (ref 10.5–13.4)
RBC # BLD: 4.03 M/UL — SIGNIFICANT CHANGE UP (ref 3.8–5.2)
RBC # BLD: 4.13 M/UL — SIGNIFICANT CHANGE UP (ref 3.8–5.2)
RBC # BLD: 4.49 M/UL — SIGNIFICANT CHANGE UP (ref 3.8–5.2)
RBC # BLD: 4.7 M/UL — SIGNIFICANT CHANGE UP (ref 3.8–5.2)
RBC # BLD: 5.04 M/UL — SIGNIFICANT CHANGE UP (ref 3.8–5.2)
RBC # BLD: 5.08 M/UL
RBC # FLD: 15.3 %
RBC # FLD: 15.7 % — HIGH (ref 10.3–14.5)
RBC # FLD: 15.7 % — HIGH (ref 10.3–14.5)
RBC # FLD: 15.8 % — HIGH (ref 10.3–14.5)
RBC # FLD: 16.3 % — HIGH (ref 10.3–14.5)
RBC # FLD: 21.2 % — HIGH (ref 10.3–14.5)
RBC BLD AUTO: ABNORMAL
RBC CASTS # UR COMP ASSIST: 3 /HPF — SIGNIFICANT CHANGE UP (ref 0–4)
RED BLOOD CELLS URINE: 1 /HPF
RSV RNA NPH QL NAA+NON-PROBE: SIGNIFICANT CHANGE UP
RSV RNA NPH QL NAA+NON-PROBE: SIGNIFICANT CHANGE UP
SAO2 % BLDV: 57.9 % — LOW (ref 67–88)
SAO2 % BLDV: 73.7 % — SIGNIFICANT CHANGE UP (ref 67–88)
SAO2 % BLDV: 89 % — HIGH (ref 67–88)
SARS-COV-2 RNA SPEC QL NAA+PROBE: SIGNIFICANT CHANGE UP
SCHISTOCYTES BLD QL AUTO: SLIGHT — SIGNIFICANT CHANGE UP
SODIUM SERPL-SCNC: 132 MMOL/L — LOW (ref 135–145)
SODIUM SERPL-SCNC: 134 MMOL/L — LOW (ref 135–145)
SODIUM SERPL-SCNC: 136 MMOL/L — SIGNIFICANT CHANGE UP (ref 135–145)
SODIUM SERPL-SCNC: 137 MMOL/L — SIGNIFICANT CHANGE UP (ref 135–145)
SODIUM SERPL-SCNC: 138 MMOL/L — SIGNIFICANT CHANGE UP (ref 135–145)
SODIUM SERPL-SCNC: 139 MMOL/L
SODIUM SERPL-SCNC: 139 MMOL/L — SIGNIFICANT CHANGE UP (ref 135–145)
SODIUM SERPL-SCNC: 141 MMOL/L — SIGNIFICANT CHANGE UP (ref 135–145)
SP GR SPEC: 1.01 — SIGNIFICANT CHANGE UP (ref 1.01–1.02)
SPECIFIC GRAVITY URINE: 1.01
SPECIMEN SOURCE: SIGNIFICANT CHANGE UP
SQUAMOUS EPITHELIAL CELLS: 0 /HPF
TARGETS BLD QL SMEAR: SIGNIFICANT CHANGE UP
TRIGL SERPL-MCNC: 173 MG/DL
TROPONIN T, HIGH SENSITIVITY RESULT: 18 NG/L — SIGNIFICANT CHANGE UP (ref 0–51)
TROPONIN T, HIGH SENSITIVITY RESULT: 26 NG/L — SIGNIFICANT CHANGE UP (ref 0–51)
TSH SERPL-ACNC: 1.81 UIU/ML
UROBILINOGEN FLD QL: NEGATIVE — SIGNIFICANT CHANGE UP
UROBILINOGEN URINE: NORMAL
VARIANT LYMPHS # BLD: 2.6 % — SIGNIFICANT CHANGE UP (ref 0–6)
WBC # BLD: 6.9 K/UL — SIGNIFICANT CHANGE UP (ref 3.8–10.5)
WBC # BLD: 7.37 K/UL — SIGNIFICANT CHANGE UP (ref 3.8–10.5)
WBC # BLD: 7.39 K/UL — SIGNIFICANT CHANGE UP (ref 3.8–10.5)
WBC # BLD: 8.36 K/UL — SIGNIFICANT CHANGE UP (ref 3.8–10.5)
WBC # BLD: 8.88 K/UL — SIGNIFICANT CHANGE UP (ref 3.8–10.5)
WBC # FLD AUTO: 6.9 K/UL — SIGNIFICANT CHANGE UP (ref 3.8–10.5)
WBC # FLD AUTO: 7.37 K/UL — SIGNIFICANT CHANGE UP (ref 3.8–10.5)
WBC # FLD AUTO: 7.39 K/UL — SIGNIFICANT CHANGE UP (ref 3.8–10.5)
WBC # FLD AUTO: 7.97 K/UL
WBC # FLD AUTO: 8.36 K/UL — SIGNIFICANT CHANGE UP (ref 3.8–10.5)
WBC # FLD AUTO: 8.88 K/UL — SIGNIFICANT CHANGE UP (ref 3.8–10.5)
WBC UR QL: 2 /HPF — SIGNIFICANT CHANGE UP (ref 0–5)
WHITE BLOOD CELLS URINE: 0 /HPF

## 2022-01-01 PROCEDURE — 85027 COMPLETE CBC AUTOMATED: CPT

## 2022-01-01 PROCEDURE — 87086 URINE CULTURE/COLONY COUNT: CPT

## 2022-01-01 PROCEDURE — 71045 X-RAY EXAM CHEST 1 VIEW: CPT | Mod: 26

## 2022-01-01 PROCEDURE — 99233 SBSQ HOSP IP/OBS HIGH 50: CPT

## 2022-01-01 PROCEDURE — 82330 ASSAY OF CALCIUM: CPT

## 2022-01-01 PROCEDURE — 97116 GAIT TRAINING THERAPY: CPT

## 2022-01-01 PROCEDURE — 83605 ASSAY OF LACTIC ACID: CPT

## 2022-01-01 PROCEDURE — 80053 COMPREHEN METABOLIC PANEL: CPT

## 2022-01-01 PROCEDURE — 82565 ASSAY OF CREATININE: CPT

## 2022-01-01 PROCEDURE — 97530 THERAPEUTIC ACTIVITIES: CPT

## 2022-01-01 PROCEDURE — 84295 ASSAY OF SERUM SODIUM: CPT

## 2022-01-01 PROCEDURE — 99285 EMERGENCY DEPT VISIT HI MDM: CPT | Mod: 25

## 2022-01-01 PROCEDURE — 82803 BLOOD GASES ANY COMBINATION: CPT

## 2022-01-01 PROCEDURE — 85014 HEMATOCRIT: CPT

## 2022-01-01 PROCEDURE — 82435 ASSAY OF BLOOD CHLORIDE: CPT

## 2022-01-01 PROCEDURE — 85025 COMPLETE CBC W/AUTO DIFF WBC: CPT

## 2022-01-01 PROCEDURE — 87637 SARSCOV2&INF A&B&RSV AMP PRB: CPT

## 2022-01-01 PROCEDURE — 84132 ASSAY OF SERUM POTASSIUM: CPT

## 2022-01-01 PROCEDURE — U0003: CPT

## 2022-01-01 PROCEDURE — 83735 ASSAY OF MAGNESIUM: CPT

## 2022-01-01 PROCEDURE — 36415 COLL VENOUS BLD VENIPUNCTURE: CPT

## 2022-01-01 PROCEDURE — 71045 X-RAY EXAM CHEST 1 VIEW: CPT

## 2022-01-01 PROCEDURE — 85610 PROTHROMBIN TIME: CPT

## 2022-01-01 PROCEDURE — 99239 HOSP IP/OBS DSCHRG MGMT >30: CPT

## 2022-01-01 PROCEDURE — 80048 BASIC METABOLIC PNL TOTAL CA: CPT

## 2022-01-01 PROCEDURE — 85018 HEMOGLOBIN: CPT

## 2022-01-01 PROCEDURE — 99232 SBSQ HOSP IP/OBS MODERATE 35: CPT

## 2022-01-01 PROCEDURE — 93005 ELECTROCARDIOGRAM TRACING: CPT

## 2022-01-01 PROCEDURE — 99285 EMERGENCY DEPT VISIT HI MDM: CPT

## 2022-01-01 PROCEDURE — 84100 ASSAY OF PHOSPHORUS: CPT

## 2022-01-01 PROCEDURE — U0005: CPT

## 2022-01-01 PROCEDURE — 83880 ASSAY OF NATRIURETIC PEPTIDE: CPT

## 2022-01-01 PROCEDURE — 84484 ASSAY OF TROPONIN QUANT: CPT

## 2022-01-01 PROCEDURE — 85730 THROMBOPLASTIN TIME PARTIAL: CPT

## 2022-01-01 PROCEDURE — 93294 REM INTERROG EVL PM/LDLS PM: CPT

## 2022-01-01 PROCEDURE — 97161 PT EVAL LOW COMPLEX 20 MIN: CPT

## 2022-01-01 PROCEDURE — 96374 THER/PROPH/DIAG INJ IV PUSH: CPT

## 2022-01-01 PROCEDURE — 93296 REM INTERROG EVL PM/IDS: CPT

## 2022-01-01 PROCEDURE — 82962 GLUCOSE BLOOD TEST: CPT

## 2022-01-01 PROCEDURE — 82248 BILIRUBIN DIRECT: CPT

## 2022-01-01 PROCEDURE — 94640 AIRWAY INHALATION TREATMENT: CPT

## 2022-01-01 PROCEDURE — 82247 BILIRUBIN TOTAL: CPT

## 2022-01-01 PROCEDURE — 99214 OFFICE O/P EST MOD 30 MIN: CPT | Mod: 25,95

## 2022-01-01 PROCEDURE — 82947 ASSAY GLUCOSE BLOOD QUANT: CPT

## 2022-01-01 PROCEDURE — 81001 URINALYSIS AUTO W/SCOPE: CPT

## 2022-01-01 RX ORDER — POTASSIUM CHLORIDE 20 MEQ
40 PACKET (EA) ORAL EVERY 4 HOURS
Refills: 0 | Status: COMPLETED | OUTPATIENT
Start: 2022-01-01 | End: 2022-01-01

## 2022-01-01 RX ORDER — POTASSIUM CHLORIDE 20 MEQ
20 PACKET (EA) ORAL
Refills: 0 | Status: COMPLETED | OUTPATIENT
Start: 2022-01-01 | End: 2022-01-01

## 2022-01-01 RX ORDER — METOPROLOL TARTRATE 50 MG
25 TABLET ORAL ONCE
Refills: 0 | Status: COMPLETED | OUTPATIENT
Start: 2022-01-01 | End: 2022-01-01

## 2022-01-01 RX ORDER — BUMETANIDE 2 MG/1
2 TABLET ORAL TWICE DAILY
Qty: 180 | Refills: 1 | Status: ACTIVE | COMMUNITY
Start: 2021-01-01 | End: 1900-01-01

## 2022-01-01 RX ORDER — MEMANTINE HYDROCHLORIDE 10 MG/1
5 TABLET ORAL ONCE
Refills: 0 | Status: COMPLETED | OUTPATIENT
Start: 2022-01-01 | End: 2022-01-01

## 2022-01-01 RX ORDER — POTASSIUM CHLORIDE 750 MG/1
10 CAPSULE, EXTENDED RELEASE ORAL
Qty: 90 | Refills: 1 | Status: ACTIVE | COMMUNITY
Start: 2021-02-03 | End: 1900-01-01

## 2022-01-01 RX ORDER — METOPROLOL TARTRATE 50 MG
25 TABLET ORAL DAILY
Refills: 0 | Status: DISCONTINUED | OUTPATIENT
Start: 2022-01-01 | End: 2022-01-01

## 2022-01-01 RX ORDER — APIXABAN 2.5 MG/1
2.5 TABLET, FILM COATED ORAL ONCE
Refills: 0 | Status: COMPLETED | OUTPATIENT
Start: 2022-01-01 | End: 2022-01-01

## 2022-01-01 RX ORDER — POTASSIUM CHLORIDE 20 MEQ
40 PACKET (EA) ORAL ONCE
Refills: 0 | Status: COMPLETED | OUTPATIENT
Start: 2022-01-01 | End: 2022-01-01

## 2022-01-01 RX ORDER — MEMANTINE HYDROCHLORIDE 10 MG/1
5 TABLET ORAL DAILY
Refills: 0 | Status: DISCONTINUED | OUTPATIENT
Start: 2022-01-01 | End: 2022-01-01

## 2022-01-01 RX ORDER — BUMETANIDE 0.25 MG/ML
2 INJECTION INTRAMUSCULAR; INTRAVENOUS ONCE
Refills: 0 | Status: COMPLETED | OUTPATIENT
Start: 2022-01-01 | End: 2022-01-01

## 2022-01-01 RX ORDER — BUMETANIDE 0.25 MG/ML
2 INJECTION INTRAMUSCULAR; INTRAVENOUS EVERY 12 HOURS
Refills: 0 | Status: DISCONTINUED | OUTPATIENT
Start: 2022-01-01 | End: 2022-01-01

## 2022-01-01 RX ORDER — IPRATROPIUM/ALBUTEROL SULFATE 18-103MCG
3 AEROSOL WITH ADAPTER (GRAM) INHALATION EVERY 6 HOURS
Refills: 0 | Status: DISCONTINUED | OUTPATIENT
Start: 2022-01-01 | End: 2022-01-01

## 2022-01-01 RX ORDER — SIMVASTATIN 20 MG/1
20 TABLET, FILM COATED ORAL AT BEDTIME
Refills: 0 | Status: DISCONTINUED | OUTPATIENT
Start: 2022-01-01 | End: 2022-01-01

## 2022-01-01 RX ORDER — FUROSEMIDE 40 MG
40 TABLET ORAL
Refills: 0 | Status: DISCONTINUED | OUTPATIENT
Start: 2022-01-01 | End: 2022-01-01

## 2022-01-01 RX ORDER — APIXABAN 2.5 MG/1
2.5 TABLET, FILM COATED ORAL
Qty: 180 | Refills: 1 | Status: ACTIVE | COMMUNITY
Start: 2019-05-28 | End: 1900-01-01

## 2022-01-01 RX ORDER — ACETAMINOPHEN 500 MG
650 TABLET ORAL ONCE
Refills: 0 | Status: COMPLETED | OUTPATIENT
Start: 2022-01-01 | End: 2022-01-01

## 2022-01-01 RX ORDER — SODIUM CHLORIDE 9 MG/ML
1000 INJECTION INTRAMUSCULAR; INTRAVENOUS; SUBCUTANEOUS ONCE
Refills: 0 | Status: COMPLETED | OUTPATIENT
Start: 2022-01-01 | End: 2022-01-01

## 2022-01-01 RX ORDER — APIXABAN 2.5 MG/1
2.5 TABLET, FILM COATED ORAL EVERY 12 HOURS
Refills: 0 | Status: DISCONTINUED | OUTPATIENT
Start: 2022-01-01 | End: 2022-01-01

## 2022-01-01 RX ORDER — BUMETANIDE 0.25 MG/ML
2 INJECTION INTRAMUSCULAR; INTRAVENOUS DAILY
Refills: 0 | Status: DISCONTINUED | OUTPATIENT
Start: 2022-01-01 | End: 2022-01-01

## 2022-01-01 RX ORDER — MEMANTINE HYDROCHLORIDE 10 MG/1
1 TABLET ORAL
Qty: 0 | Refills: 0 | DISCHARGE

## 2022-01-01 RX ORDER — SIMVASTATIN 20 MG/1
1 TABLET, FILM COATED ORAL
Qty: 0 | Refills: 0 | DISCHARGE

## 2022-01-01 RX ORDER — ENALAPRIL MALEATE 2.5 MG/1
2.5 TABLET ORAL
Qty: 90 | Refills: 1 | Status: ACTIVE | COMMUNITY
Start: 2021-01-01 | End: 1900-01-01

## 2022-01-01 RX ADMIN — SIMVASTATIN 20 MILLIGRAM(S): 20 TABLET, FILM COATED ORAL at 22:24

## 2022-01-01 RX ADMIN — MEMANTINE HYDROCHLORIDE 5 MILLIGRAM(S): 10 TABLET ORAL at 11:52

## 2022-01-01 RX ADMIN — Medication 2.5 MILLIGRAM(S): at 06:32

## 2022-01-01 RX ADMIN — Medication 650 MILLIGRAM(S): at 19:10

## 2022-01-01 RX ADMIN — MEMANTINE HYDROCHLORIDE 5 MILLIGRAM(S): 10 TABLET ORAL at 12:55

## 2022-01-01 RX ADMIN — Medication 40 MILLIEQUIVALENT(S): at 14:46

## 2022-01-01 RX ADMIN — SIMVASTATIN 20 MILLIGRAM(S): 20 TABLET, FILM COATED ORAL at 21:46

## 2022-01-01 RX ADMIN — SIMVASTATIN 20 MILLIGRAM(S): 20 TABLET, FILM COATED ORAL at 22:46

## 2022-01-01 RX ADMIN — Medication 3 MILLILITER(S): at 11:52

## 2022-01-01 RX ADMIN — APIXABAN 2.5 MILLIGRAM(S): 2.5 TABLET, FILM COATED ORAL at 05:58

## 2022-01-01 RX ADMIN — APIXABAN 2.5 MILLIGRAM(S): 2.5 TABLET, FILM COATED ORAL at 06:27

## 2022-01-01 RX ADMIN — Medication 3 MILLILITER(S): at 12:17

## 2022-01-01 RX ADMIN — Medication 40 MILLIGRAM(S): at 05:59

## 2022-01-01 RX ADMIN — APIXABAN 2.5 MILLIGRAM(S): 2.5 TABLET, FILM COATED ORAL at 05:05

## 2022-01-01 RX ADMIN — Medication 3 MILLILITER(S): at 18:22

## 2022-01-01 RX ADMIN — BUMETANIDE 2 MILLIGRAM(S): 0.25 INJECTION INTRAMUSCULAR; INTRAVENOUS at 15:45

## 2022-01-01 RX ADMIN — APIXABAN 2.5 MILLIGRAM(S): 2.5 TABLET, FILM COATED ORAL at 06:03

## 2022-01-01 RX ADMIN — MEMANTINE HYDROCHLORIDE 5 MILLIGRAM(S): 10 TABLET ORAL at 11:47

## 2022-01-01 RX ADMIN — APIXABAN 2.5 MILLIGRAM(S): 2.5 TABLET, FILM COATED ORAL at 06:49

## 2022-01-01 RX ADMIN — APIXABAN 2.5 MILLIGRAM(S): 2.5 TABLET, FILM COATED ORAL at 19:09

## 2022-01-01 RX ADMIN — Medication 25 MILLIGRAM(S): at 06:32

## 2022-01-01 RX ADMIN — Medication 40 MILLIGRAM(S): at 10:05

## 2022-01-01 RX ADMIN — Medication 3 MILLILITER(S): at 06:03

## 2022-01-01 RX ADMIN — Medication 3 MILLILITER(S): at 17:40

## 2022-01-01 RX ADMIN — SIMVASTATIN 20 MILLIGRAM(S): 20 TABLET, FILM COATED ORAL at 22:41

## 2022-01-01 RX ADMIN — BUMETANIDE 2 MILLIGRAM(S): 0.25 INJECTION INTRAMUSCULAR; INTRAVENOUS at 05:05

## 2022-01-01 RX ADMIN — APIXABAN 2.5 MILLIGRAM(S): 2.5 TABLET, FILM COATED ORAL at 16:37

## 2022-01-01 RX ADMIN — MEMANTINE HYDROCHLORIDE 5 MILLIGRAM(S): 10 TABLET ORAL at 14:46

## 2022-01-01 RX ADMIN — APIXABAN 2.5 MILLIGRAM(S): 2.5 TABLET, FILM COATED ORAL at 06:32

## 2022-01-01 RX ADMIN — Medication 3 MILLILITER(S): at 12:10

## 2022-01-01 RX ADMIN — Medication 3 MILLILITER(S): at 17:33

## 2022-01-01 RX ADMIN — Medication 40 MILLIEQUIVALENT(S): at 10:25

## 2022-01-01 RX ADMIN — MEMANTINE HYDROCHLORIDE 5 MILLIGRAM(S): 10 TABLET ORAL at 12:09

## 2022-01-01 RX ADMIN — Medication 20 MILLIEQUIVALENT(S): at 22:46

## 2022-01-01 RX ADMIN — APIXABAN 2.5 MILLIGRAM(S): 2.5 TABLET, FILM COATED ORAL at 18:22

## 2022-01-01 RX ADMIN — Medication 650 MILLIGRAM(S): at 18:13

## 2022-01-01 RX ADMIN — MEMANTINE HYDROCHLORIDE 5 MILLIGRAM(S): 10 TABLET ORAL at 18:04

## 2022-01-01 RX ADMIN — Medication 25 MILLIGRAM(S): at 05:58

## 2022-01-01 RX ADMIN — Medication 40 MILLIGRAM(S): at 17:40

## 2022-01-01 RX ADMIN — APIXABAN 2.5 MILLIGRAM(S): 2.5 TABLET, FILM COATED ORAL at 17:47

## 2022-01-01 RX ADMIN — MEMANTINE HYDROCHLORIDE 5 MILLIGRAM(S): 10 TABLET ORAL at 12:54

## 2022-01-01 RX ADMIN — Medication 40 MILLIGRAM(S): at 08:49

## 2022-01-01 RX ADMIN — Medication 3 MILLILITER(S): at 01:59

## 2022-01-01 RX ADMIN — SIMVASTATIN 20 MILLIGRAM(S): 20 TABLET, FILM COATED ORAL at 22:02

## 2022-01-01 RX ADMIN — Medication 3 MILLILITER(S): at 23:39

## 2022-01-01 RX ADMIN — APIXABAN 2.5 MILLIGRAM(S): 2.5 TABLET, FILM COATED ORAL at 18:04

## 2022-01-01 RX ADMIN — Medication 40 MILLIGRAM(S): at 06:49

## 2022-01-01 RX ADMIN — Medication 40 MILLIGRAM(S): at 16:36

## 2022-01-01 RX ADMIN — Medication 3 MILLILITER(S): at 22:50

## 2022-01-01 RX ADMIN — Medication 3 MILLILITER(S): at 13:19

## 2022-01-01 RX ADMIN — APIXABAN 2.5 MILLIGRAM(S): 2.5 TABLET, FILM COATED ORAL at 17:32

## 2022-01-01 RX ADMIN — Medication 25 MILLIGRAM(S): at 16:36

## 2022-01-01 RX ADMIN — Medication 3 MILLILITER(S): at 20:20

## 2022-01-01 RX ADMIN — Medication 25 MILLIGRAM(S): at 18:04

## 2022-01-01 RX ADMIN — Medication 40 MILLIGRAM(S): at 09:37

## 2022-01-01 RX ADMIN — Medication 40 MILLIGRAM(S): at 18:22

## 2022-01-01 RX ADMIN — Medication 40 MILLIGRAM(S): at 06:31

## 2022-01-01 RX ADMIN — Medication 2.5 MILLIGRAM(S): at 19:10

## 2022-01-01 RX ADMIN — Medication 3 MILLILITER(S): at 05:59

## 2022-01-01 RX ADMIN — MEMANTINE HYDROCHLORIDE 5 MILLIGRAM(S): 10 TABLET ORAL at 12:17

## 2022-01-01 RX ADMIN — APIXABAN 2.5 MILLIGRAM(S): 2.5 TABLET, FILM COATED ORAL at 17:38

## 2022-01-01 RX ADMIN — Medication 3 MILLILITER(S): at 06:32

## 2022-01-01 RX ADMIN — Medication 20 MILLIEQUIVALENT(S): at 17:35

## 2022-01-01 RX ADMIN — Medication 3 MILLILITER(S): at 22:24

## 2022-01-01 RX ADMIN — SODIUM CHLORIDE 1000 MILLILITER(S): 9 INJECTION INTRAMUSCULAR; INTRAVENOUS; SUBCUTANEOUS at 14:06

## 2022-01-01 RX ADMIN — Medication 3 MILLILITER(S): at 12:09

## 2022-01-01 RX ADMIN — Medication 3 MILLILITER(S): at 21:28

## 2022-01-01 RX ADMIN — Medication 40 MILLIGRAM(S): at 17:47

## 2022-01-01 RX ADMIN — Medication 3 MILLILITER(S): at 13:55

## 2022-01-01 RX ADMIN — Medication 40 MILLIEQUIVALENT(S): at 14:33

## 2022-01-01 RX ADMIN — Medication 40 MILLIGRAM(S): at 19:10

## 2022-01-01 RX ADMIN — Medication 40 MILLIGRAM(S): at 17:32

## 2022-01-01 RX ADMIN — Medication 3 MILLILITER(S): at 08:51

## 2022-01-01 RX ADMIN — APIXABAN 2.5 MILLIGRAM(S): 2.5 TABLET, FILM COATED ORAL at 06:59

## 2022-01-01 RX ADMIN — SIMVASTATIN 20 MILLIGRAM(S): 20 TABLET, FILM COATED ORAL at 21:27

## 2022-01-01 RX ADMIN — Medication 3 MILLILITER(S): at 17:47

## 2022-02-07 NOTE — ED PROVIDER NOTE - CLINICAL SUMMARY MEDICAL DECISION MAKING FREE TEXT BOX
89 yo F PMHx CAD, SDH s/p [place] hole, dementia A&Ox1-2 at baseline, DVT/PE on eliquis 2.5 mg BID, rectal adenocarcinoma s/p Hayden, Dec 2020 s/p medtronic PPM for complete heartblock and VFib arrest, presents for dec po intake, weakness. FTT. Plan to obtain basic blood work, urine, TBA.

## 2022-02-07 NOTE — ED PROVIDER NOTE - OBJECTIVE STATEMENT
89 yo F PMHx CAD, SDH s/p [place] hole, dementia A&Ox1-2 at baseline, DVT/PE on eliquis 2.5 mg BID, rectal adenocarcinoma s/p Hayden, Dec 2020 s/p medtronic PPM for complete heartblock and VFib arrest, presents for dec po intake, weakness. Ongoing for the past couple of days. Family decided to bring patient to the ED upon noticing pt not improving at home. No N/V/D/F/C/NS. Pt states she is not drinking because she does not want to. Denies any other complaints. 89 yo F PMHx CAD, SDH s/p [place] hole, dementia A&Ox1-2 at baseline, DVT/PE on eliquis 2.5 mg BID, rectal adenocarcinoma s/p Hayden, Dec 2020 s/p medtronic PPM for complete heartblock and VFib arrest, presents for dec po intake, weakness. Ongoing for the past couple of days. Family decided to bring patient to the ED upon noticing pt not improving at home. No N/V/D/F/C/NS. Pt states she is not drinking because she does not want to. Denies any other complaints.

## 2022-02-07 NOTE — ED ADULT NURSE NOTE - NSIMPLEMENTINTERV_GEN_ALL_ED
Implemented All Fall with Harm Risk Interventions:  [place] to call system. Call bell, personal items and telephone within reach. Instruct patient to call for assistance. Room bathroom lighting operational. Non-slip footwear when patient is off stretcher. Physically safe environment: no spills, clutter or unnecessary equipment. Stretcher in lowest position, wheels locked, appropriate side rails in place. Provide visual cue, wrist band, yellow gown, etc. Monitor gait and stability. Monitor for mental status changes and reorient to person, place, and time. Review medications for side effects contributing to fall risk. Reinforce activity limits and safety measures with patient and family. Provide visual clues: red socks.

## 2022-02-07 NOTE — ED PROVIDER NOTE - NS ED ROS FT
Gen: No F/C/NS, +dec po intake  Head: No falls/head trauma  Eyes: No changes in vision   Resp: No cough or trouble breathing  Cardiovascular: No chest pain or palpitation  Gastroenteric: No N/V/D  :  No change in urine output, dysuria or hematuria   MS: No joint or muscle pain  Neuro: No headache; no abnormal movements  Skin: No new rash

## 2022-02-07 NOTE — ED ADULT NURSE NOTE - OBJECTIVE STATEMENT
As per pt's  pt has been not eating as much for the past month. Pt's  takes care of her and states she has been doing well until the last month when she will only eat a little fruit and not want anything else.

## 2022-02-07 NOTE — ED PROVIDER NOTE - PROGRESS NOTE DETAILS
Rupinder Kay MD (PGY2) -  Called spouse and daughter without success, LVM Rupinder Kay MD (PGY2) -  Per daughter father panicked, pt w SOB. Patient not eating or drinking. PCP Vaso

## 2022-02-07 NOTE — PATIENT PROFILE ADULT - FALL HARM RISK - HARM RISK INTERVENTIONS
Assistance with ambulation/Assistance OOB with selected safe patient handling equipment/Communicate Risk of Fall with Harm to all staff/Monitor gait and stability/Reinforce activity limits and safety measures with patient and family/Sit up slowly, dangle for a short time, stand at bedside before walking/Tailored Fall Risk Interventions/Visual Cue: Yellow wristband and red socks/Bed in lowest position, wheels locked, appropriate side rails in place/Call bell, personal items and telephone in reach/Instruct patient to call for assistance before getting out of bed or chair/Non-slip footwear when patient is out of bed/[place] to call system/Physically safe environment - no spills, clutter or unnecessary equipment/Purposeful Proactive Rounding/Room/bathroom lighting operational, light cord in reach

## 2022-02-07 NOTE — H&P ADULT - HISTORY OF PRESENT ILLNESS
89 yo F PMHx CAD, SDH s/p Lol[place] hole, dementia A&Ox1-2 at baseline, DVT/PE on eliquis 2.5 mg BID, rectal adenocarcinoma s/p Hayden, Dec 2020 s/p medtronic PPM for complete heartblock and VFib arrest, presents for dec po intake, weakness. Ongoing for the past couple of days.

## 2022-02-07 NOTE — ED PROVIDER NOTE - ATTENDING CONTRIBUTION TO CARE
I, Rio Patel, performed a history and physical exam of the patient and discussed their management with the resident and/or advanced care provider. I reviewed the resident and/or advanced care provider's note and agree with the documented findings and plan of care. I was present and available for all procedures.    89 yo F PMHx CAD, SDH s/p Lol[place] hole, dementia A&Ox1-2 at baseline, DVT/PE on eliquis 2.5 mg BID, rectal adenocarcinoma s/p Blake, Dec 2020 s/p medtronic PPM for complete heartblock and VFib arrest, presents for dec po intake, weakness. Ongoing for the past couple of days. Family decided to bring patient to the ED upon noticing pt not improving at home. No N/V/D/F/C/NS. Pt states she is not drinking because she does not want to. Denies any other complaints.    weak appearing and in NAD, head normal appearing atraumatic, trachea midline, no respiratory distress, lungs cta bilaterally, rrr no murmurs, soft NT ND abdomen, no visible extremity deformities, Alert and disoriented, non focal neuro exam, skin warm and dry, normal affect and mood, eomi no neck stiffness peerla     worsening ftt exam non focal will eval with screening labs urine cxr, eval for occult infectious vs worsening functional status, not eating or drinking clinically dehydrated will give ivf, admit for further mgmt and eval, unlikely meningitis.

## 2022-02-07 NOTE — ED PROVIDER NOTE - PHYSICAL EXAMINATION
G: NAD, cooperative with exam   H: NCAT  E: EOMI, no conjunctival pallor   M: Mucous membranes moist   R: CTABL, nWOB  C: Nl S1/S2, no mrg  A: Soft, NT/ND, no rebound/guarding, colostomy bag in place   MSK: no calf tenderness, no LE edema

## 2022-02-08 NOTE — PROGRESS NOTE ADULT - PROBLEM SELECTOR PLAN 1
most recent TTE in Sept 2021 showing LVSF with reduced EF 35%.  presenting with worsening dyspnea, hypervolemic on exam.  - BNP 17K on admission  - hold home bumex PO  - will start lasix 40mg IVP BID for diuresis  - strict I/Os, daily weights  - fluid restrict to 1200cc/day

## 2022-02-08 NOTE — PROVIDER CONTACT NOTE (MEDICATION) - ACTION/TREATMENT ORDERED:
provider manas mcdowell made aware  ok to give bumex  push metoprolol and enalapril to later  continue to monitor

## 2022-02-09 NOTE — PHYSICAL THERAPY INITIAL EVALUATION ADULT - GENERAL OBSERVATIONS, REHAB EVAL
Patient received semireclined in bed, in NAD, (+)ostomy (+)IV lock (+)bed alarm on.  present at bedside.

## 2022-02-09 NOTE — PHYSICAL THERAPY INITIAL EVALUATION ADULT - PLANNED THERAPY INTERVENTIONS, PT EVAL
Stair Negotiation Training: Patient will negotiate up & down 1 flight of stairs independently with bilateral rails, step to gait pattern, in 4 weeks./bed mobility training/gait training/transfer training

## 2022-02-09 NOTE — PHYSICAL THERAPY INITIAL EVALUATION ADULT - PERTINENT HX OF CURRENT PROBLEM, REHAB EVAL
As per chart: 89F PMHx CAD, HTN, transaminitis, HFrEF, complete heart block and vfib arrrest s/p medtronic PPM placement, hx of recurrent DVT/PE on eliquis, SDH s/p ronny hole, rectal adenocarcinoma s/p Blake now with ostomy, and dementia (baseline AOx1-2) who presents to the ED with worsening dyspnea and weakness admitted for acute on chronic HF exacerbation.

## 2022-02-09 NOTE — PROGRESS NOTE ADULT - PROBLEM SELECTOR PLAN 1
most recent TTE in Sept 2021 showing LVSF with reduced EF 35%.  presenting with worsening dyspnea, hypervolemic on exam.  - BNP 17K on admission  - hold home bumex PO  - cont lasix 40mg IVP BID for diuresis  - strict I/Os, daily weights  - fluid restrict to 1200cc/day

## 2022-02-09 NOTE — PHYSICAL THERAPY INITIAL EVALUATION ADULT - ADDITIONAL COMMENTS
Patient is a poor historian. As per  present at bedside, patient requires assistance for all ADLs and household mobility. Patient does not use an assistive device.

## 2022-02-11 NOTE — DISCHARGE NOTE PROVIDER - NSDCMRMEDTOKEN_GEN_ALL_CORE_FT
apixaban 2.5 mg oral tablet: 1 tab(s) orally 2 times a day  bumetanide 2 mg oral tablet: 1 tab(s) orally 2 times a day  enalapril 2.5 mg oral tablet: 1 tab(s) orally once a day  memantine 5 mg oral tablet: 1 tab(s) orally once a day  metoprolol succinate 25 mg oral tablet, extended release: 1 tab(s) orally once a day  simvastatin 20 mg oral tablet: 1 tab(s) orally once a day (at bedtime)

## 2022-02-11 NOTE — DISCHARGE NOTE PROVIDER - PROVIDER TOKENS
PROVIDER:[TOKEN:[[id number]],FOLLOWUP:[1 week]],FREE:[LAST:[thierry],FIRST:[Ailyn],PHONE:[(   )    -],FAX:[(   )    -],ADDRESS:[PMD]]

## 2022-02-11 NOTE — DISCHARGE NOTE PROVIDER - NSDCFUSCHEDAPPT_GEN_ALL_CORE_FT
AMY CHACON ; 02/17/2022 ; [facility] [id number]  AMY CHACON ; 04/15/2022 ; [facility] [address]

## 2022-02-11 NOTE — PROVIDER CONTACT NOTE (CHANGE IN STATUS NOTIFICATION) - ACTION/TREATMENT ORDERED:
Warm compress placed on right upper back, patient repositioned, Tylenol 650 mg administered as per order.

## 2022-02-11 NOTE — DISCHARGE NOTE PROVIDER - HOSPITAL COURSE
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   most recent TTE in Sept 2021 showing LVSF with reduced EF 35%.  presenting with worsening dyspnea, hypervolemic on exam.  - BNP 17K on admission now downtrending  - hold home bumex PO  - Treated with lasix 40mg IVP BID for diuresis  - strict I/Os, daily weights - downtrending   - fluid restrict to 1200cc/day  probably can resume back on bumex 2mg PO BID as outpt. +dietary noncompliance.  Patient also with Transaminitis - still fluctuating, appears she has somewhat chronic fluctuations/elevations in her LFTs that correlate - overall improved compared to last admission

## 2022-02-11 NOTE — PROGRESS NOTE ADULT - PROBLEM SELECTOR PLAN 1
most recent TTE in Sept 2021 showing LVSF with reduced EF 35%.  presenting with worsening dyspnea, hypervolemic on exam.  - BNP 17K on admission now downtrending  - hold home bumex PO  - cont lasix 40mg IVP BID for diuresis  - strict I/Os, daily weights - downtrending   - fluid restrict to 1200cc/day most recent TTE in Sept 2021 showing LVSF with reduced EF 35%.  presenting with worsening dyspnea, hypervolemic on exam.  - BNP 17K on admission now downtrending  - hold home bumex PO  - cont lasix 40mg IVP BID for diuresis  - strict I/Os, daily weights - downtrending   - fluid restrict to 1200cc/day    probably can resume back on bumex 2mg PO BID as outpt. +dietary noncompliance

## 2022-02-11 NOTE — PROVIDER CONTACT NOTE (CHANGE IN STATUS NOTIFICATION) - BACKGROUND
Patient is alert and oriented x 2 and requires a lot of assistance. She was admitted for CHF exacerbation and is currently on Lasix 40 mg IV push BID.

## 2022-02-11 NOTE — PROGRESS NOTE ADULT - NSPROGADDITIONALINFOA_GEN_ALL_CORE
d/w ACP
.  Melonie Maza MD  Division of Hospital Medicine  [facility]   Pager: [phone]    Plan discussed with patient,  bedside, and medicine NP Solange.
d/w ACP
d/w ACP  d/w daughter Taisha- plan for d/c tomorrow if remains stable

## 2022-02-11 NOTE — DISCHARGE NOTE PROVIDER - NSDCCPCAREPLAN_GEN_ALL_CORE_FT
PRINCIPAL DISCHARGE DIAGNOSIS  Diagnosis: Acute on chronic systolic congestive heart failure  Assessment and Plan of Treatment: Weigh yourself daily.  If you gain 3lbs in 3 days, or 5lbs in a week call your Health Care Provider.  Do not eat or drink foods containing more than 2000mg of salt (sodium) in your diet every day.  Call your Health Care Provider if you have any swelling or increased swelling in your feet, ankles, and/or stomach.  Take all of your medication as directed.  If you become dizzy call your Health Care Provider.        SECONDARY DISCHARGE DIAGNOSES  Diagnosis: Hypertension  Assessment and Plan of Treatment: Continue metoprolol, enalapril with hold parameters.  Take medications for your blood pressure as recommended.  Eat a heart healthy diet that is low in saturated fats and salt, and includes whole grains, fruits, vegetables and lean protein   Continue to follow with your primary physician or cardiologist.   Seek medical help for dizziness, Lightheadedness, Blurry vision, Headache, Chest pain, Shortness of breath  Follow up with your medical doctor to establish long term blood pressure treatment goals.    Diagnosis: CAD (coronary artery disease)  Assessment and Plan of Treatment: Continue metoprolol XL 25mg daily, enalapril 2.5mg daily, simvastatin 20mg qHS.  Coronary artery disease is a condition where the arteries the supply the heart muscle get clogges with fatty deposits & puts you at risk for a heart attack  Call your doctor if you have any new pain, pressure, or discomfort in the center of your chest, pain, tingling or discomfort in arms, back, neck, jaw, or stomach, shortness of breath, nausea, vomiting, burping or heartburn, sweating, cold and clammy skin, racing or abnormal heartbeat for more than 10 minutes or if they keep coming & going.  Call 911 and do not tr to get to hospital by care  Take your cardiac medication as prescribed to lower cholesterol, to lower blood pressure, aspirin to prevent blood clots, and diabetes control  Make sure to keep appointments with doctor for cardiac follow up care      Diagnosis: Hyperlipidemia  Assessment and Plan of Treatment: Continue Statin    Diagnosis: Rectal adenocarcinoma  Assessment and Plan of Treatment: You had surgery in the past called Blake  procedure now with Colostomyostomy  Continue ostomy care    Diagnosis: Chronic dementia  Assessment and Plan of Treatment: baseline AOx1-2 at baseline   Continue  memantine 5mg daily.  Supportive care    Diagnosis: Recurrent deep vein thrombosis (DVT) of lower extremity  Assessment and Plan of Treatment: History of recurrent DVT/PE   Continue Eliquis 2.5mg BID - Blood thinner  Walking is encouraged, increase activity as tolerated.  If you develop new leg pain, swelling, and/or redness contact your healthcare provider.  If you develop new chest pain with difficulty breathing, a rapid heart rate and/or a feeling of passing out call emergency medical services 911.

## 2022-02-11 NOTE — DISCHARGE NOTE PROVIDER - CARE PROVIDER_API CALL
Wyatt Jauregui)  Cardiovascular Disease; Nuclear Cardiology  [address] [place] [address], [address]  [place] [address]  Phone: [phone]  Fax: [phone]  Follow Up Time: 1 week    Ailyn guadarrama  Phone: (   )    -  Fax: (   )    -  Follow Up Time:

## 2022-02-11 NOTE — DISCHARGE NOTE NURSING/CASE MANAGEMENT/SOCIAL WORK - NSDCPEFALRISK_GEN_ALL_CORE
For information on Fall & Injury Prevention, visit: [email or web address] OR  [email or web address] OR  https://www.cdc.gov/steadi/patient.html Stable

## 2022-02-11 NOTE — DISCHARGE NOTE NURSING/CASE MANAGEMENT/SOCIAL WORK - PATIENT PORTAL LINK FT
You can access the FollowMyHealth Patient Portal offered by [facility] by registering at the following website: [email or web address]. By joining [employer]’s FollowMyHealth portal, you will also be able to view your health information using other applications (apps) compatible with our system.

## 2022-02-11 NOTE — PROVIDER CONTACT NOTE (CHANGE IN STATUS NOTIFICATION) - SITUATION
Patient appeared in distress. When asked if she was okay, patient stated she was having a lot of pain in her right upper back.

## 2022-02-11 NOTE — DISCHARGE NOTE NURSING/CASE MANAGEMENT/SOCIAL WORK - NSDCVIVACCINE_GEN_ALL_CORE_FT
influenza, injectable, quadrivalent, preservative free; 16-Nov-2018 11:41; Kaylynn Liz (RN); Haolianluo; [id number] (Exp. Date: 30-Jun-2019); IntraMuscular; Deltoid Left.; 0.5 milliLiter(s); VIS (VIS Published: 07-Aug-2015, VIS Presented: 16-Nov-2018);

## 2022-02-12 NOTE — PROGRESS NOTE ADULT - PROBLEM SELECTOR PLAN 10
DVT ppx: on eliquis    PT home PT    Dispo: home xander if further improvement,     discussed with daughter Dominic, agreeable with plan

## 2022-02-12 NOTE — PROGRESS NOTE ADULT - PROBLEM SELECTOR PLAN 1
most recent TTE in Sept 2021 showing LVSF with reduced EF 35%.  - presenting with worsening dyspnea  - still hypervolemic on exam today and mild sob/tachypnea   - BNP 17K on admission now downtrending  - cont lasix 40mg IVP BID for today  - will also attempt to walk xander and ensure no hypoxia or lockhart   - strict I/Os, daily weights - downtrending, per daughter dry weight is around 148-150, currently 158  - fluid restrict to 1200cc/day  - strict I/O, daily weights  -probably can resume back on bumex 2mg PO BID as outpt. +dietary noncompliance

## 2022-02-13 NOTE — PROGRESS NOTE ADULT - PROBLEM SELECTOR PLAN 10
DVT ppx: on eliquis    PT home PT    Dispo: home today pending exertional 02 sat and switch to oral diuretics     discussed with daughter Dominic and NP Jana   spent 40 min on d/c time

## 2022-02-13 NOTE — PROGRESS NOTE ADULT - PROBLEM SELECTOR PLAN 1
most recent TTE in Sept 2021 showing LVSF with reduced EF 35%.  - fluid status and symptoms improved, no sob  - will get 02 sat on exertion on RA  - switch IV lasix to home po bumex 2mg po bid and monitor weights/output  - BNP 17K improving  - fluid restrict to 1200cc/day  - strict I/O, daily weights

## 2022-02-14 NOTE — PROGRESS NOTE ADULT - PROBLEM SELECTOR PLAN 5
stable.  - c/w home metoprolol, enalapril with hold parameters

## 2022-02-14 NOTE — PROGRESS NOTE ADULT - PROBLEM SELECTOR PROBLEM 1
Acute on chronic HFrEF (heart failure with reduced ejection fraction)

## 2022-02-14 NOTE — PROGRESS NOTE ADULT - ASSESSMENT
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

## 2022-02-14 NOTE — PROGRESS NOTE ADULT - PROBLEM SELECTOR PLAN 2
still fluctuating. per chart review, appears she has somewhat chronic fluctuations/elevations in her LFTs that correlate.  - overall improved compared to last admission  -monitor
improved   per chart review, appears she has somewhat chronic fluctuations/elevations in her LFTs that correlate.  - overall improved compared to last admission  -monitor
improved   per chart review, appears she has somewhat chronic fluctuations/elevations in her LFTs that correlate.  - overall improved compared to last admission  -monitor
still fluctuating. per chart review, appears she has somewhat chronic fluctuations/elevations in her LFTs that correlate.  - overall improved compared to last admission  -monitor
improved asymptomatic. per chart review, appears she has somewhat chronic fluctuations/elevations in her LFTs that correlate.  - overall improved compared to last admission  -monitor
still fluctuating. per chart review, appears she has somewhat chronic fluctuations/elevations in her LFTs that correlate.  - overall improved compared to last admission  -monitor
asymptomatic. per chart review, appears she has somewhat chronic fluctuations/elevations in her LFTs that correlate.  - overall improved compared to last admission  - check differentiated bili with AM labs  - if worsens, may consider RUQ to better evaluate  - may need to hold her statin as well, but for now will continue

## 2022-02-14 NOTE — PROGRESS NOTE ADULT - PROBLEM SELECTOR PLAN 7
history of recurrent DVT/PE on eliquis  - c/w eliquis 2.5mg BID

## 2022-02-14 NOTE — PROGRESS NOTE ADULT - TIME BILLING
case complexity and discharge planning. Pt is clinically stable for discharge. She is to f/up with PCP, Cardiology.    Case d/w ACP Jaja

## 2022-02-14 NOTE — PROGRESS NOTE ADULT - PROBLEM SELECTOR PLAN 1
most recent TTE in Sept 2021 showing LVSF with reduced EF 35%.  - fluid status and symptoms improved, no sob  - f/up  02 sat on exertion on RA  - c/w bumex 2mg po bid and monitor weights/output  - BNP 17K improving  - fluid restrict to 1200cc/day  - strict I/O, daily weights

## 2022-02-14 NOTE — PROGRESS NOTE ADULT - PROBLEM SELECTOR PLAN 6
- c/w home simvastatin
- c/w home simvastatin  - can hold if transaminitis worsens but per chart review has somewhat chronic fluctuations/elevations in her LFTs
- c/w home simvastatin

## 2022-02-14 NOTE — PROGRESS NOTE ADULT - PROBLEM SELECTOR PLAN 9
baseline AOx1-2 at baseline per  bedside.  - c/w memantine 5mg daily

## 2022-02-14 NOTE — PROGRESS NOTE ADULT - PROBLEM SELECTOR PLAN 8
s/p Hayden now with ostomy  - ostomy care while inpatient

## 2022-02-14 NOTE — PROGRESS NOTE ADULT - PROBLEM SELECTOR PLAN 4
s/p medtronic PPM.

## 2022-02-14 NOTE — PROGRESS NOTE ADULT - PROBLEM SELECTOR PLAN 3
- not on ASA as on full AC for recurrent DVT/PE  - c/w metoprolol XL 25mg daily  - c/w enalapril 2.5mg daily  - c/w simvastatin 20mg qHS

## 2022-02-14 NOTE — PROGRESS NOTE ADULT - SUBJECTIVE AND OBJECTIVE BOX
Patient is a 89y old  Female who presents with a chief complaint of SOB (11 Feb 2022 15:02)      SUBJECTIVE / OVERNIGHT EVENTS:  Pt seen and examined with  at bedside. No acute events overnight. She denies CP, SOB  at rest. Sating well on room air ; will need to assess to ambulatory O2 sats.    MEDICATIONS  (STANDING):  albuterol/ipratropium for Nebulization 3 milliLiter(s) Nebulizer every 6 hours  apixaban 2.5 milliGRAM(s) Oral every 12 hours  enalapril 2.5 milliGRAM(s) Oral daily  furosemide   Injectable 40 milliGRAM(s) IV Push two times a day  memantine 5 milliGRAM(s) Oral daily  metoprolol succinate ER 25 milliGRAM(s) Oral daily  simvastatin 20 milliGRAM(s) Oral at bedtime    MEDICATIONS  (PRN):      Vital Signs Last 24 Hrs  T(C): 36.4 (14 Feb 2022 12:13), Max: 36.7 (14 Feb 2022 05:40)  T(F): 97.5 (14 Feb 2022 12:13), Max: 98 (14 Feb 2022 05:40)  HR: 83 (14 Feb 2022 14:20) (77 - 95)  BP: 90/53 (14 Feb 2022 11:45) (90/53 - 102/65)  BP(mean): --  RR: 19 (14 Feb 2022 14:20) (16 - 20)  SpO2: 96% (14 Feb 2022 14:20) (91% - 99%)  CAPILLARY BLOOD GLUCOSE        I&O's Summary    13 Feb 2022 07:01  -  14 Feb 2022 07:00  --------------------------------------------------------  IN: 0 mL / OUT: 900 mL / NET: -900 mL    14 Feb 2022 07:01  -  14 Feb 2022 16:31  --------------------------------------------------------  IN: 400 mL / OUT: 400 mL / NET: 0 mL        PHYSICAL EXAM:  GENERAL: NAD, anicteric, afebrile, n RA comfortable  HEAD:  Atraumatic, Normocephalic  EYES:  conjunctiva and sclera clear  NECK: Supple, No JVD  CHEST/LUNG: Clear to auscultation bilaterally;   HEART: Regular rate and rhythm;   ABDOMEN: Soft, Nontender, Nondistended; Bowel sounds present  EXTREMITIES:  2+ Peripheral Pulses, trace ankle edema b/l, no calf tenderness b/l   PSYCH: AAOx3  NEUROLOGY: non-focal  SKIN: No rashes or lesions    LABS:    02-14    137  |  101  |  26<H>  ----------------------------<  81  3.7   |  22  |  0.92    Ca    8.8      14 Feb 2022 05:53  Mg     2.1     02-14                RADIOLOGY & ADDITIONAL TESTS:    Imaging Personally Reviewed:    Consultant(s) Notes Reviewed:      Care Discussed with Consultants/Other Providers:  
[facility] Division of Hospital Medicine  Maria Elena Verduzco MD  Pager (M-F, 8A-5P): [phone]  Other Times:  [phone]    Patient is a 89y old  Female who presents with a chief complaint of  SOB    SUBJECTIVE / OVERNIGHT EVENTS:  feeling ok. still with SMITH. afebrile .    ADDITIONAL REVIEW OF SYSTEMS: otherwise neg    MEDICATIONS  (STANDING):  albuterol/ipratropium for Nebulization 3 milliLiter(s) Nebulizer every 6 hours  apixaban 2.5 milliGRAM(s) Oral every 12 hours  enalapril 2.5 milliGRAM(s) Oral daily  furosemide   Injectable 40 milliGRAM(s) IV Push two times a day  memantine 5 milliGRAM(s) Oral daily  metoprolol succinate ER 25 milliGRAM(s) Oral daily  simvastatin 20 milliGRAM(s) Oral at bedtime    MEDICATIONS  (PRN):      CAPILLARY BLOOD GLUCOSE        I&O's Summary      PHYSICAL EXAM:  Vital Signs Last 24 Hrs  T(C): 36.2 (2022 13:04), Max: 37 (2022 21:15)  T(F): 97.2 (2022 13:04), Max: 98.6 (2022 21:15)  HR: 95 (2022 13:04) (93 - 96)  BP: 108/74 (2022 13:04) (95/63 - 118/74)  BP(mean): --  RR: 22 (2022 13:04) (18 - 22)  SpO2: 94% (2022 13:04) (94% - 99%)    CONSTITUTIONAL: NAD, well-groomed  EYES: PERRLA; conjunctiva and sclera clear  ENMT: Moist oral mucosa, no pharyngeal injection or exudates; normal dentition  NECK: Supple, +JVD  RESPIRATORY: +bibasilar crackles on exam  CARDIOVASCULAR: Regular rate and rhythm, normal S1 and S2, no murmur/rub/gallop; trace lower extremity edema; Peripheral pulses are 2+ bilaterally  ABDOMEN: Soft, Nondistended, Nontender to palpation, normoactive bowel sounds, +ostomy back with brown output  MUSCULOSKELETAL:  No clubbing or cyanosis of digits; no joint swelling or tenderness to palpation  PSYCH: A+O to person only, not place despite being given choices, not month/year (per  bedside, baseline AOx1-2)  NEUROLOGY: CN 2-12 are intact and symmetric; no gross sensory deficits   SKIN: No rashes; no palpable lesions, LE cool touch b/l    LABS:                        11.9   6.90  )-----------( 226      ( 2022 06:38 )             36.1     02-    139  |  103  |  27<H>  ----------------------------<  83  3.5   |  23  |  1.17    Ca    9.1      2022 06:38  Phos  3.7     -  Mg     2.3     -    TPro  5.5<L>  /  Alb  3.4  /  TBili  1.2  /  DBili  0.5<H>  /  AST  37  /  ALT  52<H>  /  AlkPhos  114  02-          Urinalysis Basic - ( 2022 16:21 )    Color: Yellow / Appearance: Clear / S.014 / pH: x  Gluc: x / Ketone: Negative  / Bili: Negative / Urobili: Negative   Blood: x / Protein: Negative / Nitrite: Negative   Leuk Esterase: Moderate / RBC: 3 /hpf / WBC 2 /HPF   Sq Epi: x / Non Sq Epi: 2 / Bacteria: Negative        Culture - Urine (collected 2022 18:47)  Source: Clean Catch Clean Catch (Midstream)  Final Report (2022 14:18):    <10,000 CFU/mL Normal Urogenital Anika        RADIOLOGY & ADDITIONAL TESTS:  Results Reviewed:   Imaging Personally Reviewed:  Electrocardiogram Personally Reviewed:    COORDINATION OF CARE:  Care Discussed with Consultants/Other Providers [Y/N]:  Prior or Outpatient Records Reviewed [Y/N]:  
[facility] Division of Hospital Medicine  Melonie Maza MD  Pager (M-F, 8A-5P): [phone]  Other Times:  [phone]      Patient is a 89y old  Female who presents with a chief complaint of dyspnea    SUBJECTIVE / OVERNIGHT EVENTS: no acute events overnight. patient continues to repeat "i don't feel good" though having difficulty further explaining/characterizing what she feels. does endorse still has ongoing shortness of breath which is why family brought her into the hospital per  who was bedside during time of my encounter. no fever, chills, cough, abd pain, n/v.   ADDITIONAL REVIEW OF SYSTEMS:    MEDICATIONS  (STANDING):  apixaban 2.5 milliGRAM(s) Oral every 12 hours  enalapril 2.5 milliGRAM(s) Oral daily  furosemide   Injectable 40 milliGRAM(s) IV Push two times a day  memantine 5 milliGRAM(s) Oral daily  metoprolol succinate ER 25 milliGRAM(s) Oral daily  simvastatin 20 milliGRAM(s) Oral at bedtime    MEDICATIONS  (PRN):      CAPILLARY BLOOD GLUCOSE        I&O's Summary      PHYSICAL EXAM:  Vital Signs Last 24 Hrs  T(C): 36.3 (2022 04:41), Max: 36.8 (2022 14:24)  T(F): 97.3 (2022 04:41), Max: 98.2 (2022 14:24)  HR: 92 (2022 04:41) (79 - 96)  BP: 104/72 (2022 04:41) (98/70 - 108/65)  BP(mean): 87 (2022 21:29) (72 - 91)  RR: 22 (2022 04:41) (16 - 32)  SpO2: 93% (2022 04:41) (92% - 98%)    CONSTITUTIONAL: NAD, well-groomed  EYES: PERRLA; conjunctiva and sclera clear  ENMT: Moist oral mucosa, no pharyngeal injection or exudates; normal dentition  NECK: Supple, +JVD  RESPIRATORY: +tachypneic, visible dyspnea with prolonged conversation, +bibasilar crackles on exam  CARDIOVASCULAR: Regular rate and rhythm, normal S1 and S2, no murmur/rub/gallop; No lower extremity edema; Peripheral pulses are 2+ bilaterally  ABDOMEN: Soft, Nondistended, Nontender to palpation, normoactive bowel sounds, +ostomy back with brown output  MUSCULOSKELETAL:  No clubbing or cyanosis of digits; no joint swelling or tenderness to palpation  PSYCH: A+O to person only, not place despite being given choices, not month/year (per  bedside, baseline AOx1-2)  NEUROLOGY: CN 2-12 are intact and symmetric; no gross sensory deficits   SKIN: No rashes; no palpable lesions, LE cool touch b/l    LABS:                        13.2   8.88  )-----------( 249      ( 2022 07:32 )             40.3     02-08    136  |  98  |  22  ----------------------------<  151<H>  3.6   |  18<L>  |  0.99    Ca    8.9      2022 07:32  Phos  3.2     02-  Mg     2.3     -    TPro  6.2  /  Alb  3.8  /  TBili  1.3<H>  /  DBili  x   /  AST  53<H>  /  ALT  65<H>  /  AlkPhos  122<H>          Urinalysis Basic - ( 2022 16:21 )    Color: Yellow / Appearance: Clear / S.014 / pH: x  Gluc: x / Ketone: Negative  / Bili: Negative / Urobili: Negative   Blood: x / Protein: Negative / Nitrite: Negative   Leuk Esterase: Moderate / RBC: 3 /hpf / WBC 2 /HPF   Sq Epi: x / Non Sq Epi: 2 / Bacteria: Negative    pro-BNP 17K    RADIOLOGY & ADDITIONAL TESTS:  Results Reviewed: no leukocytosis, H/H stable, mild hypokalemia, lactate wnl  Imaging Personally Reviewed:  2021 TTE:  Observations:  Mitral Valve: Normal mitral valve. Mitral annular  calcification.  Moderate mitral regurgitation.  Aortic Valve/Aorta: Calcified aortic valve with normal  opening. Moderate aortic regurgitation.  Normal aortic root size.  Left Atrium: Severely dilated left atrium.  Left Ventricle: Moderate left ventricular enlargement.  Estimated ejection fraction 35%. Diffuse hypokensis.  Right Heart: Normal right atrium. Normal right ventricular  size and function.  Normal tricuspid valve. Mild tricuspid regurgitation.  Normal pulmonic valve. Minimal pulmonic regurgitation.  Pericardium/Pleura: Normal pericardium with no pericardial  effusion.  Hemodynamic: Estimated right atrial pressure is normal.  No evidence of pulmonary hypertension.  ------------------------------------------------------------------------  Conclusions:  Estimated ejection fraction 35%. Diffuse hypokensis.  Moderate mitral regurgitation.  Moderate aortic  regurgitation.  Electrocardiogram Personally Reviewed:    COORDINATION OF CARE:  Care Discussed with Consultants/Other Providers [Y]: medicine RUFINO Narvaez  Prior or Outpatient Records Reviewed [Y/N]:  
  Patient is a 89y old  Female who presents with a chief complaint of SOB (11 Feb 2022 15:02)      SUBJECTIVE / OVERNIGHT EVENTS: No On events. Reports some sob this AM. Denies cp, dizziness, n/v,         ADDITIONAL REVIEW OF SYSTEMS: Negative except for above    MEDICATIONS  (STANDING):  albuterol/ipratropium for Nebulization 3 milliLiter(s) Nebulizer every 6 hours  apixaban 2.5 milliGRAM(s) Oral every 12 hours  enalapril 2.5 milliGRAM(s) Oral daily  furosemide   Injectable 40 milliGRAM(s) IV Push two times a day  memantine 5 milliGRAM(s) Oral daily  metoprolol succinate ER 25 milliGRAM(s) Oral daily  simvastatin 20 milliGRAM(s) Oral at bedtime    MEDICATIONS  (PRN):      CAPILLARY BLOOD GLUCOSE        I&O's Summary    11 Feb 2022 07:01  -  12 Feb 2022 07:00  --------------------------------------------------------  IN: 480 mL / OUT: 1550 mL / NET: -1070 mL        PHYSICAL EXAM:  Vital Signs Last 24 Hrs  T(C): 36.3 (12 Feb 2022 13:21), Max: 36.9 (11 Feb 2022 21:06)  T(F): 97.4 (12 Feb 2022 13:21), Max: 98.5 (11 Feb 2022 21:06)  HR: 94 (12 Feb 2022 13:21) (92 - 98)  BP: 118/74 (12 Feb 2022 13:21) (98/62 - 118/74)  BP(mean): --  RR: 20 (12 Feb 2022 13:21) (20 - 20)  SpO2: 95% (12 Feb 2022 13:21) (95% - 97%)    PHYSICAL EXAM:  GENERAL: NAD, well-developed on RA but mildly tachypneic   HEAD:  Atraumatic, Normocephalic  NECK: Supple, mild JVD+  CHEST/LUNG: Clear to auscultation bilaterally;   HEART: Regular rate and rhythm;  ABDOMEN: Soft, Nontender, Nondistended; Bowel sounds present  EXTREMITIES:  2+ Peripheral Pulses, 1+ pitting edema in ankles  PSYCH: AAOx3  NEUROLOGY: non-focal  SKIN: No rashes or lesions      LABS:    02-12    138  |  101  |  27<H>  ----------------------------<  95  3.6   |  22  |  0.99    Ca    9.1      12 Feb 2022 07:33  Mg     2.2     02-11    TPro  5.9<L>  /  Alb  3.4  /  TBili  0.8  /  DBili  x   /  AST  29  /  ALT  49<H>  /  AlkPhos  113  02-12                RADIOLOGY & ADDITIONAL TESTS:    Imaging Personally Reviewed:    Electrocardiogram Personally Reviewed:    COORDINATION OF CARE:  Care Discussed with Consultants/Other Providers [Y/N]:  Prior or Outpatient Records Reviewed [Y/N]:  
[facility] Division of Hospital Medicine  Maria Elena Verduzco MD  Pager (M-F, 3A-5P): [phone]  Other Times:  [phone]    Patient is a 89y old  Female who presents with a chief complaint of SOB (10 Feb 2022 15:52)      SUBJECTIVE / OVERNIGHT EVENTS:  afebrile. feeling ok , minimally verbal but awake and responsive.   no acute overnight issues     ADDITIONAL REVIEW OF SYSTEMS: otherwise neg    MEDICATIONS  (STANDING):  albuterol/ipratropium for Nebulization 3 milliLiter(s) Nebulizer every 6 hours  apixaban 2.5 milliGRAM(s) Oral every 12 hours  enalapril 2.5 milliGRAM(s) Oral daily  furosemide   Injectable 40 milliGRAM(s) IV Push two times a day  memantine 5 milliGRAM(s) Oral daily  metoprolol succinate ER 25 milliGRAM(s) Oral daily  simvastatin 20 milliGRAM(s) Oral at bedtime    MEDICATIONS  (PRN):      CAPILLARY BLOOD GLUCOSE        I&O's Summary    10 Feb 2022 07:01  -  11 Feb 2022 07:00  --------------------------------------------------------  IN: 805 mL / OUT: 1350 mL / NET: -545 mL    11 Feb 2022 07:01  -  11 Feb 2022 15:03  --------------------------------------------------------  IN: 480 mL / OUT: 850 mL / NET: -370 mL        PHYSICAL EXAM:  Vital Signs Last 24 Hrs  T(C): 36.3 (11 Feb 2022 12:09), Max: 36.8 (11 Feb 2022 00:46)  T(F): 97.4 (11 Feb 2022 12:09), Max: 98.3 (11 Feb 2022 00:46)  HR: 94 (11 Feb 2022 12:09) (91 - 94)  BP: 95/57 (11 Feb 2022 12:09) (95/57 - 105/59)  BP(mean): --  RR: 20 (11 Feb 2022 12:09) (18 - 20)  SpO2: 94% (11 Feb 2022 12:09) (94% - 97%)    CONSTITUTIONAL: NAD, well-groomed  EYES: PERRLA; conjunctiva and sclera clear  ENMT: Moist oral mucosa, no pharyngeal injection or exudates; normal dentition  NECK: Supple,    RESPIRATORY: +bibasilar crackles on exam  CARDIOVASCULAR: Regular rate and rhythm, normal S1 and S2, no murmur/rub/gallop; trace lower extremity edema; Peripheral pulses are 2+ bilaterally  ABDOMEN: Soft, Nondistended, Nontender to palpation, normoactive bowel sounds, +ostomy back with brown output  MUSCULOSKELETAL:  No clubbing or cyanosis of digits; no joint swelling or tenderness to palpation  PSYCH: A+O to person only, not place despite being given choices, not month/year (per  bedside, baseline AOx1-2)  NEUROLOGY: CN 2-12 are intact and symmetric; no gross sensory deficits   SKIN: No rashes; no palpable lesions, LE cool touch b/l    LABS:                        12.1   7.37  )-----------( 239      ( 10 Feb 2022 06:20 )             36.8     02-11    134<L>  |  100  |  24<H>  ----------------------------<  103<H>  4.4   |  22  |  0.96    Ca    8.8      11 Feb 2022 06:43  Mg     2.2     02-11    TPro  5.6<L>  /  Alb  3.5  /  TBili  0.9  /  DBili  x   /  AST  55<H>  /  ALT  66<H>  /  AlkPhos  124<H>  02-11                RADIOLOGY & ADDITIONAL TESTS:  Results Reviewed:   Imaging Personally Reviewed:  Electrocardiogram Personally Reviewed:    COORDINATION OF CARE:  Care Discussed with Consultants/Other Providers [Y/N]:  Prior or Outpatient Records Reviewed [Y/N]:  
  Patient is a 89y old  Female who presents with a chief complaint of SOB (11 Feb 2022 15:02)      SUBJECTIVE / OVERNIGHT EVENTS: No On events. Feeling better today, denies sob, n/v,  Thinks leg are still a little swollen and not back to baseline. DEnies cp, palpitations, dizziness.       ADDITIONAL REVIEW OF SYSTEMS: Negative except for above    MEDICATIONS  (STANDING):  albuterol/ipratropium for Nebulization 3 milliLiter(s) Nebulizer every 6 hours  apixaban 2.5 milliGRAM(s) Oral every 12 hours  enalapril 2.5 milliGRAM(s) Oral daily  furosemide   Injectable 40 milliGRAM(s) IV Push two times a day  memantine 5 milliGRAM(s) Oral daily  metoprolol succinate ER 25 milliGRAM(s) Oral daily  simvastatin 20 milliGRAM(s) Oral at bedtime    MEDICATIONS  (PRN):      CAPILLARY BLOOD GLUCOSE        I&O's Summary    12 Feb 2022 07:01  -  13 Feb 2022 07:00  --------------------------------------------------------  IN: 0 mL / OUT: 1500 mL / NET: -1500 mL        PHYSICAL EXAM:  Vital Signs Last 24 Hrs  T(C): 36.4 (13 Feb 2022 06:16), Max: 36.6 (12 Feb 2022 15:44)  T(F): 97.6 (13 Feb 2022 06:16), Max: 97.8 (12 Feb 2022 15:44)  HR: 93 (13 Feb 2022 06:16) (93 - 96)  BP: 114/74 (13 Feb 2022 06:16) (100/65 - 118/74)  BP(mean): --  RR: 18 (13 Feb 2022 06:16) (18 - 20)  SpO2: 98% (13 Feb 2022 06:16) (94% - 98%)    PHYSICAL EXAM:  GENERAL: NAD, well-developed on RA comfortable  HEAD:  Atraumatic, Normocephalic  EYES:  conjunctiva and sclera clear  NECK: Supple, No JVD  CHEST/LUNG: Clear to auscultation bilaterally;   HEART: Regular rate and rhythm;   ABDOMEN: Soft, Nontender, Nondistended; Bowel sounds present  EXTREMITIES:  2+ Peripheral Pulses, trace ankle edema b/l, no calf tenderness b/l   PSYCH: AAOx3  NEUROLOGY: non-focal  SKIN: No rashes or lesions      LABS:    02-13    132<L>  |  99  |  30<H>  ----------------------------<  141<H>  4.8   |  19<L>  |  0.94    Ca    9.2      13 Feb 2022 06:30    TPro  5.9<L>  /  Alb  3.4  /  TBili  0.8  /  DBili  x   /  AST  29  /  ALT  49<H>  /  AlkPhos  113  02-12                RADIOLOGY & ADDITIONAL TESTS:    Imaging Personally Reviewed:    Electrocardiogram Personally Reviewed:    COORDINATION OF CARE:  Care Discussed with Consultants/Other Providers [Y/N]:  Prior or Outpatient Records Reviewed [Y/N]:  
[facility] Division of Hospital Medicine  Maria Elena Verduzco MD  Pager (M-F, 8A-5P): [phone]  Other Times:  [phone]    Patient is a 89y old  Female who presents with a chief complaint of SOB (09 Feb 2022 15:51)      SUBJECTIVE / OVERNIGHT EVENTS:  afebrile. feeling ok no changes.      ADDITIONAL REVIEW OF SYSTEMS: otherwise neg    MEDICATIONS  (STANDING):  albuterol/ipratropium for Nebulization 3 milliLiter(s) Nebulizer every 6 hours  apixaban 2.5 milliGRAM(s) Oral every 12 hours  enalapril 2.5 milliGRAM(s) Oral daily  furosemide   Injectable 40 milliGRAM(s) IV Push two times a day  memantine 5 milliGRAM(s) Oral daily  metoprolol succinate ER 25 milliGRAM(s) Oral daily  simvastatin 20 milliGRAM(s) Oral at bedtime    MEDICATIONS  (PRN):      CAPILLARY BLOOD GLUCOSE        I&O's Summary    09 Feb 2022 07:01  -  10 Feb 2022 07:00  --------------------------------------------------------  IN: 960 mL / OUT: 0 mL / NET: 960 mL    10 Feb 2022 07:01  -  10 Feb 2022 15:52  --------------------------------------------------------  IN: 325 mL / OUT: 1000 mL / NET: -675 mL        PHYSICAL EXAM:  Vital Signs Last 24 Hrs  T(C): 36.4 (10 Feb 2022 12:37), Max: 36.5 (10 Feb 2022 05:07)  T(F): 97.6 (10 Feb 2022 12:37), Max: 97.7 (10 Feb 2022 05:07)  HR: 93 (10 Feb 2022 12:37) (92 - 99)  BP: 103/65 (10 Feb 2022 12:37) (91/57 - 125/62)  BP(mean): --  RR: 20 (10 Feb 2022 12:37) (18 - 20)  SpO2: 100% (10 Feb 2022 12:37) (94% - 100%)    CONSTITUTIONAL: NAD, well-groomed  EYES: PERRLA; conjunctiva and sclera clear  ENMT: Moist oral mucosa, no pharyngeal injection or exudates; normal dentition  NECK: Supple, +JVD  RESPIRATORY: +bibasilar crackles on exam  CARDIOVASCULAR: Regular rate and rhythm, normal S1 and S2, no murmur/rub/gallop; trace lower extremity edema; Peripheral pulses are 2+ bilaterally  ABDOMEN: Soft, Nondistended, Nontender to palpation, normoactive bowel sounds, +ostomy back with brown output  MUSCULOSKELETAL:  No clubbing or cyanosis of digits; no joint swelling or tenderness to palpation  PSYCH: A+O to person only, not place despite being given choices, not month/year (per  bedside, baseline AOx1-2)  NEUROLOGY: CN 2-12 are intact and symmetric; no gross sensory deficits   SKIN: No rashes; no palpable lesions, LE cool touch b/l    LABS:                        12.1   7.37  )-----------( 239      ( 10 Feb 2022 06:20 )             36.8     02-10    141  |  100  |  26<H>  ----------------------------<  85  3.2<L>   |  23  |  1.09    Ca    9.0      10 Feb 2022 06:20  Phos  3.7     02-09  Mg     2.2     02-10    TPro  5.7<L>  /  Alb  3.6  /  TBili  1.1  /  DBili  x   /  AST  66<H>  /  ALT  72<H>  /  AlkPhos  140<H>  02-10              Culture - Urine (collected 07 Feb 2022 18:47)  Source: Clean Catch Clean Catch (Midstream)  Final Report (08 Feb 2022 14:18):    <10,000 CFU/mL Normal Urogenital Anika        RADIOLOGY & ADDITIONAL TESTS:  Results Reviewed:   Imaging Personally Reviewed:  Electrocardiogram Personally Reviewed:    COORDINATION OF CARE:  Care Discussed with Consultants/Other Providers [Y/N]:  Prior or Outpatient Records Reviewed [Y/N]:

## 2022-02-14 NOTE — PROGRESS NOTE ADULT - PROBLEM SELECTOR PROBLEM 8
Rectal adenocarcinoma

## 2022-02-14 NOTE — PROGRESS NOTE ADULT - PROBLEM SELECTOR PROBLEM 4
History of complete heart block

## 2022-02-14 NOTE — PROGRESS NOTE ADULT - PROBLEM SELECTOR PROBLEM 7
History of recurrent deep vein thrombosis (DVT)

## 2022-02-21 NOTE — DISCUSSION/SUMMARY
[FreeTextEntry1] : IMPRESSION: Mrs. 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 \par \par PLAN:\par 1.  She will continue off of Enalapril despite her congestive heart failure as her blood pressure drops and she feels very lethargic. She will continue on Toprol XL 25 mg daily rate control of her atrial fibrillation and Bumex 2 mg twice daily for diuresis in addition to potassium supplementation. I will arrange for a home draw next week for a CMP and pro-BNP. Her daughter will continue to follow her weights at home.  \par 2. She will continue on Simvastatin 20 mg daily for her cholesterol. Her most recent LDL and triglycerides were above goal. I have not made any changes at this time. \par 3. She will continue on Eliquis 2.5 mg twice daily for her bilateral PE and DVTs. She will also follow up with Vascular Cardiology. I will be checking a CBC with her blood work next week. \par 4. She will follow up with me in 1 month or sooner if she develops any new or worsening symptoms in the interim.\par 5. She will continue to follow with EP for her device interrogations. \par 6. I discussed with her daughter about Hospice evaluation which she will discuss with her siblings and father. \par 7. We have previously discussed possible intervention for her mitral regurgitation, however, she does not want any invasive procedures at this time.\par \par Time started- 5:37 PM\par Time ended- 5:51 PM

## 2022-02-21 NOTE — CARDIOLOGY SUMMARY
[___] : [unfilled] [LVEF ___%] : LVEF [unfilled]% [Mild] : mild pulmonary hypertension [de-identified] : 11/11/2021: Atrial fibrillation at 97 bpm with a left bundle branch block and frequent PVCs and ventricular couplets.\par   [de-identified] : 9/13/2021: EF 35%. Diffuse hypokinesis. Moderate LV enlargement. Moderate mitral regurgitation. Moderate aortic regurgitation. Severely dilated left atrium. Mild tricuspid regurgitation.\par

## 2022-02-21 NOTE — HISTORY OF PRESENT ILLNESS
[Home] : at home, [unfilled] , at the time of the visit. 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 She feels better since being discharged on 2/14/22 from the hospital. She was admitted on 2/7/2022 with decompensated heart failure and was diuresed. She continues to experience dyspnea with exertion with rare episodes of dyspnea at rest. She otherwise denies any chest pain, palpitations, and headaches.

## 2022-03-16 NOTE — PATIENT PROFILE ADULT - FUNCTIONAL ASSESSMENT - BASIC MOBILITY SCORE.
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 See previous encounter for detailed History of Present Illness. Patient has down's syndrome, and is presenting today with parents. Nafisa Zhang has a history of prediabetes and because of association of type 1 diabetes with down syndrome additional testing was done    Last A1c measurement was:  Hemoglobin A1C (%)   Date Value   01/07/2022 5.9 (H)     ILIANA antibodies were >250, suggestive of autoimmune diabetes. Islet cell antibodies and C-peptide were normal.  Most likely stage 2    Patient was started on Toujeo 6 units HS. Taking medication regularly, per father. Parents are checking his blood sugar 2 times per day, and most readings are within  range. Parents note that they are restricting diet. PHYSICAL EXAMINATION:   No examination is conducted on today's visit. Labs: Reviewed. ASSESSMENT AND PLAN:   1. 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 Down syndrome    --Patient has hx of borderline high A1c levels, and iliana and islet cell antibody were checked due to increased prevalence of autoimmune diabetes in patients with Down syndrome  --patient noted to have positive ILIANA antibody, level >250  --discussed natural history of latent autoimmune diabetes in adult  --we will continue with small dose of long-acting insulin for now. Parents are not having any trouble giving injection to patient. --continue to watch blood sugar twice per day. --discussed long-term complications of diabetes  --discussed monitoring and long-term management of diabetes. --Advised parents to contact office if Nafisa Zhang has any blood sugar readings below 70 mg/dL  --Check A1c and CMP prior to follow up    Information sheets and written instructions were given explaining above.     Parents have been encouraged to contact office if they should have other further questions or concerns. Estefanía Ace and his guardians were given sufficient time to ask questions, which were answered to their satisfaction. They agrees with the above-mentioned treatment plan. Total time spent on this appointment :  35 minutes which includes, reviewing labs, documentation, direct face-to-face counseling and care coordination. Follow-up: 4 months    Copy to Jameson Valero MD    I, Victoria Nguyễn, documented this patient chart as a medical scribe on behalf of Dr. Jason Hernandez MD.    I personally examined the patient, reviewed the clinical data, labs, images. I reviewed and edited the above note as needed. I personally formulated the impressions and recommendations.      JOHANA Carrillo  12

## 2022-03-22 PROBLEM — D64.9 ANEMIA: Status: ACTIVE | Noted: 2019-12-07

## 2022-03-22 PROBLEM — I50.9 CHF (CONGESTIVE HEART FAILURE): Status: ACTIVE | Noted: 2021-01-28

## 2022-05-03 NOTE — ED PROVIDER NOTE - ATTENDING CONTRIBUTION TO CARE
Patient with cough and reported difficulty breathing as per EMS. Patient's  here for collateral history. patient saturating high 90s on pulse oxygen status as per ems and noted to be above 94 here as well.   mild crackles to base of right lung  non-tachycardic  non-tachypneic  no respiratory distress or accessory muscle use  no gross edema no pitting  no rash/vesicles/petechiae  Chano Suarez MD, FACEP: In this physician's medical judgement based on clinical history and physical exam: patient with reports of res distress but here without distress and saturating within normal limits with no evidence of bacterial pneumonia on history, exam or xray. patient's family present for discussion of case and expected management decided upon with family and our team.   The patient was serially evaluated throughout emergency department course by the team. There was no acute deterioration up to this time in the emergency department. The patient has demonstrated clinical improvement and/or stability, feels better at this time according to emergency department team. Agree with goals/plan of emergency department care as described in this physician's electronic medical record, including diagnostics, therapeutics and consultation recommendation as clinically warranted. Will discharge home with close outpatient follow up with primary care physician/provider and specialist if necessary. Patient's family educated on concerning signs and features to return to the emergency department, in layman terms, including but not limited to: nausea, vomiting, fever, chills, persistent/worsening symptoms or any concerns at all. There are no acute or immediate life threatening issues present on history, clinical exam, or any diagnostic evaluation. The patient is a safe disposition home, patient and/or family/guardian (with documents sent with patient) has capacity and insight into their condition, and will follow up with their doctor(s) this week. Patient and/or family/guardian (with documents sent with patient) understand anticipatory guidance and was given strict return and follow up precautions and return precautions for worsening of condition. The patient and/or family/guardian (with documents sent with patient) has been informed of all concerning signs and symptoms to return to Emergency Department, the necessity to follow up with the PMD/Clinic/follow up provided within 2-3 days was explained in written detail and left for family/guardian(s). The patient and/or family/guardian were given the opportunity to ask questions and have them answered in full. The patient and/or family/guardian are with capacity and insight into the situation, treatment, risks, benefits, alternative therapies, and understand that they can ask any further questions if needed. Patient and/or family/guardian understand anticipatory guidance were given strict return and follow up precautions.  The patient and/or family/guardian have been informed of all concerning signs and symptoms to return to Emergency Department, the necessity to follow up with the PMD/Clinic/follow up provided within 2-3 days was explained, and the patient and/or family reports understanding of above with capacity and insight. The patient and/or family/guardian were informed of any results of their tests and are were encouraged to follow up on the findings with their doctor as well as the need to inform their doctor of any results. The patient and/or family/guardian are aware of the need to follow up with repeat testing as applicable and report understanding of the above with capacity and insight. The patient and/or family/guardian was made aware of any pending test results at the time of discharge and of the need to call back for the final results a well as the need to inform their doctor of the results.

## 2022-05-03 NOTE — ED ADULT NURSE NOTE - OBJECTIVE STATEMENT
Received pt in assigned room with , pt has Dementia baseline awake, alert to first name only, pt able to follow commands, as per  pt woke up not breathing well & began coughing, denies any mucus, fever, vomiting or diarrhea, pt has no complaints of pain, placed on cardiac monitor VSS, ekg completed, skin intact, resps even and non labored, speaking in full sentences, IVL placed labs sent per MD orders, colostomy bag noted,  remains with patient, will continue to monitor, pt in nad

## 2022-05-03 NOTE — ED PROVIDER NOTE - NSFOLLOWUPINSTRUCTIONS_ED_ALL_ED_FT
- Lab and imaging results, if performed, were discussed with you along with your discharge diagnosis    - Follow up with your doctor in 1 week - bring copies of your results with you to your appointment    - Return to the ED for any new, worsening, or concerning symptoms to you, including if you develop fever, worsening coughing, shortness of breath or chest pain     - Continue all prescribed medications    - Take the tessalon perles (cough medication) as needed for your cough

## 2022-05-03 NOTE — ED PROVIDER NOTE - PATIENT PORTAL LINK FT
You can access the FollowMyHealth Patient Portal offered by [facility] by registering at the following website: [email or web address]. By joining [facility]’s FollowMyHealth portal, you will also be able to view your health information using other applications (apps) compatible with our system.

## 2022-05-03 NOTE — ED PROVIDER NOTE - PHYSICAL EXAMINATION
Physical Exam:  Gen: NAD, AOx2, non-toxic appearing  HEENT: EOMI, PEERL, normal conjunctiva, tongue midline, oral mucosa moist  Lung: +crackles to right lung base, no respiratory distress, speaking in full sentences  CV: non-paced rhythm, normal rate, no murmurs, rubs or gallops  Abd: soft, NT, ND, no guarding, no rigidity, no rebound tenderness, no CVA tenderness   MSK: no visible deformities, ROM normal in UE/LE, no back pain  Neuro: No focal sensory or motor deficits  Skin: Warm, well perfused, no rash, +non-pitting edema to B/L LE   Luz Armstrong D.O. -Resident

## 2022-05-03 NOTE — ED PROVIDER NOTE - CLINICAL SUMMARY MEDICAL DECISION MAKING FREE TEXT BOX
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 +crackles to R base of lung, satting well on RA without respiratory distress. Non-pitting edema to B/L LE, no JVD, abd soft, NT. Differential includes pneumonia, CHF exacerbation. Will obtain CXR, labs, reassess.

## 2022-05-03 NOTE — ED PROVIDER NOTE - PROGRESS NOTE DETAILS
Nathaniel BROWN (PGY-3): pt labs at baseline along w/ CXR, has not required 02 while here, no respiratory distress. Discussed strict return precautions and follow-up instructions. Patient is agreeable with plan, addressed all questions and concerns at this time.  stated will arrange close follow-up with PMD and Cardiologist.

## 2022-05-04 NOTE — ED POST DISCHARGE NOTE - ADDITIONAL DOCUMENTATION
pt's daughter called, pharmacy did not receive rx for benzonatate. rx resubmitted with original instructions as intended by provider that cared for patient, I did not directly care/treat this patient.

## 2022-07-19 ENCOUNTER — APPOINTMENT (OUTPATIENT)
Dept: ELECTROPHYSIOLOGY | Facility: CLINIC | Age: 87
End: 2022-07-19

## 2022-08-10 NOTE — REASON FOR VISIT
[Follow-Up - Clinic] : a clinic follow-up of [FreeTextEntry1] : 88F hx of dementia, HTN, HLD, osteoporosis, ICH s/p ronny hole, returned from [facility], hosptial stay for UTI, rehab, then returned to [place] found to have PE and DVT.  40

## 2022-08-19 ENCOUNTER — APPOINTMENT (OUTPATIENT)
Dept: ELECTROPHYSIOLOGY | Facility: CLINIC | Age: 87
End: 2022-08-19

## 2022-11-15 NOTE — DISCHARGE NOTE PROVIDER - NSDCHOSPICE_GEN_A_CORE
Head,  normocephalic,  atraumatic,  Face,  Face within normal limits,  Ears,  External ears within normal limits, No

## 2022-11-29 NOTE — ED PROVIDER NOTE - CARE PLAN
Cryotherapy Text: The wound bed was treated with cryotherapy after the biopsy was performed. Principal Discharge DX:	CHF exacerbation

## 2022-12-22 NOTE — ED ADULT NURSE NOTE - PAIN RATING/NUMBER SCALE (0-10): ACTIVITY
[FreeTextEntry8] : Complain of few weeks bilateral upper chest pain with no radiation. Pain is 6/10 and pressure like. Lasts about 1-2 minutes. Resolves without doing anything. During episodes, no nausea, sweats, dyspnea, dizziness, palpitations. Sx not nocturnal and not with exertion or going up stairs. \par Sx were originally once every two weeks approx and almost every day about 1-2 weeks. \par Last EST was about 5-6 years or more. \par 
0

## 2023-01-03 NOTE — PATIENT PROFILE ADULT - NSPROPTRIGHTREPNAME_GEN_A__NUR
Diego Lundberg [FreeTextEntry3] : Cataract surgery [FreeTextEntry9] : Arthritis [de-identified] : delusional [de-identified] : Thyroid  [FreeTextEntry1] : Allergy shots for dander

## 2023-01-11 NOTE — PROVIDER CONTACT NOTE (OTHER) - REASON
20 WCT on telemetry
Patient noted with conversion to  frequent Afib and sinus rhythm on tele monitor.
Fall with Harm Risk
Fall Risk

## 2023-01-26 NOTE — H&P ADULT - PROBLEM SELECTOR PLAN 8
[phone]
- hypotensive in setting of PE, MAP >65  - hold home enalapril   - s/p 1.5L bolus, c/w NS @125, caution with additional boluses given RV strain and left systolic dysfunction, per GOC below family would not want pressors  - VS [id number]

## 2023-03-09 NOTE — STUDENT SIGN OFF DOCUMENT - COPY OF STUDENT DOCUMENT REVIEW
Aure Joy comes into clinic today at the request of ESTELA Zamudio MD Ordering Provider for Med Injection only Ketamine.    Procedure Prep:  Medication double check completed by: Jael Alexandra RN  Prior to administration pt identified by name and : yes    Nursing Assessment:  Appearance: Casual Clothing   Mood: neutral  Affect: wnl  Eye contact: good  PHQ 3/9/2023   PHQ-9 Total Score 8   Q9: Thoughts of better off dead/self-harm past 2 weeks Not at all     QIDDSR 16 weekly assessment: score 13. Assessment was scanned to EHR.       Procedure Performed:  VSS and mental status WNL. Patient was given ketamine. See MAR for details.     Post Procedure Assessment:  Patient tolerated the treatment with the following side effects: dissociation. Vital signs were monitored, see VS Flowsheet. Patient stated they felt ready to go home prior to discharge. AVS was offered to patient and patient declined. Patient was instructed not to drive for the remainder of the day and to notify clinic if any concerns arise.     Next appt: 3/16    Medications provided by Pharmacy     This service provided today was under the supervising provider of the day Dom Shirley MD, who was available if needed.    Perla Robbins RN      
physical therapy

## 2023-08-31 NOTE — H&P ADULT - NSHPPHYSICALEXAM_GEN_ALL_CORE
31-Aug-2023 22:13
Vital Signs Last 24 Hrs  T(C): 36.3 (13 Nov 2018 02:08), Max: 36.7 (12 Nov 2018 23:15)  T(F): 97.4 (13 Nov 2018 02:08), Max: 98.1 (12 Nov 2018 23:15)  HR: 77 (13 Nov 2018 02:08) (77 - 106)  BP: 113/73 (13 Nov 2018 02:08) (103/59 - 143/77)  BP(mean): --  RR: 17 (13 Nov 2018 02:08) (17 - 20)  SpO2: 95% (13 Nov 2018 02:08) (94% - 98%)

## 2024-02-20 NOTE — PROGRESS NOTE ADULT - SUBJECTIVE AND OBJECTIVE BOX
Contact:  [phone]     Nazia Lyle     Phone number: [phone]    Name of person spoken with and relationship to patient: Nazia, self   Patient’s Adherence:            How patient is doing on medication: well     How many missed doses and reason: 0    Any new medications: no    Any new conditions: no    Any new allergies: no    Any new side effects: no     Any new diagnoses: no     How many doses remainin    Did patient want to speak with pharmacist: no  Delivery:            [place] date and method:   [place]     Needs by Date:      Signature required:  no    Any additional details for :  morris  Teach Appointment Date:  morris  Shipping Address:  [address] [place], [place] [id number]  Medication(name, strength and dose):   Ozempic (1 MG/DOSE) 4 MG/3ML Sopn SubQ Inject every 7 days  Copay: $391.04  Payment Method: ccof  Supplies:  na  Additional info:  HUSSAIN Mandujano. She stated doing well on the medication. She had previous intestinal issues prior to taking this medication and stated she is not experiencing any worse effects after starting on Ozempic. She is very pleased pharmacy offed special pricing for her. No further questions/concerns at this time     RED TEAM SURGERY PROGRESS NOTE    89yFemale    SUBJECTIVE:  Patient seen and examined at bedside. No acute events overnight. Pain well controlled. Wants to go home and is upset to not be on 9monti. Denies fevers, chills, nausea, vomiting, chest pain, and shortness of breath.     --------------------------------------------------------------------------------------------------  OBJECTIVE:     Physical Exam:  General: AAOx3, NAD, resting comfortably   HEENT: NC/AT  Respiratory: no increased work of breathing   Abdomen: soft, nontender, nondistended, no rebound/guarding, ostomy with gas and stool  Extremities: warm and well perfused  --------------------------------------------------------------------------------------------------  Vital Signs:  Vital Signs Last 24 Hrs  T(C): 37.1 (21 Mar 2020 05:01), Max: 37.1 (21 Mar 2020 05:01)  T(F): 98.7 (21 Mar 2020 05:01), Max: 98.7 (21 Mar 2020 05:01)  HR: 71 (21 Mar 2020 05:01) (71 - 91)  BP: 119/61 (21 Mar 2020 05:01) (96/56 - 130/68)  BP(mean): --  RR: 18 (21 Mar 2020 05:01) (18 - 18)  SpO2: 94% (21 Mar 2020 05:01) (94% - 100%)    --------------------------------------------------------------------------------------------------  Inputs/Outputs:    19 Mar 2020 07:01  -  20 Mar 2020 07:00  --------------------------------------------------------  IN:    lactated ringers.: 1425 mL    Oral Fluid: 80 mL  Total IN: 1505 mL    OUT:    Colostomy: 400 mL    Indwelling Catheter - Urethral: 720 mL  Total OUT: 1120 mL    Total NET: 385 mL      20 Mar 2020 07:01  -  21 Mar 2020 06:45  --------------------------------------------------------  IN:    lactated ringers.: 1100 mL    Oral Fluid: 780 mL  Total IN: 1880 mL    OUT:    Indwelling Catheter - Urethral: 3075 mL  Total OUT: 3075 mL    Total NET: -1195 mL        --------------------------------------------------------------------------------------------------  Laboratories:                        8.7    9.45  )-----------( 268      ( 21 Mar 2020 06:26 )             27.4         20 Mar 2020 08:29    140    |  105    |  14     ----------------------------<  123    4.6     |  25     |  0.75     Ca    8.7        20 Mar 2020 08:29  Phos  3.8       20 Mar 2020 05:37  Mg     <.6       20 Mar 2020 05:37      --------------------------------------------------------------------------------------------------  Medications:  MEDICATIONS  (STANDING):  acetaminophen   Tablet .. 1000 milliGRAM(s) Oral every 6 hours  enoxaparin Injectable 40 milliGRAM(s) SubCutaneous daily  lactated ringers. 1000 milliLiter(s) (50 mL/Hr) IV Continuous <Continuous>  memantine 5 milliGRAM(s) Oral daily  metoprolol succinate ER 12.5 milliGRAM(s) Oral daily  simvastatin 20 milliGRAM(s) Oral at bedtime    MEDICATIONS  (PRN):  morphine  - Injectable 1 milliGRAM(s) IV Push every 3 hours PRN Severe Pain (7 - 10)  naloxone Injectable 0.1 milliGRAM(s) IV Push every 3 minutes PRN For ANY of the following changes in patient status:  A. RR LESS THAN 10 breaths per minute, B. Oxygen saturation LESS THAN 90%, C. Sedation score of 6  ondansetron Injectable 4 milliGRAM(s) IV Push every 6 hours PRN Nausea

## 2024-04-23 NOTE — DISCHARGE NOTE NURSING/CASE MANAGEMENT/SOCIAL WORK - NSDCPEPTCAREGIVEDUMATLIST _GEN_ALL_CORE
Chief Complaints and History of Present Illnesses   Patient presents with    Monitor Current High Risk Meds     High risk medication - plaquenil     Chief Complaint(s) and History of Present Illness(es)       Monitor Current High Risk Meds              Laterality: both eyes    Associated symptoms: dryness, floaters and itching.  Negative for eye pain, tearing and flashes    Treatments tried: artificial tears    Pain scale: 0/10    Comments: High risk medication - plaquenil              Comments    Pt states vision is getting a little worse.  No pain.  No flashes.  No changes to floaters.  Always dry BE.  Some itchiness.  AT's and allergy drops PRN.  Plaquenil 2 400 mg tabs daily.    SUZY Parnell April 23, 2024 9:28 AM                        Heart Failure/Influenza Vaccination/Apixaban/Eliquis

## 2024-08-26 NOTE — PROVIDER CONTACT NOTE (CRITICAL VALUE NOTIFICATION) - RECOMMENDATIONS
[facility] Post-Care Instructions In Note?: no Detail Level: Zone Consent: The patient's consent was obtained including but not limited to risks of crusting, scabbing, blistering, scarring, darker or lighter pigmentary change, recurrence, incomplete removal and infection. Total Number Of Aks Treated: 2 Post-Care Instructions: I reviewed with the patient in detail post-care instructions. Patient is to wear sunprotection, and avoid picking at any of the treated lesions. Pt may apply Vaseline to crusted or scabbing areas. Duration Of Freeze Thaw-Cycle (Seconds): 0

## 2024-10-20 NOTE — CONSULT NOTE ADULT - SUBJECTIVE AND OBJECTIVE BOX
p (1480)     HPI: Leonarda Lundberg 89 y/o F pmhx htn, hld, mild dementia, sp L ronny hole x2 for evacuation of [facility] in [place] in early october.  Patient returned from [place] yesterday and family states they are concerned about her having been deconditioned. Per family, patient has decline in strength/energy but MS is at baseline.     PAST MEDICAL HISTORY   Generalized Osteoarthritis  PVD (Peripheral Vascular Disease)  History of Osteoporosis  Asthma  Hyperlipemia  Hypertension    PAST SURGICAL HISTORY   S/P Cataract Surgery        MEDICATIONS:  Antibiotics:    Neuro:    Anticoagulation:    Other:      SOCIAL HISTORY:   Occupation:   Marital Status:     FAMILY HISTORY:      REVIEW OF SYSTEMS:  Check here if all are normal other than Neurological/HPI [x]  General:  Eyes:  ENT:  Cardiac:  Respiratory:  GI:  Musculoskeletal:   Skin:  Neurologic:   Psychiatric:     PHYSICAL EXAMINATION:   T(C): 36.3 (11-12-18 @ 17:17), Max: 36.3 (11-12-18 @ 17:17)  HR: 106 (11-12-18 @ 17:17) (106 - 106)  BP: 143/77 (11-12-18 @ 17:17) (143/77 - 143/77)  RR: 18 (11-12-18 @ 17:17) (18 - 18)  SpO2: 98% (11-12-18 @ 17:17) (98% - 98%)  Wt(kg): --Height (cm): 160.02 (11-12 @ 17:17)  Weight (kg): 56.7 (11-12 @ 17:17)    General Examination:     Neurologic Examination:           AO x2, fc, PERRL,MCKINNEY at least antigravity, no drift    LABS:                        11.8   6.8   )-----------( 274      ( 12 Nov 2018 20:28 )             37.1     11-12    135  |  102  |  11  ----------------------------<  96  >9.0<HH>   |  22  |  0.32<L>    Ca    8.9      12 Nov 2018 20:28    TPro  6.9  /  Alb  3.3  /  TBili  0.4  /  DBili  x   /  AST  79<H>  /  ALT  21  /  AlkPhos  39<L>  11-12    PT/INR - ( 12 Nov 2018 20:28 )   PT: 12.9 sec;   INR: 1.12 ratio         PTT - ( 12 Nov 2018 20:28 )  PTT:34.1 sec      RADIOLOGY & ADDITIONAL STUDIES: no radiation

## 2025-01-29 NOTE — ED PROVIDER NOTE - OBJECTIVE STATEMENT
Problem: Physical Therapy - Adult  Goal: By Discharge: Performs mobility at highest level of function for planned discharge setting.  See evaluation for individualized goals.  Description: FUNCTIONAL STATUS PRIOR TO ADMISSION: Pt lives with spouse and was indep with occasional use of SPC. Recently admitted and discharged 1/23 with recommendation for use of RW and HH PT. Pt returned to ED on 1/24.      Physical Therapy Goals  Initiated 1/28/2025  1.  Patient will move from supine to sit and sit to supine and roll side to side in bed with minimal assistance within 7 day(s).    2.  Patient will perform sit to stand with contact guard assist within 7 day(s).  3.  Patient will transfer from bed to chair and chair to bed with minimal assistance using the least restrictive device within 7 day(s).  4.  Patient will ambulate with minimal assistance for 25 feet with the least restrictive device within 7 day(s).     Outcome: Progressing   PHYSICAL THERAPY TREATMENT    Patient: Katey Sterling (84 y.o. female)  Date: 1/29/2025  Diagnosis: Pneumoperitoneum [K66.8]  Gastric ulcer with perforation, unspecified chronicity (HCC) [K25.5]  Perforated abdominal viscus [R19.8] Gastric ulcer with perforation, unspecified chronicity (HCC)  Procedure(s) (LRB):  LAPAROSCOPIC RADHA PATCH AND ESOPHAGOGASTRODUODENOSCOPY (N/A) 5 Days Post-Op  Precautions: Fall Risk, Other (Comment) (NPO)                      ASSESSMENT:  Patient continues to benefit from skilled PT services and is slowly progressing towards goals. Pt received in bed, agreeable to PT session.  She had slid all the way down in bed, appearing very uncomfortable.   and other family member at bedside.  Slightly less physical assistance required this visit.  Pt able to come to sitting eob, stand, and ambulate a short distance to the recliner.  She is very deconditioned, reporting fatigue once seated in the chair.  BP elevated. No pain voiced.           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 Collateral obtained from  who states patient has hx of chronic dry cough but yesterday cough worsened and during coughing fits patient appeared to have increased work of breathing. Denies fever, chills, chest pain, vomiting, diarrhea, urinary symptoms, recent known sick contacts. States patient is compliant with her home medications.  states swelling in legs has not acute worsened. Patient arrived to room on RA satting 97% without evidence of tachypnea. Patient follows commands, able to answer basic questions. continues to benefit from skilled intervention to address the above impairments.  Continue treatment per established plan of care.    Recommendation for discharge: (in order for the patient to meet his/her long term goals):   Moderate intensity short-term skilled physical therapy up to 5x/week    Other factors to consider for discharge: patient's current support system is unable to meet their requirements for physical assistance, high risk for falls, not safe to be alone, and concern for safely navigating or managing the home environment       SUBJECTIVE:   Patient stated, \"that was hard.\"    OBJECTIVE DATA SUMMARY:   Critical Behavior:          Functional Mobility Training:  Bed Mobility:  Bed Mobility Training  Bed Mobility Training: Yes  Overall Level of Assistance: Moderate assistance  Rolling: Minimum assistance;Moderate assistance;Assist X1;Additional time  Supine to Sit: Moderate assistance;Additional time;Assist X1  Scooting: Moderate assistance;Assist X1;Additional time  Transfers:  Transfer Training  Transfer Training: Yes  Sit to Stand: Minimum assistance  Stand to Sit: Contact-guard assistance  Bed to Chair: Contact-guard assistance;Minimum assistance  Balance:  Balance  Sitting: Impaired  Sitting - Static: Fair (occasional);Good (unsupported) (L lateral lean)  Sitting - Dynamic: Fair (occasional)  Standing: Impaired  Standing - Static: Good;Constant support  Standing - Dynamic: Good;Fair;Constant support   Ambulation/Gait Training:     Gait  Gait Training: Yes  Left Side Weight Bearing: Full  Right Side Weight Bearing: Full  Distance (ft): 3 Feet  Assistive Device: Gait belt;Walker, rolling  Interventions: Verbal cues;Safety awareness training;Manual cues  Base of Support: Widened  Speed/Aida: Slow  Step Length: Right shortened;Left shortened  Gait Abnormalities: Decreased step clearance  Wheelchair Management  Wheelchair Management: No    Activity Tolerance:   Fair , requires rest breaks, and SpO2

## 2025-03-20 NOTE — DISCHARGE NOTE PROVIDER - NSDCFUADDAPPT_GEN_ALL_CORE_FT
Patients wife called in regards to wanting new glucose monitor and test strips for patient. Previous supplies are old. Please advise.     
Please sign order   
Please follow up with Dr. Basilio in 2 weeks after your discharge from the hospital.
no

## 2025-05-09 NOTE — DISCHARGE NOTE NURSING/CASE MANAGEMENT/SOCIAL WORK - PATIENT PORTAL LINK FT
You can access the FollowMyHealth Patient Portal offered by [facility] by registering at the following website: [email or web address]. By joining [facility]’s FollowMyHealth portal, you will also be able to view your health information using other applications (apps) compatible with our system.
DISPLAY PLAN FREE TEXT